# Patient Record
Sex: FEMALE | Race: WHITE | NOT HISPANIC OR LATINO | Employment: OTHER | URBAN - METROPOLITAN AREA
[De-identification: names, ages, dates, MRNs, and addresses within clinical notes are randomized per-mention and may not be internally consistent; named-entity substitution may affect disease eponyms.]

---

## 2017-03-21 ENCOUNTER — ALLSCRIPTS OFFICE VISIT (OUTPATIENT)
Dept: OTHER | Facility: OTHER | Age: 82
End: 2017-03-21

## 2017-03-28 ENCOUNTER — TRANSCRIBE ORDERS (OUTPATIENT)
Dept: ADMINISTRATIVE | Facility: HOSPITAL | Age: 82
End: 2017-03-28

## 2017-03-28 DIAGNOSIS — I35.0 NODULAR CALCIFIC AORTIC VALVE STENOSIS: Primary | ICD-10-CM

## 2017-04-19 ENCOUNTER — GENERIC CONVERSION - ENCOUNTER (OUTPATIENT)
Dept: OTHER | Facility: OTHER | Age: 82
End: 2017-04-19

## 2017-04-19 ENCOUNTER — LAB (OUTPATIENT)
Dept: LAB | Facility: CLINIC | Age: 82
End: 2017-04-19
Payer: MEDICARE

## 2017-04-19 ENCOUNTER — HOSPITAL ENCOUNTER (OUTPATIENT)
Dept: NON INVASIVE DIAGNOSTICS | Facility: CLINIC | Age: 82
Discharge: HOME | End: 2017-04-19
Payer: MEDICARE

## 2017-04-19 DIAGNOSIS — I35.0 NONRHEUMATIC AORTIC VALVE STENOSIS: ICD-10-CM

## 2017-04-19 LAB
ANION GAP SERPL CALCULATED.3IONS-SCNC: 6 MMOL/L (ref 4–13)
ATRIAL RATE: 54 BPM
BUN SERPL-MCNC: 17 MG/DL (ref 5–25)
CALCIUM SERPL-MCNC: 9.7 MG/DL (ref 8.3–10.1)
CHLORIDE SERPL-SCNC: 106 MMOL/L (ref 100–108)
CO2 SERPL-SCNC: 29 MMOL/L (ref 21–32)
CREAT SERPL-MCNC: 0.7 MG/DL (ref 0.6–1.3)
ERYTHROCYTE [DISTWIDTH] IN BLOOD BY AUTOMATED COUNT: 13.9 % (ref 11.6–15.1)
GFR SERPL CREATININE-BSD FRML MDRD: >60 ML/MIN/1.73SQ M
GLUCOSE SERPL-MCNC: 88 MG/DL (ref 65–140)
HCT VFR BLD AUTO: 42.4 % (ref 34.8–46.1)
HGB BLD-MCNC: 14.3 G/DL (ref 11.5–15.4)
MCH RBC QN AUTO: 34.1 PG (ref 26.8–34.3)
MCHC RBC AUTO-ENTMCNC: 33.7 G/DL (ref 31.4–37.4)
MCV RBC AUTO: 101 FL (ref 82–98)
P AXIS: 65 DEGREES
PLATELET # BLD AUTO: 188 THOUSANDS/UL (ref 149–390)
PMV BLD AUTO: 10.8 FL (ref 8.9–12.7)
POTASSIUM SERPL-SCNC: 4.4 MMOL/L (ref 3.5–5.3)
PR INTERVAL: 172 MS
QRS AXIS: -17 DEGREES
QRSD INTERVAL: 130 MS
QT INTERVAL: 466 MS
QTC INTERVAL: 441 MS
RBC # BLD AUTO: 4.19 MILLION/UL (ref 3.81–5.12)
SODIUM SERPL-SCNC: 141 MMOL/L (ref 136–145)
T WAVE AXIS: 82 DEGREES
VENTRICULAR RATE: 54 BPM
WBC # BLD AUTO: 5.58 THOUSAND/UL (ref 4.31–10.16)

## 2017-04-19 PROCEDURE — 85027 COMPLETE CBC AUTOMATED: CPT

## 2017-04-19 PROCEDURE — 36415 COLL VENOUS BLD VENIPUNCTURE: CPT

## 2017-04-19 PROCEDURE — 93005 ELECTROCARDIOGRAM TRACING: CPT

## 2017-04-19 PROCEDURE — 93306 TTE W/DOPPLER COMPLETE: CPT

## 2017-04-19 PROCEDURE — 80048 BASIC METABOLIC PNL TOTAL CA: CPT

## 2017-04-24 ENCOUNTER — ALLSCRIPTS OFFICE VISIT (OUTPATIENT)
Dept: OTHER | Facility: OTHER | Age: 82
End: 2017-04-24

## 2017-05-04 ENCOUNTER — ALLSCRIPTS OFFICE VISIT (OUTPATIENT)
Dept: OTHER | Facility: OTHER | Age: 82
End: 2017-05-04

## 2017-06-21 ENCOUNTER — ALLSCRIPTS OFFICE VISIT (OUTPATIENT)
Dept: OTHER | Facility: OTHER | Age: 82
End: 2017-06-21

## 2017-08-21 ENCOUNTER — ALLSCRIPTS OFFICE VISIT (OUTPATIENT)
Dept: OTHER | Facility: OTHER | Age: 82
End: 2017-08-21

## 2017-08-21 DIAGNOSIS — E03.9 HYPOTHYROIDISM: ICD-10-CM

## 2017-10-17 ENCOUNTER — GENERIC CONVERSION - ENCOUNTER (OUTPATIENT)
Dept: GERIATRICS | Age: 82
End: 2017-10-17

## 2017-10-24 DIAGNOSIS — E03.9 HYPOTHYROIDISM: ICD-10-CM

## 2017-11-09 ENCOUNTER — ALLSCRIPTS OFFICE VISIT (OUTPATIENT)
Dept: OTHER | Facility: OTHER | Age: 82
End: 2017-11-09

## 2017-11-10 NOTE — PROGRESS NOTES
Assessment  Assessed    1  History of Aortic stenosis, severe (424 1) (I35 0)   2  History of Aortic Valve Replacement Transcatheter   3  Left ventricular hypertrophy (429 3) (I51 7)    Plan  Combined disorders of mitral, aortic and tricuspid valves, S/P TAVR (transcatheter aorticvalve replacement)    · Follow-up visit in 6 months Evaluation and Treatment  Follow-up  Status: Hold For -Scheduling  Requested for: 60EVE0511   Ordered;Combined disorders of mitral, aortic and tricuspid valves, S/P TAVR (transcatheter aortic valve replacement); Ordered By: Nicole Martin Performed:  Due: 95QOC2722    Discussion/Summary  Discussion Summary:   Ms Kenia Trammell is a pleasant 49-year-old female who presents to the office today for a routine follow-up  Since her last visit she has been feeling well  She continues to exercise and is asymptomatic   blood pressure is well-controlled on no medication  most recent echo reveals a well-seated aortic valve with mild to moderate regurgitation and mild perivalvular regurgitation  She will undergo a repeat study next year  She was reminded of the need for antibiotic prophylaxis prior to dental procedures  changes were made to her medication regimen  will see her back in the office in six months or sooner if deemed necessary  History of Present Illness  HPI: Ms Kenia Trammell is a pleasant 49-year-old female who presents to the office today for routine followup   her last visit she has been feeling well  She continues swimming a few times a week  She also participates in senior aerobics although she admits to not doing this as frequently as she had been  With those activities she denies any chest pain or shortness of breath  She denies any signs or symptoms of congestive heart failure including increasing lower extremity edema, paroxysmal nocturnal dyspnea, or orthopnea  She denies weight gain or increasing abdominal girth  She denies lightheadedness, dizziness, syncope or presyncope   She denies symptoms of claudication  continues to smoke a few cigarettes per week  Review of Systems  Cardiology Female ROS:    Cardiac: as noted in HPI  Skin: No complaints of nonhealing sores or skin rash  Genitourinary: No complaints of recurrent urinary tract infections, frequent urination at night, difficult urination, blood in urine, kidney stones, loss of bladder control, kidney problems, denies any birth control or hormone replacement, is not post menopausal, not currently pregnant  Psychological: No complaints of feeling depressed, anxiety, panic attacks, or difficulty concentrating  General: no changes in weight  Respiratory: No complaints of shortness of breath, cough with sputum, or wheezing  HEENT: No complaints of serious problems, hearing problems, nose problems, throat problems, or snoring  Gastrointestinal: No complaints of liver problems, nausea, vomiting, heartburn, constipation, bloody stools, diarrhea, problems swallowing, adbominal pain, or rectal bleeding  Hematologic: No complaints of bleeding disorders, anemia, blood clots, or excessive brusing  Neurological: No complaints of numbness, tingling, dizziness, weakness, seizures, headaches, syncope or fainting, AM fatigue, daytime sleepiness, no witnessed apnea episodes  Musculoskeletal: No complaints of arthritis, back pain, or painfull swelling  Active Problems  Problems    1  History of Aortic stenosis, severe (424 1) (I35 0)   2  Cerumen impaction (380 4) (H61 20)   3  Combined disorders of mitral, 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)   4  Diplopia (368 2) (H53 2)   5  DNR (do not resuscitate) (V49 86) (Z66)   6  Essential hypertension (401 9) (I10)   7  Essential tremor (333 1) (G25 0)   8  Fatigue (780 79) (R53 83)   9  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)   10  Hearing loss (389 9) (H91 90)   11  Hypothyroidism (244 9) (E03 9)   12  IBS (irritable bowel syndrome) (564 1) (K58 9)   13   Insomnia (780 52) (G47 00) 14  Left ventricular hypertrophy (429 3) (I51 7)   15  Near syncope (780 2) (R55)   16  Onychomycosis of toenail (110 1) (B35 1)   17  Osteoarthritis (715 90) (M19 90)   18  Osteoporosis (733 00) (M81 0)   19  Postop check (V67 00) (Z09)   20  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)   21  Symptoms involving cardiovascular system (785 9) (R09 89)   22  Urinary incontinence (788 30) (R32)    Past Medical History  Problems    1  History of Aortic stenosis, severe (424 1) (I35 0)   2  History of hypothyroidism (V12 29) (Z86 39)   3  History of upper respiratory infection (V12 09) (Z87 09)   4  History of viral infection (V12 09) (Z86 19)    Surgical History  Problems    1  History of Aortic Valve Replacement Transcatheter   2  History of Cataract Surgery   3  History of Hysterectomy   4  History of Splenectomy  Surgical History Reviewed: The surgical history was reviewed and updated today  Family History  Father    1  Family history of Coronary Artery Disease (V17 49)  Brother    2  Family history of Congestive Heart Failure  Family History Reviewed: The family history was reviewed and updated today  Social History  Problems    · Denied: History of Alcohol Use (History)   · Current some day smoker (305 1) (F17 200)  Social History Reviewed: The social history was reviewed and updated today  Current Meds   1  Acetaminophen 500 MG Oral Tablet; take 2 tablet twice daily; Therapy: 45RWH1709 to Recorded   2  Amoxicillin 500 MG Oral Tablet; TAKE 4 TABLET Other one hour prior to procedure TDD:2000 mg; Therapy: 91AYU7709 to (Evaluate:42Faw2345); Last BI:81REQ2123 Ordered   3  Aspirin 81 MG CAPS; take 1 capsule daily; Therapy: (Doron Bentley) to Recorded   4  Glucosamine Chondroitin Complx Oral Capsule; one tablet twice daisha; Therapy: 80VGJ1696 to Recorded   5  Imodium Advanced 2-125 MG TABS; USE AS DIRECTED; Therapy: 74TPO8947 to Recorded   6   Levothyroxine Sodium 75 MCG Oral Tablet; Take 1 tablet daily  Requested for: 87XET2088; Last Rx:25Oct2017; Status: ACTIVE - Retrospective By Protocol Authorization Ordered   7  Melatonin 5 MG Oral Tablet; TAKE AS DIRECTED; Therapy: 96KLN2829 to (Evaluate:27Nov2014); Last Rx:10Nov2014 Ordered   8  Multivitamins TABS; TAKE 1 TABLET DAILY; Therapy: 67LOG5920 to Recorded   9  Systane Contacts Ophthalmic Solution; one drop in each eye twice daily; Therapy: 28HIN0209 to Recorded  Medication List Reviewed: The medication list was reviewed and updated today  Allergies  Medication    1  No Known Drug Allergies    Vitals  Vital Signs    Recorded: 44WMB6114 10:26AM   Heart Rate 72, L Radial   Pulse Quality Normal, L Radial   Systolic 149, LUE, Sitting   Diastolic 72, LUE, Sitting   Height 5 ft 6 in   Weight 119 lb    BMI Calculated 19 21   BSA Calculated 1 6       Physical Exam   Constitutional  General appearance: No acute distress, well appearing and well nourished  Eyes  Conjunctiva and Sclera examination: Conjunctiva pink, sclera anicteric  Ears, Nose, Mouth, and Throat - Oropharynx: Clear, nares are clear, mucous membranes are moist   Neck  Neck and thyroid: Normal, supple, trachea midline, no thyromegaly  Pulmonary  Respiratory effort: No increased work of breathing or signs of respiratory distress  Cardiovascular  Auscultation of heart: Abnormal   The heart rate was normal  The rhythm was regular  Heart sounds: normal S1,-- normal S2,-- no S3-- and-- no S4  A grade 1 systolic murmur was heard at the RUSB  Early peaking without radiation  Carotid pulses: Normal, 2+ bilaterally  Peripheral vascular exam: Normal pulses throughout, no tenderness, erythema or swelling  Pedal pulses: Normal, 2+ bilaterally  Examination of extremities for edema and/or varicosities: Normal    Abdomen  Abdomen: Non-tender and no distention  Liver and spleen: No hepatomegaly or splenomegaly  Musculoskeletal Gait and station: Normal gait  -- Digits and nails: Normal without clubbing or cyanosis  -- Inspection/palpation of joints, bones, and muscles: Normal, ROM normal    Skin - Skin and subcutaneous tissue: Normal without rashes or lesions  Skin is warm and well perfused, normal turgor  Neurologic - Cranial nerves: II - XII intact  Psychiatric - Orientation to person, place, and time: Normal -- Mood and affect: Normal       Health Management  Health Maintenance   Medicare Annual Wellness Visit; every 1 year; Last 02OBA4490; Next Due: 91QGA8638;  Overdue    Future Appointments    Date/Time Provider Specialty Site   11/21/2017 08:30 AM Stas Allen, 10 [facility] [address] [facility] OFFICE       Signatures   Electronically signed by : Hua Perea DO; Nov 9 2017 10:51AM EST                       (Author)

## 2017-11-21 ENCOUNTER — ALLSCRIPTS OFFICE VISIT (OUTPATIENT)
Dept: OTHER | Facility: OTHER | Age: 82
End: 2017-11-21

## 2017-12-21 DIAGNOSIS — E03.9 HYPOTHYROIDISM: ICD-10-CM

## 2018-01-10 NOTE — PROCEDURES
Chief Complaint  Pt here to have ear wax removed  Patient mentioned that she has some wax problems in the past  She has had this removed previously  There was not a significant amount of decreased hearing currently  She did also ask about her echo, and we talked about DO NOT RESUSCITATE  Current Meds   1  Acetaminophen 500 MG Oral Tablet; PRN; Therapy: 48ZPD7445 to Recorded   2  Calcium 600 MG Oral Tablet; Takw one tablet twice daily; Therapy: (Recorded:94Ign0604) to Recorded   3  Glucosamine Chondroitin Complx Oral Capsule; one tablet twice daisha; Therapy: 93UIJ6669 to Recorded   4  Ibuprofen TABS; PRN; Therapy: (Leanne Mcghee) to Recorded   5  Imodium Advanced 2-125 MG Oral Tablet; USE AS DIRECTED; Therapy: 67KUW4500 to Recorded   6  Levothyroxine Sodium 75 MCG Oral Tablet; Take 1 tablet daily; Therapy: 63WDZ7098 to (Evaluate:31Okh7756)  Requested for: 99DHC8933; Last   Rx:12Jan2016 Ordered   7  Melatonin 5 MG Oral Tablet; TAKE AS DIRECTED; Therapy: 93MOW3853 to (Evaluate:27Nov2014); Last Rx:10Nov2014 Ordered   8  Multivitamins TABS; TAKE 1 TABLET DAILY; Therapy: 90MKO9370 to Recorded   9  Systane Contacts Ophthalmic Solution; one drop in each eye twice daily; Therapy: 01UNH2348 to Recorded    Allergies    1  No Known Drug Allergies    Vitals  Signs [Data Includes: Current Encounter]    Heart Rate: 76, L Radial  Respiration: 16  Systolic: 695, LUE, Sitting  Diastolic: 68, LUE, Sitting  Height: 5 ft 5 in  Weight: 129 lb 0 5 oz  BMI Calculated: 21 47  BSA Calculated: 1 64    Procedure    Procedure: cerumen removal    Indication: cerumen impaction in the left ear  Procedure Note: The procedure was performed by the Provider   The procedure required significant time and effort to remove the cerumen  A otoscope was placed in the ear canal(s) to visualize the ear canal debris  The ear was cleaned by using a curette  The procedure was successful     Post-Procedure:   Patient Status: the patient tolerated the procedure well  Complications: there were no complications  Patient instructions: avoid using q-tips  Follow-up as needed  Assessment    1  Cerumen impaction (380 4) (H61 20)   2  Combined disorders of mitral, 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)   · Moderate aortic stenosis, moderate tricuspid regurgitation, mild mitral regurgitation - 2D      echo, 2014    3  DNR (do not resuscitate) (V49 86) (Z66)   · Has advanced directives  Discussion/Summary    #1: Cerumen impaction: Patient tolerated quite well  Small amount was removed from the left ear  Right ear did not have enough to remove, so this was not performed today  #2: Lower abnormalities: Patient to follow with cardiology in the near future  They will review the aortic valve stenosis, as well as the tricuspid and mitral valves  #3: DO NOT RESUSCITATE: Patient clearly voiced understanding and wished to be DO NOT RESUSCITATE  She states she does have a low showing same  Will enter the diagnosis in the chart  Future Appointments    Date/Time Provider Specialty Site   2016 10:30 AM CHING Abbott  Family Medicine [facility] [place] OFF[facility]   2016 10:40 AM Mode Mnotana DO Cardiology [facility] CARDIOLOG[facility]     Results/Data  Results    ECHO COMPLETE WITH CONTRAST IF INDICATED, TTE / TRANSTHORACIC   ECHO COMPLETE WITH CONTRAST IF INDICATED: [facility]   [address]   [phone]     Transthoracic Echocardiogram   2D, M-mode, Doppler, and Color Doppler     Study date:  2016     Patient: Roslyn Holstein   MR number: [id number]   Account number: [de-identified]   : 1928   Age: 80 years   Gender: Female   Status: Outpatient   Location: [address] [place] [address] [facility]   Height: 65 in   Weight: 128 7 lb   BP: 122/ 78 mmHg     Indications: Aortic valve disorder       Diagnoses: I35 0 - Nonrheumatic aortic (valve) stenosis Sonographer:  Radha Aparicio, [facility], RDCS   Primary Physician:  Carlota Beltran MD   Referring Physician:  Hakan King DO   Group:  [facility]   Interpreting Physician:  Cherylene Cleaver, DO     SUMMARY     LEFT VENTRICLE:   Systolic function was normal by visual assessment  Ejection fraction was   estimated to be 65 %  There were no regional wall motion abnormalities  Doppler parameters were consistent with abnormal left ventricular relaxation   (grade 1 diastolic dysfunction)  MITRAL VALVE:   There was moderate thickening  There was mild regurgitation  AORTIC VALVE:   demonstrated moderately increased thickness, moderate calcification, and   reduced mobility  There was likely severe stenosis with HAYLIE 0 9 cm2,   dimensionless index of 0 2 and mean gradient of 25 mmHg  Lower gradient may be   due to decreased stroke volume from smaller heart size   Valve mean gradient was 27 mmHg  TRICUSPID VALVE:   There was moderate regurgitation  HISTORY: PRIOR HISTORY: LVH, hypothyroidism, smoker, mitral valve disorder,   aortic stenosis  PROCEDURE: The study was performed in the [address] [facility]  This was a routine study  The transthoracic approach was used  The study   included complete 2D imaging, M-mode, complete spectral Doppler, and color   Doppler  The heart rate was 62 bpm, at the start of the study  Images  were   obtained from the parasternal, apical, subcostal, and suprasternal notch   acoustic windows  Image quality was adequate  LEFT VENTRICLE: Size was normal  Systolic function was normal by visual   assessment  Ejection fraction was estimated to be 65 %  There were no regional   wall motion abnormalities  DOPPLER: Doppler parameters were consistent with   abnormal left ventricular relaxation (grade 1 diastolic dysfunction)       RIGHT VENTRICLE: The size was normal  Systolic function was normal      LEFT ATRIUM: Size was normal      RIGHT ATRIUM: Size was normal      MITRAL VALVE: There was moderate thickening  DOPPLER: There was mild   regurgitation  AORTIC VALVE: demonstrated moderately increased thickness, moderate   calcification, and reduced mobility  DOPPLER: There was likely severe stenosis   with HAYLIE 0 9 cm2, dimensionless index of 0 2 and mean gradient of  25 mmHg  Lower gradient may be due to decreased stroke volume from smaller heart size     TRICUSPID VALVE: DOPPLER: There was moderate regurgitation  Estimated peak PA   pressure was 37 mmHg  PULMONIC VALVE: Not well visualized  PERICARDIUM: There was no pericardial effusion  The pericardium was normal in   appearance  AORTA: The root exhibited normal size  MEASUREMENT TABLES     DOPPLER MEASUREMENTS   Aortic valve   (Reference normals)   Peak britany   3 2 cm/s   (--)   VTI   80 cm   (--)   Peak gradient   46 mmHg   (--)   Mean gradient   27 mmHg   (--)   Valve area   0 9 cm squared   (--)   Tricuspid valve   (Reference normals)   RV-RA peak gradient   32 mmHg   (--)     SYSTEM MEASUREMENT TABLES     2D   %FS: 46 98 %   Ao Diam: 3 32 cm   EDV(Teich): 47 21 ml   EF(Cube): 85 09 %   EF(Teich): 79 44 %   ESV(Cube): 5 82 ml   ESV(Teich): 9 71 ml   IVSd: 1 14 cm   LA Area: 14 59 cm2   LA Diam: 3 73 cm   LVEDV MOD A4C: 59 34 ml   LVEF MOD A4C: 63 52 %   LVESV MOD A4C: 21 65 ml   LVIDd: 3 39 cm   LVIDs: 1 8 cm   LVLd A4C: 6 8 cm   LVLs A4C: 5 71 cm   LVOT Diam: 1 89 cm   LVPWd: 1 16 cm   RA Area: 12 69 cm2   RV Diam : 3 27 cm   SI(Cube): 20 27 ml/m2   SI(Teich): 22 87 ml/m2   SV MOD A4C: 37 69 ml   SV(Cube): 33 25 ml   SV(Teich): 37 5 ml     CW   AV Env  Ti: 314 85 ms   AV VTI: 75 83 cm   AV Vmax: 3 3 m/s   AV Vmean: 2 41 m/s   AV maxP 58 mmHg   AV meanP 89 mmHg   TR Vmax: 2 83 m/s   TR maxP 12 mmHg     PW   HAYLIE (VTI): 0 91 cm2   HAYLIE Vmax: 0 81 cm2   E': 0 05 m/s   E/E': 13 61   HR: 62 26 BPM   LVCI Dopp: 2 61 l/minm2   LVCO Dopp: 4 29 L/min   LVOT Env  Ti: 357 36 ms   LVOT VTI: 24 45 cm   LVOT Vmax: 0 95 m/s   LVOT Vmean: 0 69 m/s   LVOT maxPG: 3 59 mmHg   LVOT meanP 09 mmHg   LVSI Dopp: 41 96 ml/m2   LVSV Dopp: 68 82 ml   MV A Edson: 1 61 m/s   MV Dec Payette: 1 3 m/s2   MV DecT: 550 54 ms   MV E Edson: 0 72 m/s   MV E/A Ratio: 0 45     [facility]     Prepared and electronically signed by     Shavonne Khanna DO   Signed 2016 14:05:25     Signatures   Electronically signed by :  CHING Mcnamara ; 2016  9:56AM EST                       (Author)

## 2018-01-10 NOTE — MISCELLANEOUS
History of Present Illness  Cardiac Surgery Phone Follow-up:    This phone call was in regards to post-operative care  Procedure: TF TAVR  4/12/16   Hospital Discharge Date: 4/14/16  Surgeon: Anam Dickinson DO  Office Visit Pending: Yes   5/11/16  The patient has an appointment with their cardiologist on: 4/22/16  Date of Call: 04/18/2016, Follow-up call after discharge  Symptom review with the patient is as follows: no shortness of breath, no chest pain, no leg swelling, pain is controlled, incision stable, no bowel problems, stable appetite, no lightheaded/dizziness, heart rate stable, activity: tolerating daily activity and walks, , patient's reported pre-op weight was 125 lbs, patient's reported weight at discharge was 115 lbs, patient's reported current weight is 122 lbs and medication review  Comments: Spoke to patient  She feels well, offers no complaints  Weight stable, no edema  Difficulty with removing groin and neck dressings (adhered to incisions) but sites scabbed, no bleeding  She is stopping her Colace due to loose stool  Denies lightheadedness or SOB with activity, just tires easily  Plan: 1  Daily weight, call for weight gain 2 lbs or more in 24 hours  2  Continue activity and frequent short walks,increase as tolerated  3  Continue use of IS  4  Call CTS with questions, reviewed scheduled follow-up appts  Active Problems    1  Aortic stenosis (424 1) (I35 0)   2  Cerumen impaction (380 4) (H61 20)   3  Combined disorders of mitral, 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)   4  Diplopia (368 2) (H53 2)   5  DNR (do not resuscitate) (V49 86) (Z66)   6  Essential tremor (333 1) (G25 0)   7  Fatigue (780 79) (R53 83)   8  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)   9  Hearing loss (389 9) (H91 90)   10  Hypothyroidism (244 9) (E03 9)   11  IBS (irritable bowel syndrome) (564 1) (K58 9)   12  Insomnia (780 52) (G47 00)   13  Left ventricular hypertrophy (429 3) (I51 7)   14   Near syncope (780 2) (R55)   15  Osteoarthritis (715 90) (M19 90)   16  Osteoporosis (733 00) (M81 0)   17  Symptoms involving cardiovascular system (785 9) (R09 89)   18  Urinary incontinence (788 30) (R32)    Past Medical History    1  History of hypothyroidism (V12 29) (Z86 39)   2  History of upper respiratory infection (V12 09) (Z87 09)   3  History of viral infection (V12 09) (Z86 19)    Surgical History    1  History of Cataract Surgery   2  History of Hysterectomy   3  History of Splenectomy    Family History  Problems    1  Family history of Congestive Heart Failure : Brother   2  Family history of Coronary Artery Disease (V17 49) : Father    Social History    · Denied: History of Alcohol Use (History)   · Current some day smoker (305 1) (F17 210)    Current Meds    1  Mupirocin 2 % External Ointment; 1 application each nares BID for 5 days before surgery; Therapy: 01Apr2016 to (Last Rx:01Apr2016)  Requested for: 01Apr2016 Ordered    2  Systane Contacts Ophthalmic Solution; one drop in each eye twice daily; Therapy: 49FTY4406 to Recorded    3  Glucosamine Chondroitin Complx Oral Capsule; one tablet twice daisha; Therapy: 33CMC8681 to Recorded   4  Multivitamins TABS; TAKE 1 TABLET DAILY; Therapy: 59YKZ7882 to Recorded    5  Imodium Advanced 2-125 MG Oral Tablet; USE AS DIRECTED; Therapy: 36ZPG3866 to Recorded    6  Melatonin 5 MG Oral Tablet; TAKE AS DIRECTED; Therapy: 31XAB3202 to (Evaluate:27Nov2014); Last Rx:10Nov2014 Ordered    7  Acetaminophen 500 MG Oral Tablet; PRN; Therapy: 24SNA7628 to Recorded   8  Calcium 600 MG Oral Tablet; Takw one tablet twice daily; Therapy: (Recorded:82Ssm8317) to Recorded    9  Ibuprofen TABS; PRN; Therapy: (Sohan Niece) to Recorded   10  Synthroid 75 MCG Oral Tablet (Levothyroxine Sodium); TAKE 1 TABLET EVERY OTHER    DAY Recorded    Signatures   Electronically signed by : JESSICA Kilpatrick;  Apr 18 2016 12:15PM EST                       (Author)

## 2018-01-10 NOTE — PROGRESS NOTES
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)    Plan    · (1) TSH WITH FT4 REFLEX; Status:Active; Requested for:25Fyh4655;    · Follow-up visit in 3 months Evaluation and Treatment  Follow-up  Status: Hold For -  Scheduling  Requested for: 66Gyc0048    Discussion/Summary  Discussion Summary:   Patient is an 70-year-old  female with history of hypothyroidism which be reassessed prior to her next visit  The patient has had aortic valve replacement and appears to be doing extremely well her valve will need to be followed periodically by the cardiologist with echocardiograms  Patient is not interested in getting a DEXA scan or taking treatment for osteoporosis should she have some  Counseling Documentation With Imm: The patient was counseled regarding diagnostic results, instructions for management, patient and family education  total time of encounter was 45 minutes and 20 minutes was spent counseling  [id number]   Goals and Barriers: Patient's goals are to continue being active and exercising regularly  None at present  Patient's Capacity to Self-Care: Patient is able to Self-Care  Patient Education: Educational resources provided: Did note to the patient that her TSH needs to be monitored every 6 months  Medication SE Review and Pt Understands Tx: Possible side effects of new medications were reviewed with the patient/guardian today  Geriatrics ADL [facility]: Toilet  Soiling or wetting while awake more than once a week  Feeding:  Eats without assistance   Dressing: Dresses, undresses, and selects clothes from own wardrobe  Housekeeping: Always neatly dressed and well groomed, without assistance   Physical Ambulation: Linda Garcia Goes about grounds or city  Bathing:  Bathes self ( tub,shower,sponge bath) without help     Geriatrics IADLs [facility]: Ability to use telephone:  Operates telephone on own initiative; looks up and dials numbers, etc    Shopping  Takes care of shopping needs independently  Food Preparation: Plans, prepare, and serves adequate meals independently  Housekeeping: Maintains house alone or with occasional assistance ( i,e, domestic help)  Laundry:  Does personal laundry completely  Transportation Modes:  Travels independently on public transportation or drives own car  Responsibility for Own Medication  Is responsible for taking correct medication in correct dosages at correct time  Ability to Handle Finances: Kathy Restrepo Manages financial matters independently (budgets, writes checks, pays rent and bills, goes to bank, collects and keeps track of income  Driving History No problems while driving  Chief Complaint  Chief Complaint Free Text Note Form: Pt here for 2mth routine- Hypothyroidism, S/P TAVR, HTN       History of Present Illness  HPI: Patient is an 19-year-old  female with a recent history of aortic valve replacement via Tavr  The patient does not wish to have a DEXA scan doesn't really believe in the treatment for osteoporosis  She does have history of hypothyroidism and is currently being treated for that  Her TSHs have been within normal limits but we need to get one this coming month to document efficacy of treatment  The patient does have some hearing loss but she has managed to buy equipment at a cheaper price that helps her with her hearing deficit  She has no history of chest pain denies any shortness of breath  She recently became violently ill sometime last week which appears to be some form of food poisoning  She had vomiting and diarrhea but this has subsided  Review of Systems  Complete-Female:   Constitutional: No fever, no chills, feels well, no tiredness, no recent weight gain or weight loss     Eyes: No complaints of eye pain, no red eyes, no eyesight problems, no discharge, no dry eyes, no itching of eyes  ENT: no complaints of earache, no loss of hearing, no nose bleeds, no nasal discharge, no sore throat, no hoarseness  Cardiovascular: No complaints of slow heart rate, no fast heart rate, no chest pain, no palpitations, no leg claudication, no lower extremity edema  Respiratory: No complaints of shortness of breath, no wheezing, no cough, no SOB on exertion, no orthopnea, no PND  Gastrointestinal: No complaints of abdominal pain, no constipation, no nausea or vomiting, no diarrhea, no bloody stools  Genitourinary: No complaints of dysuria, no incontinence, no pelvic pain, no dysmenorrhea, no vaginal discharge or bleeding  Musculoskeletal: No complaints of arthralgias, no myalgias, no joint swelling or stiffness, no limb pain or swelling  Integumentary: No complaints of skin rash or lesions, no itching, no skin wounds, no breast pain or lump  Neurological: No complaints of headache, no confusion, no convulsions, no numbness, no dizziness or fainting, no tingling, no limb weakness, no difficulty walking  Psychiatric: Not suicidal, no sleep disturbance, no anxiety or depression, no change in personality, no emotional problems  Endocrine: No complaints of proptosis, no hot flashes, no muscle weakness, no deepening of the voice, no feelings of weakness  Hematologic/Lymphatic: No complaints of swollen glands, no swollen glands in the neck, does not bleed easily, does not bruise easily  Geriatric Syndrome [place]:   the memory was unimpaired  has no depression  no falls  no vision impairment  hearing impairment  incontinence  gait is normal     has no osteoporosis  with no delirium  no polypharmacy  Active Problems    1  History of Aortic stenosis, severe (424 1) (I35 0)   2  Cerumen impaction (380 4) (H61 20)   3  Combined disorders of mitral, 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)   4  Diplopia (368 2) (H53 2)   5   DNR (do not resuscitate) (V49 86) (Z66)   6  Essential hypertension (401 9) (I10)   7  Essential tremor (333 1) (G25 0)   8  Fatigue (780 79) (R53 83)   9  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)   10  Hearing loss (389 9) (H91 90)   11  Hypothyroidism (244 9) (E03 9)   12  IBS (irritable bowel syndrome) (564 1) (K58 9)   13  Insomnia (780 52) (G47 00)   14  Left ventricular hypertrophy (429 3) (I51 7)   15  Near syncope (780 2) (R55)   16  Onychomycosis of toenail (110 1) (B35 1)   17  Osteoarthritis (715 90) (M19 90)   18  Osteoporosis (733 00) (M81 0)   19  Postop check (V67 00) (Z09)   20  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)   21  Symptoms involving cardiovascular system (785 9) (R09 89)   22  Urinary incontinence (788 30) (R32)    Past Medical History    1  History of Aortic stenosis, severe (424 1) (I35 0)   2  History of hypothyroidism (V12 29) (Z86 39)   3  History of upper respiratory infection (V12 09) (Z87 09)   4  History of viral infection (V12 09) (Z86 19)    Surgical History    1  History of Aortic Valve Replacement Transcatheter   2  History of Cataract Surgery   3  History of Hysterectomy   4  History of Splenectomy  Surgical History Reviewed: The surgical history was reviewed and updated today  Family History  Father    1  Family history of Coronary Artery Disease (V17 49)  Brother    2  Family history of Congestive Heart Failure  Family History Reviewed: The family history was reviewed and updated today  Social History    · Denied: History of Alcohol Use (History)   · Current some day smoker (305 1) (F17 200)  Social History Reviewed: The social history was reviewed and updated today  Current Meds   1  Acetaminophen 500 MG Oral Tablet; take 2 tablet twice daily; Therapy: 23WDZ2848 to Recorded   2  Amoxicillin 500 MG Oral Tablet; TAKE 4 TABLET Other one hour prior to procedure   TDD:2000 mg; Therapy: 84IHA7654 to (Evaluate:45Vmi2272); Last AR:[id number] Ordered   3  Aspirin 81 MG CAPS; take 1 capsule daily; Therapy: (Rosalie Fine) to Recorded   4  Glucosamine Chondroitin Complx Oral Capsule; one tablet twice daisha; Therapy: 40TMV8716 to Recorded   5  Imodium Advanced 2-125 MG TABS; USE AS DIRECTED; Therapy: 03YIH9890 to Recorded   6  Levothyroxine Sodium 75 MCG Oral Tablet; Take 1 tablet daily  Requested for:   66RTJ6834; Last Rx:37Ngz7911 Ordered   7  Melatonin 5 MG Oral Tablet; TAKE AS DIRECTED; Therapy: 36UQG2361 to (Evaluate:27Nov2014); Last Rx:10Nov2014 Ordered   8  Multivitamins TABS; TAKE 1 TABLET DAILY; Therapy: 96LGS2817 to Recorded   9  Systane Contacts Ophthalmic Solution; one drop in each eye twice daily; Therapy: 50JBU2020 to Recorded  Medication List Reviewed: The medication list was reviewed and updated today  Allergies    1  No Known Drug Allergies    Vitals  Signs   Recorded: 21Aug2017 08:45AM   Heart Rate: 70, R Radial  Respiration: 16  Systolic: 257, RUE, Sitting  Diastolic: 66, RUE, Sitting  Height: 5 ft 5 in  Weight: 118 lb 0 5 oz  BMI Calculated: 19 64  BSA Calculated: 1 58  O2 Saturation: 95    Physical Exam    Constitutional   General appearance: No acute distress, well appearing and well nourished  Eyes   Conjunctiva and lids: No erythema, swelling or discharge  Pupils and irises: Equal, round, reactive to light  Ophthalmoscopic examination: Normal fundi and optic discs  Ears, Nose, Mouth, and Throat   External inspection of ears and nose: Normal     Otoscopic examination: Tympanic membranes translucent with normal light reflex  Canals patent without erythema  Hearing: Normal     Conversational Hearing: Normal     Nasal mucosa, septum, and turbinates: Normal without edema or erythema  Lips, teeth, and gums: Normal, good dentition  Oropharynx: Normal with no erythema, edema, exudate or lesions  Neck   Neck: Supple, symmetric, trachea midline, no masses  Thyroid: Normal, no thyromegaly      Pulmonary Respiratory effort: No increased work of breathing or signs of respiratory distress  Percussion of chest: Normal     Palpation of chest: Normal     Auscultation of lungs: Clear to auscultation  Cardiovascular   Palpation of heart: Normal PMI, no thrills  Auscultation of heart: Abnormal   aortic diastolic murmur  Carotid pulses: 2+ bilaterally  Abdominal aorta: Normal     Femoral pulses: 2+ bilaterally  Pedal pulses: 2+ bilaterally  Examination of extremities for edema and/or varicosities: Normal     Abdomen   Abdomen: Non-tender, no masses  Liver and spleen: No hepatomegaly or splenomegaly  Lymphatic   Palpation of lymph nodes in neck: No lymphadenopathy  Palpation of lymph nodes in axillae: No lymphadenopathy  Palpation of lymph nodes in groin: No lymphadenopathy  Palpation of lymph nodes in other areas: No lymphadenopathy  Musculoskeletal   Gait and station: Normal     Inspection/palpation of digits and nails: Normal without clubbing or cyanosis  Inspection/palpation of joints, bones, and muscles: Normal     Range of motion: Normal     Stability: Normal     Muscle strength/tone: Normal     Skin   Skin and subcutaneous tissue: Normal without rashes or lesions  Palpation of skin and subcutaneous tissue: Normal turgor  Neurologic   Cranial nerves: Cranial nerves 2-12 intact  Reflexes: 2+ and symmetric  Sensation: No sensory loss  Psychiatric   Judgment and insight: Normal     Orientation to person, place and time: Normal     Recent and remote memory: Intact  Mood and affect: Normal        Results/Data  PHQ-2 Adult Depression Screening 74Vwl5532 08:46AM User, s     Test Name Result Flag Reference   PHQ-2 Adult Depression Score 0     Over the last two weeks, how often have you been bothered by any of the following problems?   Little interest or pleasure in doing things: Not at all - 0  Feeling down, depressed, or hopeless: Not at all - 0   PHQ-2 Adult Depression Screening Negative       Falls Risk Assessment (Dx Z13 89 Screen for Neurologic Disorder) 76Adk6606 08:46AM User, Ahs     Test Name Result Flag Reference   Falls Risk      No falls in the past year       Health Management  Health Maintenance   Medicare Annual Wellness Visit; every 1 year; Last 41PSD2145; Next Due: 68WQP5094;  Overdue    Signatures   Electronically signed by : CHING Clayton ; Aug 21 2017  9:18AM EST                       (Author)

## 2018-01-10 NOTE — PROGRESS NOTES
Plan  Health Maintenance    · Medicare Annual Wellness Visit ; every 1 year; Last [id number]; Next [id number];  Status:Active    Discussion/Summary    Pt cont to do well w/out c/o  Reviewed medicare wellness visit  Pt does c/o some fatigue w/cardiac rehab, not sure how long she wants to cont  Discussed poss of decreasing sessions to once or twice a week and cont for duration as able  Pt will discuss w/card rehab coordinator  Has reg scheduled appt w/cards next week, pcp next month  Impression: Subsequent Annual Wellness Visit  Cardiovascular screening and counseling: screening is current, counseling was given on maintaining a healthy diet, counseling was given on ways to improve exercise tolerance and counseling was given on tobacco cessation  Diabetes screening and counseling: screening is current and counseling was given on maintaining a healthy diet  Osteoporosis screening and counseling: the risks and benefits of screening were discussed  Abdominal aortic aneurysm screening and counseling: screening not indicated  Glaucoma screening and counseling: screening is current  HIV screening and counseling: the risks and benefits of screening were discussed  Self Referrals: No      Chief Complaint  Pt here for medicare annual visit      History of Present Illness  Annual visit - no new complaints  Had tvar done in April, is now going to cardiac rehab  Fatigued from cardiac rehab - goes 3x/w, not sure if she can cont that much for 12 wks  Denies cp, palpitation, no sob except 1/2 way up last flight of 3 steps  Occ dry cough  Occ diarrhea (chronic) - r/t foods- uses prn Imodium  Voids 2-3 times/night, some stress incont  No swelling  Enjoys swimming - goal is to return to swimming  The patient is being seen for the subsequent annual wellness visit  Medicare Screening and Risk Factors   Hospitalizations: she has been previously hospitalizied and Aortic Valve Replacement     Once per lifetime medicare screening tests: AAA screening US has not yet been done  Medicare Screening Tests Risk Questions   Abdominal aortic aneurysm risk assessment: none indicated  Osteoporosis risk assessment: female gender, over 48years of age and tobacco use  HIV risk assessment: none indicated  Drug and Alcohol Use: The patient is a current cigarette smoker  She has 12 pack year(s) of cigarette use and had quit for awile then needs to resume  She has declined tobacco cessation intervention  The patient reports occasional alcohol use  Alcohol concern:   The patient has no concerns about alcohol abuse  She has never used illicit drugs  She is not ready to quit using drugs  Diet and Physical Activity: Current diet includes well balanced meals and 2 cups of tea per day  She exercises 3 times per week  Exercise: swimming, strength training 5 hours per week  Mood Disorder and Cognitive Impairment Screening: Geriatric Depression Scale   Yes, the patient is satisfied with Her life  No, the patient has not dropped any activities or interests  No, the patient does not feel that their life is empty  No, the patient does not often get bored  Yes, the patient is hopeful about the future  No, the patient is not bothered by thoughts in their head  Yes, the patient is in good spirits most of the time  No, the patient is not afraid something bad is going to happen to them  Yes, the patient feels happy most of the time  No, the patient does not often feel helpless  Yes, the patient often gets restless and fidgety  Point = 1  Yes, the patient prefers to stay home rather then try new things  Point = 1  No, the patient does not worry about the future  No, the patient does not feel that they have more problems with their memory than most    Yes, the patient thinks its wonderful to be alive now  No, the patient does not feel downhearted or blue     No, the patient does not feel worthless the way they are currently  Yes, the patient finds that life is very exciting  No, the patient does not worry a lot about the past    No, it is not hard for the patient to get started on new projects  Yes, the patient feels full of energy  No, the patient does not feel their situation is hopeless  No, the patient does not feel that most people are better off then they are  No, the patient does not frequently get upset about the little things  No, the patient does not frequently feel like crying  No, the patient does not have any problems concentrating  No, the patient does not enjoy getting up in the morning  Point = 1  No, the patient enjoys social gatherings  Yes, the patient finds it easy to make decisions  Yes, the patient feels their mind is as clear as it used to be  Normal 0 - 9, Mild Depression 10 - 19, Severe Depression 20 - 30   Depression screening  no significant symptoms  She denies feeling down, depressed, or hopeless over the past two weeks  She denies feeling little interest or pleasure in doing things over the past two weeks  Further Evaluation: has decreased some exercise activity r/t recent tvar and cardiac rehab  Cognitive impairment screening: denies difficulty learning/retaining new information, denies difficulty handling complex tasks, denies difficulty with reasoning, denies difficulty with spatial ability and orientation, denies difficulty with language and denies difficulty with behavior  Functional Ability/Level of Safety: Hearing is slightly decreased, slightly decreased in the right ear, slightly decreased in the left ear and a hearing aid is not used  The patient is currently able to do activities of daily living without limitations, able to do instrumental activities of daily living without limitations and able to drive without limitations  Fall risk factors: The patient fell 0 times in the past 12 months  Further Evaluation: no night or long distance driving     Advance Directives: Advance directives: living will, durable power of  for health care directives and advance directives  Co-Managers and Medical Equipment/Suppliers: See Patient Care Team     Last Medicare Wellness Visit Information was reviewed, patient interviewed and updates made to the record today  Falls Risk: The patient fell 0 times in the past 12 months  The patient is currently asymptomatic Symptoms Include:  Urinary Incontinence Symptoms includes: no urinary frequency, but no urinary urgency, no urinary hesitancy and no dysuria    Date of last glaucoma screen was 10/15      Patient Care Team    Care Team Member Role Specialty Office Number   Ludy Fady BEASLEY  Geriatrics [phone]   Tien Jacinto DO  Cardiology [phone] DO  Cardiac Surgery [phone]   [place] [phone]   Pauleen Lesches DO  Cardiology [phone]     Review of Systems    The patient presents with complaints of fatigue (chronic, increased since cardiac rehab started)  Head and Face: negative  Eyes: negative  The patient presents with complaints of hearing loss (sees audiologist, not quite ready for hearing aid  has some buzzing in ears after listening to son's rock and roll band)  The patient presents with complaints of sneezing (occ r/t seasonal allergies)   Cardiovascular: negative  Respiratory: negative  Gastrointestinal: no abdominal pain, no abdominal bloating, no nausea, no vomiting and no constipation    The patient presents with complaints of diarrhea (chronic r/t foods - takes Imodium w/relief - avoids trigger foods when going out)  Genitourinary: stress incont  Musculoskeletal: joint stiffness and right knee  Integumentary and Breasts: negative  Neurological: negative  Psychiatric: insomnia, but no anxiety, no depression and not suicidal    Endocrine: negative     Hematologic and Lymphatic: a tendency for easy bruising, but no swollen glands and no swollen glands in the neck  Over the past 2 weeks, how often have you been bothered by the following problems? 1 ) Little interest or pleasure in doing things? Not at all    2 ) Feeling down, depressed or hopeless? Not at all    3 ) Trouble falling asleep or sleeping too much? Not at all    4 ) Feeling tired or having little energy? Not at all    5 ) Poor appetite or overeating? Not at all    6 ) Feeling bad about yourself, or that you are a failure, or have let yourself or your family down? Not at all    7 ) Trouble concentrating on things, such as reading a newspaper or watching television? Not at all    8 ) Moving or speaking so slowly that other people could have noticed, or the opposite, moving or speaking faster than usual? Not at all    9 ) Thoughts that you would be better off dead or of hurting yourself in some way? 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)   9  Hearing loss (389 9) (H91 90)   10  HTN (hypertension) (401 9) (I10)   11  Hypothyroidism (244 9) (E03 9)   12  IBS (irritable bowel syndrome) (564 1) (K58 9)   13  Insomnia (780 52) (G47 00)   14  Left ventricular hypertrophy (429 3) (I51 7)   15  Near syncope (780 2) (R55)   16  Onychomycosis of toenail (110 1) (B35 1)   17  Osteoarthritis (715 90) (M19 90)   18  Osteoporosis (733 00) (M81 0)   19  Postop check (V67 00) (Z09)   20  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)   21  Symptoms involving cardiovascular system (785 9) (R09 89)   22  Urinary incontinence (788 30) (R32)    Past Medical History    1  History of hypothyroidism (V12 29) (Z86 39)   2   History of upper respiratory infection (V12 09) (Z87 09)   3  History of viral infection (V12 09) (Z86 19)    The active problems and past medical history were reviewed and updated today  Surgical History    1  History of Aortic Valve Replacement Transcatheter   2  History of Cataract Surgery   3  History of Hysterectomy   4  History of Splenectomy    The surgical history was reviewed and updated today  Family History  Father    1  Family history of Coronary Artery Disease (V17 49)  Brother    2  Family history of Congestive Heart Failure    The family history was reviewed and updated today  Social History    · Denied: History of Alcohol Use (History)   · Current some day smoker (305 1) (F17 210)  The social history was reviewed and updated today  The social history was reviewed and is unchanged  Current Meds   1  Acetaminophen 500 MG Oral Tablet; take 2 tablet twice daily; Therapy: 08GJL8567 to Recorded   2  Aspirin 81 MG CAPS; take 1 capsule daily; Therapy: (Usman Regalado) to Recorded   3  Calcium 600 + D TABS; TAKE 1 TABLET DAILY; Therapy: (Recorded:14Jun2016) to Recorded   4  Clopidogrel Bisulfate 75 MG Oral Tablet; TAKE 1 TABLET DAILY; Therapy: (Recorded:15Hoa7755) to Recorded   5  Glucosamine Chondroitin Complx Oral Capsule; one tablet twice daisha; Therapy: 83EWL2319 to Recorded   6  Imodium Advanced 2-125 MG Oral Tablet; USE AS DIRECTED; Therapy: 88QMX8847 to Recorded   7  Levothyroxine Sodium 75 MCG Oral Tablet; Take 1 tablet daily Recorded   8  Melatonin 5 MG Oral Tablet; TAKE AS DIRECTED; Therapy: 38UWM0476 to (Evaluate:27Nov2014); Last Rx:10Nov2014 Ordered   9  Metoprolol Tartrate 25 MG Oral Tablet; TAKE 1/2 TAB IN THE AM AND 1/2 TAB IN THE PM;   Therapy: (Recorded:14Jun2016) to Recorded   10  Multivitamins TABS; TAKE 1 TABLET DAILY; Therapy: 99GCX7025 to Recorded   11  Systane Contacts Ophthalmic Solution; one drop in each eye twice daily;     Therapy: 21TOJ4516 to Recorded    The medication list was reviewed and updated today  Allergies    1  No Known Drug Allergies    Immunizations  Influenza --- Anthony Desouza: [id number]     Vitals  Signs [Data Includes: Current Encounter]   Recorded: 85QDS9417 08:47AM   Heart Rate: 62, L Radial  Respiration: 16  Systolic: 269, LUE, Sitting  Diastolic: 70, LUE, Sitting  Height: 5 ft 5 in  Weight: 129 lb 0 32 oz  BMI Calculated: 21 47  BSA Calculated: 1 64    Procedure    Procedure: Visual Acuity Test    Indication: routine screening  Inforrmation supplied by a Snellen chart  Results: 20/20/40 in both eyes with corrective device, 20/20/40 in the right eye with corrective device, 20/20/50 in the left eye with corrective device      Health Management  Health Maintenance   Medicare Annual Wellness Visit; every 1 year; Last 18YPP9099; Next Due: 99EUW0464; Active    Future Appointments    Date/Time Provider Specialty Site   07/27/2016 10:00 AM CHING Flores  Family Medicine [address] [place] [address] OFFICE   06/24/2016 01:00 PM Aditi Wilson DO Cardiology [facility] CARDIOLOG[facility]     Signatures   Electronically signed by : Vahid Sullivan, ; Jun 14 2016  8:49AM EST                       (Author)    Electronically signed by : Laura Davey, [address] [place] [address]; Jun 14 2016  9:43AM EST                       (Author)    Electronically signed by :  CHING Hardin ; Jun 20 2016  2:21PM EST

## 2018-01-10 NOTE — MISCELLANEOUS
Message  Wellness center requesting refill of pt's levothyroxine  Looking back at notes, last tsh was 1/2016 w/plan recheck in spring  WIll refill x1, then plan to recheck tsh presently  WIll let health center know to make pt aware  Plan  Hypothyroidism    · (1) TSH; Status:Active;  Requested for:62Mwt1343;     Signatures   Electronically signed by : Delfino Mcadams, [facility]; Jul 19 2016 12:53PM EST                       (Author)

## 2018-01-11 DIAGNOSIS — I10 ESSENTIAL (PRIMARY) HYPERTENSION: ICD-10-CM

## 2018-01-11 DIAGNOSIS — M81.0 AGE-RELATED OSTEOPOROSIS WITHOUT CURRENT PATHOLOGICAL FRACTURE: ICD-10-CM

## 2018-01-11 DIAGNOSIS — R29.890 LOSS OF HEIGHT: ICD-10-CM

## 2018-01-11 NOTE — PROGRESS NOTES
Assessment    1  Cerumen impaction (380 4) (T71 20)    Plan    · Removal Impacted Cerumen Using Irrigation / Lavage one or both ears - POC;  Status:Complete;   Done: 60UFC7587   · Follow-up visit in 2 months Evaluation and Treatment  Follow-up  Status: Hold For -  Scheduling  Requested for: 21Jun2017    Discussion/Summary  Discussion Summary:   In summary we have an 70-year-old  female who comes in with cerumen impaction I proceeded to cleanse both ears with a curet successfully  Chief Complaint  Chief Complaint Free Text Note Form: Pt here for routine visit- Hypothyroidism, S/P TAVR, HTN, Osteoporosis      History of Present Illness  HPI: Patient returns to the office she is an 70-year-old  female who had quite interesting past medical history relevant for aortic stenosis had aortic valve replacement back on April 14 of 2016 via TAVR  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 and her clinical picture resolved  She is here today for cerumen impaction  Review of Systems  Complete-Female:   Constitutional: No fever, no chills, feels well, no tiredness, no recent weight gain or weight loss  Eyes: No complaints of eye pain, no red eyes, no eyesight problems, no discharge, no dry eyes, no itching of eyes  ENT: no complaints of earache, no loss of hearing, no nose bleeds, no nasal discharge, no sore throat, no hoarseness  Cardiovascular: No complaints of slow heart rate, no fast heart rate, no chest pain, no palpitations, no leg claudication, no lower extremity edema  Respiratory: No complaints of shortness of breath, no wheezing, no cough, no SOB on exertion, no orthopnea, no PND  Gastrointestinal: No complaints of abdominal pain, no constipation, no nausea or vomiting, no diarrhea, no bloody stools  Genitourinary: No complaints of dysuria, no incontinence, no pelvic pain, no dysmenorrhea, no vaginal discharge or bleeding  Musculoskeletal: No complaints of arthralgias, no myalgias, no joint swelling or stiffness, no limb pain or swelling  Integumentary: No complaints of skin rash or lesions, no itching, no skin wounds, no breast pain or lump  Neurological: No complaints of headache, no confusion, no convulsions, no numbness, no dizziness or fainting, no tingling, no limb weakness, no difficulty walking  Psychiatric: Not suicidal, no sleep disturbance, no anxiety or depression, no change in personality, no emotional problems  Endocrine: No complaints of proptosis, no hot flashes, no muscle weakness, no deepening of the voice, no feelings of weakness  Hematologic/Lymphatic: No complaints of swollen glands, no swollen glands in the neck, does not bleed easily, does not bruise easily  Geriatric Syndrome Kathe Phalen:   the memory was unimpaired  has no depression  no falls  recent weight loss  no vision impairment  no hearing impairment  incontinence  gait is normal     has no osteoporosis  with no delirium  no polypharmacy  Active Problems    1  History of Aortic stenosis, severe (424 1) (I35 0)   2  Cerumen impaction (380 4) (H61 20)   3  Combined disorders of mitral, 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)   4  Diplopia (368 2) (H53 2)   5  DNR (do not resuscitate) (V49 86) (Z66)   6  Essential hypertension (401 9) (I10)   7  Essential tremor (333 1) (G25 0)   8  Fatigue (780 79) (R53 83)   9  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)   10  Hearing loss (389 9) (H91 90)   11  Hypothyroidism (244 9) (E03 9)   12  IBS (irritable bowel syndrome) (564 1) (K58 9)   13  Insomnia (780 52) (G47 00)   14  Left ventricular hypertrophy (429 3) (I51 7)   15  Near syncope (780 2) (R55)   16  Onychomycosis of toenail (110 1) (B35 1)   17  Osteoarthritis (715 90) (M19 90)   18  Osteoporosis (733 00) (M81 0)   19  Postop check (V67 00) (Z09)   20   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)   21  Symptoms involving cardiovascular system (785 9) (R09 89)   22  Urinary incontinence (788 30) (R32)    Past Medical History    1  History of Aortic stenosis, severe (424 1) (I35 0)   2  History of hypothyroidism (V12 29) (Z86 39)   3  History of upper respiratory infection (V12 09) (Z87 09)   4  History of viral infection (V12 09) (Z86 19)    Surgical History    1  History of Aortic Valve Replacement Transcatheter   2  History of Cataract Surgery   3  History of Hysterectomy   4  History of Splenectomy  Surgical History Reviewed: The surgical history was reviewed and updated today  Family History  Father    1  Family history of Coronary Artery Disease (V17 49)  Brother    2  Family history of Congestive Heart Failure  Family History Reviewed: The family history was reviewed and updated today  Social History    · Denied: History of Alcohol Use (History)   · Current some day smoker (305 1) (F17 200)  Social History Reviewed: The social history was reviewed and updated today  Current Meds   1  Acetaminophen 500 MG Oral Tablet; take 2 tablet twice daily; Therapy: 82EOO5644 to Recorded   2  Amoxicillin 500 MG Oral Tablet; TAKE 4 TABLET Other one hour prior to procedure   TDD:2000 mg; Therapy: 30QNE5552 to (Evaluate:07May2017); Last MA:24RWK8964 Ordered   3  Aspirin 81 MG CAPS; take 1 capsule daily; Therapy: (Willie Distance) to Recorded   4  Glucosamine Chondroitin Complx Oral Capsule; one tablet twice daisha; Therapy: 97AUG1809 to Recorded   5  Imodium Advanced 2-125 MG TABS; USE AS DIRECTED; Therapy: 26URB4700 to Recorded   6  Levothyroxine Sodium 75 MCG Oral Tablet; Take 1 tablet daily  Requested for:   02QZA6704; Last Rx:99Pxh8748; Status: ACTIVE - Retrospective By Protocol   Authorization Ordered   7  Melatonin 5 MG Oral Tablet; TAKE AS DIRECTED; Therapy: 62NAV3581 to (Evaluate:27Nov2014); Last Rx:10Nov2014 Ordered   8   Multivitamins TABS; TAKE 1 TABLET DAILY; Therapy: 04MKC2565 to Recorded   9  Systane Contacts Ophthalmic Solution; one drop in each eye twice daily; Therapy: 32OJS7074 to Recorded  Medication List Reviewed: The medication list was reviewed and updated today  Allergies    1  No Known Drug Allergies    Vitals  Signs   Recorded: 21Jun2017 12:20PM   Heart Rate: 66, L Radial  Respiration: 16  Systolic: 813, LUE, Sitting  Diastolic: 76, LUE, Sitting  Weight: 119 lb 3 oz  BMI Calculated: 19 83  BSA Calculated: 1 59    Physical Exam    Constitutional   General appearance: No acute distress, well appearing and well nourished  Eyes   Conjunctiva and lids: No erythema, swelling or discharge  Pupils and irises: Equal, round, reactive to light  Ophthalmoscopic examination: Normal fundi and optic discs  Ears, Nose, Mouth, and Throat   External inspection of ears and nose: Normal     Otoscopic examination: Abnormal   increased wax bilat  Hearing: Normal     Conversational Hearing: Normal     Nasal mucosa, septum, and turbinates: Normal without edema or erythema  Lips, teeth, and gums: Normal, good dentition  Oropharynx: Normal with no erythema, edema, exudate or lesions  Neck   Neck: Supple, symmetric, trachea midline, no masses  Thyroid: Normal, no thyromegaly  Pulmonary   Respiratory effort: No increased work of breathing or signs of respiratory distress  Percussion of chest: Normal     Palpation of chest: Normal     Auscultation of lungs: Clear to auscultation  Cardiovascular   Palpation of heart: Normal PMI, no thrills  Auscultation of heart: Normal rate and rhythm, normal S1 and S2, no murmurs  Carotid pulses: 2+ bilaterally  Abdominal aorta: Normal     Femoral pulses: 2+ bilaterally  Pedal pulses: 2+ bilaterally  Examination of extremities for edema and/or varicosities: Normal     Chest   Breasts: Normal, no dimpling or skin changes appreciated      Palpation of breasts and axillae: Normal, no masses palpated  Chest: Normal     Abdomen   Abdomen: Non-tender, no masses  Liver and spleen: No hepatomegaly or splenomegaly  Examination for hernias: No hernias appreciated  Anus, perineum, and rectum: Normal sphincter tone, no masses, no prolapse  Stool sample for occult blood: Negative  Genitourinary   External genitalia and vagina: Normal, no lesions appreciated  Urethra: Normal, no discharge  Bladder: Not distended, no tenderness  Cervix: Normal, no lesions  Uterus: Normal size, no tenderness, no masses  Adnexa/Parametria: Normal, no masses or tenderness  Lymphatic   Palpation of lymph nodes in neck: No lymphadenopathy  Palpation of lymph nodes in axillae: No lymphadenopathy  Palpation of lymph nodes in groin: No lymphadenopathy  Palpation of lymph nodes in other areas: No lymphadenopathy  Musculoskeletal   Gait and station: Normal     Inspection/palpation of digits and nails: Normal without clubbing or cyanosis  Inspection/palpation of joints, bones, and muscles: Normal     Range of motion: Normal     Stability: Normal     Muscle strength/tone: Normal     Skin   Skin and subcutaneous tissue: Normal without rashes or lesions  Palpation of skin and subcutaneous tissue: Normal turgor  Neurologic   Cranial nerves: Cranial nerves 2-12 intact  Reflexes: 2+ and symmetric  Sensation: No sensory loss  Psychiatric   Judgment and insight: Normal     Orientation to person, place and time: Normal     Recent and remote memory: Intact  Mood and affect: Normal        Results/Data  PHQ-2 Adult Depression Screening 21Jun2017 12:17PM User, s     Test Name Result Flag Reference   PHQ-2 Adult Depression Score 0     Over the last two weeks, how often have you been bothered by any of the following problems?   Little interest or pleasure in doing things: Not at all - 0  Feeling down, depressed, or hopeless: Not at all - 0   PHQ-2 Adult Depression Screening Negative Falls Risk Assessment (Dx Z13 89 Screen for Neurologic Disorder) 21Jun2017 12:17PM User, s     Test Name Result Flag Reference   Falls Risk      No falls in the past year       Health Management  Health Maintenance   Medicare Annual Wellness Visit; every 1 year; Last 48ESF7747; Next Due: 85ZLB4902;  Overdue    Signatures   Electronically signed by : CHING Benitez ; Jun 21 2017 12:46PM EST                       (Author)

## 2018-01-11 NOTE — PROGRESS NOTES
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)    Plan    · Levothyroxine Sodium 75 MCG Oral Tablet; TAKE 1 TABLET DAILY   · (1) TSH; Status:Active; Requested AYF:91DXM7023;    · Follow-up visit in 3 months Evaluation and Treatment  Follow-up  Status: Hold For -  Scheduling  Requested for: 27Oct2016    Discussion/Summary  Discussion Summary:   #1: Hypertension: Blood pressure today is excellent  Based on this, I would recommend that she continue on the metoprolol at her current dose (25 mg one half tablet twice daily)  Other than this, no other changes at the moment  #2: Hypothyroidism: Recent TSH was abnormal, however the repeat TSH was normal  Recommend recheck in 3 months, then adjust as needed  #3: Status post TAVR: Stable  Follows with cardiology  No changes at the moment  Valve appears to be functioning correctly  #4: Abnormal valves: Follow with cardiology as scheduled in the future  Chief Complaint  Chief Complaint Free Text Note Form: Pt here for 3mth routine-S/P TAVR,HTN,Hypothyroidism,insomnia,Osteoporosis  Review bw done 07/26/16  Pt want to discuss D/C Metoprolol 25mg  Pt said Dr Pari Jiang stated that she would be taken off meds but last   renewal she got 11 refills  History of Present Illness  HPI: She is doing well with her blood pressure  She did question whether or not she was supposed to stop the metoprolol  Her valve is doing quite well at this point  Has completed the follow-up with the vascular/valve replacement team, as well as cardiology  With regards to her thyroid, patient is on Synthroid  She did have a TSH done recently Ã2  The first one showed greater than 5, and the second showed 3 5  She is not experiencing any hypothyroid symptoms    She did mention that she was concerned bit about weight loss  She does have a normal range between 128 and 132, and is currently slightly below that at 127, and she did mention that she is eating quite a bit  Of note, she does like chocolate cake  Review of Systems  Complete-Female:   Constitutional: No fever, no chills, feels well, no tiredness, no recent weight gain or weight loss  Eyes: No complaints of eye pain, no red eyes, no eyesight problems, no discharge, no dry eyes, no itching of eyes  ENT: no complaints of earache, no loss of hearing, no nose bleeds, no nasal discharge, no sore throat, no hoarseness  Cardiovascular: No complaints of slow heart rate, no fast heart rate, no chest pain, no palpitations, no leg claudication, no lower extremity edema  Respiratory: No complaints of shortness of breath, no wheezing, no cough, no SOB on exertion, no orthopnea, no PND  Gastrointestinal: No complaints of abdominal pain, no constipation, no nausea or vomiting, no diarrhea, no bloody stools  Genitourinary: No complaints of dysuria, no incontinence, no pelvic pain, no dysmenorrhea, no vaginal discharge or bleeding  Musculoskeletal: No complaints of arthralgias, no myalgias, no joint swelling or stiffness, no limb pain or swelling  Integumentary: No complaints of skin rash or lesions, no itching, no skin wounds, no breast pain or lump  Neurological: No complaints of headache, no confusion, no convulsions, no numbness, no dizziness or fainting, no tingling, no limb weakness, no difficulty walking  Psychiatric: Not suicidal, no sleep disturbance, no anxiety or depression, no change in personality, no emotional problems  Endocrine: No complaints of proptosis, no hot flashes, no muscle weakness, no deepening of the voice, no feelings of weakness  Hematologic/Lymphatic: No complaints of swollen glands, no swollen glands in the neck, does not bleed easily, does not bruise easily  Active Problems    1   History of Aortic stenosis, severe (424  1) (I35 0)   2  Cerumen impaction (380 4) (H61 20)   3  Combined disorders of mitral, 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)   4  Diplopia (368 2) (H53 2)   5  DNR (do not resuscitate) (V49 86) (Z66)   6  Essential tremor (333 1) (G25 0)   7  Fatigue (780 79) (R53 83)   8  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)   9  Hearing loss (389 9) (H91 90)   10  HTN (hypertension) (401 9) (I10)   11  Hypothyroidism (244 9) (E03 9)   12  IBS (irritable bowel syndrome) (564 1) (K58 9)   13  Insomnia (780 52) (G47 00)   14  Left ventricular hypertrophy (429 3) (I51 7)   15  Near syncope (780 2) (R55)   16  Onychomycosis of toenail (110 1) (B35 1)   17  Osteoarthritis (715 90) (M19 90)   18  Osteoporosis (733 00) (M81 0)   19  Postop check (V67 00) (Z09)   20  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)   21  Symptoms involving cardiovascular system (785 9) (R09 89)   22  Urinary incontinence (788 30) (R32)    Past Medical History    1  History of Aortic stenosis, severe (424 1) (I35 0)   2  History of hypothyroidism (V12 29) (Z86 39)   3  History of upper respiratory infection (V12 09) (Z87 09)   4  History of viral infection (V12 09) (Z86 19)    Surgical History    1  History of Aortic Valve Replacement Transcatheter   2  History of Cataract Surgery   3  History of Hysterectomy   4  History of Splenectomy  Surgical History Reviewed: The surgical history was reviewed and updated today  Family History  Father    1  Family history of Coronary Artery Disease (V17 49)  Brother    2  Family history of Congestive Heart Failure  Family History Reviewed: The family history was reviewed and updated today  Social History    · Denied: History of Alcohol Use (History)   · Current some day smoker (305 1) (F17 210)  Social History Reviewed: The social history was reviewed and updated today  The social history was reviewed and is unchanged  Current Meds   1   Acetaminophen 500 MG Oral Tablet; take 2 tablet twice daily; Therapy: 55DCG6604 to Recorded   2  Aspirin 81 MG CAPS; take 1 capsule daily; Therapy: (Benjamen Roots) to Recorded   3  Calcium 600 + D TABS; TAKE 1 TABLET DAILY; Therapy: (Recorded:73Sge6125) to Recorded   4  Clopidogrel Bisulfate 75 MG Oral Tablet; TAKE 1 TABLET DAILY  Requested for:   50QEM8411; Last ND:01CHT1436 Ordered   5  Glucosamine Chondroitin Complx Oral Capsule; one tablet twice daisha; Therapy: 13LMP6381 to Recorded   6  Imodium Advanced 2-125 MG Oral Tablet; USE AS DIRECTED; Therapy: 11UXP0487 to Recorded   7  Levothyroxine Sodium 75 MCG Oral Tablet; TAKE 1 TABLET DAILY  Requested for:   98Tmx1777; Last Rx:25Onw7807; Status: ACTIVE - Retrospective By Protocol Authorization   Ordered   8  Melatonin 5 MG Oral Tablet; TAKE AS DIRECTED; Therapy: 98PQQ8499 to (Evaluate:27Nov2014); Last Rx:10Nov2014 Ordered   9  Metoprolol Tartrate 25 MG Oral Tablet; TAKE 1/2 TAB IN THE AM AND 1/2 TAB IN THE PM    Requested for: 64SFS4378; Last SA:00EGP5129 Ordered   10  Multivitamins TABS; TAKE 1 TABLET DAILY; Therapy: 89EHM9960 to Recorded   11  Systane Contacts Ophthalmic Solution; one drop in each eye twice daily; Therapy: 97WEZ6974 to Recorded  Medication List Reviewed: The medication list was reviewed and updated today  Allergies    1  No Known Drug Allergies    Vitals  Signs   Recorded: 07ADI1238 15:54ZV   Systolic: 315, RUE, Sitting  Diastolic: 68, RUE, Sitting  Heart Rate: 66, R Radial  Respiration: 16  Height: 5 ft 5 in  Weight: 127 lb 0 64 oz  BMI Calculated: 21 14  BSA Calculated: 1 63    Physical Exam    Constitutional   General appearance: No acute distress, well appearing and well nourished  Head and Face   Head and face: Normal     Palpation of the face and sinuses: No sinus tenderness  Pulmonary   Respiratory effort: No increased work of breathing or signs of respiratory distress  Auscultation of lungs: Clear to auscultation      Cardiovascular Auscultation of heart: Normal rate and rhythm, normal S1 and S2, no murmurs  Health Management  Health Maintenance   Medicare Annual Wellness Visit; every 1 year; Last 13IDV3473; Next Due: 63MFS5450; Active    Signatures   Electronically signed by :  CHING Bloom ; Jul 27 2016 10:37AM EST                       (Author)

## 2018-01-12 VITALS
WEIGHT: 119.19 LBS | RESPIRATION RATE: 16 BRPM | DIASTOLIC BLOOD PRESSURE: 76 MMHG | SYSTOLIC BLOOD PRESSURE: 120 MMHG | HEART RATE: 66 BPM | BODY MASS INDEX: 19.83 KG/M2

## 2018-01-12 NOTE — PROGRESS NOTES
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)    Plan    · Follow-up visit in 3 months Evaluation and Treatment  Follow-up  Status: Hold For -  Scheduling  Requested for: 31Oct2016   · A diet low in sodium and high in potassium, magnesium, and calcium can help your  blood pressure ; Status:Complete;   Done: 40KIV4562 10:29AM   · Begin a limited exercise program ; Status:Complete;   Done: 70ZHE6682 10:29AM   · Continue with our present treatment plan ; Status:Complete;   Done: 77PRB1848 10:29AM   · Eat a low fat and low cholesterol diet ; Status:Complete;   Done: 42NHE1975 10:29AM   · Call [phone] if: You develop double vision (see two of everything) ;  Status:Complete;   Done: 31Oct2016 10:29AM   · Call [phone] if: Your blood pressure is frequently higher than 140/90 ;  Status:Complete;   Done: 48ZRU0839 10:29AM   · Call 911 if: You experience a new kind of chest pain (angina) or pressure ;  Status:Complete;   Done: 31Oct2016 10:29AM   · Call 911 if: You have any symptoms of a stroke ; Status:Complete;   Done: 31Oct2016  10:29AM   · Seek Immediate Medical Attention if: You have a severe headache that will not go away ;  Status:Complete;   Done: 31Oct2016 10:29AM   · Seek Immediate Medical Attention if: Your blood pressure is greater than 250/120 for 2  consecutive readings ; Status:Complete;   Done: 12AME2841 10:29AM    · From  Levothyroxine Sodium 75 MCG Oral Tablet TAKE 1 TABLET DAILY To  Synthroid 75 MCG Oral Tablet (Levothyroxine Sodium) Take 1 tablet daily   · (1) TSH; Status:Active;  Requested for:14Kyk6964;    · Follow-up visit in 6 weeks Evaluation and Treatment  Follow-up  Status: Hold For -  Scheduling  Requested for: 23Fmq4318   · Begin a limited exercise program ; Status:Complete;   Done: 82LSZ9063 10:30AM   · You may slowly resume your normal level of activity once you feel better ;  Status:Complete;   Done: 31Oct2016 10:30AM    · Follow-up visit in 1 month Evaluation and Treatment  Follow-up  Status: Hold For -  Scheduling  Requested for: 31Oct2016   · Eat a low fat and low cholesterol diet ; Status:Complete;   Done: 31Oct2016 10:28AM   · Use your oxygen at 2 liters per minute 12 hours a day ; Status:Complete;   Done:  31Oct2016 10:28AM   · Oxygen Supplies; Status:Complete;   Done: 22URR3623 10:28AM    Discussion/Summary  Discussion Summary:   #1: Aortic stenosis: Status post TAVR  Patient doing relatively well from this  In fact, she doesn't seem to have any problems at all  Given that she will be in [place] and the surrounding areas at high elevation in December, would be okay with portable oxygen for her when she is there for a when necessary basis  It is more likely on the basis of deconditioning than anything else, however I will put down that this was secondary to her valvular abnormalities  #2: Hypertension: Stable  A pressure today is excellent  #3: Hypothyroidism: TSH is high again  She did mention that her pills may have been different colors recently  Based on this, recommend switch to brand name Synthroid at 75 Âµg, check labs in 6 weeks, and follow-up after that  #4: Status post TAVR: Patient doing extremely well  No changes  Medication SE Review and Pt Understands Tx: Possible side effects of new medications were reviewed with the patient/guardian today  The treatment plan was reviewed with the patient/guardian  The patient/guardian understands and agrees with the treatment plan      Chief Complaint  Chief Complaint Free Text Note Form: Pt here for 3mth routine- HTN,Hypothyroidism,S/P TAVR  c/o fatigue - review recent TSH done 10/27  Want to discuss flying to [place] to see family considering recent TAVR surgery not sure if she should  History of Present Illness  HPI: Reviewed TAVR with patient   She is concerned about flight and time in [place]  She did note that the altitude did bother her is [place] about 8 years ago  TSH is high today  I did review with her that she is not forgetting to take her medications  Previously her TSH was variable as well  She has noted a change in the color of the synthroid  Has only been since [employer]  Weight loss: SHe has had some weight loss recently  She states she is eating well, but cant seem to gain  Review of Systems  Complete-Female:   Constitutional: feeling tired, but as noted in HPI  Eyes: No complaints of eye pain, no red eyes, no eyesight problems, no discharge, no dry eyes, no itching of eyes  ENT: no complaints of earache, no loss of hearing, no nose bleeds, no nasal discharge, no sore throat, no hoarseness  Cardiovascular: No complaints of slow heart rate, no fast heart rate, no chest pain, no palpitations, no leg claudication, no lower extremity edema  Respiratory: No complaints of shortness of breath, no wheezing, no cough, no SOB on exertion, no orthopnea, no PND  Gastrointestinal: No complaints of abdominal pain, no constipation, no nausea or vomiting, no diarrhea, no bloody stools  Genitourinary: No complaints of dysuria, no incontinence, no pelvic pain, no dysmenorrhea, no vaginal discharge or bleeding  Musculoskeletal: No complaints of arthralgias, no myalgias, no joint swelling or stiffness, no limb pain or swelling  Integumentary: No complaints of skin rash or lesions, no itching, no skin wounds, no breast pain or lump  Neurological: No complaints of headache, no confusion, no convulsions, no numbness, no dizziness or fainting, no tingling, no limb weakness, no difficulty walking  Psychiatric: Not suicidal, no sleep disturbance, no anxiety or depression, no change in personality, no emotional problems  Endocrine: No complaints of proptosis, no hot flashes, no muscle weakness, no deepening of the voice, no feelings of weakness  Hematologic/Lymphatic: No complaints of swollen glands, no swollen glands in the neck, does not bleed easily, does not bruise easily  Active Problems    1  History of Aortic stenosis, severe (424 1) (I35 0)   2  Cerumen impaction (380 4) (H61 20)   3  Combined disorders of mitral, 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)   4  Diplopia (368 2) (H53 2)   5  DNR (do not resuscitate) (V49 86) (Z66)   6  Essential tremor (333 1) (G25 0)   7  Fatigue (780 79) (R53 83)   8  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)   9  Hearing loss (389 9) (H91 90)   10  HTN (hypertension) (401 9) (I10)   11  Hypothyroidism (244 9) (E03 9)   12  IBS (irritable bowel syndrome) (564 1) (K58 9)   13  Insomnia (780 52) (G47 00)   14  Left ventricular hypertrophy (429 3) (I51 7)   15  Near syncope (780 2) (R55)   16  Onychomycosis of toenail (110 1) (B35 1)   17  Osteoarthritis (715 90) (M19 90)   18  Osteoporosis (733 00) (M81 0)   19  Postop check (V67 00) (Z09)   20  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)   21  Symptoms involving cardiovascular system (785 9) (R09 89)   22  Urinary incontinence (788 30) (R32)    Past Medical History    1  History of Aortic stenosis, severe (424 1) (I35 0)   2  History of hypothyroidism (V12 29) (Z86 39)   3  History of upper respiratory infection (V12 09) (Z87 09)   4  History of viral infection (V12 09) (Z86 19)    Surgical History    1  History of Aortic Valve Replacement Transcatheter   2  History of Cataract Surgery   3  History of Hysterectomy   4  History of Splenectomy  Surgical History Reviewed: The surgical history was reviewed and updated today  Family History  Father    1  Family history of Coronary Artery Disease (V17 49)  Brother    2  Family history of Congestive Heart Failure  Family History Reviewed: The family history was reviewed and updated today         Social History    · Denied: History of Alcohol Use (History)   · Current some day smoker (305  1) (F17 210)  Social History Reviewed: The social history was reviewed and updated today  The social history was reviewed and is unchanged  Current Meds   1  Acetaminophen 500 MG Oral Tablet; take 2 tablet twice daily; Therapy: 55LVT3958 to Recorded   2  Aspirin 81 MG CAPS; take 1 capsule daily; Therapy: (Loreta Wilson) to Recorded   3  Calcium 600 + D TABS; TAKE 1 TABLET DAILY; Therapy: (Recorded:07Qzx2030) to Recorded   4  Glucosamine Chondroitin Complx Oral Capsule; one tablet twice daisha; Therapy: 07LXO0983 to Recorded   5  Imodium Advanced 2-125 MG Oral Tablet; USE AS DIRECTED; Therapy: 33AED5839 to Recorded   6  Levothyroxine Sodium 75 MCG Oral Tablet; TAKE 1 TABLET DAILY  Requested for:   48Kej7846; Last Rx:24End9514 Ordered   7  Melatonin 5 MG Oral Tablet; TAKE AS DIRECTED; Therapy: 74GKI2253 to (Evaluate:27Nov2014); Last Rx:97Lik1219 Ordered   8  Metoprolol Tartrate 25 MG Oral Tablet; TAKE 1/2 TAB IN THE AM AND 1/2 TAB IN THE PM    Requested for: 90FKS3768; Last VH:03WGF6361 Ordered   9  Multivitamins TABS; TAKE 1 TABLET DAILY; Therapy: 56VPK4689 to Recorded   10  Systane Contacts Ophthalmic Solution; one drop in each eye twice daily; Therapy: 72AOV7838 to Recorded  Medication List Reviewed: The medication list was reviewed and updated today  Allergies    1  No Known Drug Allergies    Vitals  Signs   Recorded: 48JFA3149 94:85IY   Systolic: 841, RUE, Sitting  Diastolic: 60, RUE, Sitting  Heart Rate: 62, R Radial  Respiration: 16  Height: 5 ft 5 in  Weight: 125 lb 0 16 oz  BMI Calculated: 20 8  BSA Calculated: 1 62    Physical Exam    Constitutional   General appearance: No acute distress, well appearing and well nourished  Pulmonary   Respiratory effort: No increased work of breathing or signs of respiratory distress  Auscultation of lungs: Clear to auscultation  Cardiovascular   Auscultation of heart: Normal rate and rhythm, normal S1 and S2, no murmurs  Carotid pulses: 2+ bilaterally  Examination of extremities for edema and/or varicosities: Normal          Health Management  Health Maintenance   Medicare Annual Wellness Visit; every 1 year; Last 62LRS4134; Next Due: 89XSQ2243; Active    Future Appointments    Date/Time Provider Specialty Site   11/30/2016 01:40 PM Mode [place] DO Cardiology [facility]     Signatures   Electronically signed by :  CHING Hanna ; Oct 31 2016 10:33AM EST                       (Author)

## 2018-01-13 VITALS
SYSTOLIC BLOOD PRESSURE: 138 MMHG | BODY MASS INDEX: 20.33 KG/M2 | WEIGHT: 122 LBS | HEART RATE: 62 BPM | HEIGHT: 65 IN | RESPIRATION RATE: 14 BRPM | DIASTOLIC BLOOD PRESSURE: 66 MMHG | OXYGEN SATURATION: 95 %

## 2018-01-13 VITALS
HEART RATE: 76 BPM | WEIGHT: 123 LBS | HEIGHT: 65 IN | BODY MASS INDEX: 20.49 KG/M2 | SYSTOLIC BLOOD PRESSURE: 128 MMHG | DIASTOLIC BLOOD PRESSURE: 76 MMHG

## 2018-01-13 NOTE — MISCELLANEOUS
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)   5  Essential tremor (333 1) (G25 0)   6  Hypothyroidism (244 9) (E03 9)   7  Onychomycosis of toenail (110 1) (B35 1)   8  DNR (do not resuscitate) (V49 86) (Y57)    Discussion/Summary  Discussion Summary:   #1: Combined valvular disorders of aortic mitral and tricuspid: Patient went and had valve replacement  She is doing extremely well at this point  Cardiac exam today was normal  She will follow-up with cardiology and CT surgery as scheduled  #2: Aortic stenosis: As per #1  #3: Hypertension: Blood pressure doing well  No changes  Follow-up as scheduled  #4: Status post TAVR: Follow-up with cardiology as scheduled  This will also be with CT surgery  Patient had a long discussion with me today about cardiac rehabilitation  Given the fact that she is DO NOT RESUSCITATE, and did not wish to have resuscitation or shocking, it would make sense that she did not necessarily need to participate in cardiac rehabilitation  She does have a fairly rigorous exercise program at [facility]  Recommend that she discuss this with Dr Don High at her visit for CT surgery  #5: Essential tremor: Stable  No changes at the moment  #7: Hypothyroidism: Stable  Check in the future  #8: Onychomycosis: Patient's nails are quite thickened and irritated and painful and brittle  Recommend follow-up with podiatry  This is especially true given the bunion deformity of her first toes bilaterally  #9: DO NOT RESUSCITATE: Patient continues to request DO NOT RESUSCITATE  She does understand this, and we will continue done at that request    Medication SE Review and Pt Understands Tx: Possible side effects of new medications were reviewed with the patient/guardian today   The treatment plan was reviewed with the patient/guardian  The patient/guardian understands and agrees with the treatment plan      Chief Complaint  Chief Complaint Free Text Note Form: Pt here for [facility] visit- Aortic Stenosis  [facility] and discharge 04/14/16  Only concern today is fatigue      History of Present Illness  TCM Communication [facility]: The patient is being contacted for follow-up after hospitalization  She was hospitalized at [facility]  The date of admission: 04/12/2016, date of discharge: 04/14/2016  Diagnosis: Aortic Stenosis  She was discharged to home  Medications reviewed and updated today  Follow-up appointments with other specialists: Cardiology appt 04/22/16 @2:40pm    Symptoms: fatigue, shortness of breath and wound drainage, but no fever, no weakness, no dizziness, no headache, no cough, no chest pain, no back pain on left side, no back pain on right side, no arm pain left side, no arm pain on right side, no leg pain on left side, no leg pain on right side, no upper abdominal pain, no middle abdominal pain, no lower abdominal pain, no rash:, no anorexia, no nausea, no vomiting, no loose stools, no constipation, no pain with urinating, no incisional pain and no swelling  Slight bleeding from neck wound  Will come to [facility] to change dressing  Communication performed and completed by    HPI: Patient was recently in the hospital  She had an aortic valve replacement  She did have a TAVR, with some postop delirium  She is better at this point  She is looking to return to swimming, aerobics, and fitness classes at [facility]  She was not told by cardio when she could return to those activities  Review of Systems  Complete-Female:   Constitutional: negative  Head and Face: negative  Eyes: negative  ENT: negative  Cardiovascular: negative  Respiratory: negative  Gastrointestinal: negative  Genitourinary: negative  Musculoskeletal: negative     Integumentary and Breasts: negative  Neurological: negative  Psychiatric: negative  Endocrine: negative  Hematologic and Lymphatic: negative  Active Problems     1  Aortic stenosis (424 1) (I35 0)   2  Cerumen impaction (380 4) (H61 20)   3  Diplopia (368 2) (H53 2)   4  DNR (do not resuscitate) (V49 86) (Z66)   5  Essential tremor (333 1) (G25 0)   6  Fatigue (780 79) (R53 83)   7  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)   8  Hearing loss (389 9) (H91 90)   9  Hypothyroidism (244 9) (E03 9)   10  IBS (irritable bowel syndrome) (564 1) (K58 9)   11  Insomnia (780 52) (G47 00)   12  Left ventricular hypertrophy (429 3) (I51 7)   13  Near syncope (780 2) (R55)   14  Osteoarthritis (715 90) (M19 90)   15  Osteoporosis (733 00) (M81 0)   16  Symptoms involving cardiovascular system (785 9) (R09 89)   17  Urinary incontinence (788 30) (R32)    Combined disorders of mitral, 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)          Past Medical History    1  History of hypothyroidism (V12 29) (Z86 39)   2  History of upper respiratory infection (V12 09) (Z87 09)   3  History of viral infection (V12 09) (Z86 19)    Surgical History    1  History of Aortic Valve Replacement Transcatheter   2  History of Cataract Surgery   3  History of Hysterectomy   4  History of Splenectomy  Surgical History Reviewed: The surgical history was reviewed and updated today  Family History    1  Family history of Coronary Artery Disease (V17 49)    2  Family history of Congestive Heart Failure  Family History Reviewed: The family history was reviewed and updated today  Social History    · Denied: History of Alcohol Use (History)   · Current some day smoker (305 1) (F17 210)  Social History Reviewed: The social history was reviewed and updated today  The social history was reviewed and is unchanged  Current Meds   1  Acetaminophen 500 MG Oral Tablet; PRN; Therapy: 46FYC5750 to Recorded   2  Calcium 600 MG Oral Tablet;  Takw one tablet twice daily; Therapy: (Recorded:37Cuq4582) to Recorded   3  Glucosamine Chondroitin Complx Oral Capsule; one tablet twice daisha; Therapy: 84RWX3172 to Recorded   4  Ibuprofen TABS; PRN; Therapy: (Nicolasa Peralta) to Recorded   5  Imodium Advanced 2-125 MG Oral Tablet; USE AS DIRECTED; Therapy: 57UXW3723 to Recorded   6  Melatonin 5 MG Oral Tablet; TAKE AS DIRECTED; Therapy: 32AYA7724 to (Evaluate:27Nov2014); Last Rx:10Nov2014 Ordered   7  Metoprolol Tartrate 25 MG Oral Tablet; take 1/2 tablet twice daily; Therapy: (Patti Riding) to Recorded   8  Multivitamins TABS; TAKE 1 TABLET DAILY; Therapy: 84JGS4923 to Recorded   9  Mupirocin 2 % External Ointment; 1 application each nares BID for 5 days before surgery; Therapy: 01Apr2016 to (Last Rx:01Apr2016)  Requested for: 01Apr2016 Ordered   10  Synthroid 75 MCG Oral Tablet; TAKE 1 TABLET EVERY OTHER DAY Recorded   11  Systane Contacts Ophthalmic Solution; one drop in each eye twice daily; Therapy: 03NKT2992 to Recorded  Medication List Reviewed: The medication list was reviewed and updated today  Allergies    1  No Known Drug Allergies    Vitals  Signs [Data Includes: Current Encounter]   Recorded: 27Apr2016 10:32AM   Heart Rate: 60, L Radial  Respiration: 16  Systolic: 238, LUE, Sitting  Diastolic: 70, LUE, Sitting  Height: 5 ft 5 in  Weight: 127 lb 0 96 oz  BMI Calculated: 21 14  BSA Calculated: 1 63    Physical Exam    Constitutional   General appearance: No acute distress, well appearing and well nourished  Health Management  Health Maintenance   Medicare Annual Wellness Visit; every 1 year; Next Due: [id number]; Overdue    Future Appointments    Date/Time Provider Specialty Site   06/24/2016 01:00 PM [facility]  Cardiology [facility] CARDIOLOGY [facility]   05/11/2016 01:00 PM Bertrand Peralta DO Cardiac Surgery CT SURGERY [facility]     Signatures   Electronically signed by :  Tabitha Liao, ; Apr 15 2016 10:33AM EST (Author)    Electronically signed by :  CHING Hsu ; Apr 27 2016 11:03AM EST                       (Author)

## 2018-01-14 VITALS
SYSTOLIC BLOOD PRESSURE: 130 MMHG | WEIGHT: 118.03 LBS | HEIGHT: 65 IN | RESPIRATION RATE: 16 BRPM | DIASTOLIC BLOOD PRESSURE: 66 MMHG | OXYGEN SATURATION: 95 % | HEART RATE: 70 BPM | BODY MASS INDEX: 19.67 KG/M2

## 2018-01-14 VITALS
WEIGHT: 119 LBS | HEIGHT: 66 IN | BODY MASS INDEX: 19.13 KG/M2 | DIASTOLIC BLOOD PRESSURE: 72 MMHG | HEART RATE: 72 BPM | SYSTOLIC BLOOD PRESSURE: 110 MMHG

## 2018-01-15 ENCOUNTER — ALLSCRIPTS OFFICE VISIT (OUTPATIENT)
Dept: OTHER | Facility: OTHER | Age: 83
End: 2018-01-15

## 2018-01-16 NOTE — MISCELLANEOUS
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)   4  Diplopia (368 2) (H53 2)   5  DNR (do not resuscitate) (V49 86) (Z66)   6  Essential tremor (333 1) (G25 0)   7  Fatigue (780 79) (R53 83)   8  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)   9  Hearing loss (389 9) (H91 90)   10  Hypothyroidism (244 9) (E03 9)   11  IBS (irritable bowel syndrome) (564 1) (K58 9)   12  Insomnia (780 52) (G47 00)   13  Left ventricular hypertrophy (429 3) (I51 7)   14  Near syncope (780 2) (R55)   15  Osteoarthritis (715 90) (M19 90)   16  Osteoporosis (733 00) (M81 0)   17  Symptoms involving cardiovascular system (785 9) (R09 89)   18  Urinary incontinence (788 30) (R32)    Past Medical History    1  History of hypothyroidism (V12 29) (Z86 39)   2  History of upper respiratory infection (V12 09) (Z87 09)   3  History of viral infection (V12 09) (Z86 19)    Surgical History    1  History of Cataract Surgery   2  History of Hysterectomy   3  History of Splenectomy    Family History    1  Family history of Congestive Heart Failure : Brother   2  Family history of Coronary Artery Disease (V17 49) : Father    Social History    · Denied: History of Alcohol Use (History)   · Current some day smoker (305 1) (F17 210)    Current Meds    1  Mupirocin 2 % External Ointment; 1 application each nares BID for 5 days before surgery; Therapy: 01Apr2016 to (Last Rx:01Apr2016)  Requested for: 01Apr2016 Ordered    2  Systane Contacts Ophthalmic Solution; one drop in each eye twice daily;    Therapy: 33SOP5501 to Recorded    3  Glucosamine Chondroitin Complx Oral Capsule; one tablet twice daisha; Therapy: 35ULM2274 to Recorded   4  Multivitamins TABS; TAKE 1 TABLET DAILY; Therapy: 11QVE2050 to Recorded    5  Imodium Advanced 2-125 MG Oral Tablet; USE AS DIRECTED; Therapy: 66RRO9221 to Recorded    6  Melatonin 5 MG Oral Tablet; TAKE AS DIRECTED; Therapy: 50VEL0782 to (Evaluate:27Nov2014); Last Rx:10Nov2014 Ordered    7  Acetaminophen 500 MG Oral Tablet; PRN; Therapy: 14ZEM5335 to Recorded   8  Calcium 600 MG Oral Tablet; Takw one tablet twice daily; Therapy: (Recorded:00Qhj7137) to Recorded    9  Ibuprofen TABS; PRN; Therapy: (Littlejohn Alosa) to Recorded   10   Synthroid 75 MCG Oral Tablet (Levothyroxine Sodium); TAKE 1 TABLET EVERY OTHER    DAY Recorded    Signatures   Electronically signed by : Lm Tran, ; Apr 6 2016 10:15AM EST                       (Author)

## 2018-01-16 NOTE — CONSULTS
I had the pleasure of evaluating your patient, Omar Krystina  My full evaluation follows:      History of Present Illness  Ms Ayaan Torres is a pleasant 80-year-old female who presents to the office today for routine followup  Since her last visit she has been feeling well  She continues swimming a few times a week  She also participates in senior aerobics although she admits to not doing this as frequently as she had been  With those activities she denies any chest pain or shortness of breath  She denies any signs or symptoms of congestive heart failure including increasing lower extremity edema, paroxysmal nocturnal dyspnea, or orthopnea  She denies weight gain or increasing abdominal girth  She denies lightheadedness, dizziness, syncope or presyncope  She denies symptoms of claudication    She continues to smoke a few cigarettes per week  Review of Systems      Cardiac: as noted in HPI  Skin: No complaints of nonhealing sores or skin rash  Genitourinary: No complaints of recurrent urinary tract infections, frequent urination at night, difficult urination, blood in urine, kidney stones, loss of bladder control, kidney problems, denies any birth control or hormone replacement, is not post menopausal, not currently pregnant  Psychological: No complaints of feeling depressed, anxiety, panic attacks, or difficulty concentrating  General: no changes in weight  Respiratory: No complaints of shortness of breath, cough with sputum, or wheezing  HEENT: No complaints of serious problems, hearing problems, nose problems, throat problems, or snoring  Gastrointestinal: No complaints of liver problems, nausea, vomiting, heartburn, constipation, bloody stools, diarrhea, problems swallowing, adbominal pain, or rectal bleeding  Hematologic: No complaints of bleeding disorders, anemia, blood clots, or excessive brusing     Neurological: No complaints of numbness, tingling, dizziness, weakness, seizures, headaches, syncope or fainting, AM fatigue, daytime sleepiness, no witnessed apnea episodes  Musculoskeletal: No complaints of arthritis, back pain, or painfull swelling  Active Problems    1  History of Aortic stenosis, severe (424 1) (I35 0)   2  Cerumen impaction (380 4) (H61 20)   3  Combined disorders of mitral, 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)   4  Diplopia (368 2) (H53 2)   5  DNR (do not resuscitate) (V49 86) (Z66)   6  Essential hypertension (401 9) (I10)   7  Essential tremor (333 1) (G25 0)   8  Fatigue (780 79) (R53 83)   9  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)   10  Hearing loss (389 9) (H91 90)   11  Hypothyroidism (244 9) (E03 9)   12  IBS (irritable bowel syndrome) (564 1) (K58 9)   13  Insomnia (780 52) (G47 00)   14  Left ventricular hypertrophy (429 3) (I51 7)   15  Near syncope (780 2) (R55)   16  Onychomycosis of toenail (110 1) (B35 1)   17  Osteoarthritis (715 90) (M19 90)   18  Osteoporosis (733 00) (M81 0)   19  Postop check (V67 00) (Z09)   20  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)   21  Symptoms involving cardiovascular system (785 9) (R09 89)   22  Urinary incontinence (788 30) (R32)    Past Medical History    · History of Aortic stenosis, severe (424 1) (I35 0)   · History of hypothyroidism (V12 29) (Z86 39)   · History of upper respiratory infection (V12 09) (Z87 09)   · History of viral infection (V12 09) (Z86 19)    Surgical History    · History of Aortic Valve Replacement Transcatheter   · History of Cataract Surgery   · History of Hysterectomy   · History of Splenectomy    The surgical history was reviewed and updated today  Family History    · Family history of Coronary Artery Disease (V17 49)    · Family history of Congestive Heart Failure    The family history was reviewed and updated today         Social History    · Denied: History of Alcohol Use (History)   · Current some day smoker (305 1) (F17 200)  The social history was reviewed and updated today  Current Meds   1  Acetaminophen 500 MG Oral Tablet; take 2 tablet twice daily; Therapy: 42ESX6718 to Recorded   2  Amoxicillin 500 MG Oral Tablet; TAKE 4 TABLET Other one hour prior to procedure   TDD:2000 mg; Therapy: 39LHR4499 to (Evaluate:03Kan4450); Last HI:48EIY0965 Ordered   3  Aspirin 81 MG CAPS; take 1 capsule daily; Therapy: (Woodrow Jack) to Recorded   4  Glucosamine Chondroitin Complx Oral Capsule; one tablet twice daisha; Therapy: 78GKD9681 to Recorded   5  Imodium Advanced 2-125 MG TABS; USE AS DIRECTED; Therapy: 74FXY6856 to Recorded   6  Levothyroxine Sodium 75 MCG Oral Tablet; Take 1 tablet daily  Requested for:   77VUT1351; Last Rx:25Oct2017; Status: ACTIVE - Retrospective By Protocol Authorization   Ordered   7  Melatonin 5 MG Oral Tablet; TAKE AS DIRECTED; Therapy: 14KDB9630 to (Evaluate:27Nov2014); Last Rx:10Nov2014 Ordered   8  Multivitamins TABS; TAKE 1 TABLET DAILY; Therapy: 46JNH9118 to Recorded   9  Systane Contacts Ophthalmic Solution; one drop in each eye twice daily; Therapy: 22VJY6916 to Recorded    The medication list was reviewed and updated today  Allergies    1  No Known Drug Allergies    Vitals   Recorded: 73WKJ7062 10:26AM   Heart Rate 72, L Radial   Pulse Quality Normal, L Radial   Systolic 580, LUE, Sitting   Diastolic 72, LUE, Sitting   Height 5 ft 6 in   Weight 119 lb    BMI Calculated 19 21   BSA Calculated 1 6     Physical Exam    Constitutional   General appearance: No acute distress, well appearing and well nourished  Eyes   Conjunctiva and Sclera examination: Conjunctiva pink, sclera anicteric  Ears, Nose, Mouth, and Throat - Oropharynx: Clear, nares are clear, mucous membranes are moist    Neck   Neck and thyroid: Normal, supple, trachea midline, no thyromegaly  Pulmonary   Respiratory effort: No increased work of breathing or signs of respiratory distress      Cardiovascular   Auscultation of heart: Abnormal   The heart rate was normal  The rhythm was regular  Heart sounds: normal S1, normal S2, no S3 and no S4  A grade 1 systolic murmur was heard at the RUSB  Early peaking without radiation  Carotid pulses: Normal, 2+ bilaterally  Peripheral vascular exam: Normal pulses throughout, no tenderness, erythema or swelling  Pedal pulses: Normal, 2+ bilaterally  Examination of extremities for edema and/or varicosities: Normal     Abdomen   Abdomen: Non-tender and no distention  Liver and spleen: No hepatomegaly or splenomegaly  Musculoskeletal Gait and station: Normal gait  Digits and nails: Normal without clubbing or cyanosis  Inspection/palpation of joints, bones, and muscles: Normal, ROM normal     Skin - Skin and subcutaneous tissue: Normal without rashes or lesions  Skin is warm and well perfused, normal turgor  Neurologic - Cranial nerves: II - XII intact  Psychiatric - Orientation to person, place, and time: Normal  Mood and affect: Normal       Assessment    1  History of Aortic stenosis, severe (424 1) (I35 0)   2  History of Aortic Valve Replacement Transcatheter   · Transfemoral TAVR with 26 mm Bar FRANTZ 3 valve   3  Left ventricular hypertrophy (429 3) (I51 7)    Plan  Combined disorders of mitral, aortic and tricuspid valves, S/P TAVR (transcatheter aortic  valve replacement)    · Follow-up visit in 6 months Evaluation and Treatment  Follow-up  Status: Hold For -  Scheduling  Requested for: 51EJZ3920   Ordered; For: Combined disorders of mitral, aortic and tricuspid valves, S/P TAVR (transcatheter aortic valve replacement); Ordered By: Navjot Salazar Performed:  Due: 34HWK9158    Discussion/Summary    Ms Gaona is a pleasant 59-year-old female who presents to the office today for a routine follow-up  Since her last visit she has been feeling well  She continues to exercise and is asymptomatic  Her blood pressure is well-controlled on no medication       Her most recent echo reveals a well-seated aortic valve with mild to moderate regurgitation and mild perivalvular regurgitation  She will undergo a repeat study next year  She was reminded of the need for antibiotic prophylaxis prior to dental procedures  No changes were made to her medication regimen  I will see her back in the office in six months or sooner if deemed necessary  Thank you very much for allowing me to participate in the care of this patient  If you have any questions, please do not hesitate to contact me        Future Appointments    Signatures   Electronically signed by : Christi Dixon DO; Nov 9 2017 10:51AM EST                       (Author)

## 2018-01-18 NOTE — PROGRESS NOTES
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)   4  Essential hypertension (401 9) (I10)    Discussion/Summary  Discussion Summary:   1 ) hypothyroid - no clinical s/s, tsh check wnl  cont current meds, f/u in 3 months  2 ) severe AS, s/p TAVR - doing well with no co  Has f/u with cards in May   3 ) Htn - stable, cont current rx  Monitor salt intake, fluid intake  Notify office if headaches/dizziness/fatigue  4 ) left ear cerumen impaction - debrox drops - 5 drops to left ear bid x4 days, then will reassess next week  Counseling Documentation With Imm: The patient was counseled regarding instructions for management, importance of compliance with treatment  Medication SE Review and Pt Understands Tx: The treatment plan was reviewed with the patient/guardian  The patient/guardian understands and agrees with the treatment plan      Chief Complaint  Chief Complaint Free Text Note Form: Pt here for 3mth routine- Hypothyroidism, HTN, S/P TAVR      History of Present Illness  HPI: Mrs Justice Reece is here for follow up appt of chronic conditions including hypothyroid (denies temp intol, constipation, anxiety, palpitation), htn (denies ha/dizziness, is taking meds as directed), AS (is s/p tavr - no cp, sob cough)  Pt reports she is doing well without complaints  She reports occ insomnia which is chronic and worsens when she is concerned about things  She reports good activity level, good appetite  She states she has an appt w/cards in May  She denies trouble ambulating, denies falls  She reports occ st memory loss, but no interrupting function  Pt does ask to have ears inspected for wax  Review of Systems  Complete-Female:   Constitutional: No fever, no chills, feels well, no tiredness, no recent weight gain or weight loss     Cardiovascular: No complaints of slow heart rate, no fast heart rate, no chest pain, no palpitations, no leg claudication, no lower extremity edema  Respiratory: No complaints of shortness of breath, no wheezing, no cough, no SOB on exertion, no orthopnea, no PND  Gastrointestinal: No complaints of abdominal pain, no constipation, no nausea or vomiting, no diarrhea, no bloody stools  Musculoskeletal: No complaints of arthralgias, no myalgias, no joint swelling or stiffness, no limb pain or swelling  Neurological: No complaints of headache, no confusion, no convulsions, no numbness, no dizziness or fainting, no tingling, no limb weakness, no difficulty walking  Psychiatric: sleep disturbances  Active Problems    1  History of Aortic stenosis, severe (424 1) (I35 0)   2  Cerumen impaction (380 4) (H61 20)   3  Combined disorders of mitral, 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)   4  Diplopia (368 2) (H53 2)   5  DNR (do not resuscitate) (V49 86) (Z66)   6  Essential tremor (333 1) (G25 0)   7  Fatigue (780 79) (R53 83)   8  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)   9  Hearing loss (389 9) (H91 90)   10  Hypothyroidism (244 9) (E03 9)   11  IBS (irritable bowel syndrome) (564 1) (K58 9)   12  Insomnia (780 52) (G47 00)   13  Left ventricular hypertrophy (429 3) (I51 7)   14  Near syncope (780 2) (R55)   15  Onychomycosis of toenail (110 1) (B35 1)   16  Osteoarthritis (715 90) (M19 90)   17  Osteoporosis (733 00) (M81 0)   18  Postop check (V67 00) (Z09)   19  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)   20  Symptoms involving cardiovascular system (785 9) (R09 89)   21  Urinary incontinence (788 30) (R32)    Past Medical History    1  History of Aortic stenosis, severe (424 1) (I35 0)   2  History of hypothyroidism (V12 29) (Z86 39)   3  History of upper respiratory infection (V12 09) (Z87 09)   4  History of viral infection (V12 09) (Z86 19)    Surgical History    1  History of Aortic Valve Replacement Transcatheter   2  History of Cataract Surgery   3  History of Hysterectomy   4  History of Splenectomy    Family History  Father    1  Family history of Coronary Artery Disease (V17 49)  Brother    2  Family history of Congestive Heart Failure    Social History    · Denied: History of Alcohol Use (History)   · Current some day smoker (305 1) (F17 200)    Current Meds   1  Acetaminophen 500 MG Oral Tablet; take 2 tablet twice daily; Therapy: 28TPA1938 to Recorded   2  Aspirin 81 MG CAPS; take 1 capsule daily; Therapy: (Remigio De La Cruz) to Recorded   3  Calcium 600 + D TABS; TAKE 1 TABLET DAILY; Therapy: (Recorded:14Jun2016) to Recorded   4  Glucosamine Chondroitin Complx Oral Capsule; one tablet twice daisha; Therapy: 17BMO4902 to Recorded   5  Imodium Advanced 2-125 MG TABS; USE AS DIRECTED; Therapy: 13FYK7231 to Recorded   6  Levothyroxine Sodium 75 MCG Oral Tablet; Take 1 tablet daily  Requested for:   50DMC1535; Last Rx:24Jan2017 Ordered   7  Melatonin 5 MG Oral Tablet; TAKE AS DIRECTED; Therapy: 49QVC5203 to (Evaluate:27Nov2014); Last Rx:10Nov2014 Ordered   8  Multivitamins TABS; TAKE 1 TABLET DAILY; Therapy: 15UFC8697 to Recorded   9  Systane Contacts Ophthalmic Solution; one drop in each eye twice daily; Therapy: 19EGD2101 to Recorded    Allergies    1  No Known Drug Allergies    Vitals  Signs   Recorded: 21Mar2017 10:37AM   Heart Rate: 70, L Radial  Respiration: 16  Systolic: 358, LUE, Sitting  Diastolic: 72, LUE, Sitting  Height: 5 ft 5 in  Weight: 124 lb 0 4 oz  BMI Calculated: 20 64  BSA Calculated: 1 61    Physical Exam    Constitutional   General appearance: No acute distress, well appearing and well nourished  Eyes   EOM: normal    Ears, Nose, Mouth, and Throat   Otoscopic examination: Abnormal   (right ear w/sm cerumen, can see tm intact, pearly grey  left ear w/impacted yellow-brown wax occluding tm)   Conversational Hearing: Normal     Pulmonary   Respiratory effort: No increased work of breathing or signs of respiratory distress  Auscultation of lungs: Clear to auscultation  Cardiovascular   Auscultation of heart: Normal rate and rhythm, normal S1 and S2, no murmurs  Examination of extremities for edema and/or varicosities: Normal     Abdomen   Abdomen: Non-tender, no masses  Musculoskeletal   Gait and station: Normal       Psychiatric   Judgment and insight: Normal     Recent and remote memory: Abnormal   (occ st memory forgetfulness, but then could recall after few minutes)      Results/Data  PHQ-2 Adult Depression Screening 21Mar2017 10:42AM User, Ahs     Test Name Result Flag Reference   PHQ-2 Adult Depression Score 0     Over the last two weeks, how often have you been bothered by any of the following problems? Little interest or pleasure in doing things: Not at all - 0  Feeling down, depressed, or hopeless: Not at all - 0   PHQ-2 Adult Depression Screening Negative       Falls Risk Assessment (Dx Z13 89 Screen for Neurologic Disorder) 71QJN7228 10:42AM User, Ahs     Test Name Result Flag Reference   Falls Risk      No falls in the past year       Health Management  Health Maintenance   Medicare Annual Wellness Visit; every 1 year; Last 91UYM4910; Next Due: 40IQW1209; Active    Future Appointments    Date/Time Provider Specialty Site   05/04/2017 01:00 PM Dena Ho DO Cardiology [facility]     Signatures   Electronically signed by : Ariela Maldonado; Mar 21 2017  2:48PM EST                       (Author)    Electronically signed by :  CHING Fairbanks ; Mar 22 2017 10:20AM EST

## 2018-01-18 NOTE — PROGRESS NOTES
Assessment  Assessed    1  Hypothyroidism (244 9) (E03 9)    Plan  Hypothyroidism    · (1) TSH; Status:Active; Requested for:14Mar2017;     Discussion/Summary  Discussion Summary:   #1: Hypothyroidism: Patient's TSH is improved  She is feeling better  Based on that, no adjustment is made today  Would recommend that she follow-up in approximately 3 months with repeat TSH  We'll reevaluate based on that number  Chief Complaint  Chief Complaint Free Text Note Form: Pt here for 1mth f/u- Hypothyroidism  Review BW done 12/13/16      History of Present Illness  HPI: Patient here to follow-up on hypothyroidism  Her TSH is 4 59  Her last TSH was 7  16  With her last office visit, she was started on branded Synthroid 75 Âµg  She is noticing that she is less tired, however there were no other significant symptoms  Review of Systems  Complete-Female:   Constitutional: No fever, no chills, feels well, no tiredness, no recent weight gain or weight loss  Endocrine: as noted in HPI  Active Problems  Problems    1  History of Aortic stenosis, severe (424 1) (I35 0)   2  Cerumen impaction (380 4) (H61 20)   3  Combined disorders of mitral, 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)   4  Diplopia (368 2) (H53 2)   5  DNR (do not resuscitate) (V49 86) (Z66)   6  Essential tremor (333 1) (G25 0)   7  Fatigue (780 79) (R53 83)   8  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)   9  Hearing loss (389 9) (H91 90)   10  Hypothyroidism (244 9) (E03 9)   11  IBS (irritable bowel syndrome) (564 1) (K58 9)   12  Insomnia (780 52) (G47 00)   13  Left ventricular hypertrophy (429 3) (I51 7)   14  Near syncope (780 2) (R55)   15  Onychomycosis of toenail (110 1) (B35 1)   16  Osteoarthritis (715 90) (M19 90)   17  Osteoporosis (733 00) (M81 0)   18  Postop check (V67 00) (Z09)   19  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)   20  Symptoms involving cardiovascular system (785 9) (R09 89)   21   Urinary incontinence (788 30) (R32)    Past Medical History  Problems    1  History of Aortic stenosis, severe (424 1) (I35 0)   2  History of hypothyroidism (V12 29) (Z86 39)   3  History of upper respiratory infection (V12 09) (Z87 09)   4  History of viral infection (V12 09) (Z86 19)    Surgical History  Problems    1  History of Aortic Valve Replacement Transcatheter   2  History of Cataract Surgery   3  History of Hysterectomy   4  History of Splenectomy    Family History  Father    1  Family history of Coronary Artery Disease (V17 49)  Brother    2  Family history of Congestive Heart Failure    Social History  Problems    · Denied: History of Alcohol Use (History)   · Current some day smoker (305 1) (F17 200)  Social History Reviewed: The social history was reviewed and updated today  The social history was reviewed and is unchanged  Current Meds   1  Acetaminophen 500 MG Oral Tablet; take 2 tablet twice daily; Therapy: 75CCD3556 to Recorded   2  Aspirin 81 MG CAPS; take 1 capsule daily; Therapy: (Kasia Sánchez) to Recorded   3  Calcium 600 + D TABS; TAKE 1 TABLET DAILY; Therapy: (Recorded:14Jun2016) to Recorded   4  Glucosamine Chondroitin Complx Oral Capsule; one tablet twice daisha; Therapy: 19MRZ7638 to Recorded   5  Imodium Advanced 2-125 MG TABS; USE AS DIRECTED; Therapy: 20IOY1881 to Recorded   6  Melatonin 5 MG Oral Tablet; TAKE AS DIRECTED; Therapy: 70BGR2238 to (Evaluate:27Nov2014); Last Rx:10Nov2014 Ordered   7  Multivitamins TABS; TAKE 1 TABLET DAILY; Therapy: 16YBK5569 to Recorded   8  Synthroid 75 MCG Oral Tablet; Take 1 tablet daily  Requested for: 57VXA8448; Last   Rx:31Oct2016 Ordered   9  Systane Contacts Ophthalmic Solution; one drop in each eye twice daily; Therapy: 72UXC9046 to Recorded    Allergies  Medication    1   No Known Drug Allergies    Vitals  Signs   Recorded: 97Zrs2580 09:11AM   Heart Rate: 60, L Radial  Respiration: 16  Systolic: 476, LUE, Sitting  Diastolic: 66, LUE, Sitting  Height: 5 ft 5 in  Weight: 125 lb 0 32 oz  BMI Calculated: 20 8  BSA Calculated: 1 62  O2 Saturation: 95    Physical Exam  General Multi-System Exam [facility]:   Constitutional   General appearance: No acute distress, well appearing and well nourished  Results/Data  PHQ-2 Adult Depression Screening 16Oao1448 09:13AM User, States     Test Name Result Flag Reference   PHQ-2 Adult Depression Score 0     Over the last two weeks, how often have you been bothered by any of the following problems? Little interest or pleasure in doing things: Not at all - 0  Feeling down, depressed, or hopeless: Not at all - 0   PHQ-2 Adult Depression Screening Negative       Falls Risk Assessment (Dx Z13 89 Screen for Neurologic Disorder) 41BDO1620 09:13AM User, States     Test Name Result Flag Reference   Falls Risk      No falls in the past year       Health Management  Health Maintenance   Medicare Annual Wellness Visit; every 1 year; Last 06YVB1637; Next Due: 59LUB7437; Active    Signatures   Electronically signed by :  CHING Wylie ; Dec 14 2016  9:24AM EST                       (Author)

## 2018-01-22 VITALS
OXYGEN SATURATION: 95 % | HEART RATE: 76 BPM | SYSTOLIC BLOOD PRESSURE: 134 MMHG | DIASTOLIC BLOOD PRESSURE: 72 MMHG | HEIGHT: 65 IN | WEIGHT: 120.03 LBS | BODY MASS INDEX: 20 KG/M2 | RESPIRATION RATE: 16 BRPM

## 2018-01-22 VITALS
SYSTOLIC BLOOD PRESSURE: 124 MMHG | WEIGHT: 124.03 LBS | HEART RATE: 70 BPM | HEIGHT: 65 IN | BODY MASS INDEX: 20.66 KG/M2 | DIASTOLIC BLOOD PRESSURE: 72 MMHG | RESPIRATION RATE: 16 BRPM

## 2018-01-22 VITALS
OXYGEN SATURATION: 93 % | RESPIRATION RATE: 16 BRPM | BODY MASS INDEX: 19.5 KG/M2 | WEIGHT: 117.02 LBS | HEIGHT: 65 IN | SYSTOLIC BLOOD PRESSURE: 120 MMHG | HEART RATE: 63 BPM | DIASTOLIC BLOOD PRESSURE: 76 MMHG

## 2018-01-23 NOTE — PROGRESS NOTES
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)   4  Hearing loss (389 9) (H91 90)   5  Decreased body height (781 91) (R29 890)    Plan    · * DXA BONE DENSITY SPINE HIP AND PELVIS; Status:Hold For - Scheduling; Requested  for:82Ehd1488;     · Follow-up visit in 3 months Evaluation and Treatment  Follow-up  Status: Hold For -  Scheduling  Requested for: 39QKS9906    Discussion/Summary  Discussion Summary:   1 -HEIGHT LOSS  -PATIENT WILL BE GETTING A DEXA SCAN TO FURTHER EVALUATE  2 -AORTIC VALVE REPLACEMENT  -HAS A DIASTOLIC MURMUR GETS FOLLOW WITH CARDIOLOGY ON A YEARLY BASIS WITH AN ECHOCARDIOGRAM     3 -HYPOTHYROIDISM  -UNDER EXCELLENT CONTROL AT PRESENT    4 -HISTORY OF HAIRY CELL LEUKEMIA  -IN REMISSION AT PRESENT  Counseling Documentation With Imm: The patient was counseled regarding diagnostic results, instructions for management  total time of encounter was 35 minutes and 10 minutes was spent counseling  [id number]      Chief Complaint  Chief Complaint Free Text Note Form: Pt here for routine visit- Review chronic conditions and labs done 01/11, 12/21       History of Present Illness  HPI: PATIENT IS AN 80YEAR-OLD  FEMALE WHO HAD DECLINED GETTING A DEXA SCAN IN THE PAST BUT NOW SEEMS AMENABLE TO GETTING THE TEST DONE  SHE HAD A VITAMIN-D LEVEL DONE BACK IN 2016 THAT WAS 40 8 WELL WITHIN NORMAL RANGE    SHE ALSO HAS A GFR OF 69 WITH NORMAL ELECTROLYTES AND A TSH OF 4 99 WITH A NORMAL FREE T4  SHE CURRENTLY IS ON THYROID REPLACEMENT AND DOING WELL ON THAT  SHE DENIES ANY OTHER ACTIVE COMPLAINTS SHE HAD A PREVIOUS AORTIC VALVE REPLACEMENT VIA TAVR WITH A RESIDUAL DIASTOLIC MURMUR  SHE DENIES ANY CHEST PAIN SHE DENIES ANY SHORTNESS OF BREATH SHE DOES EXERCISE ON A REGULAR BASIS  SHE DOES HAVE URINARY INCONTINENCE WEARS A PAD        Review of Systems  Complete-Female: Constitutional: No fever, no chills, feels well, no tiredness, no recent weight gain or weight loss  Eyes: No complaints of eye pain, no red eyes, no eyesight problems, no discharge, no dry eyes, no itching of eyes  ENT: no complaints of earache, no loss of hearing, no nose bleeds, no nasal discharge, no sore throat, no hoarseness  Cardiovascular: No complaints of slow heart rate, no fast heart rate, no chest pain, no palpitations, no leg claudication, no lower extremity edema  Respiratory: No complaints of shortness of breath, no wheezing, no cough, no SOB on exertion, no orthopnea, no PND  Gastrointestinal: No complaints of abdominal pain, no constipation, no nausea or vomiting, no diarrhea, no bloody stools  Genitourinary: No complaints of dysuria, no incontinence, no pelvic pain, no dysmenorrhea, no vaginal discharge or bleeding  Musculoskeletal: No complaints of arthralgias, no myalgias, no joint swelling or stiffness, no limb pain or swelling  Integumentary: No complaints of skin rash or lesions, no itching, no skin wounds, no breast pain or lump  Neurological: No complaints of headache, no confusion, no convulsions, no numbness, no dizziness or fainting, no tingling, no limb weakness, no difficulty walking  Psychiatric: Not suicidal, no sleep disturbance, no anxiety or depression, no change in personality, no emotional problems  Endocrine: No complaints of proptosis, no hot flashes, no muscle weakness, no deepening of the voice, no feelings of weakness  Hematologic/Lymphatic: No complaints of swollen glands, no swollen glands in the neck, does not bleed easily, does not bruise easily  Geriatric Syndrome Brandon Loaiza:   the memory was unimpaired  has no depression  no falls  recent weight loss  no vision impairment  hearing impairment  incontinence  gait is normal     has no osteoporosis  with no delirium  no polypharmacy  Active Problems    1   History of Aortic stenosis, severe (424 1) (I35 0)   2  Cerumen impaction (380 4) (H61 20)   3  Combined disorders of mitral, 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)   4  Diplopia (368 2) (H53 2)   5  DNR (do not resuscitate) (V49 86) (Z66)   6  Essential hypertension (401 9) (I10)   7  Essential tremor (333 1) (G25 0)   8  Fatigue (780 79) (R53 83)   9  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)   10  Hearing loss (389 9) (H91 90)   11  Hypothyroidism (244 9) (E03 9)   12  IBS (irritable bowel syndrome) (564 1) (K58 9)   13  Insomnia (780 52) (G47 00)   14  Left ventricular hypertrophy (429 3) (I51 7)   15  Near syncope (780 2) (R55)   16  Onychomycosis of toenail (110 1) (B35 1)   17  Osteoarthritis (715 90) (M19 90)   18  Osteoporosis (733 00) (M81 0)   19  Postop check (V67 00) (Z09)   20  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)   21  Symptoms involving cardiovascular system (785 9) (R09 89)   22  Urinary incontinence (788 30) (R32)    Past Medical History    1  History of Aortic stenosis, severe (424 1) (I35 0)   2  History of hypothyroidism (V12 29) (Z86 39)   3  History of upper respiratory infection (V12 09) (Z87 09)   4  History of viral infection (V12 09) (Z86 19)    Surgical History    1  History of Aortic Valve Replacement Transcatheter   2  History of Cataract Surgery   3  History of Hysterectomy   4  History of Splenectomy  Surgical History Reviewed: The surgical history was reviewed and updated today  Family History  Mother    1  Family history of malignant neoplasm of uterus (V16 49) (Z80 49)  Father    2  Family history of Coronary Artery Disease (V17 49)  Brother    3  Family history of Congestive Heart Failure  Family History Reviewed: The family history was reviewed and updated today  Social History    · Alcohol Use (History)   · Current some day smoker (305 1) (F17 200)  Social History Reviewed: The social history was reviewed and updated today        Current Meds   1  Acetaminophen 500 MG Oral Tablet; take 2 tablet twice daily; Therapy: 41UGE7959 to Recorded   2  Amoxicillin 500 MG Oral Tablet; TAKE 4 TABLET Other one hour prior to procedure   TDD:2000 mg; Therapy: 58AAV4964 to (Evaluate:32Sep6147); Last YV:90BHZ6880 Ordered   3  Aspirin 81 MG CAPS; take 1 capsule daily; Therapy: (Wilhelmena Litten) to Recorded   4  Calcium 600-200 MG-UNIT Oral Tablet; TAKE 1 TABLET Bedtime; Therapy: 81TPX3949 to Recorded   5  Glucosamine Chondroitin Complx Oral Capsule; one tablet twice daisha; Therapy: 21UZW7681 to Recorded   6  Imodium Advanced 2-125 MG TABS; USE AS DIRECTED; Therapy: 77LAF2860 to Recorded   7  Levothyroxine Sodium 75 MCG Oral Tablet; TAKE 1 TABLET DAILY IN THE MORNING ON   EMPTY STOMACH; Therapy: 78HWQ2785 to (Evaluate:18Jan2018)  Requested for: 66FZB8000; Last   Rx:75Emy0050; Status: ACTIVE - Retrospective By Protocol Authorization Ordered   8  Melatonin 5 MG Oral Tablet; TAKE AS DIRECTED; Therapy: 46UPK9193 to (Evaluate:27Nov2014); Last Rx:10Nov2014 Ordered   9  Multivitamins TABS; TAKE 1 TABLET DAILY; Therapy: 22QAY5907 to Recorded   10  Systane Contacts Ophthalmic Solution; one drop in each eye twice daily; Therapy: 93XYI9202 to Recorded  Medication List Reviewed: The medication list was reviewed and updated today  Allergies    1  No Known Drug Allergies    Vitals  Signs   Recorded: 32LCE0228 11:37AM   Heart Rate: 63, L Radial  Respiration: 16  Systolic: 889, LUE, Sitting  Diastolic: 76, LUE, Sitting  Height: 5 ft 5 in  Weight: 117 lb 0 3 oz  BMI Calculated: 19 47  BSA Calculated: 1 58  O2 Saturation: 93    Physical Exam    Constitutional   General appearance: No acute distress, well appearing and well nourished  Eyes   Conjunctiva and lids: No swelling, erythema, or discharge  Pupils and irises: Equal, round and reactive to light      Ears, Nose, Mouth, and Throat   External inspection of ears and nose: Normal     Otoscopic examination: Tympanic membrance translucent with normal light reflex  Canals patent without erythema  Oropharynx: Normal with no erythema, edema, exudate or lesions  Pulmonary   Respiratory effort: No increased work of breathing or signs of respiratory distress  Auscultation of lungs: Clear to auscultation  Cardiovascular   Palpation of heart: Normal PMI, no thrills  Auscultation of heart: Abnormal   DIASTOLIC MURMUR  Examination of extremities for edema and/or varicosities: Normal     Abdomen   Abdomen: Non-tender, no masses  Liver and spleen: No hepatomegaly or splenomegaly  Lymphatic   Palpation of lymph nodes in neck: No lymphadenopathy  Musculoskeletal   Gait and station: Normal     Digits and nails: Normal without clubbing or cyanosis  Inspection/palpation of joints, bones, and muscles: Normal     Skin   Skin and subcutaneous tissue: Normal without rashes or lesions  Neurologic   Cranial nerves: Cranial nerves 2-12 intact  Reflexes: 2+ and symmetric  Sensation: No sensory loss  Psychiatric   Orientation to person, place and time: Normal     Mood and affect: Normal        Health Management  Health Maintenance   Medicare Annual Wellness Visit; every 1 year; Last 45RPG6180; Next Due: 63Cxs1443;  Active    Signatures   Electronically signed by : CHING Viera ; Darren 15 2018 12:04PM EST                       (Author)

## 2018-03-22 PROBLEM — Z95.2 S/P TAVR (TRANSCATHETER AORTIC VALVE REPLACEMENT): Status: ACTIVE | Noted: 2018-03-22

## 2018-03-22 NOTE — PROGRESS NOTES
Cardiology Follow Up    [address]  4/7/1928  [id number]  [address] [facility]  [address] [place] [address] [address]    1  Aortic stenosis, severe  Echo complete with contrast if indicated   2  S/P TAVR (transcatheter aortic valve replacement)  Echo complete with contrast if indicated   3  Left ventricular hypertrophy         Discussion/Summary:  Ms Regina Almaguer is a pleasant 63-year-old female who presents to the office today for a routine follow-up  Since her last visit she has been feeling well  She continues to exercise and is asymptomatic  Her blood pressure is well-controlled on no medication  Her most recent echo reveals a well-seated aortic valve with mild to moderate regurgitation and mild perivalvular regurgitation  She will undergo a repeat study for re-evaluation  She was reminded of the need for antibiotic prophylaxis prior to dental procedures  No changes were made to her medication regimen  I will see her back in the office in six months or sooner if deemed necessary  Interval History:  Ms Regina Almaguer is a pleasant 63-year-old female who presents to the office today for routine followup  Since her last visit she has been feeling well  She continues swimming a few times a week  With those activities she denies any chest pain or shortness of breath  She denies any signs or symptoms of congestive heart failure including increasing lower extremity edema, paroxysmal nocturnal dyspnea, or orthopnea  She denies weight gain or increasing abdominal girth  She denies lightheadedness, dizziness, syncope or presyncope  She denies symptoms of claudication  She has not smoked since December       Problem List     Aortic stenosis, severe    Urinary incontinence    Osteoporosis    Osteoarthritis    Leukemia, 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[facility] [address] )    Overview Signed 4/12/2016 12:57 PM by Sasha Sparks PA-C     s/p splenectomy Hypothyroidism    Hyperlipidemia    HTN (hypertension)    Essential tremor    CAD (coronary artery disease)    Insomnia    S/P TAVR (transcatheter aortic valve replacement)        Past Medical History:   Diagnosis Date    Aortic stenosis     severe    CAD (coronary artery disease)     Essential tremor     HTN (hypertension)     HTN (hypertension)     Hyperlipidemia     Hypothyroidism     Leukemia, 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HCC)     s/p splenectomy    Osteoarthritis     Osteoporosis     Urinary incontinence      Social History     Social History    Marital status:      Spouse name: N/A    Number of children: N/A    Years of education: N/A     Occupational History    Not on file  Social History Main Topics    Smoking status: Current Some Day Smoker     Types: Cigarettes    Smokeless tobacco: Former User    Alcohol use Yes      Comment: SOCIAL    Drug use: No    Sexual activity: Not on file     Other Topics Concern    Not on file     Social History Narrative    No narrative on file      No family history on file  Past Surgical History:   Procedure Laterality Date    COLONOSCOPY      HYSTERECTOMY      PA REPLACE AORTIC VALVE OPENFEMORAL ARTERY APPROACH N/A 4/12/2016    Procedure: REPLACEMENT AORTIC VALVE TRANSCATHETER (TAVR) TRANSFEMORAL  26mm Lin 3 valve;  Surgeon: Zohreh Bui DO;  [facility]: [facility];  Service: Cardiac Surgery    SPLENECTOMY      for hx hairy cell leukemia       Current Outpatient Prescriptions:     acetaminophen (TYLENOL) 500 mg tablet, Take 1,000 mg by mouth every 6 (six) hours as needed for mild pain   , Disp: , Rfl:     Calcium Carbonate-Vitamin D (CALCIUM + D PO), Take 1 tablet by mouth daily  500MG-400IU , Disp: , Rfl:     Glucosamine-Chondroitin (GLUCOSAMINE CHONDR COMPLEX PO), Take by mouth 2 (two) times a day  1500MG-1250MG, Disp: , Rfl:     levothyroxine 75 mcg tablet, Take 75 mcg by mouth daily  , Disp: , Rfl:     Loperamide HCl (IMODIUM PO), Take 2 mg by mouth as needed  , Disp: , Rfl:     MELATONIN PO, Take 5 mg by mouth daily at bedtime  , Disp: , Rfl:     Multiple Vitamin (MULTIVITAMIN) tablet, Take 1 tablet by mouth daily  , Disp: , Rfl:     Polyethyl Glycol-Propyl Glycol (SYSTANE OP), Apply 1 drop to eye 2 (two) times a day  EACH EYE TWICE DAILY , Disp: , Rfl:     ascorbic acid (VITAMIN C) 500 mg tablet, Take 500 mg by mouth 2 (two) times a day   , Disp: , Rfl:     IBUPROFEN PO, Take 400 mg by mouth every 4 (four) hours as needed  , Disp: , Rfl:   No Known Allergies    Labs:     Chemistry        Component Value Date/Time     04/19/2017 1216     06/02/2015 1237    K 4 4 04/19/2017 1216    K 4 3 06/02/2015 1237     04/19/2017 1216     06/02/2015 1237    CO2 29 04/19/2017 1216    CO2 31 06/02/2015 1237    BUN 17 04/19/2017 1216    BUN 20 06/02/2015 1237    CREATININE 0 70 04/19/2017 1216    CREATININE 0 68 06/02/2015 1237        Component Value Date/Time    CALCIUM 9 7 04/19/2017 1216    CALCIUM 9 0 06/02/2015 1237    ALKPHOS 65 06/02/2015 1237    AST 22 06/02/2015 1237    ALT 22 06/02/2015 1237    BILITOT 0 81 06/02/2015 1237            Lab Results   Component Value Date    CHOL 202 11/25/2014    CHOL 192 03/13/2014     Lab Results   Component Value Date    HDL 84 11/25/2014    HDL 69 03/13/2014     Lab Results   Component Value Date    LDLCALC 102 (H) 11/25/2014    LDLCALC 111 (H) 03/13/2014     Lab Results   Component Value Date    TRIG 80 11/25/2014    TRIG 62 03/13/2014     No components found for: CHOLHDL    Imaging: No results found  Review of Systems   Cardiovascular: Negative for chest pain, claudication, cyanosis, dyspnea on exertion, irregular heartbeat, leg swelling, near-syncope, orthopnea, palpitations, paroxysmal nocturnal dyspnea and syncope         Vitals:    03/23/18 1143   BP: 130/70   Pulse: 60     Vitals:    03/23/18 1143   Weight: 53 5 kg (118 lb)     Height: 5' 6" (167 6 cm)   Body mass index is 19 05 kg/m²      Physical Exam:   General appearance:  Appears stated age, alert, well appearing and in no distress  HEENT:  PERRLA, EOMI, no scleral icterus, no conjunctival pallor  NECK:  Supple, No elevated JVP, no thyromegaly, no carotid bruits  HEART:  Regular rate and rhythm, normal S1/S2, soft early peaking systolic ejection murmur noted at the right upper sternal border with a early diastolic murmur  LUNGS:  Clear to auscultation bilaterally  ABDOMEN:  Soft, non-tender, positive bowel sounds, no rebound or guarding, no organomegaly   EXTREMITIES:  No edema  VASCULAR:  Normal pedal pulses   SKIN: No lesions or rashes on exposed skin  NEURO:  CN II-XII intact, no focal deficits

## 2018-03-23 ENCOUNTER — OFFICE VISIT (OUTPATIENT)
Dept: CARDIOLOGY CLINIC | Facility: CLINIC | Age: 83
End: 2018-03-23
Payer: MEDICARE

## 2018-03-23 VITALS
BODY MASS INDEX: 18.96 KG/M2 | HEART RATE: 60 BPM | WEIGHT: 118 LBS | DIASTOLIC BLOOD PRESSURE: 70 MMHG | SYSTOLIC BLOOD PRESSURE: 130 MMHG | HEIGHT: 66 IN

## 2018-03-23 DIAGNOSIS — Z95.2 S/P TAVR (TRANSCATHETER AORTIC VALVE REPLACEMENT): ICD-10-CM

## 2018-03-23 DIAGNOSIS — I51.7 LEFT VENTRICULAR HYPERTROPHY: ICD-10-CM

## 2018-03-23 DIAGNOSIS — I35.0 AORTIC STENOSIS, SEVERE: Primary | ICD-10-CM

## 2018-03-23 PROCEDURE — 99214 OFFICE O/P EST MOD 30 MIN: CPT | Performed by: INTERNAL MEDICINE

## 2018-03-30 ENCOUNTER — HOSPITAL ENCOUNTER (OUTPATIENT)
Dept: NON INVASIVE DIAGNOSTICS | Facility: CLINIC | Age: 83
Discharge: HOME | End: 2018-03-30
Payer: MEDICARE

## 2018-03-30 DIAGNOSIS — Z95.2 S/P TAVR (TRANSCATHETER AORTIC VALVE REPLACEMENT): ICD-10-CM

## 2018-03-30 DIAGNOSIS — I35.0 AORTIC STENOSIS, SEVERE: ICD-10-CM

## 2018-03-30 PROCEDURE — 93306 TTE W/DOPPLER COMPLETE: CPT

## 2018-03-30 PROCEDURE — 93306 TTE W/DOPPLER COMPLETE: CPT | Performed by: INTERNAL MEDICINE

## 2018-04-06 ENCOUNTER — TRANSCRIBE ORDERS (OUTPATIENT)
Dept: LAB | Facility: CLINIC | Age: 83
End: 2018-04-06

## 2018-04-10 ENCOUNTER — HOSPITAL ENCOUNTER (OUTPATIENT)
Dept: RADIOLOGY | Age: 83
Discharge: HOME | End: 2018-04-10
Payer: MEDICARE

## 2018-04-10 DIAGNOSIS — M81.0 AGE-RELATED OSTEOPOROSIS WITHOUT CURRENT PATHOLOGICAL FRACTURE: ICD-10-CM

## 2018-04-10 DIAGNOSIS — R29.890 LOSS OF HEIGHT: ICD-10-CM

## 2018-04-10 PROCEDURE — 77080 DXA BONE DENSITY AXIAL: CPT

## 2018-04-19 ENCOUNTER — TELEPHONE (OUTPATIENT)
Dept: CARDIOLOGY CLINIC | Facility: CLINIC | Age: 83
End: 2018-04-19

## 2018-04-24 DIAGNOSIS — Z95.2 H/O AORTIC VALVE REPLACEMENT: Primary | ICD-10-CM

## 2018-04-24 DIAGNOSIS — Z95.2 S/P TAVR (TRANSCATHETER AORTIC VALVE REPLACEMENT): Primary | ICD-10-CM

## 2018-04-24 RX ORDER — AMOXICILLIN 500 MG/1
CAPSULE ORAL
Qty: 4 CAPSULE | Refills: 0 | Status: SHIPPED | OUTPATIENT
Start: 2018-04-24 | End: 2019-04-24

## 2018-04-24 RX ORDER — AMOXICILLIN 500 MG/1
2000 CAPSULE ORAL
Qty: 4 CAPSULE | Refills: 3 | Status: SHIPPED | OUTPATIENT
Start: 2018-04-24 | End: 2018-04-25

## 2018-04-24 NOTE — TELEPHONE ENCOUNTER
Patient is requesting Amoxicillin 500mg for dental   work up please specify sig: and if any refills  Thanks

## 2018-05-04 ENCOUNTER — OFFICE VISIT (OUTPATIENT)
Dept: GERIATRICS | Facility: CLINIC | Age: 83
End: 2018-05-04
Payer: MEDICARE

## 2018-05-04 VITALS
BODY MASS INDEX: 19.41 KG/M2 | RESPIRATION RATE: 18 BRPM | DIASTOLIC BLOOD PRESSURE: 64 MMHG | HEART RATE: 76 BPM | SYSTOLIC BLOOD PRESSURE: 118 MMHG | HEIGHT: 65 IN | WEIGHT: 116.5 LBS | OXYGEN SATURATION: 94 %

## 2018-05-04 DIAGNOSIS — E03.9 ACQUIRED HYPOTHYROIDISM: Primary | ICD-10-CM

## 2018-05-04 DIAGNOSIS — M81.0 AGE-RELATED OSTEOPOROSIS WITHOUT CURRENT PATHOLOGICAL FRACTURE: ICD-10-CM

## 2018-05-04 DIAGNOSIS — Z95.2 S/P TAVR (TRANSCATHETER AORTIC VALVE REPLACEMENT): ICD-10-CM

## 2018-05-04 PROCEDURE — 99214 OFFICE O/P EST MOD 30 MIN: CPT | Performed by: INTERNAL MEDICINE

## 2018-05-04 RX ORDER — ALENDRONATE SODIUM 70 MG/1
70 TABLET ORAL
Qty: 4 TABLET | Refills: 3 | Status: SHIPPED | OUTPATIENT
Start: 2018-05-04 | End: 2018-08-21 | Stop reason: SDUPTHER

## 2018-05-04 NOTE — PATIENT INSTRUCTIONS
1 -will order a TSH and a vitamin-D level  She will obtain her blood work this week  2 -patient will start Fosamax 70 mg once a week I have dispense 4 with 3 refills    3 -patient will return in 3 months for a physical examination

## 2018-05-04 NOTE — PROGRESS NOTES
Assessment/Plan:  1 -acquired hypothyroidism -I will recheck a TSH and a free T4 prior to her next visit in 3 months at which time we will do a full physical exam     2 -age-related osteoporosis without current pathological fracture -the patient was advised to start Fosamax 70 mg once a week  She is to take this medication 1st thing in the morning without 8 oz glass of water, remain upright, do not eat breakfast or take other medications for half an hour  After the half an hour has elapsed she can have her breakfast take her other medications  I will also obtain a vitamin-D level  3 -status post transcatheter aortic valve replacement -she is doing well at present and has active follow-up from Cardiology  No problem-specific Assessment & Plan notes found for this encounter  Diagnoses and all orders for this visit:    Acquired hypothyroidism    Age-related osteoporosis without current pathological fracture    S/P TAVR (transcatheter aortic valve replacement)          Subjective:   CC Pt here for 3mth routine- S/P TAVR,Hypothyroidism, HTN  Review DEXA done 04/10/18 and ECHO 03/30/18   Patient ID: Milagros Erickson is a 80 y o  female  Patient is a 80-year-old  female who is followed in the practice for hypothyroidism patient's last TSH performed in December was 4 99 with a free T4 of 1 12  No adjustments were made on her medication and we will need to obtain another TSH in approximately 3 months  The patient also had aortic valve replacement via TAVR 2 years ago she had an echocardiogram recently revealing normal systolic function with an ejection fraction of 60% and no regional wall motion abnormalities  Wall thickness was mildly to moderately increased in her left ventricle her aortic valve show a bioprosthesis it exhibited normal systolic function with mild regurgitation    Her mitral valve showed moderate annular calcification with moderate thickening moderate calcification in the posterior leaflet mild stenosis and moderate to severe regurgitation  This will need to be followed by Cardiology on a regular basis to make sure that the patient does not develop severe MR  We also had a bone density done on the patient and that was discussed in full with her her bone density revealed evidence of osteoporosis at the level of the left femoral neck with a -2 9  She had a -1 8 at the level of the lumbar spine and a -2 2 at the left total hip  The following portions of the patient's history were reviewed and updated as appropriate: allergies, current medications, past family history, past medical history, past social history, past surgical history and problem list     Review of Systems   Constitutional: Positive for fatigue  HENT: Negative  Eyes: Negative  Respiratory: Negative  Cardiovascular: Negative  Gastrointestinal: Negative  Endocrine: Negative  Genitourinary: Negative  Musculoskeletal: Positive for arthralgias and back pain  Skin: Negative  Allergic/Immunologic: Negative  Neurological: Negative  Hematological: Bruises/bleeds easily  Psychiatric/Behavioral: Negative  Objective:      /64 (BP Location: Right arm, Patient Position: Sitting, Cuff Size: Standard)   Pulse 76   Resp 18   Ht 5' 5" (1 651 m)   Wt 52 8 kg (116 lb 8 oz)   BMI 19 39 kg/m²          Physical Exam   Constitutional: She is oriented to person, place, and time  She appears well-developed and well-nourished  HENT:   Head: Normocephalic and atraumatic  Right Ear: External ear normal    Left Ear: External ear normal    Mouth/Throat: Oropharynx is clear and moist    Eyes: Conjunctivae and EOM are normal  Pupils are equal, round, and reactive to light  Neck: Normal range of motion  Neck supple  Cardiovascular: Normal rate, regular rhythm, normal heart sounds and intact distal pulses  Pulmonary/Chest: Effort normal and breath sounds normal    Abdominal: Soft  Bowel sounds are normal    Musculoskeletal: Normal range of motion  Neurological: She is alert and oriented to person, place, and time  She has normal reflexes  Skin: Skin is warm and dry  Psychiatric: She has a normal mood and affect  Her behavior is normal  Judgment and thought content normal    Nursing note and vitals reviewed

## 2018-05-25 DIAGNOSIS — E03.9 HYPOTHYROIDISM, UNSPECIFIED TYPE: Primary | ICD-10-CM

## 2018-05-25 RX ORDER — LEVOTHYROXINE SODIUM 0.07 MG/1
75 TABLET ORAL DAILY
Qty: 30 TABLET | Refills: 3 | Status: SHIPPED | OUTPATIENT
Start: 2018-05-25 | End: 2018-09-19 | Stop reason: SDUPTHER

## 2018-08-06 ENCOUNTER — OFFICE VISIT (OUTPATIENT)
Dept: GERIATRICS | Facility: CLINIC | Age: 83
End: 2018-08-06
Payer: MEDICARE

## 2018-08-06 VITALS
DIASTOLIC BLOOD PRESSURE: 70 MMHG | WEIGHT: 114.8 LBS | HEIGHT: 65 IN | BODY MASS INDEX: 19.13 KG/M2 | HEART RATE: 76 BPM | SYSTOLIC BLOOD PRESSURE: 132 MMHG | RESPIRATION RATE: 18 BRPM | OXYGEN SATURATION: 92 %

## 2018-08-06 DIAGNOSIS — I35.0 AORTIC STENOSIS, SEVERE: ICD-10-CM

## 2018-08-06 DIAGNOSIS — E03.9 ACQUIRED HYPOTHYROIDISM: Primary | ICD-10-CM

## 2018-08-06 DIAGNOSIS — Z95.2 S/P TAVR (TRANSCATHETER AORTIC VALVE REPLACEMENT): ICD-10-CM

## 2018-08-06 PROCEDURE — 99214 OFFICE O/P EST MOD 30 MIN: CPT | Performed by: INTERNAL MEDICINE

## 2018-08-06 NOTE — PROGRESS NOTES
Assessment/Plan:    Hypothyroidism  I will repeat a TSH and a free T4 in 6 months time she is doing very well at present  S/P TAVR (transcatheter aortic valve replacement)  She follows with Cardiology on a regular basis  Her cardiologist is Dr Nabeel Flowers over at [facility]   She should continue exercising regularly as she does will see her in 6 months for yearly evaluation  Diagnoses and all orders for this visit:    Acquired hypothyroidism  -     TSH, 3rd generation with T4 reflex; Future    Aortic stenosis, severe  -     Comprehensive metabolic panel; Future  -     Lipid panel; Future    S/P TAVR (transcatheter aortic valve replacement)    Other orders  -     APPLE CIDER VINEGAR PO; Take by mouth daily          Subjective:   CC  Pt here for 3mth routine- Review chronic conditions  Review BW done 05/08/18  Started Fosamax 70mg weekly at last OV     Patient ID: Olaf Garibay is a 80 y o  female  Patient is a 80-year-old  female who returns to the office today for follow-up she had an aortic valve replacement approximately 2 years ago via TAVR  The patient has responded well her most recent echocardiogram reveals an ejection fraction of 60% with very mild aortic regurgitation  She is also followed for hypo be time in [place] D her vitamin-D level at present is 37 which is within normal range  Her hypothyroidism is well controlled with a free T4 of 1 12 TSH slightly elevated 5 59 I will need to repeat the studies in another 6 months to make sure that there is no further rise in her TSH  The patient is feeling well denies any active complaints she wears glasses she exercises on a regular basis has no issues with chest discomfort or palpitations, no GI problems no evidence of constipation or diarrhea  She enjoys all of her basic and instrumental activities of daily living          The following portions of the patient's history were reviewed and updated as appropriate: allergies, current medications, past family history, past medical history, past social history, past surgical history and problem list     Review of Systems   Constitutional: Negative  HENT: Negative  Eyes: Negative  Respiratory: Negative  Cardiovascular: Negative  Gastrointestinal: Negative  Endocrine: Negative  Genitourinary: Negative  Musculoskeletal: Negative  Skin: Negative  Allergic/Immunologic: Negative  Neurological: Negative  Hematological: Negative  Psychiatric/Behavioral: Negative  Objective:      /70 (BP Location: Right arm, Patient Position: Sitting, Cuff Size: Standard)   Pulse 76   Resp 18   Ht 5' 5" (1 651 m)   Wt 52 1 kg (114 lb 12 8 oz)   SpO2 92%   BMI 19 10 kg/m²          Physical Exam   Constitutional: She appears well-developed and well-nourished  HENT:   Head: Normocephalic and atraumatic  Left Ear: External ear normal    Nose: Nose normal    Mouth/Throat: Oropharynx is clear and moist    Neck: Normal range of motion  Neck supple  Cardiovascular: Normal rate, regular rhythm and intact distal pulses  Murmur heard  Patient has a diastolic murmur in the aortic focus  Pulmonary/Chest: Effort normal and breath sounds normal    Abdominal: Soft  Bowel sounds are normal    Musculoskeletal: Normal range of motion  Skin: Skin is warm  Psychiatric: She has a normal mood and affect  Her behavior is normal  Judgment and thought content normal    Nursing note and vitals reviewed

## 2018-08-06 NOTE — PATIENT INSTRUCTIONS
1 -will obtain routine blood work prior to her general evaluation in 6 months time      2 -continue active exercise program

## 2018-08-06 NOTE — ASSESSMENT & PLAN NOTE
She follows with Cardiology on a regular basis  Her cardiologist is Dr Casie riojas at [facility]   She should continue exercising regularly as she does will see her in 6 months for yearly evaluation

## 2018-08-21 DIAGNOSIS — M81.0 AGE-RELATED OSTEOPOROSIS WITHOUT CURRENT PATHOLOGICAL FRACTURE: ICD-10-CM

## 2018-08-21 RX ORDER — ALENDRONATE SODIUM 70 MG/1
70 TABLET ORAL
Qty: 5 TABLET | Refills: 3 | Status: SHIPPED | OUTPATIENT
Start: 2018-08-21 | End: 2019-02-06 | Stop reason: SINTOL

## 2018-09-17 ENCOUNTER — OFFICE VISIT (OUTPATIENT)
Dept: CARDIOLOGY CLINIC | Facility: CLINIC | Age: 83
End: 2018-09-17
Payer: MEDICARE

## 2018-09-17 VITALS
HEART RATE: 70 BPM | SYSTOLIC BLOOD PRESSURE: 110 MMHG | HEIGHT: 65 IN | WEIGHT: 116.5 LBS | DIASTOLIC BLOOD PRESSURE: 60 MMHG | BODY MASS INDEX: 19.41 KG/M2

## 2018-09-17 DIAGNOSIS — I44.7 LEFT BUNDLE BRANCH BLOCK (LBBB): ICD-10-CM

## 2018-09-17 DIAGNOSIS — Z95.2 S/P TAVR (TRANSCATHETER AORTIC VALVE REPLACEMENT): ICD-10-CM

## 2018-09-17 DIAGNOSIS — I35.0 AORTIC STENOSIS, SEVERE: Primary | ICD-10-CM

## 2018-09-17 DIAGNOSIS — I51.7 LEFT VENTRICULAR HYPERTROPHY: ICD-10-CM

## 2018-09-17 PROCEDURE — 99214 OFFICE O/P EST MOD 30 MIN: CPT | Performed by: INTERNAL MEDICINE

## 2018-09-17 PROCEDURE — 93000 ELECTROCARDIOGRAM COMPLETE: CPT | Performed by: INTERNAL MEDICINE

## 2018-09-17 NOTE — PROGRESS NOTES
Cardiology Follow Up    [address]  4/7/1928  [id number]  [address] [facility]  [address] [place] [address] [address]    1  Aortic stenosis, severe  POCT ECG   2  S/P TAVR (transcatheter aortic valve replacement)  POCT ECG   3  Left ventricular hypertrophy     4  Left bundle branch block (LBBB)         Discussion/Summary:  Ms Christie Quinones is a pleasant 66-year-old female who presents to the office today for a routine follow-up  Since her last visit she has been feeling well  She continues to exercise and is asymptomatic  Her blood pressure is well-controlled on no medication  Her most recent echo reveals a well-seated aortic valve with mild regurgitation  She was also noted to have moderate to severe mitral regurgitation  I personally reviewed the study  Her mitral regurgitation appears at most moderate  She also does have mild-to-moderate perivalvular aortic regurgitation  She will undergo repeat study in the spring  No changes were made to her medication regimen  I will see her back in the office in six months or sooner if deemed necessary  Interval History:  Ms Christie Quinones is a pleasant 66-year-old female who presents to the office today for routine followup  Since her last visit she has been feeling well  She continues swimming a few times a week  With those activities she denies any chest pain or shortness of breath  She denies any signs or symptoms of congestive heart failure including increasing lower extremity edema, paroxysmal nocturnal dyspnea, or orthopnea  She denies weight gain or increasing abdominal girth  She denies lightheadedness, dizziness, syncope or presyncope  She denies symptoms of claudication  She smokes not even one cigarette per day           Problem List     Aortic stenosis, severe    Urinary incontinence    Osteoporosis    Osteoarthritis    Leukemia, 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HCC)    Overview Signed 4/12/2016 12:57 PM by Coco Morfin PA-C     s/p splenectomy         Hypothyroidism    Hyperlipidemia    HTN (hypertension)    Essential tremor    CAD (coronary artery disease)    Insomnia    S/P TAVR (transcatheter aortic valve replacement)        Past Medical History:   Diagnosis Date    Aortic stenosis     severe    CAD (coronary artery disease)     Essential tremor     HTN (hypertension)     HTN (hypertension)     Hyperlipidemia     Hypothyroidism     Leukemia, 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HCC)     s/p splenectomy    Osteoarthritis     Osteoporosis     Urinary incontinence      Social History     Social History    Marital status:      Spouse name: N/A    Number of children: N/A    Years of education: N/A     Occupational History    Not on file       Social History Main Topics    Smoking status: Current Some Day Smoker     Types: Cigarettes    Smokeless tobacco: Former User    Alcohol use Yes      Comment: SOCIAL    Drug use: No    Sexual activity: Not on file     Other Topics Concern    Not on file     Social History Narrative    No narrative on file      Family History   Problem Relation Age of Onset    Other Mother         malignant neoplasm of uterus    Coronary artery disease Father     Heart failure Brother      Past Surgical History:   Procedure Laterality Date    COLONOSCOPY      HYSTERECTOMY      IN REPLACE AORTIC VALVE OPENFEMORAL ARTERY APPROACH N/A 4/12/2016    Procedure: REPLACEMENT AORTIC VALVE TRANSCATHETER (TAVR) TRANSFEMORAL  26mm Lin 3 valve;  Surgeon: Heather Guy DO;  [facility];  Service: Cardiac Surgery    SPLENECTOMY      for hx hairy cell leukemia       Current Outpatient Prescriptions:     acetaminophen (TYLENOL) 500 mg tablet, Take 1,000 mg by mouth every 6 (six) hours as needed for mild pain   , Disp: , Rfl:     alendronate (FOSAMAX) 70 mg tablet, Take 1 tablet (70 mg total) by mouth every 7 days, Disp: 5 tablet, Rfl: 3    amoxicillin (AMOXIL) 500 mg capsule, TAKE 4 CAPSULES 1 HOUR PRIOR TO PROCEDURE , Disp: 4 capsule, Rfl: 0    APPLE CIDER VINEGAR PO, Take by mouth daily, Disp: , Rfl:     levothyroxine 75 mcg tablet, Take 1 tablet (75 mcg total) by mouth daily, Disp: 30 tablet, Rfl: 3    Loperamide HCl (IMODIUM PO), Take 2 mg by mouth as needed  , Disp: , Rfl:     MELATONIN PO, Take 5 mg by mouth daily at bedtime  , Disp: , Rfl:     Multiple Vitamin (MULTIVITAMIN) tablet, Take 1 tablet by mouth daily  , Disp: , Rfl:     Polyethyl Glycol-Propyl Glycol (SYSTANE OP), Apply 1 drop to eye 2 (two) times a day  EACH EYE TWICE DAILY , Disp: , Rfl:   No Known Allergies    Labs:     Chemistry        Component Value Date/Time     04/19/2017 1216     06/02/2015 1237    K 4 4 04/19/2017 1216    K 4 3 06/02/2015 1237     04/19/2017 1216     06/02/2015 1237    CO2 29 04/19/2017 1216    CO2 31 06/02/2015 1237    BUN 17 04/19/2017 1216    BUN 20 06/02/2015 1237    CREATININE 0 70 04/19/2017 1216    CREATININE 0 68 06/02/2015 1237        Component Value Date/Time    CALCIUM 9 7 04/19/2017 1216    CALCIUM 9 0 06/02/2015 1237    ALKPHOS 65 06/02/2015 1237    AST 22 06/02/2015 1237    ALT 22 06/02/2015 1237    BILITOT 0 81 06/02/2015 1237            Lab Results   Component Value Date    CHOL 202 11/25/2014    CHOL 192 03/13/2014     Lab Results   Component Value Date    HDL 84 11/25/2014    HDL 69 03/13/2014     Lab Results   Component Value Date    LDLCALC 102 (H) 11/25/2014    LDLCALC 111 (H) 03/13/2014     Lab Results   Component Value Date    TRIG 80 11/25/2014    TRIG 62 03/13/2014     No components found for: CHOLHDL    Imaging: No results found  Review of Systems   Cardiovascular: Negative for chest pain, claudication, cyanosis, dyspnea on exertion, irregular heartbeat, leg swelling, near-syncope, orthopnea, palpitations, paroxysmal nocturnal dyspnea and syncope         Vitals:    09/17/18 1316   BP: 110/60   Pulse: Vitals:    09/17/18 1303   Weight: 52 8 kg (116 lb 8 oz)     Height: 5' 5" (165 1 cm)   Body mass index is 19 39 kg/m²      Physical Exam:   General appearance:  Appears stated age, alert, well appearing and in no distress  HEENT:  PERRLA, EOMI, no scleral icterus, no conjunctival pallor  NECK:  Supple, No elevated JVP, no thyromegaly, no carotid bruits  HEART:  Regular rate and rhythm, normal S1/S2, soft early peaking systolic ejection murmur noted at the right upper sternal border with a early diastolic murmur  LUNGS:  Clear to auscultation bilaterally  ABDOMEN:  Soft, non-tender, positive bowel sounds, no rebound or guarding, no organomegaly   EXTREMITIES:  No edema  VASCULAR:  Normal pedal pulses   SKIN: No lesions or rashes on exposed skin  NEURO:  CN II-XII intact, no focal deficits

## 2018-09-19 DIAGNOSIS — E03.9 HYPOTHYROIDISM, UNSPECIFIED TYPE: ICD-10-CM

## 2018-09-19 RX ORDER — LEVOTHYROXINE SODIUM 0.07 MG/1
75 TABLET ORAL DAILY
Qty: 30 TABLET | Refills: 3 | Status: SHIPPED | OUTPATIENT
Start: 2018-09-19 | End: 2019-01-14 | Stop reason: SDUPTHER

## 2019-01-14 DIAGNOSIS — E03.9 HYPOTHYROIDISM, UNSPECIFIED TYPE: ICD-10-CM

## 2019-01-14 RX ORDER — LEVOTHYROXINE SODIUM 0.07 MG/1
TABLET ORAL
Qty: 30 TABLET | Refills: 3 | Status: SHIPPED | OUTPATIENT
Start: 2019-01-14 | End: 2019-05-17 | Stop reason: SDUPTHER

## 2019-02-06 ENCOUNTER — OFFICE VISIT (OUTPATIENT)
Dept: GERIATRICS | Facility: CLINIC | Age: 84
End: 2019-02-06
Payer: MEDICARE

## 2019-02-06 ENCOUNTER — DOCUMENTATION (OUTPATIENT)
Dept: GERIATRICS | Facility: CLINIC | Age: 84
End: 2019-02-06

## 2019-02-06 VITALS
WEIGHT: 116.1 LBS | OXYGEN SATURATION: 94 % | HEART RATE: 65 BPM | SYSTOLIC BLOOD PRESSURE: 124 MMHG | RESPIRATION RATE: 18 BRPM | HEIGHT: 65 IN | DIASTOLIC BLOOD PRESSURE: 70 MMHG | BODY MASS INDEX: 19.34 KG/M2

## 2019-02-06 DIAGNOSIS — H81.393 PERIPHERAL VERTIGO OF BOTH EARS: ICD-10-CM

## 2019-02-06 DIAGNOSIS — N39.3 STRESS INCONTINENCE OF URINE: ICD-10-CM

## 2019-02-06 DIAGNOSIS — I44.7 LEFT BUNDLE BRANCH BLOCK (LBBB): ICD-10-CM

## 2019-02-06 DIAGNOSIS — I51.7 LEFT VENTRICULAR HYPERTROPHY: Primary | ICD-10-CM

## 2019-02-06 DIAGNOSIS — E02 SUBCLINICAL IODINE-DEFICIENCY HYPOTHYROIDISM: ICD-10-CM

## 2019-02-06 DIAGNOSIS — Z95.2 S/P TAVR (TRANSCATHETER AORTIC VALVE REPLACEMENT): ICD-10-CM

## 2019-02-06 PROBLEM — H81.399 PERIPHERAL VERTIGO: Status: ACTIVE | Noted: 2019-02-06

## 2019-02-06 PROBLEM — H61.23 BILATERAL IMPACTED CERUMEN: Status: ACTIVE | Noted: 2019-02-06

## 2019-02-06 PROCEDURE — 99215 OFFICE O/P EST HI 40 MIN: CPT | Performed by: INTERNAL MEDICINE

## 2019-02-06 NOTE — PROGRESS NOTES
Assessment/Plan:    Urinary incontinence  Patient has evidence of stress incontinence she only wears 1 pad a day  S/P TAVR (transcatheter aortic valve replacement)  Patient is currently being followed actively by Cardiology she will have a heart echocardiogram on February 25th and will have a follow-up appointment with Dr Faustino Shah on March 28th of this year  Osteoporosis  Patient's stop the Fosamax on her own because of constipation does not wish to go on Prolia at present she was encouraged to actively walk  Peripheral vertigo  Patient has evidence of peripheral vertigo I will refer her to physical therapy and she will be giving a routine of exercises to do to help ameliorate this problem  Bilateral impacted cerumen  Will bring patient in next week for cerumen removal        Diagnoses and all orders for this visit:    Left ventricular hypertrophy    Left bundle branch block (LBBB)    S/P TAVR (transcatheter aortic valve replacement)    Stress incontinence of urine    Peripheral vertigo of both ears  -     Ambulatory referral to Physical Therapy; Future    Other orders  -     Glucosamine-Chondroitin (GLUCOSAMINE CHONDR COMPLEX PO); Take by mouth daily          Subjective:      Patient ID: Juan Salinas is a 80 y o  female  Patient is a 63-year-old  female who comes to the office today for a yearly evaluation patient had a previous history of TAVR her most recent echocardiogram revealed a well-seated aortic valve with mild regurgitation  She does have evidence of moderate to severe mitral regurg her mitral regurgitation appears to be at most moderate  She also has mild to moderate perivalvular aortic regurgitation and will be undergoing a repeat study apparently in the spring  Patient apparently stopped taking the Fosamax because it was causing constipation I will need to discuss with her whether she would be willing to take Prolia for her osteoporosis    She has a -2 9 on her DEXA scan performed in April of 2018 the osteoporosis is noted in her left femoral neck  She does not wish at this time to take the Prolia injections  She is aware that she can suffer a fracture in her left hip if not treated  The patient's blood work reveals a normal blood sugar of 82 her GFR is 66  Her cholesterol slightly elevated at 224  Her good cholesterol is superb at 90  The patient does complain that she did have episode of peripheral vertigo which made her uneasy on her gait  She has never been evaluated by Physical therapy for this and has not been given the exercises to perform to help palliate this problem  The following portions of the patient's history were reviewed and updated as appropriate: allergies, current medications, past family history, past medical history, past social history, past surgical history and problem list     Review of Systems   Constitutional: Negative  HENT: Negative  Eyes: At time she states that she might have double vision  She will be seeing Ophthalmology in a couple months  Respiratory: Negative  Cardiovascular: Negative  Gastrointestinal: Negative  Endocrine: Negative  Genitourinary:        Patient can at times have incontinence of urine she does wear a pad  It appears to be related more to stress incontinence because it is more noticeable when she coughs  Musculoskeletal: Positive for arthralgias  Skin: Negative  Allergic/Immunologic: Negative  Neurological: Negative  Hematological: Negative  Psychiatric/Behavioral: Negative  Objective:      /70 (BP Location: Right arm, Patient Position: Sitting, Cuff Size: Standard)   Pulse 65   Resp 18   Ht 5' 5" (1 651 m)   Wt 52 7 kg (116 lb 1 6 oz)   SpO2 94%   BMI 19 32 kg/m²          Physical Exam   Constitutional: She is oriented to person, place, and time  She appears well-developed and well-nourished  HENT:   Head: Normocephalic and atraumatic  Mouth/Throat: Oropharynx is clear and moist    Patient has evidence of increased wax bilaterally  Eyes: Pupils are equal, round, and reactive to light  Conjunctivae and EOM are normal    Neck: Normal range of motion  Neck supple  Cardiovascular: Normal rate, normal heart sounds and intact distal pulses  Pulmonary/Chest: Effort normal and breath sounds normal    Abdominal: Soft  Bowel sounds are normal    Musculoskeletal: Normal range of motion  Neurological: She is alert and oriented to person, place, and time  She has normal reflexes  Skin: Skin is warm and dry  Nursing note and vitals reviewed

## 2019-02-06 NOTE — ASSESSMENT & PLAN NOTE
Patient has evidence of peripheral vertigo I will refer her to physical therapy and she will be giving a routine of exercises to do to help ameliorate this problem

## 2019-02-06 NOTE — ASSESSMENT & PLAN NOTE
Patient's stop the Fosamax on her own because of constipation does not wish to go on Prolia at present she was encouraged to actively walk

## 2019-02-06 NOTE — ASSESSMENT & PLAN NOTE
Patient is currently being followed actively by Cardiology she will have a heart echocardiogram on February 25th and will have a follow-up appointment with Dr Anthony Arrieta on March 28th of this year

## 2019-02-06 NOTE — PATIENT INSTRUCTIONS
1 -patient will be referred to physical therapy for her peripheral vertigo to do the Epley maneuver  2 -patient will be following with Ophthalmology will discuss with them the double vision episodes      3 -patient will follow with Cardiology after her echocardiogram

## 2019-02-12 ENCOUNTER — OFFICE VISIT (OUTPATIENT)
Dept: GERIATRICS | Facility: CLINIC | Age: 84
End: 2019-02-12
Payer: MEDICARE

## 2019-02-12 VITALS
TEMPERATURE: 97.4 F | BODY MASS INDEX: 19 KG/M2 | DIASTOLIC BLOOD PRESSURE: 68 MMHG | WEIGHT: 114.2 LBS | HEART RATE: 68 BPM | SYSTOLIC BLOOD PRESSURE: 112 MMHG

## 2019-02-12 DIAGNOSIS — H61.23 BILATERAL IMPACTED CERUMEN: Primary | ICD-10-CM

## 2019-02-12 PROCEDURE — 99213 OFFICE O/P EST LOW 20 MIN: CPT | Performed by: NURSE PRACTITIONER

## 2019-02-12 PROCEDURE — 69209 REMOVE IMPACTED EAR WAX UNI: CPT | Performed by: NURSE PRACTITIONER

## 2019-02-12 NOTE — PATIENT INSTRUCTIONS
· Debrox to left ear - 5 drops twice a day  x4 days then will recheck ear next week  · Notify office if worsening of hearing, drainage, pain, dizziness

## 2019-02-12 NOTE — ASSESSMENT & PLAN NOTE
· Left ear clear of cerumen with irrigation, right ear partially cleared  · Debrox to right ear - 5 drops bid x4 days then will recheck ear next week  · Notify office if worsening of hearing, drainage, pain, dizziness

## 2019-02-12 NOTE — PROGRESS NOTES
Assessment/Plan:      Diagnoses and all orders for this visit:    Bilateral impacted cerumen    Other orders  -     Ear cerumen removal          Subjective:     Patient ID: Rowdy Yee is a 80 y o  female  Patient here for ear cleaning secondary to wax buildup, decreased hearing  Has had cerumen removal in past and tolerated well  Patient did not use debrox, does not clean own ears  Review of Systems   Constitutional: Negative  HENT: Positive for hearing loss  Negative for ear discharge and ear pain  Objective:     Physical Exam   Constitutional: She is oriented to person, place, and time  She appears well-developed and well-nourished  No distress  HENT:   Head:       Neurological: She is alert and oriented to person, place, and time  Skin: She is not diaphoretic  Nursing note and vitals reviewed  Ear cerumen removal  Date/Time: 2/12/2019 11:51 AM  Performed by: JESSICA Anderson  Authorized by: JESSICA Anderson     Patient location:  Clinic  Consent:     Consent obtained:  Verbal    Consent given by:  Patient    Risks discussed:  Bleeding, dizziness, infection, incomplete removal, pain and TM perforation    Alternatives discussed:  No treatment  Universal protocol:     Procedure explained and questions answered to patient or proxy's satisfaction: yes      Patient identity confirmed:  Verbally with patient  Procedure details:     Local anesthetic:  None    Location:  L ear and R ear    Procedure type: irrigation      Visualization (free text):  Right tm visible after flush- sm yellow wax removed- intact, pearly greay  Left ear tm minimally visible after flush- unable to remove wax safely  Post-procedure details:     Complication:  None    Hearing quality:  Improved    Patient tolerance of procedure:   Tolerated well, no immediate complications

## 2019-02-13 ENCOUNTER — EVALUATION (OUTPATIENT)
Dept: PHYSICAL THERAPY | Age: 84
End: 2019-02-13
Payer: MEDICARE

## 2019-02-13 VITALS — SYSTOLIC BLOOD PRESSURE: 121 MMHG | DIASTOLIC BLOOD PRESSURE: 65 MMHG | HEART RATE: 64 BPM

## 2019-02-13 DIAGNOSIS — R26.9 ABNORMALITY OF GAIT: Primary | ICD-10-CM

## 2019-02-13 DIAGNOSIS — R42 VERTIGO: ICD-10-CM

## 2019-02-13 DIAGNOSIS — H81.393 PERIPHERAL VERTIGO OF BOTH EARS: ICD-10-CM

## 2019-02-13 PROCEDURE — 97162 PT EVAL MOD COMPLEX 30 MIN: CPT

## 2019-02-13 PROCEDURE — G8979 MOBILITY GOAL STATUS: HCPCS

## 2019-02-13 PROCEDURE — G8978 MOBILITY CURRENT STATUS: HCPCS

## 2019-02-13 PROCEDURE — 97530 THERAPEUTIC ACTIVITIES: CPT

## 2019-02-14 NOTE — PROGRESS NOTES
PT Evaluation     Today's date: 2019  Patient name: Vivek Garland  : 1928  MRN: [id number]  Referring provider: Francella Lesch, MD  Dx:   Encounter Diagnosis     ICD-10-CM    1  Abnormality of gait R26 9    2  Peripheral vertigo of both ears H81 393 Ambulatory referral to Physical Therapy   3  Vertigo R42                   Assessment  Assessment details: The pt is a 80year old female presenting with decreased balance, unstable gait and c/o vertigo, with exacerbation of vertigo noted on 2019 while in bed with the pt then staying in bed for approx 3-4 days due to constant dizziness  She presents with right sided vestibular dysfunction, 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, and report of 1 year of double vision  PT is warranted to address these deficits in efforts to maximize function and return to all prior activity without onset of symptoms  Currently the pt does report dizziness with right jorge hallpike testing with no nystagmus noted, slight left dizziness noted upon testing  She reports having had wax removed from her ears and a decline in symptoms to date  The pt also presents with visual disturbance ( central processing ) which shall also be addressed with eye head exer  With possible referral to neuoropthalmalogist Dr Ana M Blair should additional intervention be required  Impairments: abnormal gait, abnormal or restricted ROM, activity intolerance, impaired balance, impaired physical strength, lacks appropriate home exercise program and pain with function  Functional limitations: decreased tolerance to bed mobility and supine lying, decreased tolerance to quick turns with gait, decreased balance and unstable gait, hx of double visionBarriers to therapy: Double vision hx not checked by ophthalmologist ( pt goes in Oct for check up )  Understanding of Dx/Px/POC: good   Prognosis: good    Goals  Short Term  1  The pt shall achieve vertigo reduction by 50 percent in two weeks    2  The pt shall achieve independent bed mobility in two weeks  3  The pt shall prove independent in a home exer program in two weeks  Long Term Goals Vestibular  1  The pt shall achieve 80 percent reduction in vertigo in 4 weeks   2  The pt shall achieve quick turns with gait with not loss of balance in 4 weeks    Plan  Patient would benefit from: PT eval and skilled physical therapy  Referral necessary: Yes  Planned therapy interventions: balance, manual therapy, joint mobilization, neuromuscular re-education, strengthening, stretching, sensory integrative techniques, therapeutic activities, therapeutic exercise and home exercise program  Other planned therapy interventions: vestibular rehabilitation, habituation exer, eye head exer,   Frequency: 2x week  Duration in weeks: 8  Treatment plan discussed with: patient        Subjective Evaluation    History of Present Illness  Date of onset: 2019  Mechanism of injury: The pt is a 80year old female referred to outpt PT with onset of vertigo while lying in bed on 2019 per her report  She reports intermittent vertigo for many years (+ 15-20) with the pt reporting apple cider vinegar has helped eliminate vertigo in the past  The pt is an avid swimmer 3-4 times a week swimming a 1/4 mile at a time and exercising in her gym at [facility] at least 2 x a  Week in addition to swimming             Recurrent probem    Quality of life: good    Pain  Current pain ratin  At best pain ratin  At worst pain ratin  Location: right sided neck pain  at 1-2   low back pain with making bed at 5-6  Quality: tight and dull ache  Relieving factors: change in position and heat  Aggravating factors: overhead activity  Progression: no change    Social Support  Steps to enter house: yes (also elevator present   15 step 2 railings)  Stairs in house: no   Lives in: apartment  Lives with: alone    Exercise history: avid exerciser, swimming , weights aerobic exer    Treatments  Current treatment: physical therapy  Patient Goals  Patient goals for therapy: increased motion, improved balance, decreased pain, independence with ADLs/IADLs and return to sport/leisure activities  Patient goal: reduction of vertigo and improved balance to reduce risk of falling         Objective     Active Range of Motion   Cervical/Thoracic Spine       Cervical  Subcranial protraction:  WFL   Subcranial retraction: Active cervical subcranial retraction: 1/2 range    Flexion:  WFL  Extension: Neck active extension: 1/2 range       Left lateral flexion: Neck active lateral bend left: 1/2 range       Right lateral flexion: Neck active lateral bend right: 1/2 range       Left rotation: Neck active rotation left: 3/4 range  Right rotation: Neck active rotation right: 3/4 range            Additional Active Range of Motion Details  Slight forward head, +suboccipital restriction noted  Ambulation     Ambulation: Level Surfaces   Ambulation without assistive device: independent    Additional Level Surfaces Ambulation Details  200 ft slow ketty wide base of support,  [facility] step testing eyes open 2inches forward, eyes closed 1 foot forward with 25 degree turn to the right indicating vestibular dysfunction, left jorge hallpike c/o double vision  Slight vertigo, right jorge hallpike  _+ vertigo no nystagmus 5 seconds, right sidelying to sit c/o vertigo , left sidelying to sit slight vertigo, TUG 10 seconds, VOR and vor cancellation - , c/o buzzing in both ears, HTT- , tandem gait cg of 1 , tandem stance falls in 10 sec, sharpened romberg falls in 5 seconds, flat surface eyes open wfl's , eyes closed increased forward and backward sway , foam surface eyes open wfl's, eyes closed pt with excessive forward and backward sway noted, foam standing with head turns pt falls with eyes closed       Ambulation: Stairs   Ascend stairs: independent  Pattern: non-reciprocal  Railings: one rail  Descend stairs: independent  Pattern: non-reciprocal  Railings: one rail      Flowsheet Rows      Most Recent Value   PT/OT G-Codes   Current Score  82   Projected Score  86   Assessment Type  Evaluation   G code set  Mobility: Walking & Moving Around   Mobility: Walking and Moving Around Current Status ()  CI   Mobility: Walking and Moving Around Goal Status ()  CI          Precautions: no med allergies known   PMH: s/p TVAR4/12/2016 (valve replacement), stress incontinence, hypothyroidism, osteoporosis, severe aortic stenosis, left ventricular hypertrophye,, s/p hysterectomy, insomnia, CAD, HTN essential tremor, left bundle branch block,  DDD, chronic low back pain, arthritis, hairy cell leukemia s/p splenectomy, report of double vision for +1 year, pt with recent bilateral ear cleaning due to excess wax 2/11/19 per pt report,     Daily Treatment Diary     Manual  2/13/19             I nelly            biodex balance testing and training                                                        Exercise Diary  2/13            Eye head exer issue             Tandem gait 1 in              Tandem stance             Ball pass activities              squats             Hand to opposite knee gait             braiding             Backwards walking              theraband sidestepping                                                                                                                                                                 Modalities

## 2019-02-19 ENCOUNTER — OFFICE VISIT (OUTPATIENT)
Dept: GERIATRICS | Facility: CLINIC | Age: 84
End: 2019-02-19
Payer: MEDICARE

## 2019-02-19 ENCOUNTER — OFFICE VISIT (OUTPATIENT)
Dept: PHYSICAL THERAPY | Age: 84
End: 2019-02-19
Payer: MEDICARE

## 2019-02-19 VITALS
RESPIRATION RATE: 18 BRPM | HEIGHT: 65 IN | HEART RATE: 64 BPM | SYSTOLIC BLOOD PRESSURE: 118 MMHG | OXYGEN SATURATION: 95 % | DIASTOLIC BLOOD PRESSURE: 70 MMHG | BODY MASS INDEX: 19.26 KG/M2 | WEIGHT: 115.6 LBS

## 2019-02-19 DIAGNOSIS — H61.23 BILATERAL IMPACTED CERUMEN: Primary | ICD-10-CM

## 2019-02-19 DIAGNOSIS — R26.9 ABNORMALITY OF GAIT: Primary | ICD-10-CM

## 2019-02-19 DIAGNOSIS — R42 VERTIGO: ICD-10-CM

## 2019-02-19 PROCEDURE — 97530 THERAPEUTIC ACTIVITIES: CPT

## 2019-02-19 PROCEDURE — 97110 THERAPEUTIC EXERCISES: CPT

## 2019-02-19 PROCEDURE — 99212 OFFICE O/P EST SF 10 MIN: CPT | Performed by: NURSE PRACTITIONER

## 2019-02-19 PROCEDURE — 97750 PHYSICAL PERFORMANCE TEST: CPT

## 2019-02-19 NOTE — ASSESSMENT & PLAN NOTE
· Right ear clear - intact grey tm - no further cleaning needed  · Left ear occluded - after flush mild occluded with thin wax, unable to remove with flush and not safe to remove with currette  Can partially see intact grey tm, but upper portion still occluded    · Follow up with audiology

## 2019-02-19 NOTE — PROGRESS NOTES
Assessment/Plan:      Diagnoses and all orders for this visit:    Bilateral impacted cerumen    Other orders  -     Ear cerumen removal    ·   Right ear clear - intact grey tm - no further cleaning needed  · Left ear occluded - after flush mild occluded with thin wax, unable to remove with flush and not safe to remove with currette  Can partially see intact grey tm, but upper portion still occluded  Follow up with audiology    Subjective:     Patient ID: Meghan Chawla is a 80 y o  female  Patient here for follow up of cerumen impaction left ear  Has used debrox as directed        Review of Systems   Constitutional: Negative  HENT: Negative  Objective:     Physical Exam   Constitutional: She is oriented to person, place, and time  She appears well-developed and well-nourished  No distress  HENT:   Head:       Neurological: She is alert and oriented to person, place, and time  Skin: She is not diaphoretic  Ear cerumen removal  Date/Time: 2/19/2019 10:14 AM  Performed by: JESSICA Burk  Authorized by: JESSICA Burk     Patient location:  Clinic  Consent:     Consent obtained:  Verbal    Consent given by:  Patient    Risks discussed:  Bleeding, dizziness, incomplete removal, infection, pain and TM perforation    Alternatives discussed:  No treatment  Universal protocol:     Procedure explained and questions answered to patient or proxy's satisfaction: yes    Post-procedure details:     Complication:  None    Hearing quality:  Normal    Patient tolerance of procedure:  Procedure terminated at patient's request  Comments: At end of procedure still thin veil of wax left, unable to remove by irrigation, too far back for curette  Able to partially visualize intact tm (pearly grey)  Discussed with patient - will stop irrigating at this time

## 2019-02-19 NOTE — PROGRESS NOTES
Daily Note     Today's date: 2019  Patient name: Juan Salinas  : 1928  MRN: [id number]  Referring provider: Andrea Pepe MD  Dx:   Encounter Diagnosis     ICD-10-CM    1  Abnormality of gait R26 9    2  Vertigo R42                   Subjective:  Andrew supervising PT  200-230,  Dinah supervising 230-30pm      Objective: See treatment diary below       Manual  19                     I eval                     [employer] balance testing and training    perf today                                                                                                 Exercise Diary                     Eye head exer issue    15 min there exer                   Tandem gait 1 in      10 ft x 4                   Tandem stance    30 sec x 3                   Ball pass activities                        squats    3 x 10                   Hand to opposite knee gait                       braiding                       Backwards walking                        theraband sidestepping                         ankle sway referencing flat surface and foam eo, ec    10 rep cg of /close s                                                                                                                                                                                                                                                                         Modalities                                                                                                                        Assessment: Tolerated treatment well  Patient would benefit from continued PT  Pt issued written home exer program today  Plan: Continue per plan of care

## 2019-02-21 ENCOUNTER — OFFICE VISIT (OUTPATIENT)
Dept: PHYSICAL THERAPY | Age: 84
End: 2019-02-21
Payer: MEDICARE

## 2019-02-21 DIAGNOSIS — R26.9 ABNORMALITY OF GAIT: Primary | ICD-10-CM

## 2019-02-21 PROCEDURE — 97110 THERAPEUTIC EXERCISES: CPT

## 2019-02-21 PROCEDURE — 97530 THERAPEUTIC ACTIVITIES: CPT

## 2019-02-21 NOTE — PROGRESS NOTES
Daily Note     Today's date: 2019  Patient name: Gissell Durant  : 1928  MRN: [id number]  Referring provider: Miller Gamez MD  Dx:   Encounter Diagnosis     ICD-10-CM    1  Abnormality of gait R26 9                   Subjective: "I have difficulty with the heel to toe walking "      Objective: See treatment diary below     Manual  19                   I eval                     biodex balance testing and training    perf today  training x 5 maze easy and los easy level                                                                                                Exercise Diary                   Eye head exer issue    15 min there exer  15 min                 Tandem gait 1 in      10 ft x 4  10 f t x 4                 Tandem stance    30 sec x 3  30 se c x 2                 Ball pass activities                        squats    3 x 10  3 x 10                 Hand to opposite knee gait      40 ft x 2                 braiding      cg 20 ft x 4                 Backwards walking       10 ft x 4                 theraband sidestepping       red 4 x 10 ft                  ankle sway referencing flat surface and foam eo, ec    10 rep cg of 1/close s  10 reps                                                                                                                                                                                                                                                                        Modalities                                                                                                                 Assessment: Tolerated treatment well  Patient would benefit from continued PT, good technique with eye head exer  Today upon review of exer      Plan: Continue per plan of care

## 2019-02-25 ENCOUNTER — HOSPITAL ENCOUNTER (OUTPATIENT)
Dept: NON INVASIVE DIAGNOSTICS | Facility: CLINIC | Age: 84
Discharge: HOME | End: 2019-02-25
Payer: MEDICARE

## 2019-02-25 DIAGNOSIS — Z95.2 S/P TAVR (TRANSCATHETER AORTIC VALVE REPLACEMENT): ICD-10-CM

## 2019-02-25 DIAGNOSIS — I35.0 AORTIC STENOSIS, SEVERE: ICD-10-CM

## 2019-02-25 PROCEDURE — 93306 TTE W/DOPPLER COMPLETE: CPT

## 2019-02-25 PROCEDURE — 93306 TTE W/DOPPLER COMPLETE: CPT | Performed by: INTERNAL MEDICINE

## 2019-02-26 ENCOUNTER — OFFICE VISIT (OUTPATIENT)
Dept: PHYSICAL THERAPY | Age: 84
End: 2019-02-26
Payer: MEDICARE

## 2019-02-26 DIAGNOSIS — R26.9 ABNORMALITY OF GAIT: Primary | ICD-10-CM

## 2019-02-26 PROCEDURE — 97530 THERAPEUTIC ACTIVITIES: CPT

## 2019-02-26 NOTE — PROGRESS NOTES
Daily Note     Today's date: 2019  Patient name: Meghan Chawla  : 1928  MRN: [id number]  Referring provider: Severiano Haskins MD  Dx:   Encounter Diagnosis     ICD-10-CM    1  Abnormality of gait R26 9                   Subjective: "I do believe my legs are getting stronger "      Objective: See treatment diary below       Manual  19                 I eval                     biodex balance testing and training    perf today  training x 5 maze easy and los easy level   training x 5                                                                                             Exercise Diary                 Eye head exer issue    15 min there exer  15 min   at home               Tandem gait 1 in  Off set     10 ft x 4  10 f t x 4  10 f t x 4               Tandem stance off set    30 sec x 3  30 se c x 2  30 se c x 2                Ball pass activities         10               squats    3 x 10  3 x 10   3 x 10               Hand to opposite knee gait      40 ft x 2  40 f t x 2               braiding      cg 20 ft x 4                 Backwards walking       10 ft x 4                 theraband sidestepping       red 4 x 10 ft                  ankle sway referencing flat surface and foam eo, ec    10 rep cg of 1/close s  10 reps   10 reps                 walk and toss ball to self 40 ft x 2 close superv       40 ft x 2                side to side ball toss        40 ft x 2                                                                                                                                                                                                                     Modalities                                                                                                         Assessment: Tolerated treatment well  Patient would benefit from continued PT      Plan: Continue per plan of care

## 2019-02-28 ENCOUNTER — OFFICE VISIT (OUTPATIENT)
Dept: PHYSICAL THERAPY | Age: 84
End: 2019-02-28
Payer: MEDICARE

## 2019-02-28 DIAGNOSIS — R26.9 ABNORMALITY OF GAIT: Primary | ICD-10-CM

## 2019-02-28 PROCEDURE — 97112 NEUROMUSCULAR REEDUCATION: CPT

## 2019-02-28 NOTE — PROGRESS NOTES
Daily Note     Today's date: 2019  Patient name: Ludmila Moore  : 1928  MRN: [id number]  Referring provider: Darlene Beasley MD  Dx:   Encounter Diagnosis     ICD-10-CM    1  Abnormality of gait R26 9                   Subjective: Pt  With some LOB with eyes closed  Objective: See treatment diary below      Assessment: Tolerated treatment well  Patient would benefit from continued PT      Plan: Continue per plan of care  Manual  19               I eval                     biodex balance testing and training    perf today  training x 5 maze easy and los easy level   training x 5  stabiltiy/maze                                                                                           Exercise Diary               Eye head exer issue    15 min there exer  15 min   at home  HEP             Tandem gait 1 in  Off set     10 ft x 4  10 f t x 4  10 f t x 4  40ft             Tandem stance off set    30 sec x 3  30 se c x 2  30 se c x 2   20sec x 5             Ball pass activities         10               squats    3 x 10  3 x 10   3 x 10  30             Hand to opposite knee gait      40 ft x 2  40 f t x 2  yes             braiding      cg 20 ft x 4                 Backwards walking       10 ft x 4    yes             theraband sidestepping       red 4 x 10 ft                  ankle sway referencing flat surface and foam eo, ec    10 rep cg of 1/close s  10 reps   10 reps   10 reps              walk and toss ball to self 40 ft x 2 close superv       40 ft x 2  40ft   X 2              side to side ball toss        40 ft x 2                                                                                                                                                                                                                     Modalities

## 2019-03-05 ENCOUNTER — OFFICE VISIT (OUTPATIENT)
Dept: PHYSICAL THERAPY | Age: 84
End: 2019-03-05
Payer: MEDICARE

## 2019-03-05 DIAGNOSIS — R26.9 ABNORMALITY OF GAIT: Primary | ICD-10-CM

## 2019-03-05 PROCEDURE — 97530 THERAPEUTIC ACTIVITIES: CPT

## 2019-03-05 NOTE — PROGRESS NOTES
Daily Note     Today's date: 3/5/2019  Patient name: Megan Trinh  : 1928  MRN: [id number]  Referring provider: Jennifer Engle MD  Dx:   Encounter Diagnosis     ICD-10-CM    1  Abnormality of gait R26 9                   Subjective: "I have joined [facility] Exer class and have done 2 session "  "My balance has improved very much "      Objective: See treatment diary below      [facility] 19               I eval                     biodex balance testing and training    perf today  training x 5 maze easy and los easy level   training x 5  stabiltiy/maze                                                                                           Exercise Diary    3           Eye head exer issue    15 min there exer  15 min   at home  HEP             Tandem gait 1 in  Off set     10 ft x 4  10 f t x 4  10 f t x 4  40ft  40 f t x 4           Tandem stance off set    30 sec x 3  30 se c x 2  30 se c x 2   20sec x 5  20 se c x 5           Ball pass activities         10    10           squats    3 x 10  3 x 10   3 x 10  30  3 x 10           Hand to opposite knee gait      40 ft x 2  40 f t x 2  yes  40 ft x 2           braiding      cg 20 ft x 4      20 ft x 4 close s           Backwards walking       10 ft x 4    yes  10 ft x 4           theraband sidestepping       red 4 x 10 ft                  ankle sway referencing flat surface and foam eo, ec    10 rep cg of 1/close s  10 reps   10 reps   10 reps  10            walk and toss ball to self 40 ft x 2 close superv       40 ft x 2  40ft   X 2              side to side ball toss        40 ft x 2                4 square step             5 reps x 2 and diagonals                                                                                                                                                                                         Modalities                                                                                                      Assessment: Tolerated treatment well  Patient would benefit from continued PT      Plan: Continue per plan of care

## 2019-03-07 ENCOUNTER — OFFICE VISIT (OUTPATIENT)
Dept: PHYSICAL THERAPY | Age: 84
End: 2019-03-07
Payer: MEDICARE

## 2019-03-07 DIAGNOSIS — R26.9 ABNORMALITY OF GAIT: Primary | ICD-10-CM

## 2019-03-07 PROCEDURE — 97110 THERAPEUTIC EXERCISES: CPT

## 2019-03-07 PROCEDURE — 97112 NEUROMUSCULAR REEDUCATION: CPT

## 2019-03-07 NOTE — PROGRESS NOTES
Daily Note     Today's date: 3/7/2019  Patient name: Romana Pride  : 1928  MRN: [id number]  Referring provider: Maryann Galindo MD  Dx:   Encounter Diagnosis     ICD-10-CM    1  Abnormality of gait R26 9                   Subjective: pt  Reports she is 100% improved and is ready to discharge  Objective: See treatment diary below      Assessment: Tolerated treatment well  Patient would benefit from continued PT      Plan: Continue per plan of care  Manual  2/13/19  2/19  2/21  2/26  2/28  3/7             I eval                     biodex balance testing and training    perf today  training x 5 maze easy and los easy level   training x 5  stabiltiy/maze                                                                                           Exercise Diary  2/13  2/19  2/21  2/26  2/28  3/5  3/7         Eye head exer issue    15 min there exer  15 min   at home  HEP             Tandem gait 1 in  Off set     10 ft x 4  10 f t x 4  10 f t x 4  40ft  40 f t x 4           Tandem stance off set    30 sec x 3  30 se c x 2  30 se c x 2   20sec x 5  20 se c x 5           Ball pass activities         10    10           squats    3 x 10  3 x 10   3 x 10  30  3 x 10           Hand to opposite knee gait      40 ft x 2  40 f t x 2  yes  40 ft x 2           braiding      cg 20 ft x 4      20 ft x 4 close s           Backwards walking       10 ft x 4    yes  10 ft x 4           theraband sidestepping       red 4 x 10 ft                  ankle sway referencing flat surface and foam eo, ec    10 rep cg of 1/close s  10 reps   10 reps   10 reps  10            walk and toss ball to self 40 ft x 2 close superv       40 ft x 2  40ft   X 2              side to side ball toss        40 ft x 2                4 square step             5 reps x 2 and diagonals                                                                                                                                                                                         Modalities

## 2019-03-12 ENCOUNTER — APPOINTMENT (OUTPATIENT)
Dept: PHYSICAL THERAPY | Age: 84
End: 2019-03-12
Payer: MEDICARE

## 2019-03-14 ENCOUNTER — APPOINTMENT (OUTPATIENT)
Dept: PHYSICAL THERAPY | Age: 84
End: 2019-03-14
Payer: MEDICARE

## 2019-03-28 ENCOUNTER — OFFICE VISIT (OUTPATIENT)
Dept: CARDIOLOGY CLINIC | Facility: CLINIC | Age: 84
End: 2019-03-28
Payer: MEDICARE

## 2019-03-28 VITALS
DIASTOLIC BLOOD PRESSURE: 56 MMHG | HEART RATE: 54 BPM | SYSTOLIC BLOOD PRESSURE: 110 MMHG | HEIGHT: 65 IN | BODY MASS INDEX: 18.99 KG/M2 | WEIGHT: 114 LBS

## 2019-03-28 DIAGNOSIS — Z95.2 S/P TAVR (TRANSCATHETER AORTIC VALVE REPLACEMENT): ICD-10-CM

## 2019-03-28 DIAGNOSIS — I35.0 AORTIC STENOSIS, SEVERE: Primary | ICD-10-CM

## 2019-03-28 DIAGNOSIS — I51.7 LEFT VENTRICULAR HYPERTROPHY: ICD-10-CM

## 2019-03-28 DIAGNOSIS — I44.7 LEFT BUNDLE BRANCH BLOCK (LBBB): ICD-10-CM

## 2019-03-28 PROCEDURE — 93000 ELECTROCARDIOGRAM COMPLETE: CPT | Performed by: INTERNAL MEDICINE

## 2019-03-28 PROCEDURE — 99214 OFFICE O/P EST MOD 30 MIN: CPT | Performed by: INTERNAL MEDICINE

## 2019-04-22 ENCOUNTER — OFFICE VISIT (OUTPATIENT)
Dept: GERIATRICS | Facility: CLINIC | Age: 84
End: 2019-04-22
Payer: MEDICARE

## 2019-04-22 VITALS
HEIGHT: 65 IN | SYSTOLIC BLOOD PRESSURE: 122 MMHG | OXYGEN SATURATION: 93 % | HEART RATE: 67 BPM | WEIGHT: 114 LBS | DIASTOLIC BLOOD PRESSURE: 64 MMHG | BODY MASS INDEX: 18.99 KG/M2

## 2019-04-22 DIAGNOSIS — I77.9 AORTA DISORDER (HCC): ICD-10-CM

## 2019-04-22 DIAGNOSIS — H81.399 PERIPHERAL VERTIGO, UNSPECIFIED LATERALITY: ICD-10-CM

## 2019-04-22 DIAGNOSIS — R09.82 PND (POST-NASAL DRIP): ICD-10-CM

## 2019-04-22 DIAGNOSIS — Z95.2 S/P TAVR (TRANSCATHETER AORTIC VALVE REPLACEMENT): Primary | ICD-10-CM

## 2019-04-22 PROCEDURE — 99213 OFFICE O/P EST LOW 20 MIN: CPT | Performed by: INTERNAL MEDICINE

## 2019-04-22 RX ORDER — FLUTICASONE PROPIONATE 50 MCG
1 SPRAY, SUSPENSION (ML) NASAL DAILY
Qty: 1 BOTTLE | Refills: 0 | Status: SHIPPED | OUTPATIENT
Start: 2019-04-22 | End: 2019-11-18 | Stop reason: ALTCHOICE

## 2019-05-08 ENCOUNTER — OFFICE VISIT (OUTPATIENT)
Dept: GERIATRICS | Facility: CLINIC | Age: 84
End: 2019-05-08
Payer: MEDICARE

## 2019-05-08 VITALS
WEIGHT: 113.9 LBS | SYSTOLIC BLOOD PRESSURE: 140 MMHG | HEIGHT: 65 IN | HEART RATE: 63 BPM | OXYGEN SATURATION: 95 % | BODY MASS INDEX: 18.98 KG/M2 | DIASTOLIC BLOOD PRESSURE: 68 MMHG

## 2019-05-08 DIAGNOSIS — C91.40 HAIRY CELL LEUKEMIA NOT HAVING ACHIEVED REMISSION (HCC): ICD-10-CM

## 2019-05-08 DIAGNOSIS — E03.8 OTHER SPECIFIED HYPOTHYROIDISM: ICD-10-CM

## 2019-05-08 DIAGNOSIS — H81.399 PERIPHERAL VERTIGO, UNSPECIFIED LATERALITY: Primary | ICD-10-CM

## 2019-05-08 DIAGNOSIS — R09.82 PND (POST-NASAL DRIP): ICD-10-CM

## 2019-05-08 PROCEDURE — 99214 OFFICE O/P EST MOD 30 MIN: CPT | Performed by: INTERNAL MEDICINE

## 2019-05-17 DIAGNOSIS — E03.9 HYPOTHYROIDISM, UNSPECIFIED TYPE: ICD-10-CM

## 2019-05-17 RX ORDER — LEVOTHYROXINE SODIUM 0.07 MG/1
75 TABLET ORAL
Qty: 30 TABLET | Refills: 3 | Status: SHIPPED | OUTPATIENT
Start: 2019-05-17 | End: 2019-08-15 | Stop reason: SDUPTHER

## 2019-08-09 ENCOUNTER — OFFICE VISIT (OUTPATIENT)
Dept: GERIATRICS | Facility: CLINIC | Age: 84
End: 2019-08-09
Payer: MEDICARE

## 2019-08-09 VITALS
SYSTOLIC BLOOD PRESSURE: 120 MMHG | HEIGHT: 65 IN | BODY MASS INDEX: 18.64 KG/M2 | OXYGEN SATURATION: 94 % | HEART RATE: 65 BPM | DIASTOLIC BLOOD PRESSURE: 68 MMHG | WEIGHT: 111.9 LBS

## 2019-08-09 DIAGNOSIS — G47.00 INSOMNIA, UNSPECIFIED TYPE: ICD-10-CM

## 2019-08-09 DIAGNOSIS — C91.41 HAIRY CELL LEUKEMIA, IN REMISSION (HCC): ICD-10-CM

## 2019-08-09 DIAGNOSIS — Z95.2 S/P TAVR (TRANSCATHETER AORTIC VALVE REPLACEMENT): ICD-10-CM

## 2019-08-09 DIAGNOSIS — E03.8 OTHER SPECIFIED HYPOTHYROIDISM: Primary | ICD-10-CM

## 2019-08-09 DIAGNOSIS — G25.0 ESSENTIAL TREMOR: ICD-10-CM

## 2019-08-09 PROCEDURE — 99215 OFFICE O/P EST HI 40 MIN: CPT | Performed by: INTERNAL MEDICINE

## 2019-08-09 NOTE — ASSESSMENT & PLAN NOTE
08/01 TSH- 5 83   Patient feels more tired and fatigue than before  Levothyroxine 150 mcg on Sunday and continue 75 mcg on other days    Repeat TSH in 3 months and follow up

## 2019-08-09 NOTE — PATIENT INSTRUCTIONS
1  TSH 08/01/2019- 5 83       Levothyroxin 75mcg 1 PO  M,T,W,Th,F,S      Levothyroxin 75mcg 2 PO Sunday    2  Repeat TSH I 3 months     3   Follow up visit after blood test

## 2019-08-09 NOTE — PROGRESS NOTES
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[address] )  CBC 08/01/2019 - within normal limits      Insomnia  On melatonin 5mg stable       S/P TAVR (transcatheter aortic valve replacement)         Will visit her cardiologist every 6 months        No palpitation, no chest pain or syncope        No orders of the defined types were placed in this encounter       - Medication Side Effects: Adverse side effects of medications were reviewed with the patient/guardian today  Chief Complaint     Chief Complaint   Patient presents with    Follow-up       History of Present Illness     HPI    80 y o female patient is here for her follow up visit with blood work  Patient has known hx of hypothyroidism and on Levothyroxine 75 mcg PO daily and takes in empty stomach  She had her TSH and CBC done on 08/01/19 and her level is 5 83 and her previous test on 5/2019 was 3 88  Patient also stated that she feels more tired than before and feels no energy to function  She was recommended to have 150 mcg on Sunday and rest of the day 75 mcg of Levothyroxine  Rpt TSH in 3 months  Patient sates she does not have any other symptoms or complaints at this time     The following portions of the patient's history were reviewed and updated as appropriate: allergies, current medications, past family history, past medical history, past social history, past surgical history and problem list     Review of Systems     Review of Systems   Constitutional: Positive for fatigue   Negative for appetite change, fever and unexpected weight change  HENT: Negative for hearing loss, postnasal drip and trouble swallowing  Eyes: Negative for visual disturbance  Has new eye glasses and her vision is better   Respiratory: Negative for cough, shortness of breath and wheezing  Cardiovascular: Negative for chest pain, palpitations and leg swelling  Gastrointestinal: Negative for constipation and diarrhea  Endocrine: Positive for cold intolerance  Genitourinary: Negative  Musculoskeletal: Negative  Neurological: Negative  Psychiatric/Behavioral: Negative  All other systems reviewed and are negative  Active Problem List     Patient Active Problem List   Diagnosis    Aortic stenosis, severe    Urinary incontinence    Osteoporosis    Osteoarthritis    Leukemia, 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[address] )    Hypothyroidism    Essential tremor    Insomnia    S/P TAVR (transcatheter aortic valve replacement)    Left ventricular hypertrophy    Left bundle branch block (LBBB)    Peripheral vertigo    Bilateral impacted cerumen    PND (post-nasal drip)    Aorta disorder (HCC)       Objective     /68   Pulse 65   Ht 5' 5" (1 651 m)   Wt 50 8 kg (111 lb 14 4 oz)   SpO2 94%   BMI 18 62 kg/m²     Physical Exam   Constitutional: She is oriented to person, place, and time  She appears well-developed and well-nourished  No distress  HENT:   Head: Normocephalic and atraumatic  Mouth/Throat: Oropharynx is clear and moist    Eyes: Pupils are equal, round, and reactive to light  Conjunctivae and EOM are normal  Right eye exhibits no discharge  Left eye exhibits no discharge  Neck: Normal range of motion  Neck supple  No JVD present  Cardiovascular: Normal rate and regular rhythm  No murmur heard  Pulmonary/Chest: Effort normal and breath sounds normal    Abdominal: Soft  Bowel sounds are normal  She exhibits no distension  There is no tenderness  Musculoskeletal: Normal range of motion   She exhibits no edema, tenderness or deformity  Lymphadenopathy:     She has no cervical adenopathy  Neurological: She is alert and oriented to person, place, and time  No cranial nerve deficit  Skin: No rash noted  No erythema  Psychiatric: She has a normal mood and affect  Her behavior is normal  Judgment and thought content normal    Nursing note and vitals reviewed  Pertinent Laboratory/Diagnostic Studies:    TSH -5 3 08/01/2019  CBC with in normal limits    Current Medications     Current Outpatient Medications:     levothyroxine 75 mcg tablet, Take 1 tablet (75 mcg total) by mouth daily in the early morning (Patient taking differently: Take 75 mcg by mouth daily in the early morning Take 1 tablet Mon- Sat and Take 2 tablets on Sundays), Disp: 30 tablet, Rfl: 3    acetaminophen (TYLENOL) 500 mg tablet, Take 1,000 mg by mouth every 6 (six) hours as needed for mild pain   , Disp: , Rfl:     APPLE CIDER VINEGAR PO, Take by mouth daily, Disp: , Rfl:     fluticasone (FLONASE) 50 mcg/act nasal spray, 1 spray into each nostril daily for 10 doses, Disp: 1 Bottle, Rfl: 0    Glucosamine-Chondroitin (GLUCOSAMINE CHONDR COMPLEX PO), Take by mouth daily, Disp: , Rfl:     Loperamide HCl (IMODIUM PO), Take 2 mg by mouth as needed  , Disp: , Rfl:     MELATONIN PO, Take 5 mg by mouth daily at bedtime  , Disp: , Rfl:     Multiple Vitamin (MULTIVITAMIN) tablet, Take 1 tablet by mouth daily  , Disp: , Rfl:     Polyethyl Glycol-Propyl Glycol (SYSTANE OP), Apply 1 drop to eye 2 (two) times a day   EACH EYE TWICE DAILY , Disp: , Rfl:     Health Maintenance     Health Maintenance   Topic Date Due    Medicare Annual Wellness Visit (AWV)  04/07/1928    SLP PLAN OF CARE  04/07/[birth year]28    DTaP,Tdap,and Td Vaccines (1 - Tdap) 04/07/1949    Urinary Incontinence Screening  04/07/1993    Pneumococcal Vaccine: 65+ Years (2 of 2 - PCV13) 12/22/2007    PT PLAN OF CARE  03/20/2019    INFLUENZA VACCINE  07/01/2019    Fall Risk  02/13/2020    Depression Screening PHQ  02/13/2020    BMI: Adult  05/08/2020    Pneumococcal Vaccine: Pediatrics (0 to 5 Years) and At-Risk Patients (6 to 59 Years)  Aged Out    HEPATITIS B VACCINES  Aged [employer] History   Administered Date(s) Administered    Influenza Split High Dose Preservative Free IM 11/19/2001, 11/12/2004, 10/19/2005, 10/01/2006, 10/25/2007, 10/31/2008, 11/02/2009, 10/20/2010, 11/01/2011, 10/23/2012, 10/30/2013, 10/27/2014, 10/19/2015, 10/27/2017    Pneumococcal Polysaccharide PPV23 10/01/1998, 12/22/2006    Zoster 05/14/2008       Bri BEASLEY  Geriatric Medicine  8/9/2019 9:59 AM

## 2019-08-15 DIAGNOSIS — E03.9 HYPOTHYROIDISM, UNSPECIFIED TYPE: ICD-10-CM

## 2019-09-11 RX ORDER — LEVOTHYROXINE SODIUM 0.07 MG/1
TABLET ORAL
Qty: 30 TABLET | Refills: 6 | Status: SHIPPED | OUTPATIENT
Start: 2019-09-11 | End: 2019-11-18 | Stop reason: SDUPTHER

## 2019-09-17 DIAGNOSIS — I35.0 AORTIC STENOSIS, SEVERE: Primary | ICD-10-CM

## 2019-09-17 DIAGNOSIS — Z95.2 S/P TAVR (TRANSCATHETER AORTIC VALVE REPLACEMENT): ICD-10-CM

## 2019-09-17 RX ORDER — AMOXICILLIN 500 MG/1
CAPSULE ORAL
Qty: 4 CAPSULE | Refills: 1 | Status: SHIPPED | OUTPATIENT
Start: 2019-09-17 | End: 2019-09-18

## 2019-11-18 ENCOUNTER — OFFICE VISIT (OUTPATIENT)
Dept: GERIATRICS | Facility: CLINIC | Age: 84
End: 2019-11-18
Payer: MEDICARE

## 2019-11-18 VITALS
HEART RATE: 75 BPM | WEIGHT: 113.1 LBS | BODY MASS INDEX: 18.84 KG/M2 | OXYGEN SATURATION: 95 % | DIASTOLIC BLOOD PRESSURE: 84 MMHG | HEIGHT: 65 IN | SYSTOLIC BLOOD PRESSURE: 120 MMHG

## 2019-11-18 DIAGNOSIS — E03.9 HYPOTHYROIDISM, UNSPECIFIED TYPE: Primary | ICD-10-CM

## 2019-11-18 DIAGNOSIS — G89.29 CHRONIC LOW BACK PAIN, UNSPECIFIED BACK PAIN LATERALITY, UNSPECIFIED WHETHER SCIATICA PRESENT: ICD-10-CM

## 2019-11-18 DIAGNOSIS — M54.50 CHRONIC LOW BACK PAIN, UNSPECIFIED BACK PAIN LATERALITY, UNSPECIFIED WHETHER SCIATICA PRESENT: ICD-10-CM

## 2019-11-18 PROCEDURE — 99214 OFFICE O/P EST MOD 30 MIN: CPT | Performed by: INTERNAL MEDICINE

## 2019-11-18 RX ORDER — LEVOTHYROXINE SODIUM 0.07 MG/1
TABLET ORAL
Qty: 102 TABLET | Refills: 3 | Status: SHIPPED | OUTPATIENT
Start: 2019-11-18 | End: 2020-06-04

## 2019-11-18 NOTE — PATIENT INSTRUCTIONS
1 - TSH normal  Patient has been doing remarkably well  Will follow up with patient in 6 months for repeat TSH

## 2019-11-18 NOTE — ASSESSMENT & PLAN NOTE
Last TSH on 8/1/19 was 5 83  She was taking Levothyroxin 75mcg QD, we increased Sunday dose only to BID  TSH on 11/12 was 3 69, which is considered to be WNL  Will recheck TSH in 6 months

## 2019-11-18 NOTE — PROGRESS NOTES
[facility] Progress Note    NAME: Treva Luz  AGE: 80 y o  SEX: female [id number]    DATE OF ENCOUNTER: 11/18/2019    Assessment and Plan     Problem List Items Addressed This Visit        Endocrine    Hypothyroidism - Primary     On 75mcg of levothyroxine  TSH on 11/12 was 3 69 (WNL)  Will recheck TSH in 6 months  Other    Lumbago     Patient notes ongoing back pain after swimming too much or too often  Has tried to cut back  Has been taking Tylenol 1,000mg BID  Patient encouraged to take TID, never to exceed 3g/day  All medications and routine orders were reviewed and updated as needed  Plan discussed with: Patient    Chief Complaint     Chief Complaint   Patient presents with    Follow-up    Hypothyroidism        History of Present Illness     80year old  female with a history of hypothyroidism presents for follow up visit of TSH levels  Her last TSH on 8/1/19 was 5 83  She was taking Levothyroxin 75mcg QD, we increased Sunday dose only to BID  TSH on 11/12 was 3 69, which is considered to be WNL  Overall patient has been doing well  However she does note intermittent back discomfort  She notices this comes on if she swims too long or too frequent, so she has tried to cut back  She is on Tylenol 1000mg q8hr PRN, but only takes 1,000mg BID  Encouraged to take no more than 3g Tylenol per day for her pain  She also noticed intermittent abdominal discomfort that is relieved with bowel movements  No other complaints  Patient has received her flu shot  She has 4 children and enjoys seeing her grandchildren           HISTORY:  Past Surgical History:   Procedure Laterality Date    COLONOSCOPY      HYSTERECTOMY      [place] REPLACE AORTIC VALVE OPENFEMORAL ARTERY APPROACH N/A 4/12/2016    Procedure: REPLACEMENT AORTIC VALVE TRANSCATHETER (TAVR) TRANSFEMORAL  26mm Lin 3 valve;  Surgeon: Henrik Vee DO;  Location: [facility]; Service: Cardiac Surgery    SPLENECTOMY      for hx hairy cell leukemia      Past Medical History:   Diagnosis Date    Aortic stenosis     severe    CAD (coronary artery disease)     Essential tremor     HTN (hypertension)     HTN (hypertension)     Hyperlipidemia     Hypothyroidism     Leukemia, 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HCC)     s/p splenectomy    Osteoarthritis     Osteoporosis     Urinary incontinence      Family History   Problem Relation Age of Onset    Other Mother         malignant neoplasm of uterus    Coronary artery disease Father     Heart failure Brother      Social History     Socioeconomic History    Marital status:      Spouse name: None    Number of children: None    Years of education: None    Highest education level: None   Occupational History    None   Social Needs    Financial resource strain: None    Food insecurity:     Worry: None     Inability: None    Transportation needs:     Medical: None     Non-medical: None   Tobacco Use    Smoking status: Current Some Day Smoker     Types: Cigarettes    Smokeless tobacco: Former User   Substance and Sexual Activity    Alcohol use: Yes     Comment: SOCIAL    Drug use: No    Sexual activity: None   Lifestyle    Physical activity:     Days per week: None     Minutes per session: None    Stress: None   Relationships    Social connections:     Talks on phone: None     Gets together: None     Attends Baptism service: None     Active member of club or organization: None     Attends meetings of clubs or organizations: None     Relationship status: None    Intimate partner violence:     Fear of current or ex partner: None     Emotionally abused: None     Physically abused: None     Forced sexual activity: None   Other Topics Concern    None   Social History Narrative    None       Allergies:  No Known Allergies    Review of Systems     Review of Systems   Constitutional: Negative      HENT: Positive for hearing loss (wears hearing aids)     Eyes: Positive for visual disturbance (wears glasses)  Respiratory: Negative  Negative for shortness of breath  Cardiovascular: Negative  Negative for chest pain  Gastrointestinal: Positive for abdominal pain (bowel movement relieves pain)  Endocrine: Negative  Genitourinary: Negative  Musculoskeletal: Positive for back pain  Skin: Negative  Allergic/Immunologic: Negative  Neurological: Negative  Hematological: Negative  Psychiatric/Behavioral: Negative  PHQ-9 Depression Screening    PHQ-9:    Frequency of the following problems over the past two weeks:              Medications and orders       Current Outpatient Medications:     APPLE CIDER VINEGAR PO, Take by mouth daily, Disp: , Rfl:     Glucosamine-Chondroitin (GLUCOSAMINE CHONDR COMPLEX PO), Take by mouth 2 (two) times a day, Disp: , Rfl:     levothyroxine 75 mcg tablet, Take 1 tablet (75 mcg) by mouth mon- sat and take 2 tablets on sundays, Disp: 30 tablet, Rfl: 6    MELATONIN PO, Take 5 mg by mouth daily at bedtime  , Disp: , Rfl:     Polyethyl Glycol-Propyl Glycol (SYSTANE OP), Apply 1 drop to eye 2 (two) times a day  EACH EYE TWICE DAILY , Disp: , Rfl:     acetaminophen (TYLENOL) 500 mg tablet, Take 1,000 mg by mouth every 8 (eight) hours No more than 3g a day, Disp: , Rfl:     Loperamide HCl (IMODIUM PO), Take 2 mg by mouth as needed  , Disp: , Rfl:     Multiple Vitamin (MULTIVITAMIN) tablet, Take 1 tablet by mouth daily  , Disp: , Rfl:        Objective     Vitals:   Vitals:    11/18/19 0918   BP: 120/84   Pulse: 75   SpO2: 95%   Weight: 51 3 kg (113 lb 1 6 oz)   Height: 5' 5" (1 651 m)       Physical Exam   Constitutional: She is oriented to person, place, and time  She appears well-developed and well-nourished  HENT:   Head: Normocephalic and atraumatic     Nose: Nose normal    Some cerumen impaction to bilateral ears, but TM visualized and unremarkable   Eyes: Pupils are equal, round, and reactive to light  Conjunctivae and EOM are normal    Neck: Normal range of motion  Neck supple  Cardiovascular: Normal rate, regular rhythm and intact distal pulses  Pulmonary/Chest: Effort normal and breath sounds normal    Abdominal: Soft  Bowel sounds are normal  There is no tenderness  Musculoskeletal: Normal range of motion  Neurological: She is alert and oriented to person, place, and time  Skin: Skin is warm and dry  Psychiatric: She has a normal mood and affect  Her behavior is normal    Nursing note and vitals reviewed  Pertinent Laboratory/Diagnostic Studies: The following labs/studies were reviewed please see facility chart for details

## 2019-11-18 NOTE — ASSESSMENT & PLAN NOTE
Patient notes ongoing back pain after swimming too much or too often  Has tried to cut back  Has been taking Tylenol 1,000mg BID  Patient encouraged to take TID, never to exceed 3g/day

## 2019-12-12 ENCOUNTER — OFFICE VISIT (OUTPATIENT)
Dept: CARDIOLOGY CLINIC | Facility: CLINIC | Age: 84
End: 2019-12-12
Payer: MEDICARE

## 2019-12-12 VITALS
HEIGHT: 65 IN | HEART RATE: 70 BPM | SYSTOLIC BLOOD PRESSURE: 114 MMHG | DIASTOLIC BLOOD PRESSURE: 70 MMHG | WEIGHT: 113 LBS | BODY MASS INDEX: 18.83 KG/M2

## 2019-12-12 DIAGNOSIS — I51.7 LEFT VENTRICULAR HYPERTROPHY: ICD-10-CM

## 2019-12-12 DIAGNOSIS — Z95.2 S/P TAVR (TRANSCATHETER AORTIC VALVE REPLACEMENT): ICD-10-CM

## 2019-12-12 DIAGNOSIS — I44.7 LEFT BUNDLE BRANCH BLOCK (LBBB): ICD-10-CM

## 2019-12-12 DIAGNOSIS — I35.0 AORTIC STENOSIS, SEVERE: Primary | ICD-10-CM

## 2019-12-12 PROCEDURE — 99214 OFFICE O/P EST MOD 30 MIN: CPT | Performed by: INTERNAL MEDICINE

## 2019-12-12 NOTE — PROGRESS NOTES
Cardiology Follow Up    [address]  4/7/1928  [id number]  [address] [facility]  6 [address] [place] [address] [address]    1  Aortic stenosis, severe  Echo complete with contrast if indicated   2  S/P TAVR (transcatheter aortic valve replacement)  Echo complete with contrast if indicated   3  Left bundle branch block (LBBB)     4  Left ventricular hypertrophy         Discussion/Summary:  Ms Gaby Uribe is a pleasant 66-year-old female who presents to the office today for a routine follow-up  Since her last visit she has been feeling well  She continues to exercise and is asymptomatic  Her blood pressure is well-controlled on no medication  Her most recent echocardiogram reveals her ejection fraction had declined to 50%  She has moderate perivalvular regurgitation around her bioprosthetic aortic valve  Her mitral regurgitation has improved from moderate to severe to mild-to-moderate  She was noted to have mild mitral stenosis  I will reassess her valvular heart disease with an echocardiogram next year  I will see her back in the office in six months or sooner if deemed necessary  Interval History:  Ms Gaby Uribe is a pleasant 66-year-old female who presents to the office today for routine followup  Since her last visit she has been feeling well  She continues swimming a few times a week  She does about 30 lengths of the pool  With those activities she denies any chest pain or shortness of breath  She denies any signs or symptoms of congestive heart failure including increasing lower extremity edema, paroxysmal nocturnal dyspnea, or orthopnea  She denies weight gain or increasing abdominal girth    She denies symptoms of claudication  She continues to smoke about 4 to 5 cigarettes per week      Problem List     Aortic stenosis, severe    Urinary incontinence    Osteoporosis    Osteoarthritis    Leukemia, 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[address] )    Overview Signed 4/12/2016 12:57 PM by Kenia Clements PA-C     s/p splenectomy         Hypothyroidism    Hyperlipidemia    HTN (hypertension)    Essential tremor    CAD (coronary artery disease)    Insomnia    S/P TAVR (transcatheter aortic valve replacement)        Past Medical History:   Diagnosis Date    Aortic stenosis     severe    CAD (coronary artery disease)     Essential tremor     HTN (hypertension)     HTN (hypertension)     Hyperlipidemia     Hypothyroidism     Leukemia, 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HCC)     s/p splenectomy    Osteoarthritis     Osteoporosis     Urinary incontinence      Social History     Socioeconomic History    Marital status:       Spouse name: Not on file    Number of children: Not on file    Years of education: Not on file    Highest education level: Not on file   Occupational History    Not on file   Social Needs    Financial resource strain: Not on file    Food insecurity:     Worry: Not on file     Inability: Not on file    Transportation needs:     Medical: Not on file     Non-medical: Not on file   Tobacco Use    Smoking status: Current Some Day Smoker     Types: Cigarettes    Smokeless tobacco: Former User   Substance and Sexual Activity    Alcohol use: Yes     Comment: SOCIAL    Drug use: No    Sexual activity: Not on file   Lifestyle    Physical activity:     Days per week: Not on file     Minutes per session: Not on file    Stress: Not on file   Relationships    Social connections:     Talks on phone: Not on file     Gets together: Not on file     Attends Gnosticism service: Not on file     Active member of club or organization: Not on file     Attends meetings of clubs or organizations: Not on file     Relationship status: Not on file    Intimate partner violence:     Fear of current or ex partner: Not on file     Emotionally abused: Not on file     Physically abused: Not on file     Forced sexual activity: Not on file   Other Topics Concern  Not on file   Social History Narrative    Not on file      Family History   Problem Relation Age of Onset    Other Mother         malignant neoplasm of uterus    Coronary artery disease Father     Heart failure Brother      Past Surgical History:   Procedure Laterality Date    COLONOSCOPY      HYSTERECTOMY      FL REPLACE AORTIC VALVE OPENFEMORAL ARTERY APPROACH N/A 4/12/2016    Procedure: REPLACEMENT AORTIC VALVE TRANSCATHETER (TAVR) TRANSFEMORAL  26mm Lin 3 valve;  Surgeon: Eddie Damian DO;  [facility];  Service: Cardiac Surgery    SPLENECTOMY      for hx hairy cell leukemia       Current Outpatient Medications:     acetaminophen (TYLENOL) 500 mg tablet, Take 1,000 mg by mouth every 8 (eight) hours No more than 3g a day, Disp: , Rfl:     APPLE CIDER VINEGAR PO, Take by mouth daily, Disp: , Rfl:     Glucosamine-Chondroitin (GLUCOSAMINE CHONDR COMPLEX PO), Take by mouth 2 (two) times a day, Disp: , Rfl:     levothyroxine 75 mcg tablet, Take 1 tablet (75 mcg) by mouth mon- sat and take 2 tablets on sundays, Disp: 102 tablet, Rfl: 3    Loperamide HCl (IMODIUM PO), Take 2 mg by mouth as needed  , Disp: , Rfl:     MELATONIN PO, Take 5 mg by mouth daily at bedtime  , Disp: , Rfl:     Multiple Vitamin (MULTIVITAMIN) tablet, Take 1 tablet by mouth daily  , Disp: , Rfl:     Polyethyl Glycol-Propyl Glycol (SYSTANE OP), Apply 1 drop to eye 2 (two) times a day   EACH EYE TWICE DAILY , Disp: , Rfl:   No Known Allergies    Labs:     Chemistry        Component Value Date/Time     06/02/2015 1237    K 4 4 04/19/2017 1216    K 4 3 06/02/2015 1237     04/19/2017 1216     06/02/2015 1237    CO2 29 04/19/2017 1216    CO2 26 04/12/2016 1502    BUN 17 04/19/2017 1216    BUN 20 06/02/2015 1237    CREATININE 0 70 04/19/2017 1216    CREATININE 0 68 06/02/2015 1237        Component Value Date/Time    CALCIUM 9 7 04/19/2017 1216    CALCIUM 9 0 06/02/2015 1237    ALKPHOS 65 06/02/2015 1237    AST 22 06/02/2015 1237    ALT 22 06/02/2015 1237    BILITOT 0 81 06/02/2015 1237            Lab Results   Component Value Date    CHOL 202 11/25/2014    CHOL 192 03/13/2014     Lab Results   Component Value Date    HDL 84 11/25/2014    HDL 69 03/13/2014     Lab Results   Component Value Date    LDLCALC 102 (H) 11/25/2014    LDLCALC 111 (H) 03/13/2014     Lab Results   Component Value Date    TRIG 80 11/25/2014    TRIG 62 03/13/2014     No results found for: CHOLHDL    Imaging: No results found  Review of Systems   Constitution: Positive for malaise/fatigue  Musculoskeletal: Positive for back pain  All other systems reviewed and are negative  Vitals:    12/12/19 0951   BP: 114/70   Pulse: 70     Vitals:    12/12/19 0951   Weight: 51 3 kg (113 lb)     Height: 5' 5" (165 1 cm)   Body mass index is 18 8 kg/m²      Physical Exam:  General:  Alert and cooperative, appears stated age  [de-identified]:  PERRLA, EOMI, no scleral icterus, no conjunctival pallor  Neck:  No lymphadenopathy, no thyromegaly, no carotid bruits, no elevated JVP  Heart:  Regular rate and rhythm, normal S1/S2, 2/6 early peaking ALDA RUSB without radiation  Lungs:  Clear to auscultation bilaterally   Abdomen:  Soft, non-tender, positive bowel sounds, no rebound or guarding,   no organomegaly   Extremities:  No clubbing, cyanosis or edema   Vascular:  2+ pedal pulses  Skin:  No rashes or lesions on exposed skin  Neurologic:  Cranial nerves II-XII grossly intact without focal deficits

## 2019-12-20 DIAGNOSIS — M54.9 ACUTE BACK PAIN, UNSPECIFIED BACK LOCATION, UNSPECIFIED BACK PAIN LATERALITY: Primary | ICD-10-CM

## 2019-12-20 NOTE — PROGRESS NOTES
Pt called complaining of lower back pain that has been on and off for a few weeks  The patient states it has been "debilitating" and is coming to borrow a walker from the wellness center until Monday  She was recommended to use tylenol and heat on her lower back  She states she will try to go down to the hot tub in the fitness center

## 2019-12-23 ENCOUNTER — OFFICE VISIT (OUTPATIENT)
Dept: GERIATRICS | Facility: CLINIC | Age: 84
End: 2019-12-23
Payer: MEDICARE

## 2019-12-23 VITALS
OXYGEN SATURATION: 94 % | HEART RATE: 67 BPM | HEIGHT: 65 IN | WEIGHT: 111.1 LBS | SYSTOLIC BLOOD PRESSURE: 108 MMHG | DIASTOLIC BLOOD PRESSURE: 82 MMHG | BODY MASS INDEX: 18.51 KG/M2

## 2019-12-23 DIAGNOSIS — M47.816 SPONDYLOSIS OF LUMBAR REGION WITHOUT MYELOPATHY OR RADICULOPATHY: Primary | ICD-10-CM

## 2019-12-23 DIAGNOSIS — M54.50 CHRONIC RIGHT-SIDED LOW BACK PAIN WITHOUT SCIATICA: ICD-10-CM

## 2019-12-23 DIAGNOSIS — M54.50 CHRONIC LOW BACK PAIN, UNSPECIFIED BACK PAIN LATERALITY, UNSPECIFIED WHETHER SCIATICA PRESENT: ICD-10-CM

## 2019-12-23 DIAGNOSIS — G89.29 CHRONIC RIGHT-SIDED LOW BACK PAIN WITHOUT SCIATICA: ICD-10-CM

## 2019-12-23 DIAGNOSIS — G89.29 CHRONIC LOW BACK PAIN, UNSPECIFIED BACK PAIN LATERALITY, UNSPECIFIED WHETHER SCIATICA PRESENT: ICD-10-CM

## 2019-12-23 PROBLEM — H61.23 BILATERAL IMPACTED CERUMEN: Status: RESOLVED | Noted: 2019-02-06 | Resolved: 2019-12-23

## 2019-12-23 PROBLEM — R09.82 PND (POST-NASAL DRIP): Status: RESOLVED | Noted: 2019-04-22 | Resolved: 2019-12-23

## 2019-12-23 PROCEDURE — 99213 OFFICE O/P EST LOW 20 MIN: CPT | Performed by: INTERNAL MEDICINE

## 2019-12-23 RX ORDER — AMOXICILLIN 500 MG/1
2000 TABLET, FILM COATED ORAL
COMMUNITY
End: 2021-08-25 | Stop reason: SDUPTHER

## 2019-12-23 NOTE — PROGRESS NOTES
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  I recommend for her to see the physical therapy department in consultation for an evaluation and management of her chronic lower back pain  I recommend for her to see the occupational therapy department for an evaluation for a walker  She will continue with her Tylenol, as needed  I advised her to follow up, if her symptoms do not get better, change, or worsen  PUI  Subjective:      Patient ID: Melisa Pool is a 80 y o  female  She presents to the office for an acute visit to be evaluated for lower back pain  She reports lower back pain is a chronic problem for her  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  She notes those children are now 61years old  She reports that she was told by an orthopedist at the time that she should never have surgery  She has done various stretching exercises over the years  She was advised to perform regular exercise  She enjoys swimming and does so on a regular basis  She notes a flare-up of her lower back pain 2 weeks ago after a routine swimming session  She had difficulty bending to dress and walking back to her home  As her symptoms were persisting, she had an x-ray of her lumbar spine performed on December 20, 2019  She has been using a walker since December 20, 2019  She reports that her back is doing much better with the use of a walker  She describes the pain as being center to right in her lower back  She denies radicular symptoms into her legs  She denies bowel and bladder incontinence, as long as she listens to her body  She has been using Tylenol 1000 mg twice daily with success        The following portions of the patient's history were reviewed and updated as appropriate: allergies, current medications, past family history, past medical history, past social history, past surgical history and problem list     Review of Systems   Gastrointestinal:        She denies incontinence  Genitourinary:        She denies incontinence  Musculoskeletal: Positive for back pain (See HPI )  Objective:    /82   Pulse 67   Ht 5' 5" (1 651 m)   Wt 50 4 kg (111 lb 1 6 oz)   SpO2 94%   BMI 18 49 kg/m²      Physical Exam   Constitutional: Vital signs are normal  She appears well-developed and well-nourished  She is cooperative  Non-toxic appearance  She does not have a sickly appearance  She does not appear ill  No distress  She appears comfortable sitting and ambulating in the exam room  She appears younger than her stated age and healthy  Eyes: No scleral icterus  Musculoskeletal:   There is no lumbar spinal tenderness  There is no paraspinal muscle tenderness  There is a negative straight leg raise test, bilaterally  She has 2+ and equal patellar reflexes, bilaterally  Neurological: She is alert  Portable lumbar spine x-ray, two view:  Moderate degenerative changes

## 2020-02-12 ENCOUNTER — HOSPITAL ENCOUNTER (OUTPATIENT)
Dept: NON INVASIVE DIAGNOSTICS | Facility: CLINIC | Age: 85
Discharge: HOME | End: 2020-02-12
Payer: MEDICARE

## 2020-02-12 DIAGNOSIS — I35.0 AORTIC STENOSIS, SEVERE: ICD-10-CM

## 2020-02-12 DIAGNOSIS — Z95.2 S/P TAVR (TRANSCATHETER AORTIC VALVE REPLACEMENT): ICD-10-CM

## 2020-02-12 PROCEDURE — 93306 TTE W/DOPPLER COMPLETE: CPT | Performed by: INTERNAL MEDICINE

## 2020-02-12 PROCEDURE — 93306 TTE W/DOPPLER COMPLETE: CPT

## 2020-05-19 ENCOUNTER — TELEMEDICINE (OUTPATIENT)
Dept: GERIATRICS | Facility: CLINIC | Age: 85
End: 2020-05-19
Payer: MEDICARE

## 2020-05-19 DIAGNOSIS — N39.45 CONTINUOUS LEAKAGE OF URINE: ICD-10-CM

## 2020-05-19 DIAGNOSIS — E03.8 OTHER SPECIFIED HYPOTHYROIDISM: Primary | ICD-10-CM

## 2020-05-19 DIAGNOSIS — Z95.2 S/P TAVR (TRANSCATHETER AORTIC VALVE REPLACEMENT): ICD-10-CM

## 2020-05-19 PROCEDURE — 99213 OFFICE O/P EST LOW 20 MIN: CPT | Performed by: INTERNAL MEDICINE

## 2020-05-28 ENCOUNTER — OFFICE VISIT (OUTPATIENT)
Dept: GERIATRICS | Facility: CLINIC | Age: 85
End: 2020-05-28
Payer: MEDICARE

## 2020-05-28 VITALS
SYSTOLIC BLOOD PRESSURE: 150 MMHG | DIASTOLIC BLOOD PRESSURE: 78 MMHG | OXYGEN SATURATION: 97 % | TEMPERATURE: 98.6 F | HEART RATE: 63 BPM

## 2020-05-28 DIAGNOSIS — H53.9 DIFFICULTY READING DUE TO VISUAL PROBLEM: Primary | ICD-10-CM

## 2020-05-28 PROCEDURE — 99214 OFFICE O/P EST MOD 30 MIN: CPT | Performed by: INTERNAL MEDICINE

## 2020-06-04 ENCOUNTER — OFFICE VISIT (OUTPATIENT)
Dept: GERIATRICS | Facility: CLINIC | Age: 85
End: 2020-06-04
Payer: MEDICARE

## 2020-06-04 VITALS
OXYGEN SATURATION: 95 % | TEMPERATURE: 98.7 F | SYSTOLIC BLOOD PRESSURE: 120 MMHG | WEIGHT: 109.4 LBS | DIASTOLIC BLOOD PRESSURE: 70 MMHG | HEIGHT: 65 IN | BODY MASS INDEX: 18.23 KG/M2 | HEART RATE: 62 BPM | RESPIRATION RATE: 16 BRPM

## 2020-06-04 DIAGNOSIS — H53.9 DIFFICULTY READING DUE TO VISUAL PROBLEM: Primary | ICD-10-CM

## 2020-06-04 DIAGNOSIS — E03.8 OTHER SPECIFIED HYPOTHYROIDISM: ICD-10-CM

## 2020-06-04 DIAGNOSIS — E03.9 HYPOTHYROIDISM, UNSPECIFIED TYPE: ICD-10-CM

## 2020-06-04 PROCEDURE — 99214 OFFICE O/P EST MOD 30 MIN: CPT | Performed by: INTERNAL MEDICINE

## 2020-06-04 RX ORDER — LEVOTHYROXINE SODIUM 0.07 MG/1
TABLET ORAL
Qty: 112 TABLET | Refills: 3 | Status: SHIPPED | OUTPATIENT
Start: 2020-06-04 | End: 2020-10-26 | Stop reason: SDUPTHER

## 2020-07-14 NOTE — PROGRESS NOTES
d                                           Cardiology Follow Up    [address]  4/7/1928  [id number]  [address] [facility]  [address] [place] [address] [address]    1  Aortic stenosis, severe  POCT ECG   2  S/P TAVR (transcatheter aortic valve replacement)  POCT ECG   3  Left bundle branch block (LBBB)     4  Left ventricular hypertrophy         Discussion/Summary:  Ms Anu Jennings is a pleasant 80-year-old female who presents to the office today for a routine follow-up  Since her last visit she has been feeling well  She continues to exercise and is asymptomatic  Her most recent echocardiogram reveals her ejection fraction remains stable at  50%  She has moderate perivalvular regurgitation of her bioprosthetic aortic valve  Her mitral regurgitation remains moderate  She was noted to have mild mitral stenosis  This is relatively unchanged since 2019  She was reminded of the need for antibiotics prior to dental procedures  Her blood pressure is elevated in the office today  I have asked that the facility monitor it for the next week and report the readings to the office for my review  I will see her back in the office in six months or sooner if deemed necessary  Interval History:  Ms Anu Jennings is a pleasant 80-year-old female who presents to the office today for routine followup  Since her last visit she has been feeling well  She had not been swimming due to the coronavirus outbreak  However she recently resumed this earlier this week  She does exercises in the pool and also swims a few laps  With those activities she denies any chest pain or shortness of breath  She denies any signs or symptoms of congestive heart failure including increasing lower extremity edema, paroxysmal nocturnal dyspnea, or orthopnea  She denies weight gain or increasing abdominal girth  She denies symptoms of claudication      She continues to smoke "when I can" cannot quantify how often she does so  Problem List     Aortic stenosis, severe    Urinary incontinence    Osteoporosis    Osteoarthritis    Leukemia, 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HCC)    Overview Signed 4/12/2016 12:57 PM by Miguel Ramires PA-C     s/p splenectomy         Hypothyroidism    Hyperlipidemia    HTN (hypertension)    Essential tremor    CAD (coronary artery disease)    Insomnia    S/P TAVR (transcatheter aortic valve replacement)        Past Medical History:   Diagnosis Date    Aortic stenosis     severe    CAD (coronary artery disease)     Essential tremor     HTN (hypertension)     HTN (hypertension)     Hyperlipidemia     Hypothyroidism     Leukemia, 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HCC)     s/p splenectomy    Osteoarthritis     Osteoporosis     Urinary incontinence      Social History     Socioeconomic History    Marital status:       Spouse name: Not on file    Number of children: Not on file    Years of education: Not on file    Highest education level: Not on file   Occupational History    Not on file   Social Needs    Financial resource strain: Not on file    Food insecurity:     Worry: Not on file     Inability: Not on file    Transportation needs:     Medical: Not on file     Non-medical: Not on file   Tobacco Use    Smoking status: Current Some Day Smoker     Types: Cigarettes    Smokeless tobacco: Former User   Substance and Sexual Activity    Alcohol use: Yes     Comment: SOCIAL    Drug use: No    Sexual activity: Not on file   Lifestyle    Physical activity:     Days per week: Not on file     Minutes per session: Not on file    Stress: Not on file   Relationships    Social connections:     Talks on phone: Not on file     Gets together: Not on file     Attends Cheondoism service: Not on file     Active member of club or organization: Not on file     Attends meetings of clubs or organizations: Not on file     Relationship status: Not on file    Intimate partner violence:     Fear of current or ex partner: Not on file     Emotionally abused: Not on file     Physically abused: Not on file     Forced sexual activity: Not on file   Other Topics Concern    Not on file   Social History Narrative    Not on file      Family History   Problem Relation Age of Onset    Other Mother         malignant neoplasm of uterus    Coronary artery disease Father     Heart failure Brother      Past Surgical History:   Procedure Laterality Date    COLONOSCOPY      HYSTERECTOMY      NE REPLACE AORTIC VALVE OPENFEMORAL ARTERY APPROACH N/A 4/12/2016    Procedure: REPLACEMENT AORTIC VALVE TRANSCATHETER (TAVR) TRANSFEMORAL  26mm Lin 3 valve;  Surgeon: Ronaldo Murray DO;  [facility];  Service: Cardiac Surgery    SPLENECTOMY      for hx hairy cell leukemia       Current Outpatient Medications:     acetaminophen (TYLENOL) 500 mg tablet, Take 1,000 mg by mouth every 8 (eight) hours No more than 3g a day, Disp: , Rfl:     amoxicillin (AMOXIL) 500 MG tablet, Take 2,000 mg by mouth 60 minutes pre-procedure, Disp: , Rfl:     APPLE CIDER VINEGAR PO, Take by mouth daily, Disp: , Rfl:     aspirin 81 MG tablet, Take 81 mg by mouth daily, Disp: , Rfl:     Calcium Carbonate-Vitamin D (CALCIUM PLUS VITAMIN D PO), Take by mouth, Disp: , Rfl:     Glucosamine-Chondroitin (GLUCOSAMINE CHONDR COMPLEX PO), Take by mouth 2 (two) times a day, Disp: , Rfl:     levothyroxine 75 mcg tablet, Take 1 tablet (75 mcg) by mouth mon- sat and take 2 tablets on Saturday and Sunday  (Patient taking differently: Take 1 tablet (75 mcg) by mouth mon- fri and take 2 tablets on Saturday and Sunday ), Disp: 112 tablet, Rfl: 3    Loperamide HCl (IMODIUM PO), Take 2 mg by mouth as needed  , Disp: , Rfl:     MELATONIN PO, Take 5 mg by mouth daily at bedtime  , Disp: , Rfl:     Multiple Vitamin (MULTIVITAMIN) tablet, Take 1 tablet by mouth daily  , Disp: , Rfl:     Polyethyl Glycol-Propyl Glycol (SYSTANE OP), Apply 1 drop to eye 2 (two) times a day  EACH EYE TWICE DAILY , Disp: , Rfl:   No Known Allergies    Labs:     Chemistry        Component Value Date/Time     06/02/2015 1237    K 4 4 04/19/2017 1216    K 4 3 06/02/2015 1237     04/19/2017 1216     06/02/2015 1237    CO2 29 04/19/2017 1216    CO2 26 04/12/2016 1502    BUN 17 04/19/2017 1216    BUN 20 06/02/2015 1237    CREATININE 0 70 04/19/2017 1216    CREATININE 0 68 06/02/2015 1237        Component Value Date/Time    CALCIUM 9 7 04/19/2017 1216    CALCIUM 9 0 06/02/2015 1237    ALKPHOS 65 06/02/2015 1237    AST 22 06/02/2015 1237    ALT 22 06/02/2015 1237    BILITOT 0 81 06/02/2015 1237            Lab Results   Component Value Date    CHOL 202 11/25/2014    CHOL 192 03/13/2014     Lab Results   Component Value Date    HDL 84 11/25/2014    HDL 69 03/13/2014     Lab Results   Component Value Date    LDLCALC 102 (H) 11/25/2014    LDLCALC 111 (H) 03/13/2014     Lab Results   Component Value Date    TRIG 80 11/25/2014    TRIG 62 03/13/2014     No results found for: CHOLHDL    Imaging: No results found  Review of Systems   Constitution: Positive for malaise/fatigue  Musculoskeletal: Positive for back pain  Neurological: Positive for vertigo  All other systems reviewed and are negative  Vitals:    07/15/20 0833   BP: 144/60   Pulse: 66   Temp: 98 3 °F (36 8 °C)     Vitals:    07/15/20 0833   Weight: 49 5 kg (109 lb 1 6 oz)     Height: 5' 5" (165 1 cm)   Body mass index is 18 16 kg/m²      Physical Exam:  General:  Alert and cooperative, appears stated age  HEENT:  PERRLA, EOMI, no scleral icterus, no conjunctival pallor  Neck:  No lymphadenopathy, no thyromegaly, no carotid bruits, no elevated JVP  Heart:  Regular rate and rhythm, normal S1/S2, 2/6 early peaking ALDA RUSB   Lungs:  Clear to auscultation bilaterally   Abdomen:  Soft, non-tender, positive bowel sounds, no rebound or guarding,   no organomegaly   Extremities:  No clubbing, cyanosis or edema   Vascular:  2+ pedal pulses  Skin:  No rashes or lesions on exposed skin  Neurologic:  Cranial nerves II-XII grossly intact without focal deficits

## 2020-07-15 ENCOUNTER — OFFICE VISIT (OUTPATIENT)
Dept: CARDIOLOGY CLINIC | Facility: CLINIC | Age: 85
End: 2020-07-15
Payer: MEDICARE

## 2020-07-15 VITALS
WEIGHT: 109.1 LBS | DIASTOLIC BLOOD PRESSURE: 60 MMHG | HEART RATE: 66 BPM | SYSTOLIC BLOOD PRESSURE: 144 MMHG | BODY MASS INDEX: 18.18 KG/M2 | HEIGHT: 65 IN | TEMPERATURE: 98.3 F

## 2020-07-15 DIAGNOSIS — Z95.2 S/P TAVR (TRANSCATHETER AORTIC VALVE REPLACEMENT): ICD-10-CM

## 2020-07-15 DIAGNOSIS — I35.0 AORTIC STENOSIS, SEVERE: Primary | ICD-10-CM

## 2020-07-15 DIAGNOSIS — I44.7 LEFT BUNDLE BRANCH BLOCK (LBBB): ICD-10-CM

## 2020-07-15 DIAGNOSIS — I51.7 LEFT VENTRICULAR HYPERTROPHY: ICD-10-CM

## 2020-07-15 PROCEDURE — 93000 ELECTROCARDIOGRAM COMPLETE: CPT | Performed by: INTERNAL MEDICINE

## 2020-07-15 PROCEDURE — 99214 OFFICE O/P EST MOD 30 MIN: CPT | Performed by: INTERNAL MEDICINE

## 2020-07-21 ENCOUNTER — OFFICE VISIT (OUTPATIENT)
Dept: GERIATRICS | Facility: CLINIC | Age: 85
End: 2020-07-21
Payer: MEDICARE

## 2020-07-21 VITALS
HEART RATE: 64 BPM | WEIGHT: 108 LBS | BODY MASS INDEX: 17.97 KG/M2 | TEMPERATURE: 97.4 F | SYSTOLIC BLOOD PRESSURE: 130 MMHG | OXYGEN SATURATION: 98 % | DIASTOLIC BLOOD PRESSURE: 80 MMHG | RESPIRATION RATE: 16 BRPM

## 2020-07-21 DIAGNOSIS — E03.9 HYPOTHYROIDISM, UNSPECIFIED TYPE: Primary | ICD-10-CM

## 2020-07-21 DIAGNOSIS — Z95.2 S/P TAVR (TRANSCATHETER AORTIC VALVE REPLACEMENT): ICD-10-CM

## 2020-07-21 DIAGNOSIS — F51.01 PRIMARY INSOMNIA: ICD-10-CM

## 2020-07-21 PROCEDURE — 99214 OFFICE O/P EST MOD 30 MIN: CPT | Performed by: INTERNAL MEDICINE

## 2020-07-21 NOTE — ASSESSMENT & PLAN NOTE
Patient's TSH currently is 3 27 that is she is within normal range that also means that her medication is being adequately replaced

## 2020-07-21 NOTE — ASSESSMENT & PLAN NOTE
Her insomnia sporadic she does notice that when she takes with supplements in the late evening she has a hard time maintaining sleep

## 2020-07-21 NOTE — ASSESSMENT & PLAN NOTE
She gets regular follow ups by Cardiology they are happy with her results  They also told her that she does have a leaky mitral valve but does not require intervention at present

## 2020-07-21 NOTE — PROGRESS NOTES
[facility] Progress Note    NAME: Gissell Durant  AGE: 80 y o  SEX: female [id number]    DATE OF ENCOUNTER: 7/21/2020    Assessment and Plan     Problem List Items Addressed This Visit        Endocrine    Hypothyroidism - Primary     Patient's TSH currently is 3 27 that is she is within normal range that also means that her medication is being adequately replaced  Other    Insomnia     Her insomnia sporadic she does notice that when she takes with supplements in the late evening she has a hard time maintaining sleep  S/P TAVR (transcatheter aortic valve replacement)     She gets regular follow ups by Cardiology they are happy with her results  They also told her that she does have a leaky mitral valve but does not require intervention at present  All medications and routine orders were reviewed and updated as needed  Plan discussed with: Patient    Chief Complaint     Chief Complaint   Patient presents with    Follow-up     Weight loss and lab results  History of Present Illness     Patient is a 70-year-old  female who comes to the office for follow-up to review her thyroid studies done  We had increased her thyroid medication to 75 mcg Monday through Friday and 150 mcg on Saturday and Sunday  Her TSH is now is within normal range at 3 2 7  The patient will remain on current dosage  The patient appears to be doing well she follows with cardiology on a regular basis she had history of aortic stenosis that got surgically corrected via TAVR  On auscultation she does have a diastolic murmur consistent with aortic insufficiency  Her most recent echocardiogram revealed evidence of an ejection fraction of 50% with evidence of concentric hypertrophy, some mitral stenosis and mitral regurg  Patient denies any chest pain denies any palpitations denies any shortness of breath          HISTORY:  Past Surgical History: Procedure Laterality Date    COLONOSCOPY      HYSTERECTOMY      [place] REPLACE AORTIC VALVE OPENFEMORAL ARTERY APPROACH N/A 4/12/2016    Procedure: REPLACEMENT AORTIC VALVE TRANSCATHETER (TAVR) TRANSFEMORAL  26mm Lin 3 valve;  Surgeon: Emerson Bautista DO;  [facility]: [facility];  Service: Cardiac Surgery    SPLENECTOMY      for hx hairy cell leukemia      Past Medical History:   Diagnosis Date    Aortic stenosis     severe    CAD (coronary artery disease)     Essential tremor     HTN (hypertension)     HTN (hypertension)     Hyperlipidemia     Hypothyroidism     Leukemia, 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HCC)     s/p splenectomy    Osteoarthritis     Osteoporosis     Urinary incontinence      Family History   Problem Relation Age of Onset    Other Mother         malignant neoplasm of uterus    Coronary artery disease Father     Heart failure Brother      Social History     Socioeconomic History    Marital status:       Spouse name: Not on file    Number of children: Not on file    Years of education: Not on file    Highest education level: Not on file   Occupational History    Not on file   Social Needs    Financial resource strain: Not on file    Food insecurity:     Worry: Not on file     Inability: Not on file    Transportation needs:     Medical: Not on file     Non-medical: Not on file   Tobacco Use    Smoking status: Current Some Day Smoker     Types: Cigarettes    Smokeless tobacco: Former User   Substance and Sexual Activity    Alcohol use: Yes     Comment: SOCIAL    Drug use: No    Sexual activity: Not on file   Lifestyle    Physical activity:     Days per week: Not on file     Minutes per session: Not on file    Stress: Not on file   Relationships    Social connections:     Talks on phone: Not on file     Gets together: Not on file     Attends [employer] service: Not on file     Active member of club or organization: Not on file     Attends meetings of clubs or organizations: Not on file     Relationship status: Not on file    Intimate partner violence:     Fear of current or ex partner: Not on file     Emotionally abused: Not on file     Physically abused: Not on file     Forced sexual activity: Not on file   Other Topics Concern    Not on file   Social History Narrative    Not on file       Allergies:  No Known Allergies    Review of Systems     Review of Systems   Constitutional: Negative  HENT: Negative  Eyes: Negative  Respiratory: Negative  Cardiovascular: Negative  Gastrointestinal: Negative  Endocrine: Negative  Genitourinary: Negative  Musculoskeletal: Positive for arthralgias and back pain  Skin: Negative  Allergic/Immunologic: Negative  Neurological: Negative  Hematological: Negative  Psychiatric/Behavioral: Negative  PHQ-9 Depression Screening    PHQ-9:    Frequency of the following problems over the past two weeks:              Medications and orders       Current Outpatient Medications:     acetaminophen (TYLENOL) 500 mg tablet, Take 1,000 mg by mouth every 8 (eight) hours No more than 3g a day, Disp: , Rfl:     amoxicillin (AMOXIL) 500 MG tablet, Take 2,000 mg by mouth 60 minutes pre-procedure, Disp: , Rfl:     APPLE CIDER VINEGAR PO, Take by mouth daily, Disp: , Rfl:     aspirin 81 MG tablet, Take 81 mg by mouth daily, Disp: , Rfl:     Calcium Carbonate-Vitamin D (CALCIUM PLUS VITAMIN D PO), Take by mouth, Disp: , Rfl:     Glucosamine-Chondroitin (GLUCOSAMINE CHONDR COMPLEX PO), Take by mouth 2 (two) times a day, Disp: , Rfl:     levothyroxine 75 mcg tablet, Take 1 tablet (75 mcg) by mouth mon- sat and take 2 tablets on Saturday and Sunday  (Patient taking differently: Take 1 tablet (75 mcg) by mouth mon- fri and take 2 tablets on Saturday and Sunday ), Disp: 112 tablet, Rfl: 3    Loperamide HCl (IMODIUM PO), Take 2 mg by mouth as needed  , Disp: , Rfl:     MELATONIN PO, Take 5 mg by mouth daily at bedtime    , Disp: , Rfl:     Multiple Vitamin (MULTIVITAMIN) tablet, Take 1 tablet by mouth daily  , Disp: , Rfl:     Polyethyl Glycol-Propyl Glycol (SYSTANE OP), Apply 1 drop to eye 2 (two) times a day  EACH EYE TWICE DAILY , Disp: , Rfl:        Objective     Vitals:   Vitals:    07/21/20 1139   BP: 130/80   BP Location: Left arm   Patient Position: Sitting   Cuff Size: Standard   Pulse: 64   Resp: 16   Temp: (!) 97 4 °F (36 3 °C)   TempSrc: Temporal   SpO2: 98%   Weight: 49 kg (108 lb)       Physical Exam   Constitutional: She appears well-developed and well-nourished  HENT:   Head: Normocephalic and atraumatic  Right Ear: External ear normal    Left Ear: External ear normal    Nose: Nose normal    Mouth/Throat: Oropharynx is clear and moist    Patient has very narrow ear canals bilaterally  Eyes: Pupils are equal, round, and reactive to light  Conjunctivae and EOM are normal    Neck: Normal range of motion  Neck supple  Cardiovascular: Normal rate, regular rhythm and intact distal pulses  No murmur heard  Patient has evidence of a diastolic murmur heard best over the aortic area consistent with aortic insufficiency  This will need to be followed on a regular basis with echocardiograms  Pulmonary/Chest: Effort normal and breath sounds normal  No respiratory distress  She has no wheezes  She has no rales  Abdominal: Soft  Bowel sounds are normal  There is no tenderness  Musculoskeletal: She exhibits no edema or tenderness  Neurological: She is alert  Skin: Skin is warm and dry  She is not diaphoretic  Psychiatric: She has a normal mood and affect  Her behavior is normal  Thought content normal        Pertinent Laboratory/Diagnostic Studies: The following labs/studies were reviewed please see facility chart for details

## 2020-07-21 NOTE — PATIENT INSTRUCTIONS
1 -will continue to monitor her TSH every 6 months  2  -will reassess patient in 3 months sooner if she has any underlying problems

## 2020-07-31 ENCOUNTER — TELEPHONE (OUTPATIENT)
Dept: CARDIOLOGY CLINIC | Facility: CLINIC | Age: 85
End: 2020-07-31

## 2020-07-31 NOTE — TELEPHONE ENCOUNTER
Pt was seen on 7/15  BP was 144/60  You asked [facility] to record BP for a week and send results to you  The nurse there called to see if you received the results  I got a verbal report on the BPs    7/16  117/63  HR 67  7/17  156/ 73  HR 73  7/18  144/78  HR 71  7/19  137/74  HR 65  7/20  139/78  HR 60    Not done on the 21st or 22nd as nurse states she may have been out of the facility, as she is in independent living  Please advise

## 2020-07-31 NOTE — TELEPHONE ENCOUNTER
Spoke with Thierno Morrell and advised of Dr Alisah Williamson to continue to record BP for another week    Faxed this msg to Thierno Morrell as she requested to: [phone]

## 2020-08-04 ENCOUNTER — OFFICE VISIT (OUTPATIENT)
Dept: GERIATRICS | Facility: CLINIC | Age: 85
End: 2020-08-04
Payer: MEDICARE

## 2020-08-04 VITALS
TEMPERATURE: 97.5 F | OXYGEN SATURATION: 96 % | SYSTOLIC BLOOD PRESSURE: 120 MMHG | DIASTOLIC BLOOD PRESSURE: 64 MMHG | BODY MASS INDEX: 17.83 KG/M2 | HEART RATE: 74 BPM | HEIGHT: 65 IN | WEIGHT: 107 LBS

## 2020-08-04 DIAGNOSIS — Z95.2 S/P TAVR (TRANSCATHETER AORTIC VALVE REPLACEMENT): ICD-10-CM

## 2020-08-04 DIAGNOSIS — F51.01 PRIMARY INSOMNIA: ICD-10-CM

## 2020-08-04 DIAGNOSIS — G89.29 CHRONIC RIGHT-SIDED LOW BACK PAIN WITHOUT SCIATICA: ICD-10-CM

## 2020-08-04 DIAGNOSIS — C91.41 HAIRY CELL LEUKEMIA, IN REMISSION (HCC): ICD-10-CM

## 2020-08-04 DIAGNOSIS — E03.3 POSTINFECTIOUS HYPOTHYROIDISM: Primary | ICD-10-CM

## 2020-08-04 DIAGNOSIS — M54.50 CHRONIC RIGHT-SIDED LOW BACK PAIN WITHOUT SCIATICA: ICD-10-CM

## 2020-08-04 PROCEDURE — 99214 OFFICE O/P EST MOD 30 MIN: CPT | Performed by: INTERNAL MEDICINE

## 2020-08-04 RX ORDER — MIRTAZAPINE 7.5 MG/1
7.5 TABLET, FILM COATED ORAL
Qty: 30 TABLET | Refills: 3 | Status: SHIPPED | OUTPATIENT
Start: 2020-08-04 | End: 2020-12-04

## 2020-08-04 NOTE — ASSESSMENT & PLAN NOTE
Patient with chronic low back pain, will try on lidocaine patch and see that will help alleviate the problem  She is to wear the patch for 12 hours on 12 hours off

## 2020-08-04 NOTE — ASSESSMENT & PLAN NOTE
Patient is having some issues such as pain also her son apparently is going through a divorce  This has weighed heavily on her  I will try given her Remeron 15 mg at bedtime and see that helps and reassess her in about 4 weeks

## 2020-08-04 NOTE — PROGRESS NOTES
[address] [place] [address] Progress Note    NAME: Vanda Barreto  AGE: 80 y o  SEX: female [id number]    DATE OF ENCOUNTER: 8/4/2020    Assessment and Plan     Problem List Items Addressed This Visit        Endocrine    Hypothyroidism - Primary     Patient is taking currently 75 mcg of levothyroxine patient's TSH is within normal range  Other    Insomnia     Patient is having some issues such as pain also her son apparently is going through a divorce  This has weighed heavily on her  I will try given her Remeron 15 mg at bedtime and see that helps and reassess her in about 4 weeks  S/P TAVR (transcatheter aortic valve replacement)    Chronic right-sided low back pain without sciatica     Patient with chronic low back pain, will try on lidocaine patch and see that will help alleviate the problem  She is to wear the patch for 12 hours on 12 hours off  All medications and routine orders were reviewed and updated as needed  Plan discussed with: Patient    Chief Complaint     Chief Complaint   Patient presents with    Leg Pain     and hip pain        History of Present Illness     Patient is a 72-year-old  female who enjoys all of her basic and instrumental activities of daily living  The patient made a visit today to follow-up on her low back pain that radiates to her right buttock  She states that when she is in the pool it is alleviated by when she starts walking it begins to bother her with different degrees of intensity  The patient takes an NSAID at times for her discomfort  The patient is also followed by Cardiology she is status post TAVR recently had an echocardiogram that revealed evidence of some moderate mitral regurgitation, aortic regurgitation, ejection fraction of 50% and evidence of concentric hypertrophy    The patient also has history of hypothyroidism and is currently taking levothyroxine 75 mcg daily she recently had a TSH performed which revealed a level of 3 27 which is within normal range  HISTORY:  Past Surgical History:   Procedure Laterality Date    COLONOSCOPY      HYSTERECTOMY      [place] REPLACE AORTIC VALVE OPENFEMORAL ARTERY APPROACH N/A 4/12/2016    Procedure: REPLACEMENT AORTIC VALVE TRANSCATHETER (TAVR) TRANSFEMORAL  26mm Lin 3 valve;  Surgeon: Eric Levin DO;  [facility];  Service: Cardiac Surgery    SPLENECTOMY      for hx hairy cell leukemia      Past Medical History:   Diagnosis Date    Aortic stenosis     severe    CAD (coronary artery disease)     Essential tremor     HTN (hypertension)     HTN (hypertension)     Hyperlipidemia     Hypothyroidism     Leukemia, 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HCC)     s/p splenectomy    Osteoarthritis     Osteoporosis     Urinary incontinence      Family History   Problem Relation Age of Onset    Other Mother         malignant neoplasm of uterus    Coronary artery disease Father     Heart failure Brother      Social History     Socioeconomic History    Marital status:       Spouse name: Not on file    Number of children: Not on file    Years of education: Not on file    Highest education level: Not on file   Occupational History    Not on file   Social Needs    Financial resource strain: Not on file    Food insecurity     Worry: Not on file     Inability: Not on file    Transportation needs     Medical: Not on file     Non-medical: Not on file   Tobacco Use    Smoking status: Current Some Day Smoker     Types: Cigarettes    Smokeless tobacco: Former User   Substance and Sexual Activity    Alcohol use: Yes     Comment: SOCIAL    Drug use: No    Sexual activity: Not on file   Lifestyle    Physical activity     Days per week: Not on file     Minutes per session: Not on file    Stress: Not on file   Relationships    Social connections     Talks on phone: Not on file     Gets together: Not on file     Attends [employer] service: Not on file Active member of club or organization: Not on file     Attends meetings of clubs or organizations: Not on file     Relationship status: Not on file    Intimate partner violence     Fear of current or ex partner: Not on file     Emotionally abused: Not on file     Physically abused: Not on file     Forced sexual activity: Not on file   Other Topics Concern    Not on file   Social History Narrative    Not on file       Allergies:  No Known Allergies    Review of Systems     Review of Systems   Constitutional: Negative  HENT: Negative  Eyes: Negative  Dry eyes   Respiratory: Negative  Cardiovascular: Negative  Gastrointestinal: Negative  Endocrine: Negative  Genitourinary: Negative  Musculoskeletal: Negative  Skin: Negative  Allergic/Immunologic: Negative  Neurological: Negative  Hematological: Negative  Psychiatric/Behavioral: Negative  PHQ-9 Depression Screening    PHQ-9:    Frequency of the following problems over the past two weeks:              Medications and orders       Current Outpatient Medications:     acetaminophen (TYLENOL) 500 mg tablet, Take 1,000 mg by mouth every 8 (eight) hours No more than 3g a day, Disp: , Rfl:     amoxicillin (AMOXIL) 500 MG tablet, Take 2,000 mg by mouth 60 minutes pre-procedure, Disp: , Rfl:     APPLE CIDER VINEGAR PO, Take by mouth daily, Disp: , Rfl:     aspirin 81 MG tablet, Take 81 mg by mouth daily, Disp: , Rfl:     Calcium Carbonate-Vitamin D (CALCIUM PLUS VITAMIN D PO), Take by mouth, Disp: , Rfl:     Glucosamine-Chondroitin (GLUCOSAMINE CHONDR COMPLEX PO), Take by mouth 2 (two) times a day, Disp: , Rfl:     levothyroxine 75 mcg tablet, Take 1 tablet (75 mcg) by mouth mon- sat and take 2 tablets on Saturday and Sunday   (Patient taking differently: Take 1 tablet (75 mcg) by mouth mon- fri and take 2 tablets on Saturday and Sunday ), Disp: 112 tablet, Rfl: 3    Loperamide HCl (IMODIUM PO), Take 2 mg by mouth as needed  , Disp: , Rfl:     MELATONIN PO, Take 5 mg by mouth daily at bedtime  , Disp: , Rfl:     Multiple Vitamin (MULTIVITAMIN) tablet, Take 1 tablet by mouth daily  , Disp: , Rfl:     Polyethyl Glycol-Propyl Glycol (SYSTANE OP), Apply 1 drop to eye 2 (two) times a day  EACH EYE TWICE DAILY , Disp: , Rfl:        Objective     Vitals:   Vitals:    08/04/20 0957   BP: 120/64   Pulse: 74   Temp: 97 5 °F (36 4 °C)   TempSrc: Temporal   SpO2: 96%   Weight: 48 5 kg (107 lb)   Height: 5' 5"       Physical Exam   Constitutional: She appears well-developed  HENT:   Head: Normocephalic and atraumatic  Nose: Nose normal    Mouth/Throat: Mucous membranes are moist  Oropharynx is clear  Eyes: Pupils are equal, round, and reactive to light  Conjunctivae are normal    Neck: Normal range of motion  Neck supple  Cardiovascular: Normal rate and regular rhythm  Murmur heard  Patient has a regular rate and rhythm with a marked diastolic murmur consistent with aortic regurgitation heard best over the aortic focus  Pulmonary/Chest: Breath sounds normal  No respiratory distress  She has no wheezes  She has no rales  Abdominal: Soft  Normal appearance and bowel sounds are normal  There is no abdominal tenderness  Musculoskeletal:         General: No tenderness  Neurological: She is alert  She is disoriented  Skin: Skin is warm and dry  She is not diaphoretic  Psychiatric: Her behavior is normal  Thought content normal        Pertinent Laboratory/Diagnostic Studies: The following labs/studies were reviewed please see facility chart for details

## 2020-08-04 NOTE — PATIENT INSTRUCTIONS
1 -will give Remeron 7 5 mg to be used at bedtime and reassess her in 4 weeks and see that insomnia has improved  2 -low back pain will be treated with Aspercreme or as per Lidoderm patch to be applied to her low back on 12 hours of 12 hours  3 -will continue to monitor blood pressure in office I will see her back in about 4 weeks to make sure things are going well for her  4  -she may use Debrox drops to both ears 4 drops at bedtime for 4 days

## 2020-09-04 ENCOUNTER — OFFICE VISIT (OUTPATIENT)
Dept: GERIATRICS | Facility: CLINIC | Age: 85
End: 2020-09-04
Payer: MEDICARE

## 2020-09-04 VITALS
HEIGHT: 65 IN | TEMPERATURE: 97.7 F | SYSTOLIC BLOOD PRESSURE: 146 MMHG | OXYGEN SATURATION: 96 % | HEART RATE: 72 BPM | WEIGHT: 109.6 LBS | BODY MASS INDEX: 18.26 KG/M2 | DIASTOLIC BLOOD PRESSURE: 70 MMHG

## 2020-09-04 DIAGNOSIS — G89.29 CHRONIC RIGHT-SIDED LOW BACK PAIN WITHOUT SCIATICA: ICD-10-CM

## 2020-09-04 DIAGNOSIS — M54.50 CHRONIC RIGHT-SIDED LOW BACK PAIN WITHOUT SCIATICA: ICD-10-CM

## 2020-09-04 DIAGNOSIS — H61.23 BILATERAL IMPACTED CERUMEN: ICD-10-CM

## 2020-09-04 DIAGNOSIS — E03.8 OTHER SPECIFIED HYPOTHYROIDISM: ICD-10-CM

## 2020-09-04 DIAGNOSIS — I34.0 NONRHEUMATIC MITRAL VALVE REGURGITATION: Primary | ICD-10-CM

## 2020-09-04 DIAGNOSIS — H53.9 DIFFICULTY READING DUE TO VISUAL PROBLEM: ICD-10-CM

## 2020-09-04 PROCEDURE — 99214 OFFICE O/P EST MOD 30 MIN: CPT | Performed by: INTERNAL MEDICINE

## 2020-09-04 PROCEDURE — 69210 REMOVE IMPACTED EAR WAX UNI: CPT | Performed by: INTERNAL MEDICINE

## 2020-09-04 NOTE — ASSESSMENT & PLAN NOTE
Patient with history of hypothyroidism patient received levothyroxine 75 mcg orally by mouth Monday through Saturday and she takes 150 mcg on Saturday and Sunday her TSH on July 16, 2020 was 3 27 which is within normal range

## 2020-09-04 NOTE — PROGRESS NOTES
Ear cerumen removal    Date/Time: 9/4/2020 11:57 AM  Performed by: Julita Perales MD  Authorized by: Julita Perales MD     Patient location:  Clinic  Consent:     Consent obtained:  Verbal    Consent given by:  Patient    Risks discussed:  Bleeding, infection, pain, dizziness, incomplete removal and TM perforation    Alternatives discussed:  No treatment and delayed treatment  Universal protocol:     Procedure explained and questions answered to patient or proxy's satisfaction: yes      Site/side marked: yes      Immediately prior to procedure a time out was called: yes      Patient identity confirmed:  Provided demographic data  Procedure details:     Location:  L ear and R ear    Procedure type: irrigation with instrumentation      Instrumentation: curette      Approach:  External  Post-procedure details:     Complication:  None    Hearing quality:  Improved    Patient tolerance of procedure:   Tolerated well, no immediate complications

## 2020-09-04 NOTE — PROGRESS NOTES
[facility] Progress Note    NAME: Marizol Ashford  AGE: 80 y o  SEX: female [id number]    DATE OF ENCOUNTER: 9/4/2020    Assessment and Plan     Problem List Items Addressed This Visit        Endocrine    Hypothyroidism     Patient with history of hypothyroidism patient received levothyroxine 75 mcg orally by mouth Monday through Saturday and she takes 150 mcg on Saturday and Sunday her TSH on July 16, 2020 was 3 27 which is within normal range  Cardiovascular and Mediastinum    Nonrheumatic mitral valve regurgitation - Primary       Nervous and Auditory    Bilateral impacted cerumen     Patient had bilateral cerumen impaction I flushed and this impacted both ears  Other    Chronic right-sided low back pain without sciatica     Patient's pain has resolved she no longer has the discomfort that she had previously  Difficulty reading due to visual problem     Patient will be getting new glasses in the near future  All medications and routine orders were reviewed and updated as needed  Plan discussed with: Patient    Chief Complaint     Chief Complaint   Patient presents with    Follow-up    Back Pain        History of Present Illness     Patient is a 71-year-old  female with history of hypothyroidism that is well controlled at present  She also has had history of TAVR  Evidence of mitral regurgitation by recent echo done in February patient has an EF of about 50%  The patient comes in for a regular visit today complaining of decreased hearing acuity  She was found to have marked cerumen impaction          HISTORY:  Past Surgical History:   Procedure Laterality Date    COLONOSCOPY      HYSTERECTOMY      [place] REPLACE AORTIC VALVE OPENFEMORAL ARTERY APPROACH N/A 4/12/2016    Procedure: REPLACEMENT AORTIC VALVE TRANSCATHETER (TAVR) TRANSFEMORAL  26mm Lin 3 valve;  Surgeon: Saqib Machado DO;  [facility]; Service: Cardiac Surgery    SPLENECTOMY      for hx hairy cell leukemia      Past Medical History:   Diagnosis Date    Aortic stenosis     severe    CAD (coronary artery disease)     Essential tremor     HTN (hypertension)     HTN (hypertension)     Hyperlipidemia     Hypothyroidism     Leukemia, 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HCC)     s/p splenectomy    Osteoarthritis     Osteoporosis     Urinary incontinence      Family History   Problem Relation Age of Onset    Other Mother         malignant neoplasm of uterus    Coronary artery disease Father     Heart failure Brother      Social History     Socioeconomic History    Marital status:       Spouse name: Not on file    Number of children: Not on file    Years of education: Not on file    Highest education level: Not on file   Occupational History    Not on file   Social Needs    Financial resource strain: Not on file    Food insecurity     Worry: Not on file     Inability: Not on file    Transportation needs     Medical: Not on file     Non-medical: Not on file   Tobacco Use    Smoking status: Current Some Day Smoker     Types: Cigarettes    Smokeless tobacco: Former User   Substance and Sexual Activity    Alcohol use: Yes     Comment: SOCIAL    Drug use: No    Sexual activity: Not on file   Lifestyle    Physical activity     Days per week: Not on file     Minutes per session: Not on file    Stress: Not on file   Relationships    Social connections     Talks on phone: Not on file     Gets together: Not on file     Attends [employer] service: Not on file     Active member of club or organization: Not on file     Attends meetings of clubs or organizations: Not on file     Relationship status: Not on file    Intimate partner violence     Fear of current or ex partner: Not on file     Emotionally abused: Not on file     Physically abused: Not on file     Forced sexual activity: Not on file   Other Topics Concern    Not on file   Social History Narrative    Not on file       Allergies:  No Known Allergies    Review of Systems     Review of Systems   Constitutional: Negative  HENT: Positive for hearing loss  Eyes: Negative  Respiratory: Negative  Cardiovascular: Negative  Gastrointestinal: Negative  Endocrine: Negative  Genitourinary: Negative  Musculoskeletal: Negative  Skin: Negative  Allergic/Immunologic: Negative  Neurological: Negative  Hematological: Negative  Psychiatric/Behavioral: Negative  PHQ-9 Depression Screening    PHQ-9:    Frequency of the following problems over the past two weeks:              Medications and orders       Current Outpatient Medications:     acetaminophen (TYLENOL) 500 mg tablet, Take 1,000 mg by mouth every 8 (eight) hours No more than 3g a day, Disp: , Rfl:     amoxicillin (AMOXIL) 500 MG tablet, Take 2,000 mg by mouth 60 minutes pre-procedure, Disp: , Rfl:     APPLE CIDER VINEGAR PO, Take by mouth daily, Disp: , Rfl:     aspirin 81 MG tablet, Take 81 mg by mouth daily, Disp: , Rfl:     Calcium Carbonate-Vitamin D (CALCIUM PLUS VITAMIN D PO), Take by mouth, Disp: , Rfl:     Glucosamine-Chondroitin (GLUCOSAMINE CHONDR COMPLEX PO), Take by mouth 2 (two) times a day, Disp: , Rfl:     levothyroxine 75 mcg tablet, Take 1 tablet (75 mcg) by mouth mon- sat and take 2 tablets on Saturday and Sunday  (Patient taking differently: Take 1 tablet (75 mcg) by mouth mon- fri and take 2 tablets on Saturday and Sunday ), Disp: 112 tablet, Rfl: 3    Loperamide HCl (IMODIUM PO), Take 2 mg by mouth as needed  , Disp: , Rfl:     MELATONIN PO, Take 5 mg by mouth daily at bedtime  , Disp: , Rfl:     mirtazapine (REMERON) 7 5 MG tablet, Take 1 tablet (7 5 mg total) by mouth daily at bedtime, Disp: 30 tablet, Rfl: 3    Multiple Vitamin (MULTIVITAMIN) tablet, Take 1 tablet by mouth daily  , Disp: , Rfl:     Polyethyl Glycol-Propyl Glycol (SYSTANE OP), Apply 1 drop to eye 2 (two) times a day  EACH EYE TWICE DAILY , Disp: , Rfl:        Objective     Vitals:   Vitals:    09/04/20 1117   BP: 146/70   Pulse: 72   Temp: 97 7 °F (36 5 °C)   TempSrc: Temporal   SpO2: 96%   Weight: 49 7 kg (109 lb 9 6 oz)   Height: 5' 5" (1 651 m)       Physical Exam  Constitutional:       Appearance: She is well-developed and normal weight  She is not diaphoretic  HENT:      Head: Normocephalic and atraumatic  Ears:      Comments: Impacted cerumen bilaterally     Nose: Nose normal       Mouth/Throat:      Mouth: Mucous membranes are dry  Pharynx: Oropharynx is clear  Eyes:      Extraocular Movements: Extraocular movements intact  Conjunctiva/sclera: Conjunctivae normal       Pupils: Pupils are equal, round, and reactive to light  Neck:      Musculoskeletal: Normal range of motion and neck supple  Cardiovascular:      Rate and Rhythm: Normal rate and regular rhythm  Pulses: Normal pulses  Heart sounds: Murmur present  Comments: Patient has a grade 3/6 systolic ejection murmur holosystolic in nature  Pulmonary:      Effort: No respiratory distress  Breath sounds: Normal breath sounds  No wheezing or rales  Abdominal:      General: Abdomen is flat  Bowel sounds are normal       Palpations: Abdomen is soft  Tenderness: There is no abdominal tenderness  Musculoskeletal: Normal range of motion  General: No tenderness  Skin:     General: Skin is warm and dry  Neurological:      General: No focal deficit present  Mental Status: She is alert and oriented to person, place, and time  Mental status is at baseline  Psychiatric:         Mood and Affect: Mood normal          Behavior: Behavior normal          Thought Content: Thought content normal          Judgment: Judgment normal          Pertinent Laboratory/Diagnostic Studies: The following labs/studies were reviewed please see facility chart for details

## 2020-09-04 NOTE — PATIENT INSTRUCTIONS
1 -patient tolerated procedure well I removed a large amount of wax bilaterally      2 -patient will return to the office in 3 months

## 2020-10-26 DIAGNOSIS — E03.9 HYPOTHYROIDISM, UNSPECIFIED TYPE: ICD-10-CM

## 2020-10-28 RX ORDER — LEVOTHYROXINE SODIUM 0.07 MG/1
TABLET ORAL
Qty: 112 TABLET | Refills: 3 | Status: SHIPPED | OUTPATIENT
Start: 2020-10-28 | End: 2021-10-18 | Stop reason: SDUPTHER

## 2020-12-04 ENCOUNTER — OFFICE VISIT (OUTPATIENT)
Dept: GERIATRICS | Facility: CLINIC | Age: 85
End: 2020-12-04
Payer: MEDICARE

## 2020-12-04 VITALS
OXYGEN SATURATION: 94 % | RESPIRATION RATE: 16 BRPM | SYSTOLIC BLOOD PRESSURE: 130 MMHG | HEART RATE: 70 BPM | WEIGHT: 112.38 LBS | TEMPERATURE: 98.3 F | DIASTOLIC BLOOD PRESSURE: 52 MMHG | BODY MASS INDEX: 18.72 KG/M2 | HEIGHT: 65 IN

## 2020-12-04 DIAGNOSIS — Z95.2 S/P TAVR (TRANSCATHETER AORTIC VALVE REPLACEMENT): ICD-10-CM

## 2020-12-04 DIAGNOSIS — I10 ESSENTIAL HYPERTENSION: ICD-10-CM

## 2020-12-04 DIAGNOSIS — E03.8 OTHER SPECIFIED HYPOTHYROIDISM: ICD-10-CM

## 2020-12-04 DIAGNOSIS — F51.01 PRIMARY INSOMNIA: ICD-10-CM

## 2020-12-04 DIAGNOSIS — N39.45 CONTINUOUS LEAKAGE OF URINE: ICD-10-CM

## 2020-12-04 DIAGNOSIS — R42 LIGHTHEADEDNESS: ICD-10-CM

## 2020-12-04 DIAGNOSIS — I34.0 NONRHEUMATIC MITRAL VALVE REGURGITATION: Primary | ICD-10-CM

## 2020-12-04 DIAGNOSIS — I35.1 NONRHEUMATIC AORTIC VALVE INSUFFICIENCY: ICD-10-CM

## 2020-12-04 PROCEDURE — 99214 OFFICE O/P EST MOD 30 MIN: CPT | Performed by: INTERNAL MEDICINE

## 2020-12-08 ENCOUNTER — APPOINTMENT (EMERGENCY)
Dept: RADIOLOGY | Facility: HOSPITAL | Age: 85
End: 2020-12-08
Payer: MEDICARE

## 2020-12-08 ENCOUNTER — HOSPITAL ENCOUNTER (OUTPATIENT)
Facility: HOSPITAL | Age: 85
Setting detail: OBSERVATION
Discharge: HOME | End: 2020-12-09
Attending: EMERGENCY MEDICINE | Admitting: INTERNAL MEDICINE
Payer: MEDICARE

## 2020-12-08 DIAGNOSIS — R42 LIGHTHEADEDNESS: ICD-10-CM

## 2020-12-08 DIAGNOSIS — R07.9 CHEST PAIN: Primary | ICD-10-CM

## 2020-12-08 DIAGNOSIS — R94.31 ABNORMAL EKG: ICD-10-CM

## 2020-12-08 PROBLEM — D75.89 MACROCYTOSIS: Status: ACTIVE | Noted: 2020-12-08

## 2020-12-08 PROBLEM — I10 HYPERTENSION: Status: ACTIVE | Noted: 2020-12-08

## 2020-12-08 LAB
ALBUMIN SERPL BCP-MCNC: 3.7 G/DL (ref 3.5–5)
ALP SERPL-CCNC: 63 U/L (ref 46–116)
ALT SERPL W P-5'-P-CCNC: 21 U/L (ref 12–78)
ANION GAP SERPL CALCULATED.3IONS-SCNC: 4 MMOL/L (ref 4–13)
AST SERPL W P-5'-P-CCNC: 25 U/L (ref 5–45)
ATRIAL RATE: 65 BPM
BASOPHILS # BLD AUTO: 0.02 THOUSANDS/ΜL (ref 0–0.1)
BASOPHILS NFR BLD AUTO: 1 % (ref 0–1)
BILIRUB SERPL-MCNC: 0.84 MG/DL (ref 0.2–1)
BUN SERPL-MCNC: 23 MG/DL (ref 5–25)
CALCIUM SERPL-MCNC: 9.3 MG/DL (ref 8.3–10.1)
CHLORIDE SERPL-SCNC: 109 MMOL/L (ref 100–108)
CO2 SERPL-SCNC: 28 MMOL/L (ref 21–32)
CREAT SERPL-MCNC: 0.73 MG/DL (ref 0.6–1.3)
EOSINOPHIL # BLD AUTO: 0.08 THOUSAND/ΜL (ref 0–0.61)
EOSINOPHIL NFR BLD AUTO: 2 % (ref 0–6)
ERYTHROCYTE [DISTWIDTH] IN BLOOD BY AUTOMATED COUNT: 14.1 % (ref 11.6–15.1)
FLUAV RNA RESP QL NAA+PROBE: NEGATIVE
FLUBV RNA RESP QL NAA+PROBE: NEGATIVE
GFR SERPL CREATININE-BSD FRML MDRD: 72 ML/MIN/1.73SQ M
GLUCOSE SERPL-MCNC: 100 MG/DL (ref 65–140)
HCT VFR BLD AUTO: 42.1 % (ref 34.8–46.1)
HGB BLD-MCNC: 13.7 G/DL (ref 11.5–15.4)
IMM GRANULOCYTES # BLD AUTO: 0.06 THOUSAND/UL (ref 0–0.2)
IMM GRANULOCYTES NFR BLD AUTO: 1 % (ref 0–2)
LIPASE SERPL-CCNC: 177 U/L (ref 73–393)
LYMPHOCYTES # BLD AUTO: 1.47 THOUSANDS/ΜL (ref 0.6–4.47)
LYMPHOCYTES NFR BLD AUTO: 33 % (ref 14–44)
MCH RBC QN AUTO: 33.5 PG (ref 26.8–34.3)
MCHC RBC AUTO-ENTMCNC: 32.5 G/DL (ref 31.4–37.4)
MCV RBC AUTO: 103 FL (ref 82–98)
MONOCYTES # BLD AUTO: 0.54 THOUSAND/ΜL (ref 0.17–1.22)
MONOCYTES NFR BLD AUTO: 12 % (ref 4–12)
NEUTROPHILS # BLD AUTO: 2.24 THOUSANDS/ΜL (ref 1.85–7.62)
NEUTS SEG NFR BLD AUTO: 51 % (ref 43–75)
NRBC BLD AUTO-RTO: 0 /100 WBCS
P AXIS: 60 DEGREES
PLATELET # BLD AUTO: 184 THOUSANDS/UL (ref 149–390)
PLATELET # BLD AUTO: 185 THOUSANDS/UL (ref 149–390)
PMV BLD AUTO: 10.2 FL (ref 8.9–12.7)
PMV BLD AUTO: 10.5 FL (ref 8.9–12.7)
POTASSIUM SERPL-SCNC: 4 MMOL/L (ref 3.5–5.3)
PR INTERVAL: 164 MS
PROT SERPL-MCNC: 7.4 G/DL (ref 6.4–8.2)
QRS AXIS: -15 DEGREES
QRSD INTERVAL: 136 MS
QT INTERVAL: 420 MS
QTC INTERVAL: 436 MS
RBC # BLD AUTO: 4.09 MILLION/UL (ref 3.81–5.12)
RSV RNA RESP QL NAA+PROBE: NEGATIVE
SARS-COV-2 RNA RESP QL NAA+PROBE: NEGATIVE
SODIUM SERPL-SCNC: 141 MMOL/L (ref 136–145)
T WAVE AXIS: 88 DEGREES
TROPONIN I SERPL-MCNC: <0.02 NG/ML
TSH SERPL DL<=0.05 MIU/L-ACNC: 3.06 UIU/ML (ref 0.36–3.74)
VENTRICULAR RATE: 65 BPM
WBC # BLD AUTO: 4.41 THOUSAND/UL (ref 4.31–10.16)

## 2020-12-08 PROCEDURE — 99220 PR INITIAL OBSERVATION CARE/DAY 70 MINUTES: CPT | Performed by: INTERNAL MEDICINE

## 2020-12-08 PROCEDURE — 93005 ELECTROCARDIOGRAM TRACING: CPT

## 2020-12-08 PROCEDURE — 93010 ELECTROCARDIOGRAM REPORT: CPT | Performed by: INTERNAL MEDICINE

## 2020-12-08 PROCEDURE — 36415 COLL VENOUS BLD VENIPUNCTURE: CPT | Performed by: EMERGENCY MEDICINE

## 2020-12-08 PROCEDURE — 99285 EMERGENCY DEPT VISIT HI MDM: CPT | Performed by: EMERGENCY MEDICINE

## 2020-12-08 PROCEDURE — 85049 AUTOMATED PLATELET COUNT: CPT | Performed by: INTERNAL MEDICINE

## 2020-12-08 PROCEDURE — 84484 ASSAY OF TROPONIN QUANT: CPT | Performed by: INTERNAL MEDICINE

## 2020-12-08 PROCEDURE — 93308 TTE F-UP OR LMTD: CPT | Performed by: EMERGENCY MEDICINE

## 2020-12-08 PROCEDURE — 83690 ASSAY OF LIPASE: CPT | Performed by: EMERGENCY MEDICINE

## 2020-12-08 PROCEDURE — 99285 EMERGENCY DEPT VISIT HI MDM: CPT

## 2020-12-08 PROCEDURE — 84443 ASSAY THYROID STIM HORMONE: CPT | Performed by: INTERNAL MEDICINE

## 2020-12-08 PROCEDURE — 80053 COMPREHEN METABOLIC PANEL: CPT | Performed by: EMERGENCY MEDICINE

## 2020-12-08 PROCEDURE — 84484 ASSAY OF TROPONIN QUANT: CPT | Performed by: EMERGENCY MEDICINE

## 2020-12-08 PROCEDURE — 85025 COMPLETE CBC W/AUTO DIFF WBC: CPT | Performed by: EMERGENCY MEDICINE

## 2020-12-08 PROCEDURE — 1123F ACP DISCUSS/DSCN MKR DOCD: CPT | Performed by: EMERGENCY MEDICINE

## 2020-12-08 PROCEDURE — 0241U HB NFCT DS VIR RESP RNA 4 TRGT: CPT | Performed by: EMERGENCY MEDICINE

## 2020-12-08 PROCEDURE — 71045 X-RAY EXAM CHEST 1 VIEW: CPT

## 2020-12-08 RX ORDER — POLYVINYL ALCOHOL 14 MG/ML
1 SOLUTION/ DROPS OPHTHALMIC
Status: DISCONTINUED | OUTPATIENT
Start: 2020-12-08 | End: 2020-12-09 | Stop reason: HOSPADM

## 2020-12-08 RX ORDER — HEPARIN SODIUM 5000 [USP'U]/ML
5000 INJECTION, SOLUTION INTRAVENOUS; SUBCUTANEOUS EVERY 8 HOURS SCHEDULED
Status: DISCONTINUED | OUTPATIENT
Start: 2020-12-08 | End: 2020-12-09 | Stop reason: HOSPADM

## 2020-12-08 RX ORDER — ASPIRIN 81 MG/1
81 TABLET, CHEWABLE ORAL DAILY
Status: DISCONTINUED | OUTPATIENT
Start: 2020-12-08 | End: 2020-12-09 | Stop reason: HOSPADM

## 2020-12-08 RX ORDER — LANOLIN ALCOHOL/MO/W.PET/CERES
3 CREAM (GRAM) TOPICAL
Status: DISCONTINUED | OUTPATIENT
Start: 2020-12-08 | End: 2020-12-09 | Stop reason: HOSPADM

## 2020-12-08 RX ORDER — ACETAMINOPHEN 325 MG/1
1000 TABLET ORAL EVERY 8 HOURS
Status: DISCONTINUED | OUTPATIENT
Start: 2020-12-08 | End: 2020-12-09 | Stop reason: HOSPADM

## 2020-12-08 RX ORDER — LEVOTHYROXINE SODIUM 0.07 MG/1
75 TABLET ORAL
Status: DISCONTINUED | OUTPATIENT
Start: 2020-12-08 | End: 2020-12-09 | Stop reason: HOSPADM

## 2020-12-08 RX ADMIN — ACETAMINOPHEN 975 MG: 325 TABLET, FILM COATED ORAL at 20:24

## 2020-12-08 RX ADMIN — ASPIRIN 81 MG CHEWABLE TABLET 81 MG: 81 TABLET CHEWABLE at 14:05

## 2020-12-08 RX ADMIN — MELATONIN 3 MG: at 23:17

## 2020-12-08 RX ADMIN — HEPARIN SODIUM 5000 UNITS: 5000 INJECTION INTRAVENOUS; SUBCUTANEOUS at 14:06

## 2020-12-08 RX ADMIN — HEPARIN SODIUM 5000 UNITS: 5000 INJECTION INTRAVENOUS; SUBCUTANEOUS at 22:09

## 2020-12-09 VITALS
DIASTOLIC BLOOD PRESSURE: 78 MMHG | WEIGHT: 106.7 LBS | BODY MASS INDEX: 17.78 KG/M2 | SYSTOLIC BLOOD PRESSURE: 154 MMHG | TEMPERATURE: 97.6 F | OXYGEN SATURATION: 92 % | HEART RATE: 72 BPM | HEIGHT: 65 IN | RESPIRATION RATE: 18 BRPM

## 2020-12-09 DIAGNOSIS — R42 LIGHTHEADEDNESS: Primary | ICD-10-CM

## 2020-12-09 PROBLEM — R07.9 CHEST PAIN: Status: RESOLVED | Noted: 2020-12-08 | Resolved: 2020-12-09

## 2020-12-09 LAB
ATRIAL RATE: 0 BPM
ATRIAL RATE: 66 BPM
MAGNESIUM SERPL-MCNC: 2.4 MG/DL (ref 1.6–2.6)
P AXIS: 68 DEGREES
PHOSPHATE SERPL-MCNC: 2.7 MG/DL (ref 2.3–4.1)
PR INTERVAL: 180 MS
QRS AXIS: 0 DEGREES
QRS AXIS: 30 DEGREES
QRSD INTERVAL: 0 MS
QRSD INTERVAL: 134 MS
QT INTERVAL: 0 MS
QT INTERVAL: 458 MS
QTC INTERVAL: 0 MS
QTC INTERVAL: 480 MS
T WAVE AXIS: 0 DEGREES
T WAVE AXIS: 97 DEGREES
VENTRICULAR RATE: 0 BPM
VENTRICULAR RATE: 66 BPM

## 2020-12-09 PROCEDURE — 99215 OFFICE O/P EST HI 40 MIN: CPT | Performed by: INTERNAL MEDICINE

## 2020-12-09 PROCEDURE — RECHECK: Performed by: PHYSICIAN ASSISTANT

## 2020-12-09 PROCEDURE — 99217 PR OBSERVATION CARE DISCHARGE MANAGEMENT: CPT | Performed by: PHYSICIAN ASSISTANT

## 2020-12-09 PROCEDURE — 84100 ASSAY OF PHOSPHORUS: CPT | Performed by: STUDENT IN AN ORGANIZED HEALTH CARE EDUCATION/TRAINING PROGRAM

## 2020-12-09 PROCEDURE — 93005 ELECTROCARDIOGRAM TRACING: CPT

## 2020-12-09 PROCEDURE — 83735 ASSAY OF MAGNESIUM: CPT | Performed by: STUDENT IN AN ORGANIZED HEALTH CARE EDUCATION/TRAINING PROGRAM

## 2020-12-09 PROCEDURE — 93010 ELECTROCARDIOGRAM REPORT: CPT | Performed by: INTERNAL MEDICINE

## 2020-12-09 PROCEDURE — 87081 CULTURE SCREEN ONLY: CPT | Performed by: INTERNAL MEDICINE

## 2020-12-09 RX ADMIN — ACETAMINOPHEN 325 MG: 325 TABLET, FILM COATED ORAL at 06:09

## 2020-12-09 RX ADMIN — HEPARIN SODIUM 5000 UNITS: 5000 INJECTION INTRAVENOUS; SUBCUTANEOUS at 14:19

## 2020-12-09 RX ADMIN — LEVOTHYROXINE SODIUM 75 MCG: 75 TABLET ORAL at 06:09

## 2020-12-09 RX ADMIN — ASPIRIN 81 MG CHEWABLE TABLET 81 MG: 81 TABLET CHEWABLE at 10:51

## 2020-12-09 RX ADMIN — HEPARIN SODIUM 5000 UNITS: 5000 INJECTION INTRAVENOUS; SUBCUTANEOUS at 06:10

## 2020-12-10 LAB — MRSA NOSE QL CULT: NORMAL

## 2020-12-11 ENCOUNTER — OFFICE VISIT (OUTPATIENT)
Dept: GERIATRICS | Facility: CLINIC | Age: 85
End: 2020-12-11
Payer: MEDICARE

## 2020-12-11 VITALS
BODY MASS INDEX: 18.93 KG/M2 | OXYGEN SATURATION: 96 % | WEIGHT: 113.6 LBS | TEMPERATURE: 98.6 F | HEART RATE: 71 BPM | HEIGHT: 65 IN | DIASTOLIC BLOOD PRESSURE: 74 MMHG | SYSTOLIC BLOOD PRESSURE: 132 MMHG

## 2020-12-11 DIAGNOSIS — M81.0 AGE-RELATED OSTEOPOROSIS WITHOUT CURRENT PATHOLOGICAL FRACTURE: ICD-10-CM

## 2020-12-11 DIAGNOSIS — E03.8 OTHER SPECIFIED HYPOTHYROIDISM: ICD-10-CM

## 2020-12-11 DIAGNOSIS — I44.7 LEFT BUNDLE BRANCH BLOCK (LBBB): Primary | ICD-10-CM
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
## 2020-12-11 DIAGNOSIS — F51.01 PRIMARY INSOMNIA: ICD-10-CM

## 2020-12-11 PROCEDURE — 99214 OFFICE O/P EST MOD 30 MIN: CPT | Performed by: INTERNAL MEDICINE

## 2021-01-06 ENCOUNTER — OFFICE VISIT (OUTPATIENT)
Dept: CARDIOLOGY CLINIC | Facility: CLINIC | Age: 86
End: 2021-01-06
Payer: MEDICARE

## 2021-01-06 VITALS
BODY MASS INDEX: 19.03 KG/M2 | HEART RATE: 67 BPM | DIASTOLIC BLOOD PRESSURE: 80 MMHG | WEIGHT: 114.2 LBS | OXYGEN SATURATION: 97 % | HEIGHT: 65 IN | SYSTOLIC BLOOD PRESSURE: 162 MMHG

## 2021-01-06 DIAGNOSIS — I10 HYPERTENSION, UNSPECIFIED TYPE: ICD-10-CM

## 2021-01-06 DIAGNOSIS — Z95.2 S/P TAVR (TRANSCATHETER AORTIC VALVE REPLACEMENT): ICD-10-CM

## 2021-01-06 DIAGNOSIS — R42 LIGHTHEADEDNESS: Primary | ICD-10-CM

## 2021-01-06 DIAGNOSIS — R07.89 CHEST PAIN, ATYPICAL: ICD-10-CM

## 2021-01-06 PROCEDURE — 99215 OFFICE O/P EST HI 40 MIN: CPT | Performed by: NURSE PRACTITIONER

## 2021-01-06 NOTE — PROGRESS NOTES
Cardiology Follow Up    Rosaline Sharma  4/7/1928  [id number]  [facility] [place] [id number] [phone]      Interval History:   Ms Marium Alvarez was admitted to [facility] on 12/08 - 12/09/20 with chest pain  She presented to the ED with  chest pain and lightheadedness  Troponin negative x4  Orthostatic blood pressures negative  Cardiology consulted  She presetned with dizziness and epigastric discomfort accompanied with nausea  Cardiology felt dizziness due to vertigo  She carries a history intermittent lightheadedness particularly occurring with standing up too quickly  She has a history of vertigo  CP was felt to be due to epigastric pain  Cardiology ordered a 14 day Zio patch du to lightheadedness  She was instructed to follow up with Cardiology  Ms Candida Stephens presents to our office for a recent hospitalization follow up visit  Scooter Arrieta resides at [facility] in independent living  She add a lot of salt to her food for flavor  Scooter Arrieta denies CP, Palpitations, lightheadedness, dizziness, dyspnea with minima or moderate exertion since discharge from the hospital   She does not monitor her BP at home  HPI:  Severe AS, 4/2016 sp TAVR  LBBB  LVH  Hypothyroidism   1/12/67 TTE; LV systolic  Function lower limits normal, LVEF 85%, grade 1 diastolic dysfunction, LA mod regurgitation, mod TR, PAP 37mmHg, #26 Bar Lin 3 TAVR  Bioprosthetic present well-seated and exhibited normal function moderate perivalvular regurgitation primarily at 9 o clock position, mild PHTN, aortic root with mild dilatation 33mm      Patient Active Problem List   Diagnosis    Urinary incontinence    Osteoporosis    Osteoarthritis    Leukemia, 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[address] )    Hypothyroidism    Essential tremor    Insomnia    S/P TAVR (transcatheter aortic valve replacement)    Left ventricular hypertrophy    Left bundle branch block (LBBB)    Peripheral vertigo    Bilateral impacted cerumen    Chronic right-sided low back pain without sciatica    Difficulty reading due to visual problem    Nonrheumatic mitral valve regurgitation    Aortic insufficiency    Hypertension    Macrocytosis    Abnormal EKG    Fall     Past Medical History:   Diagnosis Date    Aortic stenosis     severe    CAD (coronary artery disease)     Essential tremor     HTN (hypertension)     HTN (hypertension)     Hyperlipidemia     Hypothyroidism     Leukemia, 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HCC)     s/p splenectomy    Osteoarthritis     Osteoporosis     Urinary incontinence      Social History     Socioeconomic History    Marital status:       Spouse name: Not on file    Number of children: Not on file    Years of education: Not on file    Highest education level: Not on file   Occupational History    Not on file   Social Needs    Financial resource strain: Not on file    Food insecurity     Worry: Not on file     Inability: Not on file    Transportation needs     Medical: Not on file     Non-medical: Not on file   Tobacco Use    Smoking status: Current Some Day Smoker     Types: Cigarettes    Smokeless tobacco: Former User   Substance and Sexual Activity    Alcohol use: Yes     Comment: SOCIAL    Drug use: No    Sexual activity: Not on file   Lifestyle    Physical activity     Days per week: Not on file     Minutes per session: Not on file    Stress: Not on file   Relationships    Social connections     Talks on phone: Not on file     Gets together: Not on file     Attends Worship service: Not on file     Active member of club or organization: Not on file     Attends meetings of clubs or organizations: Not on file     Relationship status: Not on file    Intimate partner violence     Fear of current or ex partner: Not on file     Emotionally abused: Not on file     Physically abused: Not on file Forced sexual activity: Not on file   Other Topics Concern    Not on file   Social History Narrative    Not on file      Family History   Problem Relation Age of Onset    Other Mother         malignant neoplasm of uterus    Coronary artery disease Father     Heart failure Brother      Past Surgical History:   Procedure Laterality Date    COLONOSCOPY      HYSTERECTOMY      [place] REPLACE AORTIC VALVE OPENFEMORAL ARTERY APPROACH N/A 4/12/2016    Procedure: REPLACEMENT AORTIC VALVE TRANSCATHETER (TAVR) TRANSFEMORAL  26mm Lin 3 valve;  Surgeon: Hilary Blanc DO;  [facility];  Service: Cardiac Surgery    SPLENECTOMY      for hx hairy cell leukemia       Current Outpatient Medications:     acetaminophen (TYLENOL) 500 mg tablet, Take 1,000 mg by mouth every 8 (eight) hours No more than 3g a day, Disp: , Rfl:     amoxicillin (AMOXIL) 500 MG tablet, Take 2,000 mg by mouth 60 minutes pre-procedure, Disp: , Rfl:     APPLE CIDER VINEGAR PO, Take by mouth daily, Disp: , Rfl:     aspirin 81 MG tablet, Take 81 mg by mouth daily, Disp: , Rfl:     Calcium Carbonate-Vitamin D (CALCIUM PLUS VITAMIN D PO), Take by mouth, Disp: , Rfl:     Glucosamine-Chondroitin (GLUCOSAMINE CHONDR COMPLEX PO), Take by mouth 2 (two) times a day, Disp: , Rfl:     levothyroxine 75 mcg tablet, Take 1 tablet (75 mcg) by mouth mon- fri and take 2 tablets on Saturday and Sunday  , Disp: 112 tablet, Rfl: 3    Loperamide HCl (IMODIUM PO), Take 2 mg by mouth as needed  , Disp: , Rfl:     MELATONIN PO, Take 5 mg by mouth daily at bedtime  , Disp: , Rfl:     Multiple Vitamin (MULTIVITAMIN) tablet, Take 1 tablet by mouth daily  , Disp: , Rfl:     Polyethyl Glycol-Propyl Glycol (SYSTANE OP), Apply 1 drop to eye 2 (two) times a day   EACH EYE TWICE DAILY , Disp: , Rfl:   No Known Allergies    Labs:  Admission on 12/08/2020, Discharged on 12/09/2020   Component Date Value    WBC 12/08/2020 4 41     RBC 12/08/2020 4 09     Hemoglobin 12/08/2020 13 7     Hematocrit 12/08/2020 42 1     MCV 12/08/2020 103*    MCH 12/08/2020 33 5     MCHC 12/08/2020 32 5     RDW 12/08/2020 14 1     MPV 12/08/2020 10 2     Platelets 09/03/1364 185     nRBC 12/08/2020 0     Neutrophils Relative 12/08/2020 51     Immat GRANS % 12/08/2020 1     Lymphocytes Relative 12/08/2020 33     Monocytes Relative 12/08/2020 12     Eosinophils Relative 12/08/2020 2     Basophils Relative 12/08/2020 1     Neutrophils Absolute 12/08/2020 2 24     Immature Grans Absolute 12/08/2020 0 06     Lymphocytes Absolute 12/08/2020 1 47     Monocytes Absolute 12/08/2020 0 54     Eosinophils Absolute 12/08/2020 0 08     Basophils Absolute 12/08/2020 0 02     Sodium 12/08/2020 141     Potassium 12/08/2020 4 0     Chloride 12/08/2020 109*    CO2 12/08/2020 28     ANION GAP 12/08/2020 4     BUN 12/08/2020 23     Creatinine 12/08/2020 0 73     Glucose 12/08/2020 100     Calcium 12/08/2020 9 3     AST 12/08/2020 25     ALT 12/08/2020 21     Alkaline Phosphatase 12/08/2020 63     Total Protein 12/08/2020 7 4     Albumin 12/08/2020 3 7     Total Bilirubin 12/08/2020 0 84     eGFR 12/08/2020 72     Troponin I 12/08/2020 <0 02     Lipase 12/08/2020 177     SARS-CoV-2 12/08/2020 Negative     INFLUENZA A PCR 12/08/2020 Negative     INFLUENZA B PCR 12/08/2020 Negative     RSV PCR 12/08/2020 Negative     TSH 3RD GENERATON 12/08/2020 3 060     Troponin I 12/08/2020 <0 02     Troponin I 12/08/2020 <0 02     Platelets 35/82/1505 184     MPV 12/08/2020 10 5     Ventricular Rate 12/08/2020 65     Atrial Rate 12/08/2020 65     RI Interval 12/08/2020 164     QRSD Interval 12/08/2020 136     QT Interval 12/08/2020 420     QTC Interval 12/08/2020 436     P Axis 12/08/2020 60     QRS [place] 12/08/2020 -15     T Wave Axis 12/08/2020 88     Troponin I 12/08/2020 <0 02     MRSA Culture Only 12/09/2020 No Methicillin Resistant Staphlyococcus aureus (MRSA) isolated     Ventricular Rate 12/09/2020 66     Atrial Rate 12/09/2020 66     WI Interval 12/09/2020 180     QRSD Interval 12/09/2020 134     QT Interval 12/09/2020 458     QTC Interval 12/09/2020 480     P Axis 12/09/2020 68     QRS Axis 12/09/2020 30     T Wave Axis 12/09/2020 97     Ventricular Rate 12/09/2020 0     Atrial Rate 12/09/2020 0     QRSD Interval 12/09/2020 0     QT Interval 12/09/2020 0     QTC Interval 12/09/2020 0     QRS Axis 12/09/2020 0     T Wave Palmer 12/09/2020 0     Magnesium 12/09/2020 2 4     Phosphorus 12/09/2020 2 7      Imaging: Xr Chest 1 View Portable    Result Date: 12/8/2020  Narrative: CHEST INDICATION:   Chest pain  COMPARISON:  4/13/2016 EXAM PERFORMED/VIEWS:  XR CHEST PORTABLE FINDINGS:  Prosthetic aortic valve  Cardiomediastinal silhouette appears unremarkable  The lungs are clear  Unchanged emphysema  No pneumothorax or pleural effusion  Osseous structures appear within normal limits for patient age  Impression: No acute cardiopulmonary disease  Workstation performed: [id number]     Us Bedside Procedure    Result Date: 12/8/2020  Narrative: 1 2 [id number] 2 224 [id number]      Review of Systems:  Review of Systems   HENT: Positive for hearing loss  Musculoskeletal: Positive for arthralgias, gait problem and myalgias  Using a walker to assist with ambulation    All other systems reviewed and are negative  Physical Exam:  Physical Exam  Vitals signs reviewed  Eyes:      Pupils: Pupils are equal, round, and reactive to light  Neck:      Musculoskeletal: Normal range of motion  Cardiovascular:      Rate and Rhythm: Normal rate and regular rhythm  Pulses: Normal pulses  Heart sounds: Murmur present  Comments: Right mid sternal flow murmur   Pulmonary:      Effort: Pulmonary effort is normal       Breath sounds: Normal breath sounds     Abdominal:      General: Bowel sounds are normal       Palpations: Abdomen is soft  Musculoskeletal: Normal range of motion  Right lower leg: No edema  Left lower leg: No edema  Skin:     General: Skin is warm and dry  Capillary Refill: Capillary refill takes less than 2 seconds  Neurological:      General: No focal deficit present  Mental Status: She is alert and oriented to person, place, and time  Psychiatric:         Mood and Affect: Mood normal          Behavior: Behavior normal          Discussion/Summary:  1  Lightheadedness- denies lightheadedness since discharge  14 day ZIO patch evaluate rate and rhythm possibly contributing to symptoms  We have assisted Helen with the placement of ZIO patch in the office today  I have re enforced instructions on use  2  Chest pain felt to be epigastric in etiology, no further CP since discharge  3  Hypertnesion - RUE sitting 150/86, adding a lot of salt to food, no longer swimming due to COVID-19  She is not on antihypertensive medication  I provided hand out information on low sodium diet  I have [address] on a 2gm sodium diet and using alternative salt supplements  She was instructed on Home BP monitoring every other day and to keep a log  She will contact the nurses at [facility] to assist her with BP monitoring      4  Severe AS, 4/2016 abhishek HAMPTON

## 2021-01-06 NOTE — PATIENT INSTRUCTIONS
2gm sodium, low fat, low cholesterol, eating fresh is best     Avoid ham, brandt, sausage and soup    Home Blood pressure monitoring every other day at home  Keep a log and bring it with you to the follow up visit      Follow instructions for Zio patch

## 2021-01-25 DIAGNOSIS — E03.9 HYPOTHYROIDISM, UNSPECIFIED TYPE: Primary | ICD-10-CM

## 2021-01-27 ENCOUNTER — OFFICE VISIT (OUTPATIENT)
Dept: GERIATRICS | Facility: CLINIC | Age: 86
End: 2021-01-27
Payer: MEDICARE

## 2021-01-27 VITALS
HEIGHT: 65 IN | HEART RATE: 75 BPM | TEMPERATURE: 98 F | DIASTOLIC BLOOD PRESSURE: 86 MMHG | WEIGHT: 113.6 LBS | OXYGEN SATURATION: 96 % | SYSTOLIC BLOOD PRESSURE: 140 MMHG | RESPIRATION RATE: 16 BRPM | BODY MASS INDEX: 18.93 KG/M2

## 2021-01-27 DIAGNOSIS — Z79.899 POLYPHARMACY: ICD-10-CM

## 2021-01-27 DIAGNOSIS — C91.41 HAIRY CELL LEUKEMIA, IN REMISSION (HCC): ICD-10-CM

## 2021-01-27 DIAGNOSIS — Z95.2 S/P TAVR (TRANSCATHETER AORTIC VALVE REPLACEMENT): ICD-10-CM

## 2021-01-27 DIAGNOSIS — E03.8 OTHER SPECIFIED HYPOTHYROIDISM: ICD-10-CM

## 2021-01-27 DIAGNOSIS — M81.0 AGE-RELATED OSTEOPOROSIS WITHOUT CURRENT PATHOLOGICAL FRACTURE: ICD-10-CM

## 2021-01-27 DIAGNOSIS — R42 LIGHTHEADEDNESS: Primary | ICD-10-CM

## 2021-01-27 DIAGNOSIS — F51.01 PRIMARY INSOMNIA: ICD-10-CM

## 2021-01-27 DIAGNOSIS — I10 HYPERTENSION, UNSPECIFIED TYPE: ICD-10-CM

## 2021-01-27 PROCEDURE — 99215 OFFICE O/P EST HI 40 MIN: CPT | Performed by: STUDENT IN AN ORGANIZED HEALTH CARE EDUCATION/TRAINING PROGRAM

## 2021-01-27 NOTE — PROGRESS NOTES
[facility]  [address] [facility], [address] [place] [address], [address] [place] [address]    PRIMARY CARE PRACTICE FOR OLDER ADULTS  [facility]    NAME: Claudette Goodman  AGE: 80 y o  SEX: female    DATE OF ENCOUNTER:  01/27/2021    Assessment and Plan     Problem List Items Addressed This Visit        Endocrine    Hypothyroidism      Will repeat TSH with reflex T4   Continue levothyroxine 75 mcg daily   Will continue to monitor periodically         Relevant Orders    TSH, 3rd generation with T4 reflex       Cardiovascular and Mediastinum    Hypertension      /86   Typically controlled off medications  Will continue to monitor   Recommend adherence to a low-sodium, heart healthy diet   Continue aspirin 81 mg daily            Musculoskeletal and Integument    Osteoporosis      Prior history of osteoporosis   Will plan to repeat DEXA scan and if positive will consider  starting Prolia  Continue weight-bearing exercises   Maintain Falls precautions  Continue calcium and vitamin-D supplementations for now            Other    Leukemia, 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[facility] [address] )      Patient previously followed with Hematology/ Oncology while still living in [place]   CBCs have remained stable this far         Insomnia      Improved   Continue melatonin 5 mg p o   HS   Avoid daytime napping  Avoid caffeinated beverages after 14:00  Patient did admit she has not been able to sleep for the past 2 days as her son was recently hospitalized in the ICU   Reassured patient         S/P TAVR (transcatheter aortic valve replacement)     Stable   Patient denies any cardiorespiratory distress   Patient to follow-up with Cardiology as scheduled  Recommend adherence to a heart healthy diet         Lightheadedness - Primary     Symptoms improved per patient   Recently had a Zio patch monitor, however awaiting results   Will schedule an appointment for patient to follow up with Cardiology  Patient denies any acute cardiorespiratory distress   Recommend adherence to a heart healthy diet         Polypharmacy      Patient has been using ibuprofen for chronic pain  Discussed side effects of NSAID therapy in elderly patients given the increased risk for GI bleed, renal impairment, hypertension and CHF  Advised discontinuation of ibuprofen and starting Tylenol as needed for chronic pain                 - Counseling Documentation: patient was counseled regarding: diagnostic results, instructions for management, risk factor reductions, prognosis, patient and family education, impressions, risks and benefits of treatment options and importance of compliance with treatment    Chief Complaint     Patient presents for routine follow-up of acute and chronic conditions    History of Present Illness     HPI    This is a 80-year-old female with vision impairment, HTN, essential tremor, hypothyroidism, insomnia, leukemia, s/p TAVR, osteoporosis and urinary incontinence amongst other medical conditions who presents for routine follow-up of acute and chronic conditions  The patient was previously being followed by Dr Shavon Chino  Today the patient  Appeared somewhat sad as she related that her son was recently admitted to hospital and transferred to the ICU for respiratory symptoms  The patient was tearful and stated that since his hospitalization, she has had difficulty sleeping for the past 2 nights  Typically however for her chronic history of insomnia, melatonin has been effective  The patient previously had episodes of lightheadedness which she states has improved significantly  She was admitted to hospital in December of 2020  for chest pain and lightheadedness  Today she denies any vertigo and states she recently completed wearing a zio patch  She does not have an appointment scheduled with Cardiology for follow-up  No cardiorespiratory distress, chest pain, syncope, palpitations, nausea or vomiting    The patient states she continues to tolerate small amounts orally and has been having regular bowel movements  No falls in the past 6 months  She continues on levothyroxine 75 mcg daily for a history of hypothyroidism  She has been compliant with melatonin for ahistory of insomnia which was controlled until her son's recent hospital admission  The patient continues on Systane eye drops for a history of dry eyes which remains stable  The following portions of the patient's history were reviewed and updated as appropriate: allergies, current medications, past family history, past medical history, past social history, past surgical history and problem list     Review of Systems     Review of Systems   Constitutional: Negative for activity change, chills and fever  HENT: Negative for congestion, ear pain, rhinorrhea, sore throat and trouble swallowing  Eyes: Negative for discharge  Respiratory: Negative for cough, chest tightness, shortness of breath, wheezing and stridor  Cardiovascular: Negative for chest pain, palpitations and leg swelling  Gastrointestinal: Negative for abdominal pain, constipation, diarrhea, nausea and vomiting  Genitourinary: Negative for decreased urine volume, dysuria and hematuria  Musculoskeletal: Positive for arthralgias (Chronic) and gait problem  Skin: Negative for color change, pallor, rash and wound  Neurological: Positive for tremors and light-headedness (Infrequent)  Negative for dizziness and weakness  Psychiatric/Behavioral: Negative for confusion, decreased concentration and sleep disturbance (Insomnia improved)          Patient was somewhat sad as she related her  Son was admitted to the ICU       Active Problem List     Patient Active Problem List   Diagnosis    Urinary incontinence    Osteoporosis    Osteoarthritis    Leukemia, 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[address] )    Hypothyroidism    Essential tremor    Insomnia    S/P TAVR (transcatheter aortic valve replacement)    Left ventricular hypertrophy    Left bundle branch block (LBBB)    Peripheral vertigo    Bilateral impacted cerumen    Chronic right-sided low back pain without sciatica    Difficulty reading due to visual problem    Nonrheumatic mitral valve regurgitation    Aortic insufficiency    Lightheadedness    Hypertension    Macrocytosis    Abnormal EKG    Fall    Polypharmacy       Objective     /86 (BP Location: Left arm, Patient Position: Sitting, Cuff Size: Adult)   Pulse 75   Temp 98 °F (36 7 °C) (Temporal)   Resp 16   Ht 5' 5 43" (1 662 m) Comment: with shoes  Wt 51 5 kg (113 lb 9 6 oz) Comment: with shoes  SpO2 96%   BMI 18 65 kg/m²     Physical Exam  Constitutional:       General: She is not in acute distress  Appearance: Normal appearance  She is well-developed  She is not ill-appearing or diaphoretic  HENT:      Head: Normocephalic and atraumatic  Right Ear: Tympanic membrane, ear canal and external ear normal       Left Ear: Tympanic membrane, ear canal and external ear normal       Nose: Nose normal       Mouth/Throat:      Mouth: Mucous membranes are moist       Pharynx: Oropharynx is clear  No oropharyngeal exudate  Eyes:      General: No scleral icterus  Right eye: No discharge  Left eye: No discharge  Conjunctiva/sclera: Conjunctivae normal    Neck:      Musculoskeletal: Normal range of motion and neck supple  Vascular: No JVD  Cardiovascular:      Rate and Rhythm: Normal rate and regular rhythm  Heart sounds: Murmur (ESM 3/6) present  No friction rub  No gallop  Pulmonary:      Effort: Pulmonary effort is normal  No respiratory distress  Breath sounds: Normal breath sounds  No wheezing or rales  Abdominal:      General: Bowel sounds are normal  There is no distension  Palpations: Abdomen is soft  Tenderness: There is no abdominal tenderness  Musculoskeletal: Normal range of motion  General: No tenderness or deformity  Skin:     General: Skin is warm  Coloration: Skin is not pale  Findings: No erythema or rash  Neurological:      General: No focal deficit present  Mental Status: She is alert and oriented to person, place, and time  Mental status is at baseline  Cranial Nerves: No cranial nerve deficit  Gait: Gait abnormal (uses walker)  Deep Tendon Reflexes: Reflexes are normal and symmetric  Psychiatric:         Mood and Affect: Mood normal          Pertinent Laboratory/Diagnostic Studies:   Reviewed prior blood work including CBC with  Increased MCV persisting    TSH elevated, will plan to repeat in 6 weeks   Also reviewed prior echocardiogram showing EF of 50%, moderate MR, perivalvular regurgitation of prosthetic aortic valve    Current Medications     Current Outpatient Medications:     acetaminophen (TYLENOL) 500 mg tablet, Take 1,000 mg by mouth every 8 (eight) hours No more than 3g a day, Disp: , Rfl:     amoxicillin (AMOXIL) 500 MG tablet, Take 2,000 mg by mouth 60 minutes pre-procedure, Disp: , Rfl:     APPLE CIDER VINEGAR PO, Take by mouth daily, Disp: , Rfl:     aspirin 81 MG tablet, Take 81 mg by mouth daily, Disp: , Rfl:     Calcium Carbonate-Vitamin D (CALCIUM PLUS VITAMIN D PO), Take by mouth, Disp: , Rfl:     Glucosamine-Chondroitin (GLUCOSAMINE CHONDR COMPLEX PO), Take by mouth 2 (two) times a day, Disp: , Rfl:     levothyroxine 75 mcg tablet, Take 1 tablet (75 mcg) by mouth mon- fri and take 2 tablets on Saturday and Sunday  , Disp: 112 tablet, Rfl: 3    MELATONIN PO, Take 5 mg by mouth daily at bedtime  , Disp: , Rfl:     Multiple Vitamin (MULTIVITAMIN) tablet, Take 1 tablet by mouth daily  , Disp: , Rfl:     Polyethyl Glycol-Propyl Glycol (SYSTANE OP), Apply 1 drop to eye 2 (two) times a day  EACH EYE TWICE DAILY , Disp: , Rfl:     Loperamide HCl (IMODIUM PO), Take 2 mg by mouth as needed    , Disp: , Rfl:     Health Maintenance     Health Maintenance   Topic Date Due    Medicare Annual Wellness Visit (AWV)  04/07/[birth year]28    SLP PLAN OF CARE  04/07/1928    Meningococcal ACWY Vaccine (1 - Risk start before 7 months 4-dose series) 06/07/1928    HIB Vaccine (1 of 1 - Risk 1-dose series) 07/07/1929    COVID-19 Vaccine (1 of 2) 04/07/1944    DTaP,Tdap,and Td Vaccines (1 - Tdap) 04/07/1949    PT PLAN OF CARE  03/20/2019    Fall Risk  02/13/2020    Depression Screening PHQ  02/13/2020    Influenza Vaccine (1) 09/01/2020    BMI: Adult  01/27/2022    Pneumococcal Vaccine: 65+ Years  Completed    Hepatitis B Vaccine  Aged Out    IPV Vaccine  Aged Out    Hepatitis A Vaccine  Aged Out    HPV Vaccine  Aged [employer] History   Administered Date(s) Administered    Influenza Split High Dose Preservative Free IM 11/19/2001, 11/12/2004, 10/19/2005, 10/01/2006, 10/25/2007, 10/31/2008, 11/02/2009, 10/20/2010, 11/01/2011, 10/23/2012, 10/30/2013, 10/27/2014, 10/19/2015, 10/27/2017    Pneumococcal Polysaccharide PPV23 10/01/1998, 12/22/2006    Zoster 05/14/2008       Jensen Ruiz MD  Geriatrics   1/28/2021 6:36 AM

## 2021-01-27 NOTE — PATIENT INSTRUCTIONS
1)Recommend weaning off ibuprofen and using Tylenol Extra strength 975,g every 8hours as needed  2)Repeat TSH with reflex T4 in  6 weeks  3)Follow up with Cardiology - my office to call and schedule an brandon't  To discuss Zio patch  4)Follow up in 2 months

## 2021-01-28 PROBLEM — Z79.899 POLYPHARMACY: Status: ACTIVE | Noted: 2021-01-28

## 2021-01-28 NOTE — ASSESSMENT & PLAN NOTE
Stable   Patient denies any cardiorespiratory distress   Patient to follow-up with Cardiology as scheduled  Recommend adherence to a heart healthy diet

## 2021-01-28 NOTE — ASSESSMENT & PLAN NOTE
Patient has been using ibuprofen for chronic pain  Discussed side effects of NSAID therapy in elderly patients given the increased risk for GI bleed, renal impairment, hypertension and CHF  Advised discontinuation of ibuprofen and starting Tylenol as needed for chronic pain

## 2021-01-28 NOTE — ASSESSMENT & PLAN NOTE
Patient previously followed with Hematology/ Oncology while still living in [place]   CBCs have remained stable this far

## 2021-01-28 NOTE — ASSESSMENT & PLAN NOTE
Will repeat TSH with reflex T4   Continue levothyroxine 75 mcg daily   Will continue to monitor periodically

## 2021-01-28 NOTE — ASSESSMENT & PLAN NOTE
Improved   Continue melatonin 5 mg p o   HS   Avoid daytime napping  Avoid caffeinated beverages after 14:00  Patient did admit she has not been able to sleep for the past 2 days as her son was recently hospitalized in the ICU   Reassured patient

## 2021-01-28 NOTE — ASSESSMENT & PLAN NOTE
Prior history of osteoporosis   Will plan to repeat DEXA scan and if positive will consider  starting Prolia  Continue weight-bearing exercises   Maintain [place] precautions  Continue calcium and vitamin-D supplementations for now

## 2021-01-28 NOTE — ASSESSMENT & PLAN NOTE
/86   Typically controlled off medications  Will continue to monitor   Recommend adherence to a low-sodium, heart healthy diet   Continue aspirin 81 mg daily

## 2021-01-28 NOTE — ASSESSMENT & PLAN NOTE
Symptoms improved per patient   Recently had a Zio patch monitor, however awaiting results   Will schedule an appointment for patient to follow up with Cardiology  Patient denies any acute cardiorespiratory distress   Recommend adherence to a heart healthy diet

## 2021-02-03 ENCOUNTER — TELEPHONE (OUTPATIENT)
Dept: GERIATRICS | Facility: CLINIC | Age: 86
End: 2021-02-03

## 2021-02-03 ENCOUNTER — IMMUNIZATIONS (OUTPATIENT)
Dept: FAMILY MEDICINE CLINIC | Facility: HOSPITAL | Age: 86
End: 2021-02-03

## 2021-02-03 DIAGNOSIS — Z23 ENCOUNTER FOR IMMUNIZATION: Primary | ICD-10-CM

## 2021-02-03 PROCEDURE — 0001A SARS-COV-2 / COVID-19 MRNA VACCINE (PFIZER-BIONTECH) 30 MCG: CPT

## 2021-02-03 PROCEDURE — 91300 SARS-COV-2 / COVID-19 MRNA VACCINE (PFIZER-BIONTECH) 30 MCG: CPT

## 2021-02-03 NOTE — TELEPHONE ENCOUNTER
I'm sorry this was a mix up and [facility] actually ordered this for the patient  I will direct [facility] wellness staff to follow up with them

## 2021-02-03 NOTE — TELEPHONE ENCOUNTER
[facility] center has been getting patients vitals since 1/6/21  When are they able to discontinue this?

## 2021-02-04 ENCOUNTER — CLINICAL SUPPORT (OUTPATIENT)
Dept: CARDIOLOGY CLINIC | Facility: CLINIC | Age: 86
End: 2021-02-04
Payer: MEDICARE

## 2021-02-04 DIAGNOSIS — R42 LIGHTHEADEDNESS: ICD-10-CM

## 2021-02-04 PROCEDURE — 93248 EXT ECG>7D<15D REV&INTERPJ: CPT | Performed by: INTERNAL MEDICINE

## 2021-02-15 ENCOUNTER — TELEPHONE (OUTPATIENT)
Dept: CARDIOLOGY CLINIC | Facility: CLINIC | Age: 86
End: 2021-02-15

## 2021-02-15 NOTE — TELEPHONE ENCOUNTER
Pt is ready to proceed with a PM   I advised Dr Myrtle Jimenes is on vacation until Wednesday  Pt stated she feels there is no hurry for placement and stated she is scheduled for 2nd booster vaccine on 2/22

## 2021-02-17 DIAGNOSIS — I44.7 LEFT BUNDLE BRANCH BLOCK (LBBB): Primary | ICD-10-CM

## 2021-02-17 NOTE — TELEPHONE ENCOUNTER
Patient scheduled for Dual Chamber PPM on 2/26/21 in [facility] with Dr Micah Yung  Patient aware of all general instructions  Patient has Medicare as primary insurance

## 2021-02-22 ENCOUNTER — IMMUNIZATIONS (OUTPATIENT)
Dept: FAMILY MEDICINE CLINIC | Facility: HOSPITAL | Age: 86
End: 2021-02-22

## 2021-02-22 DIAGNOSIS — Z23 ENCOUNTER FOR IMMUNIZATION: Primary | ICD-10-CM

## 2021-02-22 PROCEDURE — 0002A SARS-COV-2 / COVID-19 MRNA VACCINE (PFIZER-BIONTECH) 30 MCG: CPT

## 2021-02-22 PROCEDURE — 91300 SARS-COV-2 / COVID-19 MRNA VACCINE (PFIZER-BIONTECH) 30 MCG: CPT

## 2021-02-25 ENCOUNTER — TELEPHONE (OUTPATIENT)
Dept: SURGERY | Facility: HOSPITAL | Age: 86
End: 2021-02-25

## 2021-02-25 RX ORDER — CEFAZOLIN SODIUM 2 G/50ML
2000 SOLUTION INTRAVENOUS ONCE
Status: CANCELLED | OUTPATIENT
Start: 2021-02-25 | End: 2021-02-25

## 2021-02-25 RX ORDER — CHLORHEXIDINE GLUCONATE 0.12 MG/ML
15 RINSE ORAL ONCE
Status: CANCELLED | OUTPATIENT
Start: 2021-02-25 | End: 2021-02-25

## 2021-02-26 ENCOUNTER — APPOINTMENT (OUTPATIENT)
Dept: RADIOLOGY | Facility: HOSPITAL | Age: 86
End: 2021-02-26
Payer: MEDICARE

## 2021-02-26 ENCOUNTER — HOSPITAL ENCOUNTER (OUTPATIENT)
Dept: NON INVASIVE DIAGNOSTICS | Facility: HOSPITAL | Age: 86
Discharge: HOME | End: 2021-02-26
Attending: INTERNAL MEDICINE | Admitting: INTERNAL MEDICINE
Payer: MEDICARE

## 2021-02-26 ENCOUNTER — ANESTHESIA (OUTPATIENT)
Dept: NON INVASIVE DIAGNOSTICS | Facility: HOSPITAL | Age: 86
End: 2021-02-26
Payer: MEDICARE

## 2021-02-26 ENCOUNTER — ANESTHESIA EVENT (OUTPATIENT)
Dept: NON INVASIVE DIAGNOSTICS | Facility: HOSPITAL | Age: 86
End: 2021-02-26
Payer: MEDICARE

## 2021-02-26 VITALS
SYSTOLIC BLOOD PRESSURE: 168 MMHG | BODY MASS INDEX: 18.16 KG/M2 | TEMPERATURE: 98.4 F | DIASTOLIC BLOOD PRESSURE: 78 MMHG | OXYGEN SATURATION: 94 % | HEIGHT: 65 IN | RESPIRATION RATE: 22 BRPM | WEIGHT: 109 LBS | HEART RATE: 67 BPM

## 2021-02-26 DIAGNOSIS — I44.7 LEFT BUNDLE BRANCH BLOCK (LBBB): ICD-10-CM

## 2021-02-26 PROBLEM — Z87.898 HISTORY OF ANESTHESIA COMPLICATIONS: Status: ACTIVE | Noted: 2021-02-26

## 2021-02-26 LAB
ANION GAP SERPL CALCULATED.3IONS-SCNC: 3 MMOL/L (ref 4–13)
ATRIAL RATE: 72 BPM
BUN SERPL-MCNC: 25 MG/DL (ref 5–25)
CALCIUM SERPL-MCNC: 9.1 MG/DL (ref 8.3–10.1)
CHLORIDE SERPL-SCNC: 113 MMOL/L (ref 100–108)
CO2 SERPL-SCNC: 27 MMOL/L (ref 21–32)
CREAT SERPL-MCNC: 0.75 MG/DL (ref 0.6–1.3)
ERYTHROCYTE [DISTWIDTH] IN BLOOD BY AUTOMATED COUNT: 14.2 % (ref 11.6–15.1)
GFR SERPL CREATININE-BSD FRML MDRD: 69 ML/MIN/1.73SQ M
GLUCOSE P FAST SERPL-MCNC: 88 MG/DL (ref 65–99)
GLUCOSE SERPL-MCNC: 88 MG/DL (ref 65–140)
HCT VFR BLD AUTO: 41.5 % (ref 34.8–46.1)
HGB BLD-MCNC: 13.6 G/DL (ref 11.5–15.4)
INR PPP: 1.07 (ref 0.84–1.19)
MCH RBC QN AUTO: 34 PG (ref 26.8–34.3)
MCHC RBC AUTO-ENTMCNC: 32.8 G/DL (ref 31.4–37.4)
MCV RBC AUTO: 104 FL (ref 82–98)
P AXIS: 63 DEGREES
PLATELET # BLD AUTO: 166 THOUSANDS/UL (ref 149–390)
PMV BLD AUTO: 10.3 FL (ref 8.9–12.7)
POTASSIUM SERPL-SCNC: 4.2 MMOL/L (ref 3.5–5.3)
PR INTERVAL: 168 MS
PROTHROMBIN TIME: 13.9 SECONDS (ref 11.6–14.5)
QRS AXIS: 1 DEGREES
QRSD INTERVAL: 138 MS
QT INTERVAL: 432 MS
QTC INTERVAL: 473 MS
RBC # BLD AUTO: 4 MILLION/UL (ref 3.81–5.12)
SODIUM SERPL-SCNC: 143 MMOL/L (ref 136–145)
T WAVE AXIS: 103 DEGREES
VENTRICULAR RATE: 72 BPM
WBC # BLD AUTO: 4.04 THOUSAND/UL (ref 4.31–10.16)

## 2021-02-26 PROCEDURE — 93010 ELECTROCARDIOGRAM REPORT: CPT | Performed by: INTERNAL MEDICINE

## 2021-02-26 PROCEDURE — 93005 ELECTROCARDIOGRAM TRACING: CPT

## 2021-02-26 PROCEDURE — NC001 PR NO CHARGE: Performed by: INTERNAL MEDICINE

## 2021-02-26 PROCEDURE — C1769 GUIDE WIRE: HCPCS | Performed by: INTERNAL MEDICINE

## 2021-02-26 PROCEDURE — 85027 COMPLETE CBC AUTOMATED: CPT | Performed by: PHYSICIAN ASSISTANT

## 2021-02-26 PROCEDURE — 33208 INSRT HEART PM ATRIAL & VENT: CPT | Performed by: INTERNAL MEDICINE

## 2021-02-26 PROCEDURE — 80048 BASIC METABOLIC PNL TOTAL CA: CPT | Performed by: PHYSICIAN ASSISTANT

## 2021-02-26 PROCEDURE — C1898 LEAD, PMKR, OTHER THAN TRANS: HCPCS

## 2021-02-26 PROCEDURE — 71045 X-RAY EXAM CHEST 1 VIEW: CPT

## 2021-02-26 PROCEDURE — C1892 INTRO/SHEATH,FIXED,PEEL-AWAY: HCPCS | Performed by: INTERNAL MEDICINE

## 2021-02-26 PROCEDURE — 85610 PROTHROMBIN TIME: CPT | Performed by: PHYSICIAN ASSISTANT

## 2021-02-26 PROCEDURE — C1785 PMKR, DUAL, RATE-RESP: HCPCS

## 2021-02-26 RX ORDER — SODIUM CHLORIDE 9 MG/ML
INJECTION, SOLUTION INTRAVENOUS CONTINUOUS PRN
Status: DISCONTINUED | OUTPATIENT
Start: 2021-02-26 | End: 2021-02-26

## 2021-02-26 RX ORDER — CEFAZOLIN SODIUM 2 G/50ML
SOLUTION INTRAVENOUS AS NEEDED
Status: DISCONTINUED | OUTPATIENT
Start: 2021-02-26 | End: 2021-02-26

## 2021-02-26 RX ORDER — PROPOFOL 10 MG/ML
INJECTION, EMULSION INTRAVENOUS CONTINUOUS PRN
Status: DISCONTINUED | OUTPATIENT
Start: 2021-02-26 | End: 2021-02-26

## 2021-02-26 RX ORDER — CEFAZOLIN SODIUM 2 G/50ML
2000 SOLUTION INTRAVENOUS ONCE
Status: DISCONTINUED | OUTPATIENT
Start: 2021-02-26 | End: 2021-02-26 | Stop reason: HOSPADM

## 2021-02-26 RX ORDER — LIDOCAINE HYDROCHLORIDE 10 MG/ML
INJECTION, SOLUTION EPIDURAL; INFILTRATION; INTRACAUDAL; PERINEURAL CODE/TRAUMA/SEDATION MEDICATION
Status: COMPLETED | OUTPATIENT
Start: 2021-02-26 | End: 2021-02-26

## 2021-02-26 RX ORDER — GENTAMICIN SULFATE 40 MG/ML
INJECTION, SOLUTION INTRAMUSCULAR; INTRAVENOUS CODE/TRAUMA/SEDATION MEDICATION
Status: COMPLETED | OUTPATIENT
Start: 2021-02-26 | End: 2021-02-26

## 2021-02-26 RX ORDER — ACETAMINOPHEN 325 MG/1
650 TABLET ORAL EVERY 4 HOURS PRN
Status: DISCONTINUED | OUTPATIENT
Start: 2021-02-26 | End: 2021-02-26 | Stop reason: HOSPADM

## 2021-02-26 RX ORDER — CHLORHEXIDINE GLUCONATE 0.12 MG/ML
15 RINSE ORAL ONCE
Status: DISCONTINUED | OUTPATIENT
Start: 2021-02-26 | End: 2021-02-26 | Stop reason: HOSPADM

## 2021-02-26 RX ADMIN — GENTAMICIN SULFATE 80 MG: 40 INJECTION, SOLUTION INTRAMUSCULAR; INTRAVENOUS at 15:44

## 2021-02-26 RX ADMIN — PHENYLEPHRINE HYDROCHLORIDE 50 MCG/MIN: 10 INJECTION INTRAVENOUS at 15:19

## 2021-02-26 RX ADMIN — LIDOCAINE HYDROCHLORIDE 30 ML: 10 INJECTION, SOLUTION EPIDURAL; INFILTRATION; INTRACAUDAL; PERINEURAL at 15:14

## 2021-02-26 RX ADMIN — SODIUM CHLORIDE: 0.9 INJECTION, SOLUTION INTRAVENOUS at 14:45

## 2021-02-26 RX ADMIN — CEFAZOLIN SODIUM 2000 MG: 2 SOLUTION INTRAVENOUS at 14:52

## 2021-02-26 RX ADMIN — PROPOFOL 100 MCG/KG/MIN: 10 INJECTION, EMULSION INTRAVENOUS at 14:57

## 2021-02-26 NOTE — ANESTHESIA POSTPROCEDURE EVALUATION
Post-Op Assessment Note    CV Status:  Stable  Pain Score: 0    Pain management: adequate     Mental Status:  Arousable   Hydration Status:  Stable   PONV Controlled:  None   Airway Patency:  Patent   Two or more mitigation strategies used for obstructive sleep apnea   Post Op Vitals Reviewed: Yes      Staff: Anesthesiologist, with CRNAs         No complications documented      BP   141/66   Temp      Pulse  62   Resp   18   SpO2   96

## 2021-02-26 NOTE — ANESTHESIA PREPROCEDURE EVALUATION
Procedure:  CARDIAC EPS/PACER IMPLANT    Relevant Problems   ANESTHESIA (within normal limits)   (-) History of anesthesia complications      CARDIO   (+) Aortic insufficiency   (+) Hypertension   (+) Left bundle branch block (LBBB)   (+) Nonrheumatic mitral valve regurgitation   (+) S/P TAVR (transcatheter aortic valve replacement)      ENDO   (+) Hypothyroidism      GI/HEPATIC  Confirmed NPO appropriate   (-) Gastroesophageal reflux disease      /RENAL (within normal limits)      HEMATOLOGY (within normal limits)      MUSCULOSKELETAL   (+) Chronic right-sided low back pain without sciatica   (+) Osteoarthritis      NEURO/PSYCH (within normal limits)      PULMONARY (within normal limits)   (-) Smoking   (-) URI (upper respiratory infection)      Other   (+) Leukemia, 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HCC)        Physical Exam    Airway    Mallampati score: I  TM Distance: >3 FB  Neck ROM: full     Dental   No notable dental hx     Cardiovascular  Rate: abnormal,     Pulmonary  Pulmonary exam normal Breath sounds clear to auscultation,     Other Findings        Anesthesia Plan  ASA Score- 3     Anesthesia Type- IV sedation with anesthesia with ASA Monitors  Additional Monitors:   Airway Plan:     Comment: I discussed the risks and benefits of IV sedation anesthesia including the possibility of the need to convert to general anesthesia and the potential risk of awareness  The patient was given the opportunity to ask questions, which were answered          Plan Factors-    Chart reviewed  EKG reviewed  Existing labs reviewed  Patient is not a current smoker  Induction- intravenous  Postoperative Plan-     Informed Consent- Anesthetic plan and risks discussed with patient and son  I personally reviewed this patient with the CRNA  Discussed and agreed on the Anesthesia Plan with the CRNA             Lab Results   Component Value Date    WBC 4 04 (L) 02/26/2021    HGB 13 6 02/26/2021    HCT 41 5 02/26/2021     (H) 02/26/2021     02/26/2021     Lab Results   Component Value Date    SODIUM 143 02/26/2021    K 4 2 02/26/2021     (H) 02/26/2021    CO2 27 02/26/2021    BUN 25 02/26/2021    CREATININE 0 75 02/26/2021    GLUC 88 02/26/2021    CALCIUM 9 1 02/26/2021     Lab Results   Component Value Date    ALT 21 12/08/2020    AST 25 12/08/2020    ALKPHOS 63 12/08/2020    BILITOT 0 81 06/02/2015     Lab Results   Component Value Date    INR 1 07 02/26/2021    INR 1 06 03/31/2016    PROTIME 13 9 02/26/2021    PROTIME 13 6 03/31/2016     Lab Results   Component Value Date    HGBA1C 5 5 04/01/2016

## 2021-02-26 NOTE — H&P
H&P Exam - Cardiology   Luan Combs 80 y o  female MRN: [id number]  Unit/Bed#: CW2 217-05 Encounter: [id number]    Assessment/Plan   1  TBS   * patient presents to [facility] under direction of dr Neha York for DC PPM due to documented TBS on recent zio patch    * patient with LBBB and 2:1 AVB on zio as well as periods of SVT (report in epic)    * EF in  was 50%    * plan for PPM implant with admission vs discharge TBD by attending     2  Severe AS s/p TAVR (2016)  3  LBBB   4  Hypothyroidism         Imaging: I have personally reviewed pertinent reports  Results for orders placed during the hospital encounter of 20   Echo complete with contrast if indicated    [facility]ative [facility]  [facility] [place], [address] [facility]  [phone]    Transthoracic Echocardiogram  2D, M-mode, Doppler, and Color Doppler    Study date:  2020    Patient: Jese Hillman  MR number: [id number]  Account number: [de-identified]  : 1928  Age: 80 years  Gender: Female  Status: Outpatient  Location: [address] [place] [address] [facility]  Height: 65 in  Weight: 110 7 lb  BP: 108/ 82 mmHg    Indications: Aortic valve disease  Diagnoses: I35 9 - Nonrheumatic aortic valve disorder, unspecified    Sonographer:  Meg Servin RDCS  Primary Physician:  Nola Meneses MD  Referring Physician:  Lisset Langley DO  Group:  [facility]  Cardiology Fellow: Neema Mitchell MD  Interpreting Physician:  Thania Cárdenas MD    SUMMARY    LEFT VENTRICLE:  Systolic function was at the lower limits of normal  Ejection fraction was estimated to be 50 %  Although no diagnostic regional wall motion abnormality was identified, this possibility cannot be completely excluded on the basis of this study  Wall thickness was moderately to markedly increased  There was severe concentric hypertrophy    Doppler parameters were consistent with abnormal left ventricular relaxation (grade 1 diastolic dysfunction)  RIGHT VENTRICLE:  The size was normal   Systolic function was normal     LEFT ATRIUM:  The atrium was mildly dilated  MITRAL VALVE:  There was moderate annular calcification  Transmitral velocity was increased due to valvular stenosis  There was mild stenosis  The mean gradient was 3 mm Hg at a heart rate of 65 BPM   There was moderate regurgitation  AORTIC VALVE:  A #26 Edward Lin 3 TAVR bioprosthesis was present  It was well-seated and exhibited normal function  There was no stenosis  There was moderate perivalvular regurgitation primarily in the 9 o'clock position  TRICUSPID VALVE:  There was moderate regurgitation  Estimated peak PA pressure was 37 mmHg  The findings suggest mild pulmonary hypertension  AORTA:  The root exhibited mild dilatation at 33 mm (21 4 mm/m2)  There was mild dilatation of the ascending aorta at 35 mm (22 7 mm/m2)  COMPARISONS:  There has been no significant interval change  Comparison was made with the previous study of 25-Feb-2019  HISTORY: PRIOR HISTORY: Hypothyroidism  Aortic stenosis  S/P TAVR; 2016  LBBB  CAD  Hyperlipidemia  Hypertension  Smoker  PROCEDURE: The study was performed in the [address] [facility]  This was a routine study  The transthoracic approach was used  The study included complete 2D imaging, M-mode, complete spectral Doppler, and color Doppler  The  heart rate was 56 bpm, at the start of the study  Echocardiographic views were limited due to poor acoustic window availability and lung interference  Image quality was adequate  LEFT VENTRICLE: Size was normal  Systolic function was at the lower limits of normal  Ejection fraction was estimated to be 50 %  Although no diagnostic regional wall motion abnormality was identified, this possibility cannot be completely  excluded on the basis of this study  Wall thickness was moderately to markedly increased  There was severe concentric hypertrophy   DOPPLER: Doppler parameters were consistent with abnormal left ventricular relaxation (grade 1 diastolic  dysfunction)  RIGHT VENTRICLE: The size was normal  Systolic function was normal     LEFT ATRIUM: The atrium was mildly dilated  RIGHT ATRIUM: Size was normal     MITRAL VALVE: There was moderate annular calcification  There was mildly reduced leaflet separation  DOPPLER: Transmitral velocity was increased due to valvular stenosis  There was mild stenosis  The mean gradient was 3 mm Hg at a heart  rate of 65 BPM  There was moderate regurgitation  AORTIC VALVE: A #26 Edward Lin 3 TAVR bioprosthesis was present  It was well-seated and exhibited normal function  There was no stenosis  There was moderate perivalvular regurgitation primarily in the 9 o'clock position  TRICUSPID VALVE: The valve structure was normal  There was normal leaflet separation  DOPPLER: The transtricuspid velocity was within the normal range  There was no evidence for stenosis  There was moderate regurgitation  Estimated peak PA  pressure was 37 mmHg  The findings suggest mild pulmonary hypertension  PULMONIC VALVE: DOPPLER: The transpulmonic velocity was within the normal range  There was trace regurgitation  PERICARDIUM: There was no pericardial effusion  AORTA: The root exhibited mild dilatation at 33 mm (21 4 mm/m2)  There was mild dilatation of the ascending aorta at 35 mm (22 7 mm/m2)  SYSTEMIC VEINS: IVC: The inferior vena cava was normal in size and course   Respirophasic changes were normal     MEASUREMENT TABLES    2D MEASUREMENTS  LVOT   (Reference normals)  Diam   19 mm   (--)    SYSTEM MEASUREMENT TABLES    2D  %FS: 25 11 %  Ao Diam: 3 46 cm  EDV(Teich): 144 84 ml  EF(Cube): 57 99 %  EF(Teich): 49 12 %  ESV(Cube): 68 3 ml  ESV(Teich): 73 7 ml  IVSd: 1 22 cm  LA Area: 23 58 cm2  LA Diam: 3 54 cm  LVEDV MOD A4C: 83 34 ml  LVEF MOD A4C: 45 88 %  LVESV MOD A4C: 45 1 ml  LVIDd: 5 46 cm  LVIDs: 4 09 cm  LVLd A4C: 7 13 cm  LVLs A4C: 6 23 cm  LVOT Diam: 1 93 cm  LVPWd: 1 2 cm  RA Area: 17 01 cm2  RV Diam : 3 59 cm  SI(Cube): 61 23 ml/m2  SI(Teich): 46 2 ml/m2  SV MOD A4C: 38 24 ml  SV(Cube): 94 29 ml  SV(Teich): 71 15 ml    CW  AV Env  Ti: 295 75 ms  AV VTI: 41 08 cm  AV Vmax: 2 03 m/s  AV Vmean: 1 39 m/s  AV maxP 44 mmHg  AV meanP 55 mmHg  HR: 54 1 BPM  MV VTI: 69 14 cm  MV Vmax: 2 15 m/s  MV Vmean: 1 04 m/s  MV maxP 57 mmHg  MV meanP 02 mmHg  TR Vmax: 2 83 m/s  TR maxP mmHg    MM  TAPSE: 2 23 cm    PW  HAYLIE (VTI): 1 44 cm2  HAYLIE Vmax: 1 34 cm2  E': 0 02 m/s  E/E': 43 84  HR: 50 4 BPM  LVCI Dopp: 1 94 l/minm2  LVCO Dopp: 2 98 L/min  LVOT Env  Ti: 310 54 ms  LVOT VTI: 20 19 cm  LVOT Vmax: 0 92 m/s  LVOT Vmean: 0 65 m/s  LVOT maxPG: 3 41 mmHg  LVOT meanP 9 mmHg  LVSI Dopp: 38 44 ml/m2  LVSV Dopp: 59 2 ml  MV A Edson: 1 74 m/s  MV Dec Glynn: 1 68 m/s2  MV DecT: 525 98 ms  MV E Edson: 0 88 m/s  MV E/A Ratio: 0 51  MVA (VTI): 0 86 cm2    [facility] Accredited Echocardiography Laboratory    Prepared and electronically signed by    Jeff Abarca MD  Signed 2020 12:08:42         EKG:       History of Present Illness   HPI:  Anh Price is a 80y o  year old female with a history as above who presents to [facility] under direction of dr Bozena Alvarez for DC PPM implant  Briefly this is a patient followed by Dr Bozena Alvarez as an outpatient who underwent 2 week zio patch due to symptoms of LH/dizziness  During her monitoring period she was found to have multiple episodes of bradycardia and 2:1 AVB and SVT due to this PPM was recommended  Today she is accompanied by her son  Patient has no concerns or complaints  Review of Systems  ROS as noted above, otherwise 12 point review of systems was performed and is negative         Historical Information   Past Medical History:   Diagnosis Date    Aortic stenosis     severe    CAD (coronary artery disease)     Essential tremor     HTN (hypertension)     HTN (hypertension)     Hyperlipidemia     Hypothyroidism     Leukemia, 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HCC)     s/p splenectomy    Osteoarthritis     Osteoporosis     Urinary incontinence      Past Surgical History:   Procedure Laterality Date    COLONOSCOPY      HYSTERECTOMY      MT REPLACE AORTIC VALVE OPENFEMORAL ARTERY APPROACH N/A 4/12/2016    Procedure: REPLACEMENT AORTIC VALVE TRANSCATHETER (TAVR) TRANSFEMORAL  26mm Lin 3 valve;  Surgeon: Dallas Brand DO;  [facility]: [facility];  Service: Cardiac Surgery    SPLENECTOMY      for hx hairy cell leukemia     Family History:   Family History   Problem Relation Age of Onset    Other Mother         malignant neoplasm of uterus    Coronary artery disease Father     Heart failure Brother        Social History   Social History     Substance and Sexual Activity   Alcohol Use Yes    Comment: SOCIAL     Social History     Substance and Sexual Activity   Drug Use No     Social History     Tobacco Use   Smoking Status Current Some Day Smoker    Types: Cigarettes   Smokeless Tobacco Former User         Meds/Allergies   all medications and allergies reviewed  Home Medications:   Medications Prior to Admission   Medication    acetaminophen (TYLENOL) 500 mg tablet    amoxicillin (AMOXIL) 500 MG tablet    APPLE CIDER VINEGAR PO    aspirin 81 MG tablet    Calcium Carbonate-Vitamin D (CALCIUM PLUS VITAMIN D PO)    Glucosamine-Chondroitin (GLUCOSAMINE CHONDR COMPLEX PO)    levothyroxine 75 mcg tablet    Loperamide HCl (IMODIUM PO)    MELATONIN PO    Multiple Vitamin (MULTIVITAMIN) tablet    Polyethyl Glycol-Propyl Glycol (SYSTANE OP)       No Known Allergies    Objective   Vitals: Blood pressure 168/78, pulse 67, temperature 98 4 °F (36 9 °C), temperature source Oral, resp  rate 22, height 5' 5" (1 651 m), weight 49 4 kg (109 lb), SpO2 94 %    Orthostatic Blood Pressures      Most Recent Value   Blood Pressure  168/78 filed at 02/26/2021 1856   Patient Position - Orthostatic VS  Lying filed at 02/26/2021 0856          No intake or output data in the 24 hours ending 02/26/21 1139    Invasive Devices     None                 Physical Exam  Constitutional:       Appearance: She is well-developed  HENT:      Head: Normocephalic and atraumatic  Eyes:      Pupils: Pupils are equal, round, and reactive to light  Neck:      Musculoskeletal: Normal range of motion and neck supple  Cardiovascular:      Rate and Rhythm: Normal rate and regular rhythm  Pulmonary:      Effort: Pulmonary effort is normal       Breath sounds: Normal breath sounds  Abdominal:      General: Bowel sounds are normal       Palpations: Abdomen is soft  Musculoskeletal: Normal range of motion  Skin:     General: Skin is warm and dry  Neurological:      Mental Status: She is alert and oriented to person, place, and time  Lab Results: I have personally reviewed pertinent lab results      Results from last 7 days   Lab Units 02/26/21  0941   WBC Thousand/uL 4 04*   HEMOGLOBIN g/dL 13 6   HEMATOCRIT % 41 5   PLATELETS Thousands/uL 166     Results from last 7 days   Lab Units 02/26/21  0941   POTASSIUM mmol/L 4 2   CHLORIDE mmol/L 113*   CO2 mmol/L 27   BUN mg/dL 25   CREATININE mg/dL 0 75   CALCIUM mg/dL 9 1     Results from last 7 days   Lab Units 02/26/21  0941   INR  1 07               Code Status: Prior

## 2021-02-26 NOTE — DISCHARGE INSTRUCTIONS
Please refer to post pacemaker implantation discharge instructions and restrictions and your pacemaker booklet/temporary card  Keep incision dry for one week  Do not use lotions/powders/creams on incision  Leave outer bandage in place for 1 week - it is water proof, and as long as it is fully adhered to your skin you may shower with it  If it appears as though the bandage is coming off and/or there is any communication to the area of device incision, please then keep the whole area dry for the remaining week  After 1 week, please remove by pulling all edges away from the center of the bandage  No overhead reaching/pushing/pulling/lifting greater than 5-10lbs with left arm for six weeks  Please call the office if you notice redness, swelling, bleeding, or drainage from incision or if you develop fevers  AFTER PACEMAKER CARE:    If you have any questions, please call [phone] to speak with a nurse (8:30am-4pm, or [phone] after hours)  For appointments, please call [phone]  WHAT YOU SHOULD KNOW:   A pacemaker is a small, battery-powered device that is placed under your skin in your upper chest area with wires placed through a vein that lead directly into the heart  It helps regulate your heart rate and prevent your heart from beating too slowly  AFTER YOU LEAVE:     Medicines:     · Pain medicine: You may need medicine to take away or decrease pain  ¨ Learn how to take your medicine  Ask what medicine and how much you should take  Be sure you know how, when, and how often to take it  Usually Over the counter pain medicine is sufficient to control pain (Acetominophen or Ibuprofen) Ask your doctor if you may take these  If this does not control your pain, narcotic pain killers may be prescribed, please call if you need prescription  ¨ Do not wait until the pain is severe before you take your medicine  Tell caregivers if your pain does not decrease      ¨ Pain medicine can make you dizzy or sleepy  Prevent falls by calling someone when you get out of bed or if you need help  Take your medicine as directed  Call your healthcare provider if you think your medicine is not helping or if you have side effects  Tell him if you are allergic to any medicine  Follow up with your cardiologist after your procedure: You will need a follow-up visit approximately 2 weeks after you leave the hospital  Your cardiologist will check your wound and make sure that your pacemaker is working correctly  Follow the instructions to check your pacemaker: Your cardiologist or primary healthcare provider will check your pacemaker and the battery regularly  He will use a computer to check your pacemaker over the telephone or wireless device which will be given to you  Pacemaker batteries usually last 8 to 10 years  The pacemaker unit will be replaced when the battery gets low  This is a simpler procedure than the original one to implant your pacemaker  Wound care:  Keep your incision dry for one week  Do not use lotions/powders/creams on incision  Leave outer bandage in place for 1 week - it is water proof, and as long as it is fully adhered to your skin you may shower with it  If it appears as though the bandage is coming off and/or there is any communication to the area of device incision, please then keep the whole area dry for the remaining week  After 1 week, please remove by pulling all edges away from the center of the bandage  Please call the office if you notice redness, swelling, bleeding, or drainage from incision or if you develop fevers  Activity:   · Arm movement and lifting:  Be careful using the arm on the side of your pacemaker  Do not move your arm for the first 24 hours after your procedure  Do not  lift your arm above your shoulder or lift more than 10 pounds for one month after your procedure   Avoid pushing, pulling, or repetitive arm movements for one month  This helps the leads stay in place and helps your wound heal  Ask your caregiver when you can drive after your procedure  You may move your arm side to side without lifting above your shoulder, and do not need to wear a sling at home  · Driving: you are ok to drive 48 hours after pacemaker is implanted   · Sports:  Ask your caregiver when it is okay to play tennis, golf, basketball, or any sport that requires you to lift your arms  Do not play full contact sports, such as football, that could damage your pacemaker  Ask your cardiologist or primary healthcare provider how much and what kinds of physical activity are safe for you  Living with a pacemaker:   · Tell all caregivers you have a pacemaker: This includes surgeons, radiologists, and medical technicians  You may want to wear a medical alert ID bracelet or necklace that states that you have a pacemaker  · Carry your pacemaker ID card: Make sure you receive a pacemaker ID card  Carry it with you at all times  It lists important information about your pacemaker  Show it to airport security if you travel  · Avoid electrical interference:  Avoid welding equipment and other equipment with large magnets or electric fields  These things could interfere with how your pacemaker works  Use your cell phone on the ear opposite from your pacemaker  Do not carry your cell phone in your shirt pocket over your chest      · Some Pacemakers are MRI safe  Ask you doctor if it is safe to proceed with MRI and let the radiologist and staff know you have a pacemaker  · Do not touch the skin around your pacemaker: This can cause damage to the lead wires or move the pacemaker unit from where it should be  Contact your cardiologist or primary healthcare provider if:   · The area around your pacemaker has increasing amount of pain after surgery  The pain should improve over first few days after implantation       · The skin around your stitches has increasing redness, swelling, or has drainage  This may mean that you have an infection  · You have a fever  · You have chills, a cough, and feel weak or achy  These are also signs of infection  · Your feet or ankles are more swollen than your baseline  · Your Heart rate is less than 50 beats per minute     Seek care immediately if:   · Your bandage becomes soaked with blood  · Your pacemaker is swelling rapidly    · Your stitches open up  · You feel your heart suddenly beating very slowly or quickly  · You become too weak or dizzy to stand, or you pass out  · Your arm or leg feels warm, tender, and painful  It may look swollen and red  · You have chest pain that does not go away with rest or medicine  · You feel lightheaded, short of breath, and have chest pain  · You cough up blood  © 2014 [address] is for End User's use only and may not be sold, redistributed or otherwise used for commercial purposes  All illustrations and images included in CareNotes® are the copyrighted property of [employer]  or Russ Monroy  The above information is an  only  It is not intended as medical advice for individual conditions or treatments  Talk to your doctor, nurse or pharmacist before following any medical regimen to see if it is safe and effective for you

## 2021-03-01 LAB
ATRIAL RATE: 62 BPM
P AXIS: 82 DEGREES
PR INTERVAL: 178 MS
QRS AXIS: 136 DEGREES
QRSD INTERVAL: 106 MS
QT INTERVAL: 454 MS
QTC INTERVAL: 460 MS
T WAVE AXIS: 109 DEGREES
VENTRICULAR RATE: 62 BPM

## 2021-03-01 PROCEDURE — 93010 ELECTROCARDIOGRAM REPORT: CPT | Performed by: INTERNAL MEDICINE

## 2021-03-12 ENCOUNTER — IN-CLINIC DEVICE VISIT (OUTPATIENT)
Dept: CARDIOLOGY CLINIC | Facility: CLINIC | Age: 86
End: 2021-03-12

## 2021-03-12 DIAGNOSIS — Z95.0 CARDIAC PACEMAKER IN SITU: Primary | ICD-10-CM

## 2021-03-12 PROCEDURE — 99024 POSTOP FOLLOW-UP VISIT: CPT | Performed by: INTERNAL MEDICINE

## 2021-04-06 ENCOUNTER — OFFICE VISIT (OUTPATIENT)
Dept: GERIATRICS | Facility: CLINIC | Age: 86
End: 2021-04-06
Payer: MEDICARE

## 2021-04-06 VITALS
SYSTOLIC BLOOD PRESSURE: 136 MMHG | OXYGEN SATURATION: 97 % | RESPIRATION RATE: 16 BRPM | BODY MASS INDEX: 18.85 KG/M2 | DIASTOLIC BLOOD PRESSURE: 70 MMHG | HEART RATE: 88 BPM | WEIGHT: 113.3 LBS

## 2021-04-06 DIAGNOSIS — Z00.00 MEDICARE ANNUAL WELLNESS VISIT, SUBSEQUENT: Primary | ICD-10-CM

## 2021-04-06 DIAGNOSIS — M81.0 AGE-RELATED OSTEOPOROSIS WITHOUT CURRENT PATHOLOGICAL FRACTURE: ICD-10-CM

## 2021-04-06 PROCEDURE — G0439 PPPS, SUBSEQ VISIT: HCPCS | Performed by: NURSE PRACTITIONER

## 2021-04-06 PROCEDURE — 1123F ACP DISCUSS/DSCN MKR DOCD: CPT | Performed by: NURSE PRACTITIONER

## 2021-04-06 NOTE — PATIENT INSTRUCTIONS
Medicare Preventive Visit Patient Instructions  Thank you for completing your Welcome to Medicare Visit or Medicare Annual Wellness Visit today  Your next wellness visit will be due in one year (4/7/2022)  The screening/preventive services that you may require over the next 5-10 years are detailed below  Some tests may not apply to you based off risk factors and/or age  Screening tests ordered at today's visit but not completed yet may show as past due  Also, please note that scanned in results may not display below  Preventive Screenings:  Service Recommendations Previous Testing/Comments   Colorectal Cancer Screening  * Colonoscopy    * Fecal Occult Blood Test (FOBT)/Fecal Immunochemical Test (FIT)  * Fecal DNA/Cologuard Test  * Flexible Sigmoidoscopy Age: 54-65 years old   Colonoscopy: every 10 years (may be performed more frequently if at higher risk)  OR  FOBT/FIT: every 1 year  OR  Cologuard: every 3 years  OR  Sigmoidoscopy: every 5 years  Screening may be recommended earlier than age 48 if at higher risk for colorectal cancer  Also, an individualized decision between you and your healthcare provider will decide whether screening between the ages of 74-80 would be appropriate  Colonoscopy: Not on file  FOBT/FIT: Not on file  Cologuard: Not on file  Sigmoidoscopy: Not on file          Breast Cancer Screening Age: 36 years old  Frequency: every 1-2 years  Not required if history of left and right mastectomy Mammogram: Not on file        Cervical Cancer Screening Between the ages of 21-29, pap smear recommended once every 3 years  Between the ages of 33-67, can perform pap smear with HPV co-testing every 5 years     Recommendations may differ for women with a history of total hysterectomy, cervical cancer, or abnormal pap smears in past  Pap Smear: Not on file        Hepatitis C Screening Once for adults born between [facility]  More frequently in patients at high risk for Hepatitis C Hep C Antibody: Not on file        Diabetes Screening 1-2 times per year if you're at risk for diabetes or have pre-diabetes Fasting glucose: 88 mg/dL   A1C: No results in last 5 years        Cholesterol Screening Once every 5 years if you don't have a lipid disorder  May order more often based on risk factors  Lipid panel: 01/31/2019          Other Preventive Screenings Covered by Medicare:  1  Abdominal Aortic Aneurysm (AAA) Screening: covered once if your at risk  You're considered to be at risk if you have a family history of AAA  2  Lung Cancer Screening: covers low dose CT scan once per year if you meet all of the following conditions: (1) Age 50-69; (2) No signs or symptoms of lung cancer; (3) Current smoker or have quit smoking within the last 15 years; (4) You have a tobacco smoking history of at least 30 pack years (packs per day multiplied by number of years you smoked); (5) You get a written order from a healthcare provider  3  Glaucoma Screening: covered annually if you're considered high risk: (1) You have diabetes OR (2) Family history of glaucoma OR (3)  aged 48 and older OR (3)  American aged 72 and older  3  Osteoporosis Screening: covered every 2 years if you meet one of the following conditions: (1) You're estrogen deficient and at risk for osteoporosis based off medical history and other findings; (2) Have a vertebral abnormality; (3) On glucocorticoid therapy for more than 3 months; (4) Have primary hyperparathyroidism; (5) On osteoporosis medications and need to assess response to drug therapy  · Last bone density test (DXA Scan): 04/10/2018  5  HIV Screening: covered annually if you're between the age of 12-76  Also covered annually if you are younger than 13 and older than 72 with risk factors for HIV infection  For pregnant patients, it is covered up to 3 times per pregnancy      Immunizations:  Immunization Recommendations   Influenza Vaccine Annual influenza vaccination during flu season is recommended for all persons aged >= 6 months who do not have contraindications   Pneumococcal Vaccine (Prevnar and Pneumovax)  * Prevnar = PCV13  * Pneumovax = PPSV23   Adults 25-60 years old: 1-3 doses may be recommended based on certain risk factors  Adults 72 years old: Prevnar (PCV13) vaccine recommended followed by Pneumovax (PPSV23) vaccine  If already received PPSV23 since turning 65, then PCV13 recommended at least one year after PPSV23 dose  Hepatitis B Vaccine 3 dose series if at intermediate or high risk (ex: diabetes, end stage renal disease, liver disease)   Tetanus (Td) Vaccine - COST NOT COVERED BY MEDICARE PART B Following completion of primary series, a booster dose should be given every 10 years to maintain immunity against tetanus  Td may also be given as tetanus wound prophylaxis  Tdap Vaccine - COST NOT COVERED BY MEDICARE PART B Recommended at least once for all adults  For pregnant patients, recommended with each pregnancy  Shingles Vaccine (Shingrix) - COST NOT COVERED BY MEDICARE PART B  2 shot series recommended in those aged 48 and above     Health Maintenance Due:  There are no preventive care reminders to display for this patient  Immunizations Due:      Topic Date Due    Meningococcal ACWY Vaccine (1 - Risk start before 7 months 4-dose series) Never done    HIB Vaccine (1 of 1 - Risk 1-dose series) Never done    DTaP,Tdap,and Td Vaccines (1 - Tdap) Never done    Influenza Vaccine (1) 09/01/2020     Advance Directives   What are advance directives? Advance directives are legal documents that state your wishes and plans for medical care  These plans are made ahead of time in case you lose your ability to make decisions for yourself  Advance directives can apply to any medical decision, such as the treatments you want, and if you want to donate organs  What are the types of advance directives?   There are many types of advance directives, and each state has rules about how to use them  You may choose a combination of any of the following:  · Living will: This is a written record of the treatment you want  You can also choose which treatments you do not want, which to limit, and which to stop at a certain time  This includes surgery, medicine, IV fluid, and tube feedings  · Durable power of  for healthcare [place] [facility]): This is a written record that states who you want to make healthcare choices for you when you are unable to make them for yourself  This person, called a proxy, is usually a family member or a friend  You may choose more than 1 proxy  · Do not resuscitate (DNR) order:  A DNR order is used in case your heart stops beating or you stop breathing  It is a request not to have certain forms of treatment, such as CPR  A DNR order may be included in other types of advance directives  · Medical directive: This covers the care that you want if you are in a coma, near death, or unable to make decisions for yourself  You can list the treatments you want for each condition  Treatment may include pain medicine, surgery, blood transfusions, dialysis, IV or tube feedings, and a ventilator (breathing machine)  · Values history: This document has questions about your views, beliefs, and how you feel and think about life  This information can help others choose the care that you would choose  Why are advance directives important? An advance directive helps you control your care  Although spoken wishes may be used, it is better to have your wishes written down  Spoken wishes can be misunderstood, or not followed  Treatments may be given even if you do not want them  An advance directive may make it easier for your family to make difficult choices about your care  Cigarette Smoking and Your Health   Risks to your health if you smoke:  Nicotine and other chemicals found in tobacco damage every cell in your body   Even if you are a light smoker, you have an increased risk for cancer, heart disease, and lung disease  If you are pregnant or have diabetes, smoking increases your risk for complications  Benefits to your health if you stop smoking:   · You decrease respiratory symptoms such as coughing, wheezing, and shortness of breath  · You reduce your risk for cancers of the lung, mouth, throat, kidney, bladder, pancreas, stomach, and cervix  If you already have cancer, you increase the benefits of chemotherapy  You also reduce your risk for cancer returning or a second cancer from developing  · You reduce your risk for heart disease, blood clots, heart attack, and stroke  · You reduce your risk for lung infections, and diseases such as pneumonia, asthma, chronic bronchitis, and emphysema  · Your circulation improves  More oxygen can be delivered to your body  If you have diabetes, you lower your risk for complications, such as kidney, artery, and eye diseases  You also lower your risk for nerve damage  Nerve damage can lead to amputations, poor vision, and blindness  · You improve your body's ability to heal and to fight infections  For more information and support to stop smoking:   · [employer]  Phone: 5- 618 - 633-3754  Web Address: [employer]  Underweight  Underweight is defined as having a body mass index (BMI) of less than 18 5 kg/m2   Anorexia  is a loss of appetite, decreased food intake, or both  Your appetite naturally decreases as you get older  You also get full faster than you used to  This occurs because your body needs less energy  Other body changes can also lead to a decreased appetite  Even though some appetite loss is normal, you still need to get enough calories and nutrients to keep you healthy  You can start to lose too much weight if you do not eat as much food as your body needs  Unwanted weight loss can cause health problems, or worsen health problems you already have   You can also become dehydrated if you do not drink enough liquid  How to eat healthy and get enough nutrients:   · Choose healthy foods  Eat a variety of fruits, vegetables, whole grains, low-fat dairy foods, lean meats, and other protein foods  Limit foods high in fat, sugar, and salt  Limit or avoid alcohol as directed  Work with a dietitian to help you plan your meals if you need to follow a special diet  A dietitian can also teach you how to modify foods if you have trouble chewing or swallowing  · Snack on healthy foods between meals  if you only eat a small amount during meals  Snacks provide extra healthy nutrients and calories between meals  Examples include fruit, cheese, and whole grain crackers  · Drink liquids as directed  to avoid dehydration  Drink liquids between meals if they cause you to get full too quickly during meals  Ask how much liquid to drink each day and which liquids are best for you  · Use herbs, spices, and flavor enhancers to add flavor to foods  Avoid using herbs and spice blends that also contain sodium  Ask your healthcare provider or dietitian about flavor enhancers  Flavor enhancers with ham, natural brandt, and roast beef flavors can also be sprinkled on food to add flavor  · Share meals with others as often as you can  Eating with others may help you to eat better during meal time  Ask family members, neighbors, or friends to join you for lunch  There are also senior centers where you can meet people, and share meals with them  · Ask family and friends for help  with shopping or preparing foods  Ask for a ride to the grocery store, if needed  © Copyright [employer] 2018 Information is for End User's use only and may not be sold, redistributed or otherwise used for commercial purposes   All illustrations and images included in CareNotes® are the copyrighted property of A D A M , Inc  or [employer] [facility] Preventive Visit Patient Instructions  Thank you for completing your Welcome to [facility] Visit or Medicare Annual Wellness Visit today  Your next wellness visit will be due in one year (4/7/2022)  The screening/preventive services that you may require over the next 5-10 years are detailed below  Some tests may not apply to you based off risk factors and/or age  Screening tests ordered at today's visit but not completed yet may show as past due  Also, please note that scanned in results may not display below  Preventive Screenings:  Service Recommendations Previous Testing/Comments   Colorectal Cancer Screening  * Colonoscopy    * Fecal Occult Blood Test (FOBT)/Fecal Immunochemical Test (FIT)  * Fecal DNA/Cologuard Test  * Flexible Sigmoidoscopy Age: 54-65 years old   Colonoscopy: every 10 years (may be performed more frequently if at higher risk)  OR  FOBT/FIT: every 1 year  OR  Cologuard: every 3 years  OR  Sigmoidoscopy: every 5 years  Screening may be recommended earlier than age 48 if at higher risk for colorectal cancer  Also, an individualized decision between you and your healthcare provider will decide whether screening between the ages of 74-80 would be appropriate  Colonoscopy: Not on file  FOBT/FIT: Not on file  Cologuard: Not on file  Sigmoidoscopy: Not on file          Breast Cancer Screening Age: 36 years old  Frequency: every 1-2 years  Not required if history of left and right mastectomy Mammogram: Not on file        Cervical Cancer Screening Between the ages of 21-29, pap smear recommended once every 3 years  Between the ages of 33-67, can perform pap smear with HPV co-testing every 5 years     Recommendations may differ for women with a history of total hysterectomy, cervical cancer, or abnormal pap smears in past  Pap Smear: Not on file        Hepatitis C Screening Once for adults born between [facility]  More frequently in patients at high risk for Hepatitis C Hep C Antibody: Not on file        Diabetes Screening 1-2 times per year if you're at risk for diabetes or have pre-diabetes Fasting glucose: 88 mg/dL   A1C: No results in last 5 years        Cholesterol Screening Once every 5 years if you don't have a lipid disorder  May order more often based on risk factors  Lipid panel: 01/31/2019          Other Preventive Screenings Covered by Medicare:  6  Abdominal Aortic Aneurysm (AAA) Screening: covered once if your at risk  You're considered to be at risk if you have a family history of AAA  7  Lung Cancer Screening: covers low dose CT scan once per year if you meet all of the following conditions: (1) Age 50-69; (2) No signs or symptoms of lung cancer; (3) Current smoker or have quit smoking within the last 15 years; (4) You have a tobacco smoking history of at least 30 pack years (packs per day multiplied by number of years you smoked); (5) You get a written order from a healthcare provider  8  Glaucoma Screening: covered annually if you're considered high risk: (1) You have diabetes OR (2) Family history of glaucoma OR (3)  aged 48 and older OR (3)  American aged 72 and older  5  Osteoporosis Screening: covered every 2 years if you meet one of the following conditions: (1) You're estrogen deficient and at risk for osteoporosis based off medical history and other findings; (2) Have a vertebral abnormality; (3) On glucocorticoid therapy for more than 3 months; (4) Have primary hyperparathyroidism; (5) On osteoporosis medications and need to assess response to drug therapy  · Last bone density test (DXA Scan): 04/10/2018  10  HIV Screening: covered annually if you're between the age of 12-76  Also covered annually if you are younger than 13 and older than 72 with risk factors for HIV infection  For pregnant patients, it is covered up to 3 times per pregnancy      Immunizations:  Immunization Recommendations   Influenza Vaccine Annual influenza vaccination during flu season is recommended for all persons aged >= 6 months who do not have contraindications Pneumococcal Vaccine (Prevnar and Pneumovax)  * Prevnar = PCV13  * Pneumovax = PPSV23   Adults 25-60 years old: 1-3 doses may be recommended based on certain risk factors  Adults 72 years old: Prevnar (PCV13) vaccine recommended followed by Pneumovax (PPSV23) vaccine  If already received PPSV23 since turning 65, then PCV13 recommended at least one year after PPSV23 dose  Hepatitis B Vaccine 3 dose series if at intermediate or high risk (ex: diabetes, end stage renal disease, liver disease)   Tetanus (Td) Vaccine - COST NOT COVERED BY MEDICARE PART B Following completion of primary series, a booster dose should be given every 10 years to maintain immunity against tetanus  Td may also be given as tetanus wound prophylaxis  Tdap Vaccine - COST NOT COVERED BY MEDICARE PART B Recommended at least once for all adults  For pregnant patients, recommended with each pregnancy  Shingles Vaccine (Shingrix) - COST NOT COVERED BY MEDICARE PART B  2 shot series recommended in those aged 48 and above     Health Maintenance Due:  There are no preventive care reminders to display for this patient  Immunizations Due:      Topic Date Due    Meningococcal ACWY Vaccine (1 - Risk start before 7 months 4-dose series) Never done    HIB Vaccine (1 of 1 - Risk 1-dose series) Never done    DTaP,Tdap,and Td Vaccines (1 - Tdap) Never done    Influenza Vaccine (1) 09/01/2020     Advance Directives   What are advance directives? Advance directives are legal documents that state your wishes and plans for medical care  These plans are made ahead of time in case you lose your ability to make decisions for yourself  Advance directives can apply to any medical decision, such as the treatments you want, and if you want to donate organs  What are the types of advance directives? There are many types of advance directives, and each state has rules about how to use them   You may choose a combination of any of the following:  · Living will:  This is a written record of the treatment you want  You can also choose which treatments you do not want, which to limit, and which to stop at a certain time  This includes surgery, medicine, IV fluid, and tube feedings  · Durable power of  for healthcare [place] [facility]): This is a written record that states who you want to make healthcare choices for you when you are unable to make them for yourself  This person, called a proxy, is usually a family member or a friend  You may choose more than 1 proxy  · Do not resuscitate (DNR) order:  A DNR order is used in case your heart stops beating or you stop breathing  It is a request not to have certain forms of treatment, such as CPR  A DNR order may be included in other types of advance directives  · Medical directive: This covers the care that you want if you are in a coma, near death, or unable to make decisions for yourself  You can list the treatments you want for each condition  Treatment may include pain medicine, surgery, blood transfusions, dialysis, IV or tube feedings, and a ventilator (breathing machine)  · Values history: This document has questions about your views, beliefs, and how you feel and think about life  This information can help others choose the care that you would choose  Why are advance directives important? An advance directive helps you control your care  Although spoken wishes may be used, it is better to have your wishes written down  Spoken wishes can be misunderstood, or not followed  Treatments may be given even if you do not want them  An advance directive may make it easier for your family to make difficult choices about your care  Cigarette Smoking and Your Health   Risks to your health if you smoke:  Nicotine and other chemicals found in tobacco damage every cell in your body  Even if you are a light smoker, you have an increased risk for cancer, heart disease, and lung disease   If you are pregnant or have diabetes, smoking increases your risk for complications  Benefits to your health if you stop smoking:   · You decrease respiratory symptoms such as coughing, wheezing, and shortness of breath  · You reduce your risk for cancers of the lung, mouth, throat, kidney, bladder, pancreas, stomach, and cervix  If you already have cancer, you increase the benefits of chemotherapy  You also reduce your risk for cancer returning or a second cancer from developing  · You reduce your risk for heart disease, blood clots, heart attack, and stroke  · You reduce your risk for lung infections, and diseases such as pneumonia, asthma, chronic bronchitis, and emphysema  · Your circulation improves  More oxygen can be delivered to your body  If you have diabetes, you lower your risk for complications, such as kidney, artery, and eye diseases  You also lower your risk for nerve damage  Nerve damage can lead to amputations, poor vision, and blindness  · You improve your body's ability to heal and to fight infections  For more information and support to stop smoking:   · [employer]  Phone: 2- 864 - 912-7067  Web Address: [employer]  Underweight  Underweight is defined as having a body mass index (BMI) of less than 18 5 kg/m2   Anorexia  is a loss of appetite, decreased food intake, or both  Your appetite naturally decreases as you get older  You also get full faster than you used to  This occurs because your body needs less energy  Other body changes can also lead to a decreased appetite  Even though some appetite loss is normal, you still need to get enough calories and nutrients to keep you healthy  You can start to lose too much weight if you do not eat as much food as your body needs  Unwanted weight loss can cause health problems, or worsen health problems you already have  You can also become dehydrated if you do not drink enough liquid  How to eat healthy and get enough nutrients:   · Choose healthy foods    Eat a variety of fruits, vegetables, whole grains, low-fat dairy foods, lean meats, and other protein foods  Limit foods high in fat, sugar, and salt  Limit or avoid alcohol as directed  Work with a dietitian to help you plan your meals if you need to follow a special diet  A dietitian can also teach you how to modify foods if you have trouble chewing or swallowing  · Snack on healthy foods between meals  if you only eat a small amount during meals  Snacks provide extra healthy nutrients and calories between meals  Examples include fruit, cheese, and whole grain crackers  · Drink liquids as directed  to avoid dehydration  Drink liquids between meals if they cause you to get full too quickly during meals  Ask how much liquid to drink each day and which liquids are best for you  · Use herbs, spices, and flavor enhancers to add flavor to foods  Avoid using herbs and spice blends that also contain sodium  Ask your healthcare provider or dietitian about flavor enhancers  Flavor enhancers with ham, natural brandt, and roast beef flavors can also be sprinkled on food to add flavor  · Share meals with others as often as you can  Eating with others may help you to eat better during meal time  Ask family members, neighbors, or friends to join you for lunch  There are also senior centers where you can meet people, and share meals with them  · Ask family and friends for help  with shopping or preparing foods  Ask for a ride to the grocery store, if needed  © Copyright [address] 2018 Information is for End User's use only and may not be sold, redistributed or otherwise used for commercial purposes  All illustrations and images included in CareNotes® are the copyrighted property of A D A M , Inc  or [address] [place] [address]    · Routine screenings to be completed:  Lipid panel with next blood work, dxa scan to monitor for osteoporosis

## 2021-04-06 NOTE — ASSESSMENT & PLAN NOTE
·  Patient seen for annual wellness visit, doing well  ·  medication list reviewed, history is reviewed  No changes at this time  ·  Immunizations reviewed  ·  no record  Tdap, otherwise WNL  ·  screenings reviewed  ·  needs DEXA- Rx applied  ·  labs reviewed  ·  needs lipid panel, Rx applied for next routine lab check  ·  had 1 fall in which she tripped over sidewalk  This happened remotely  No need for PT OT at this time per patient  ·  no concerns for memory loss  ·  has POLST and advance directive  ·   Weight stable at present    Continue to eat good diet, keep hydrated, exercises able

## 2021-04-06 NOTE — PROGRESS NOTES
Assessment and Plan:     Problem List Items Addressed This Visit        Musculoskeletal and Integument    Osteoporosis     · Has not had DEXA scan since 2018  ·  will Rx to evaluate for osteoporosis         Relevant Orders    DXA bone density spine hip and pelvis       Other    Medicare annual wellness visit, subsequent - Primary     ·  Patient seen for annual wellness visit, doing well  ·  medication list reviewed, history is reviewed  No changes at this time  ·  Immunizations reviewed  ·  no record  Tdap, otherwise WNL  ·  screenings reviewed  ·  needs DEXA- Rx applied  ·  labs reviewed  ·  needs lipid panel, Rx applied for next routine lab check  ·  had 1 fall in which she tripped over sidewalk  This happened remotely  No need for PT OT at this time per patient  ·  no concerns for memory loss  ·  has POLST and advance directive  ·   Weight stable at present  Continue to eat good diet, keep hydrated, exercises able         Relevant Orders    Lipid panel    DXA bone density spine hip and pelvis        BMI Counseling: There is no height or weight on file to calculate BMI  The BMI is below normal  Dietary education for weight gain was provided  Preventive health issues were discussed with patient, and age appropriate screening tests were ordered as noted in patient's After Visit Summary  Personalized health advice and appropriate referrals for health education or preventive services given if needed, as noted in patient's After Visit Summary       History of Present Illness:     Patient presents for Medicare Annual Wellness visit    Patient Care Team:  Saqib Ansari MD as PCP - General (Geriatric Medicine)  Shirlene Viveros, DO Nena Severe, JESSICA Saravia     Problem List:     Patient Active Problem List   Diagnosis    Urinary incontinence    Osteoporosis    Osteoarthritis    Leukemia, 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[facility] [address] )    Hypothyroidism    Essential tremor    Insomnia    S/P TAVR (transcatheter aortic valve replacement)    Left ventricular hypertrophy    Left bundle branch block (LBBB)    Peripheral vertigo    Bilateral impacted cerumen    Chronic right-sided low back pain without sciatica    Difficulty reading due to visual problem    Nonrheumatic mitral valve regurgitation    Aortic insufficiency    Lightheadedness    Hypertension    Macrocytosis    Abnormal EKG    Fall    Polypharmacy    Medicare annual wellness visit, subsequent      Past Medical and Surgical History:     Past Medical History:   Diagnosis Date    Aortic stenosis     severe    CAD (coronary artery disease)     Essential tremor     HTN (hypertension)     HTN (hypertension)     Hyperlipidemia     Hypothyroidism     Leukemia, 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HCC)     s/p splenectomy    Osteoarthritis     Osteoporosis     Urinary incontinence      Past Surgical History:   Procedure Laterality Date    COLONOSCOPY      HYSTERECTOMY      WA REPLACE AORTIC VALVE OPENFEMORAL ARTERY APPROACH N/A 4/12/2016    Procedure: REPLACEMENT AORTIC VALVE TRANSCATHETER (TAVR) TRANSFEMORAL  26mm Lin 3 valve;  Surgeon: Puja Villa DO;  Location: [facility];  Service: Cardiac Surgery    SPLENECTOMY      for hx hairy cell leukemia      Family History:     Family History   Problem Relation Age of Onset    Other Mother         malignant neoplasm of uterus    Coronary artery disease Father     Heart failure Brother       Social History:        Social History     Socioeconomic History    Marital status:       Spouse name: None    Number of children: None    Years of education: None    Highest education level: None   Occupational History    None   Social Needs    Financial resource strain: None    Food insecurity     Worry: None     Inability: None    Transportation needs     Medical: None     Non-medical: None   Tobacco Use    Smoking status: Current Some Day Smoker     Types: Cigarettes    Smokeless tobacco: Former User Substance and Sexual Activity    Alcohol use: Yes     Comment: SOCIAL    Drug use: No    Sexual activity: None   Lifestyle    Physical activity     Days per week: None     Minutes per session: None    Stress: None   Relationships    Social connections     Talks on phone: None     Gets together: None     Attends [employer] service: None     Active member of club or organization: None     Attends meetings of clubs or organizations: None     Relationship status: None    Intimate partner violence     Fear of current or ex partner: None     Emotionally abused: None     Physically abused: None     Forced sexual activity: None   Other Topics Concern    None   Social History Narrative    None      Medications and Allergies:     Current Outpatient Medications   Medication Sig Dispense Refill    acetaminophen (TYLENOL) 500 mg tablet Take 1,000 mg by mouth every 8 (eight) hours No more than 3g a day      amoxicillin (AMOXIL) 500 MG tablet Take 2,000 mg by mouth 60 minutes pre-procedure      APPLE CIDER VINEGAR PO Take by mouth daily      aspirin 81 MG tablet Take 81 mg by mouth daily      Calcium Carbonate-Vitamin D (CALCIUM PLUS VITAMIN D PO) Take by mouth      Glucosamine-Chondroitin (GLUCOSAMINE CHONDR COMPLEX PO) Take by mouth 2 (two) times a day      levothyroxine 75 mcg tablet Take 1 tablet (75 mcg) by mouth mon- fri and take 2 tablets on Saturday and Sunday  112 tablet 3    Loperamide HCl (IMODIUM PO) Take 2 mg by mouth as needed   MELATONIN PO Take 5 mg by mouth daily at bedtime   Multiple Vitamin (MULTIVITAMIN) tablet Take 1 tablet by mouth daily   Polyethyl Glycol-Propyl Glycol (SYSTANE OP) Apply 1 drop to eye 2 (two) times a day  EACH EYE TWICE DAILY        No current facility-administered medications for this visit        No Known Allergies   Immunizations:     Immunization History   Administered Date(s) Administered    Influenza Split High Dose Preservative Free IM 11/19/2001, 11/12/2004, 10/19/2005, 10/01/2006, 10/25/2007, 10/31/2008, 11/02/2009, 10/20/2010, 11/01/2011, 10/23/2012, 10/30/2013, 10/27/2014, 10/19/2015, 10/27/2017    Pneumococcal Polysaccharide PPV23 10/01/1998, 12/22/2006    SARS-CoV-2 / COVID-19 mRNA IM ([employer]) 02/03/2021, 02/22/2021    Zoster 05/14/2008      Health Maintenance: There are no preventive care reminders to display for this patient  Topic Date Due    Meningococcal ACWY Vaccine (1 - Risk start before 7 months 4-dose series) Never done    HIB Vaccine (1 of 1 - Risk 1-dose series) Never done    DTaP,Tdap,and Td Vaccines (1 - Tdap) Never done    Influenza Vaccine (1) 09/01/2020      Medicare Health Risk Assessment:     /70   Pulse 88   Resp 16   Wt 51 4 kg (113 lb 4 8 oz)   SpO2 97%   BMI 18 85 kg/m²      Anita Browning is here for her Subsequent Wellness visit  Last Medicare Wellness visit information reviewed, patient interviewed, no change since last AWV  Health Risk Assessment:   Patient rates overall health as good  Patient feels that their physical health rating is slightly worse  Patient is satisfied with their life  Eyesight was rated as slightly worse  Hearing was rated as much worse  Patient feels that their emotional and mental health rating is same  Patients states they are sometimes angry  Patient states they are often unusually tired/fatigued  Pain experienced in the last 7 days has been some  Patient's pain rating has been 3/10  Patient states that she has experienced no weight loss or gain in last 6 months  Depression Screening:   PHQ-2 Score: 0      Fall Risk Screening: In the past year, patient has experienced: history of falling in past year    Number of falls: 1  Injured during fall?: No    Feels unsteady when standing or walking?: No    Worried about falling?: No      Urinary Incontinence Screening:   Patient has leaked urine accidently in the last six months   Wear depends    Home Safety:  Patient does not have trouble with stairs inside or outside of their home  Patient has working smoke alarms and has working carbon monoxide detector  Home safety hazards include: none  Nutrition:   Current diet is Regular  Medications:   Patient is currently taking over-the-counter supplements  OTC medications include: see medication list  Patient is able to manage medications  Uses mediplanner    Activities of Daily Living (ADLs)/Instrumental Activities of Daily Living (IADLs):   Walk and transfer into and out of bed and chair?: Yes  Dress and groom yourself?: Yes    Bathe or shower yourself?: Yes    Feed yourself? Yes  Do your laundry/housekeeping?: Yes  Manage your money, pay your bills and track your expenses?: Yes  Make your own meals?: Yes    Do your own shopping?: Yes    Previous Hospitalizations:   Any hospitalizations or ED visits within the last 12 months?: Yes    How many hospitalizations have you had in the last year?: 1-2    Hospitalization Comments: Cardiac concern  Pacemaker placement    Advance Care Planning:   Living will: Yes    Durable POA for healthcare:  Yes    Advanced directive: Yes      Comments: Sons  POLST in bathroom    Cognitive Screening:   Provider or family/friend/caregiver concerned regarding cognition?: No    PREVENTIVE SCREENINGS      Cardiovascular Screening:    General: Screening Current and Screening Not Indicated    Due for: Lipid Panel      Diabetes Screening:     General: Screening Current      Colorectal Cancer Screening:     General: Screening Not Indicated      Breast Cancer Screening:     General: Screening Not Indicated      Cervical Cancer Screening:    General: Screening Not Indicated      Osteoporosis Screening:    General: Screening Not Indicated and History Osteoporosis      Abdominal Aortic Aneurysm (AAA) Screening:        General: Screening Not Indicated      Lung Cancer Screening:     General: Screening Not Indicated      Hepatitis C Screening:    General: Screening Not Indicated    Screening, Brief Intervention, and Referral to Treatment (SBIRT)    Screening  Typical number of drinks in a day: 0    Single Item Drug Screening:  How often have you used an illegal drug (including marijuana) or a prescription medication for non-medical reasons in the past year? never    Single Item Drug Screen Score: 0  Interpretation: Negative screen for possible drug use disorder    Other Counseling Topics:   Car/seat belt/driving safety and calcium and vitamin D intake   Derm referal  Wears long sleeves and hat      Leticia Donovan

## 2021-04-28 ENCOUNTER — OFFICE VISIT (OUTPATIENT)
Dept: GERIATRICS | Facility: CLINIC | Age: 86
End: 2021-04-28
Payer: MEDICARE

## 2021-04-28 VITALS
OXYGEN SATURATION: 95 % | RESPIRATION RATE: 16 BRPM | BODY MASS INDEX: 18.64 KG/M2 | DIASTOLIC BLOOD PRESSURE: 70 MMHG | WEIGHT: 112 LBS | SYSTOLIC BLOOD PRESSURE: 132 MMHG | TEMPERATURE: 97.4 F | HEART RATE: 81 BPM

## 2021-04-28 DIAGNOSIS — R42 LIGHTHEADEDNESS: ICD-10-CM

## 2021-04-28 DIAGNOSIS — Z95.2 S/P TAVR (TRANSCATHETER AORTIC VALVE REPLACEMENT): ICD-10-CM

## 2021-04-28 DIAGNOSIS — I10 HYPERTENSION, UNSPECIFIED TYPE: ICD-10-CM

## 2021-04-28 DIAGNOSIS — M81.0 AGE-RELATED OSTEOPOROSIS WITHOUT CURRENT PATHOLOGICAL FRACTURE: ICD-10-CM

## 2021-04-28 DIAGNOSIS — N39.45 CONTINUOUS LEAKAGE OF URINE: ICD-10-CM

## 2021-04-28 DIAGNOSIS — C91.41 HAIRY CELL LEUKEMIA, IN REMISSION (HCC): ICD-10-CM

## 2021-04-28 DIAGNOSIS — F51.01 PRIMARY INSOMNIA: ICD-10-CM

## 2021-04-28 DIAGNOSIS — I49.5 TACHY-BRADY SYNDROME (HCC): Primary | ICD-10-CM

## 2021-04-28 DIAGNOSIS — E03.8 OTHER SPECIFIED HYPOTHYROIDISM: ICD-10-CM

## 2021-04-28 PROCEDURE — 99215 OFFICE O/P EST HI 40 MIN: CPT | Performed by: STUDENT IN AN ORGANIZED HEALTH CARE EDUCATION/TRAINING PROGRAM

## 2021-04-28 NOTE — PROGRESS NOTES
[facility]  [address], [address], [address] [place] [address]    PRIMARY CARE PRACTICE FOR OLDER ADULTS  [facility]      NAME: Rafi Tapia  AGE: 80 y o  SEX: female    DATE OF ENCOUNTER: 4/28/2021    Assessment and Plan     Problem List Items Addressed This Visit        Endocrine    Hypothyroidism     Prior TSH (3/21 was 4 76   Will repeat TSH in T4 the next few weeks  Continue levothyroxine 75 mcg daily         Relevant Orders    TSH + Free T4       Cardiovascular and Mediastinum    Hypertension      /70   Continue aspirin 81 mg daily   Recommend adherence to a heart healthy diet         Tachy-nataliia syndrome (HCC) - Primary      ZIO patch revealed tachy-nataliia syndrome and Mobitz type 2 second-degree block  S/p dual-chamber pacemaker  Follow-up with Cardiology for routine interrogation of device   Recommend adherence to a heart healthy diet   Continue aspirin 81 mg daily            Musculoskeletal and Integument    Osteoporosis      Patient declining DEXA scan for evaluation of osteoporosis  Had a detailed conversation with patient about importance in treating osteoporosis of present  Patient did have a history of a fall in the past   After the significant counseling and Education, patient was in agreement to complete DEXA scan   Maintain Falls precautions            Other    Urinary incontinence      Recommend scheduled toileting every 2 hours once awake  Avoid fluid intake 2 hours prior to bedtime to avoid nocturnal incontinence         Leukemia, 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[address] )      Stable   CBCs have remained stable   Patient previously followed with Oncology while still living in [place]  Does not wish to consult them at this time         Insomnia      Stable   Avoid daytime napping  Avoid caffeinated beverages after 14:00  Continue melatonin 5 mg p o   HS         S/P TAVR (transcatheter aortic valve replacement)      Stable   No overt symptoms of volume overload, patient euvolemic   Continue routine follow-up with Cardiology   Continue aspirin 81 mg daily   Recommend adherence to a heart healthy diet         Lightheadedness      Resolved  Tachy-nataliia syndrome and Mobitz type 2 second-degree block noted on ZIO patch   Pacemaker has since been inserted with resolution of symptoms                   - Counseling Documentation: patient was counseled regarding: diagnostic results, instructions for management, risk factor reductions, prognosis, patient and family education, impressions, risks and benefits of treatment options and importance of compliance with treatment    Chief Complaint     Patient presents for routine follow-up and chronic conditions    History of Present Illness     HPI     This is a very pleasant female with hypothyroidism, insomnia, prior hairy cell leukemia, recent tachy-nataliia syndrome, Mobitz type 2 second-degree block, s/p pacemaker placement , s/p tavr and urinary incontinence amongst other medical conditions who presents for routine follow-up of acute and chronic conditions  Today the patient was very tearful in the office as she related her daughter in-law recently passed away from stage IV metastatic lung cancer  Patient was encouraged and offered support for the grief process  She  recently had placement of a pacemaker after ZIO patch monitoring showed tachy-nataliia syndrome as well as Mobitz type 2 second-degree block  Her symptoms of lightheadedness have since resolved after placement of her pacemaker  I did have a very detailed conversation with the patient today about pursuing a DEXA scan  The patient did have a history of fall in the past   Patient was initially declining DEXA scanning however she did agree thereafter to pursuing a DEXA scan and treating osteoporosis if needed  Patient denied any cardiorespiratory distress, fever, chills, URI or urinary symptoms  She states she has been tolerating oral intake and having regular bowel movements  The patient continues on levothyroxine for a history of hypothyroidism  She has been compliant with melatonin for a history of insomnia  She also continues to do well on aspirin for a history of hypertension  The following portions of the patient's history were reviewed and updated as appropriate: allergies, current medications, past family history, past medical history, past social history, past surgical history and problem list     Review of Systems     Review of Systems   Constitutional: Negative for activity change, chills and fever  HENT: Negative for congestion, ear pain, rhinorrhea and sore throat  Eyes: Positive for visual disturbance (chronic)  Negative for discharge  Respiratory: Negative for cough, chest tightness, shortness of breath, wheezing and stridor  Cardiovascular: Negative for chest pain, palpitations and leg swelling  Gastrointestinal: Negative for abdominal pain, constipation, diarrhea, nausea and vomiting  Genitourinary: Negative for dysuria  Urinary incontinence   Musculoskeletal: Positive for gait problem  Skin: Negative for color change, pallor, rash and wound  Neurological: Negative for dizziness, speech difficulty, weakness and light-headedness  Psychiatric/Behavioral: Positive for sleep disturbance (chronic insomnia)  Negative for confusion and decreased concentration         Active Problem List     Patient Active Problem List   Diagnosis    Urinary incontinence    Osteoporosis    Osteoarthritis    Leukemia, 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[facility] [address] )    Hypothyroidism    Essential tremor    Insomnia    S/P TAVR (transcatheter aortic valve replacement)    Left ventricular hypertrophy    Left bundle branch block (LBBB)    Peripheral vertigo    Bilateral impacted cerumen    Chronic right-sided low back pain without sciatica    Difficulty reading due to visual problem    Nonrheumatic mitral valve regurgitation    Aortic insufficiency    Lightheadedness    Hypertension    Macrocytosis    Abnormal EKG    Fall    Polypharmacy    Medicare annual wellness visit, subsequent    Tachy-nataliia syndrome (HCC)       Objective     /70 (BP Location: Right arm, Patient Position: Sitting, Cuff Size: Standard)   Pulse 81   Temp (!) 97 4 °F (36 3 °C) (Temporal)   Resp 16   Wt 50 8 kg (112 lb)   SpO2 95%   BMI 18 64 kg/m²     Physical Exam  Vitals signs reviewed  Constitutional:       General: She is not in acute distress  Appearance: Normal appearance  She is well-developed  She is not ill-appearing or diaphoretic  Comments: Elderly female sitting comfortably in chair in no obvious cardiorespiratory or painful distress   HENT:      Head: Normocephalic and atraumatic  Right Ear: Tympanic membrane, ear canal and external ear normal       Left Ear: Tympanic membrane, ear canal and external ear normal       Nose: Nose normal  No congestion or rhinorrhea  Mouth/Throat:      Mouth: Mucous membranes are moist       Pharynx: Oropharynx is clear  No oropharyngeal exudate  Eyes:      General: No scleral icterus  Right eye: No discharge  Left eye: No discharge  Conjunctiva/sclera: Conjunctivae normal    Neck:      Musculoskeletal: Normal range of motion and neck supple  Vascular: No JVD  Cardiovascular:      Rate and Rhythm: Normal rate and regular rhythm  Heart sounds: Murmur present  No friction rub  No gallop  Comments: Pacemaker in situ  Pulmonary:      Effort: Pulmonary effort is normal  No respiratory distress  Breath sounds: Normal breath sounds  No stridor  No wheezing or rales  Chest:      Chest wall: No tenderness  Abdominal:      General: Bowel sounds are normal  There is no distension  Palpations: Abdomen is soft  There is no mass  Tenderness: There is no abdominal tenderness  There is no guarding or rebound  Musculoskeletal: Normal range of motion           General: No tenderness or deformity  Skin:     General: Skin is warm  Coloration: Skin is not pale  Findings: No erythema or rash  Neurological:      General: No focal deficit present  Mental Status: She is alert and oriented to person, place, and time  Mental status is at baseline  Cranial Nerves: No cranial nerve deficit  Gait: Gait abnormal (Uses Rollator)  Deep Tendon Reflexes: Reflexes are normal and symmetric  Psychiatric:         Mood and Affect: Mood normal          Behavior: Behavior normal          Pertinent Laboratory/Diagnostic Studies:  Reviewed prior blood work   No new lab orders placed today    Current Medications     Current Outpatient Medications:     acetaminophen (TYLENOL) 500 mg tablet, Take 1,000 mg by mouth every 8 (eight) hours No more than 3g a day, Disp: , Rfl:     amoxicillin (AMOXIL) 500 MG tablet, Take 2,000 mg by mouth 60 minutes pre-procedure, Disp: , Rfl:     APPLE CIDER VINEGAR PO, Take by mouth daily, Disp: , Rfl:     aspirin 81 MG tablet, Take 81 mg by mouth daily, Disp: , Rfl:     Calcium Carbonate-Vitamin D (CALCIUM PLUS VITAMIN D PO), Take by mouth, Disp: , Rfl:     levothyroxine 75 mcg tablet, Take 1 tablet (75 mcg) by mouth mon- fri and take 2 tablets on Saturday and Sunday  , Disp: 112 tablet, Rfl: 3    Loperamide HCl (IMODIUM PO), Take 2 mg by mouth as needed  , Disp: , Rfl:     MELATONIN PO, Take 5 mg by mouth daily at bedtime  , Disp: , Rfl:     Multiple Vitamin (MULTIVITAMIN) tablet, Take 1 tablet by mouth daily  , Disp: , Rfl:     Polyethyl Glycol-Propyl Glycol (SYSTANE OP), Apply 1 drop to eye 2 (two) times a day   EACH EYE TWICE DAILY , Disp: , Rfl:     Health Maintenance     Health Maintenance   Topic Date Due    SLP PLAN OF CARE  Never done    Meningococcal ACWY Vaccine (1 - Risk start before 7 months 4-dose series) Never done    HIB Vaccine (1 of 1 - Risk 1-dose series) Never done    DTaP,Tdap,and Td Vaccines (1 - Tdap) Never done    PT PLAN OF CARE  03/20/2019    Influenza Vaccine (Season Ended) 09/01/2021    Fall Risk  04/06/2022    Depression Screening PHQ  04/06/2022    Medicare Annual Wellness Visit (AWV)  04/06/2022    BMI: Adult  04/28/2022    Pneumococcal Vaccine: 65+ Years  Completed    COVID-19 Vaccine  Completed    Hepatitis B Vaccine  Aged Out    IPV Vaccine  Aged Out    Hepatitis A Vaccine  Aged Out    HPV Vaccine  Aged [employer] History   Administered Date(s) Administered    Influenza Split High Dose Preservative Free IM 11/19/2001, 11/12/2004, 10/19/2005, 10/01/2006, 10/25/2007, 10/31/2008, 11/02/2009, 10/20/2010, 11/01/2011, 10/23/2012, 10/30/2013, 10/27/2014, 10/19/2015, 10/27/2017    Pneumococcal Polysaccharide PPV23 10/01/1998, 12/22/2006    SARS-CoV-2 / COVID-19 mRNA IM ([employer]) 02/03/2021, 02/22/2021    Zoster 05/14/2008       Oral Alva MD  Geriatrics   5/2/2021 10:51 PM

## 2021-05-02 PROBLEM — I49.5 TACHY-BRADY SYNDROME (HCC): Status: ACTIVE | Noted: 2021-05-02

## 2021-05-03 NOTE — ASSESSMENT & PLAN NOTE
Stable   CBCs have remained stable   Patient previously followed with Oncology while still living in [place]  Does not wish to consult them at this time

## 2021-05-03 NOTE — ASSESSMENT & PLAN NOTE
ZIO patch revealed tachy-nataliia syndrome and Mobitz type 2 second-degree block  S/p dual-chamber pacemaker  Follow-up with Cardiology for routine interrogation of device   Recommend adherence to a heart healthy diet   Continue aspirin 81 mg daily

## 2021-05-03 NOTE — ASSESSMENT & PLAN NOTE
Stable   No overt symptoms of volume overload, patient euvolemic   Continue routine follow-up with Cardiology   Continue aspirin 81 mg daily   Recommend adherence to a heart healthy diet

## 2021-05-03 NOTE — ASSESSMENT & PLAN NOTE
Patient declining DEXA scan for evaluation of osteoporosis  Had a detailed conversation with patient about importance in treating osteoporosis of present      Patient did have a history of a fall in the past   After the significant counseling and Education, patient was in agreement to complete DEXA scan   Maintain Falls precautions

## 2021-05-03 NOTE — ASSESSMENT & PLAN NOTE
Resolved  Tachy-nataliia syndrome and Mobitz type 2 second-degree block noted on ZIO patch   Pacemaker has since been inserted with resolution of symptoms

## 2021-05-03 NOTE — ASSESSMENT & PLAN NOTE
Prior TSH (3/21 was 4 76   Will repeat TSH in T4 the next few weeks  Continue levothyroxine 75 mcg daily

## 2021-05-03 NOTE — ASSESSMENT & PLAN NOTE
Stable   Avoid daytime napping  Avoid caffeinated beverages after 14:00  Continue melatonin 5 mg p o  HS

## 2021-05-26 ENCOUNTER — HOSPITAL ENCOUNTER (OUTPATIENT)
Dept: RADIOLOGY | Age: 86
Discharge: HOME | End: 2021-05-26
Payer: MEDICARE

## 2021-05-26 DIAGNOSIS — M81.0 AGE-RELATED OSTEOPOROSIS WITHOUT CURRENT PATHOLOGICAL FRACTURE: ICD-10-CM

## 2021-05-26 DIAGNOSIS — Z00.00 MEDICARE ANNUAL WELLNESS VISIT, SUBSEQUENT: ICD-10-CM

## 2021-05-26 PROCEDURE — 77080 DXA BONE DENSITY AXIAL: CPT

## 2021-06-01 ENCOUNTER — OFFICE VISIT (OUTPATIENT)
Dept: GERIATRICS | Facility: CLINIC | Age: 86
End: 2021-06-01
Payer: MEDICARE

## 2021-06-01 VITALS
HEART RATE: 81 BPM | SYSTOLIC BLOOD PRESSURE: 130 MMHG | OXYGEN SATURATION: 96 % | TEMPERATURE: 97.8 F | WEIGHT: 107.1 LBS | BODY MASS INDEX: 17.82 KG/M2 | DIASTOLIC BLOOD PRESSURE: 70 MMHG

## 2021-06-01 DIAGNOSIS — M81.0 AGE-RELATED OSTEOPOROSIS WITHOUT CURRENT PATHOLOGICAL FRACTURE: Primary | ICD-10-CM

## 2021-06-01 PROCEDURE — 99213 OFFICE O/P EST LOW 20 MIN: CPT | Performed by: NURSE PRACTITIONER

## 2021-06-01 NOTE — PROGRESS NOTES
Assessment/Plan:    Osteoporosis  · Dxa scan + osteoporosis  · Repeat dxa scan in 2 years for follow up  · Reviewed dietary and supplemental intake for ca/vit d3  · Does not take extra d3  Will check level to recommend proper dose  · Reviewed risk/benefits of taking vs not taking prescriptive medication  Reviewed prolia brochure to describe osteoporosis, importance of bone health, increase risk of fx without treatment  Reviewed possible side effects as listed in brochure  · Pt declines prescriptive treatment at this time  She is aware she should notify office if she changes mind  · Cont to stay mobile and stretching exercises  · Cont fall precautions  There are no diagnoses linked to this encounter  Subjective:      Patient ID: Donna Marques is a 80 y o  female  Mrs Pablo Mae is a 79yo female at [address] here to review dxa results  Comorbidities include hypothyroid, hypertension, leukemia  Reviewed pt's dxa scan with her:  Lumbar spine:  -1 2  Left hip:  -1 9  Left femoral neck:  -2 7  Right forearm:  -5 1  These results are indicative of osteoporosis  We spent time reviewed recommendations for pts with osteoporosis, risk and benefits of taking and not taking medication and supplementation  Has to have teeth removed x3  Pt denies pain  Denies cp/sob/cough  Denies gi/gu distress  No change in sleep, appetite, function  No concerns for fall  The following portions of the patient's history were reviewed and updated as appropriate: past family history, past medical history, past social history and past surgical history  Review of Systems   Constitutional: Negative for activity change, appetite change, chills and fatigue  HENT: Negative for congestion  Respiratory: Negative for cough and shortness of breath  Cardiovascular: Negative for chest pain  Gastrointestinal: Negative for abdominal pain, constipation, diarrhea, nausea and vomiting     Genitourinary: Negative for difficulty urinating  Musculoskeletal: Positive for arthralgias, back pain and gait problem (walker)  Neurological: Negative for dizziness and light-headedness  Psychiatric/Behavioral: Negative for decreased concentration  Objective:      /70 (BP Location: Left leg, Patient Position: Sitting, Cuff Size: Adult)   Pulse 81   Temp 97 8 °F (36 6 °C) (Temporal)   Wt 48 6 kg (107 lb 1 6 oz)   SpO2 96%   BMI 17 82 kg/m²          Physical Exam  Vitals signs and nursing note reviewed  Constitutional:       General: She is not in acute distress  Appearance: Normal appearance  She is well-developed  She is not diaphoretic  HENT:      Head: Normocephalic  Cardiovascular:      Rate and Rhythm: Normal rate  Heart sounds: No murmur  No friction rub  No gallop  Pulmonary:      Effort: Pulmonary effort is normal  No respiratory distress  Breath sounds: Normal breath sounds  No wheezing or rales  Abdominal:      General: Bowel sounds are normal  There is no distension  Palpations: Abdomen is soft  Tenderness: There is no abdominal tenderness  There is no rebound  Musculoskeletal: Normal range of motion  Skin:     General: Skin is warm and dry  Neurological:      General: No focal deficit present  Mental Status: She is alert and oriented to person, place, and time  Mental status is at baseline     Psychiatric:         Mood and Affect: Mood normal          Behavior: Behavior normal

## 2021-06-01 NOTE — ASSESSMENT & PLAN NOTE
· Dxa scan + osteoporosis  · Repeat dxa scan in 2 years for follow up  · Reviewed dietary and supplemental intake for ca/vit d3  · Does not take extra d3  Will check level to recommend proper dose  · Reviewed risk/benefits of taking vs not taking prescriptive medication  Reviewed prolia brochure to describe osteoporosis, importance of bone health, increase risk of fx without treatment  Reviewed possible side effects as listed in brochure  · Pt declines prescriptive treatment at this time  She is aware she should notify office if she changes mind  · Cont to stay mobile and stretching exercises  · Cont fall precautions

## 2021-06-01 NOTE — PATIENT INSTRUCTIONS
· Check vitamin d level with next blood draw  · Recommendation is prescriptive treatment of osteoporosis such as fosamax, prolia, boniva  · At this time Mrs Gaona is declining further medication  Please discuss with dr Radha John further at upcoming appt if concerns    · Cont dietary intake ca/vit d, cont supplementation  · Will check d level with next blood work to ensure adequate intake  · F/U dxa in 2 years

## 2021-06-02 ENCOUNTER — TRANSCRIBE ORDERS (OUTPATIENT)
Dept: ADMINISTRATIVE | Facility: HOSPITAL | Age: 86
End: 2021-06-02

## 2021-06-02 DIAGNOSIS — M81.0 AGE RELATED OSTEOPOROSIS, UNSPECIFIED PATHOLOGICAL FRACTURE PRESENCE: Primary | ICD-10-CM

## 2021-06-07 ENCOUNTER — TELEPHONE (OUTPATIENT)
Dept: GERIATRICS | Facility: CLINIC | Age: 86
End: 2021-06-07

## 2021-06-07 NOTE — TELEPHONE ENCOUNTER
Spoke with pt informed her of vitamin d level and doctors recommendation  ----- Message from [address] sent at 6/7/2021 10:41 AM EDT -----  Can you please let Mrs Paredes Winfield known that here vitamin d level is perfect  She should continue taking her supplement the way she does now

## 2021-06-18 ENCOUNTER — IN-CLINIC DEVICE VISIT (OUTPATIENT)
Dept: CARDIOLOGY CLINIC | Facility: CLINIC | Age: 86
End: 2021-06-18
Payer: MEDICARE

## 2021-06-18 DIAGNOSIS — Z95.0 PRESENCE OF PERMANENT CARDIAC PACEMAKER: Primary | ICD-10-CM

## 2021-06-18 PROCEDURE — 93280 PM DEVICE PROGR EVAL DUAL: CPT | Performed by: INTERNAL MEDICINE

## 2021-06-18 NOTE — PROGRESS NOTES
Results for orders placed or performed in visit on 06/18/21   Cardiac EP device report    Narrative    MDT Chauncey Coyle INTERROGATED IN THE [facility] OFFICE  BATTERY VOLTAGE ADEQUATE  (11 6 YRS) AP 26%  99%  ALL LEAD PARAMETERS WITHIN NORMAL LIMITS  1 AF EPISODE DETECTED 6 MIN LONG  PATIENT IS ON ASA 81MG  NO SIGNIFICANT HIGH RATE EPISODES  NO PROGRAMMING CHANGES MADE TO DEVICE PARAMETERS  NORMAL DEVICE FUNCTION  ----MCCLELLAND

## 2021-06-29 ENCOUNTER — OFFICE VISIT (OUTPATIENT)
Dept: GERIATRICS | Facility: CLINIC | Age: 86
End: 2021-06-29
Payer: MEDICARE

## 2021-06-29 VITALS
DIASTOLIC BLOOD PRESSURE: 70 MMHG | BODY MASS INDEX: 18.39 KG/M2 | TEMPERATURE: 97.6 F | WEIGHT: 110.4 LBS | HEART RATE: 81 BPM | SYSTOLIC BLOOD PRESSURE: 124 MMHG | RESPIRATION RATE: 16 BRPM | HEIGHT: 65 IN | OXYGEN SATURATION: 96 %

## 2021-06-29 DIAGNOSIS — R26.2 AMBULATORY DYSFUNCTION: Primary | ICD-10-CM
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
## 2021-06-29 PROCEDURE — 99212 OFFICE O/P EST SF 10 MIN: CPT | Performed by: NURSE PRACTITIONER

## 2021-06-29 NOTE — PROGRESS NOTES
Assessment/Plan:    Fall  · Tripped over shedder - mechanical fall  · Resolving ecchymosis left cheek - no change in vision, no ha, no pain  · PT following for balance training  · Discussed use of cane, fall precautions      Ambulatory dysfunction  · Cont using cane as directed  · Cont with pt for balance training  · Keep hydrated in hot weather       Diagnoses and all orders for this visit:    Ambulatory dysfunction    Fall, initial encounter        Subjective:      Patient ID: Elsy Love is a 80 y o  female  Mrs Fatou Smalls is a 79yo female resident of [facility]  She is here for fall check  Comorbidities include htn, OA, leukemia    Per nursing report, pt tripped over shredder while making bed  She states she hit edge of night table and hit left eye  This hurt for awhile, but better now  Denies ha/dizziness at time  No change in vision, no ha since  Has not had any issues since  Does use walker, but does not use in apartment  PT did eval - will be seeing her balance training  Pt has not had any further falls or "near misses "  She has no other injuries      The following portions of the patient's history were reviewed and updated as appropriate: past family history, past medical history, past social history and past surgical history  Review of Systems   Constitutional: Negative for activity change, appetite change, chills and fatigue  HENT: Negative for congestion  Respiratory: Negative for cough and shortness of breath  Cardiovascular: Negative for chest pain  Gastrointestinal: Negative for abdominal pain, constipation, diarrhea, nausea and vomiting  Genitourinary: Negative for difficulty urinating  Musculoskeletal: Positive for gait problem  Negative for arthralgias and back pain  Neurological: Negative for dizziness and light-headedness  Psychiatric/Behavioral: Negative for decreased concentration           Objective:      /70 (BP Location: Right arm, Patient Position: Sitting, Cuff Size: Standard)   Pulse 81   Temp 97 6 °F (36 4 °C) (Temporal)   Resp 16   Ht 5' 5 25" (1 657 m)   Wt 50 1 kg (110 lb 6 4 oz)   SpO2 96%   BMI 18 23 kg/m²          Physical Exam  Vitals reviewed  Constitutional:       General: She is not in acute distress  Appearance: She is well-developed  She is not diaphoretic  HENT:      Head: Normocephalic  Cardiovascular:      Rate and Rhythm: Normal rate  Heart sounds: No murmur heard  No friction rub  No gallop  Pulmonary:      Effort: Pulmonary effort is normal  No respiratory distress  Breath sounds: Normal breath sounds  No wheezing or rales  Abdominal:      General: Bowel sounds are normal  There is no distension  Palpations: Abdomen is soft  Tenderness: There is no abdominal tenderness  There is no rebound  Musculoskeletal:         General: Normal range of motion  Skin:     General: Skin is warm and dry  Comments: Resolving ecchymosis on left cheek bone-no swelling   Neurological:      General: No focal deficit present  Mental Status: She is alert and oriented to person, place, and time  Mental status is at baseline     Psychiatric:         Mood and Affect: Mood normal          Behavior: Behavior normal

## 2021-06-29 NOTE — ASSESSMENT & PLAN NOTE
· Tripped over shedder - mechanical fall  · Resolving ecchymosis left cheek - no change in vision, no ha, no pain  · PT following for balance training  · Discussed use of cane, fall precautions

## 2021-06-30 ENCOUNTER — TELEPHONE (OUTPATIENT)
Dept: GERIATRICS | Age: 86
End: 2021-06-30

## 2021-06-30 NOTE — TELEPHONE ENCOUNTER
----- Message from Mandy Trinh MD sent at 6/30/2021 12:13 PM EDT -----  Pls call the pt and let her know her thyroid function is stable

## 2021-07-12 ENCOUNTER — TELEPHONE (OUTPATIENT)
Dept: GERIATRICS | Facility: CLINIC | Age: 86
End: 2021-07-12

## 2021-07-12 NOTE — TELEPHONE ENCOUNTER
Spoke with pt relayed doctor recommendation  "stop aspirin 3 days prior"      but ensure her dentist gave her instructions

## 2021-07-12 NOTE — TELEPHONE ENCOUNTER
Mathew Gutierrez called to inform her Doctor that she will be having 3 teeth removed, on   July 15, 2021

## 2021-08-24 ENCOUNTER — OFFICE VISIT (OUTPATIENT)
Dept: CARDIOLOGY CLINIC | Facility: CLINIC | Age: 86
End: 2021-08-24
Payer: MEDICARE

## 2021-08-24 VITALS
WEIGHT: 110.3 LBS | BODY MASS INDEX: 18.38 KG/M2 | SYSTOLIC BLOOD PRESSURE: 122 MMHG | DIASTOLIC BLOOD PRESSURE: 62 MMHG | HEART RATE: 66 BPM | HEIGHT: 65 IN

## 2021-08-24 DIAGNOSIS — I10 BENIGN ESSENTIAL HYPERTENSION: ICD-10-CM

## 2021-08-24 DIAGNOSIS — I35.0 SEVERE AORTIC STENOSIS: Primary | ICD-10-CM

## 2021-08-24 DIAGNOSIS — I49.5 TACHY-BRADY SYNDROME (HCC): ICD-10-CM

## 2021-08-24 DIAGNOSIS — Z95.0 PRESENCE OF CARDIAC PACEMAKER: ICD-10-CM

## 2021-08-24 DIAGNOSIS — Z95.2 S/P TAVR (TRANSCATHETER AORTIC VALVE REPLACEMENT): ICD-10-CM

## 2021-08-24 DIAGNOSIS — I44.7 LEFT BUNDLE BRANCH BLOCK (LBBB): ICD-10-CM

## 2021-08-24 PROCEDURE — 99214 OFFICE O/P EST MOD 30 MIN: CPT | Performed by: INTERNAL MEDICINE

## 2021-08-24 NOTE — PROGRESS NOTES
Cardiology Follow Up    [address]  4/7/1928  [id number]  [address] [facility]  [address] [place] [address] [address]    1  History of severe aortic stenosis     2  S/P TAVR (transcatheter aortic valve replacement)     3  Tachy-nataliia syndrome ([address] )     4  Left bundle branch block (LBBB)     5  Presence of cardiac pacemaker     6  Benign essential hypertension         Discussion/Summary:  Ms Terry Mcginnis is a pleasant 70-year-old female who presents to the office today for a routine follow-up  Since her last visit she has been feeling well  Since her last visit she did undergo implantation of a dual-chamber permanent pacemaker due to second-degree AV block  Her most recent check reveals that his function appropriately  She does have episodes of atrial tachycardia with which she is asymptomatic  Her blood pressure is well controlled in the office today on no medication  She underwent a repeat echocardiogram last year revealing the TAVR with moderate perivalvular regurgitation  We discussed further imaging with echos  She would not be interested in any further evaluation or intervention should any changes be noted hence no imaging was requested  She was reminded of the need for antibiotics prior to the dentist     I will see her back in the office in six months or sooner if deemed necessary  Interval History:  Ms Terry Mcginnis is a pleasant 70-year-old female who presents to the office today for routine followup  After her last visit she was hospitalized with lightheadedness  She underwent a Zio patch which revealed second-degree heart block for which she underwent dual-chamber permanent pacemaker implantation  Since her last visit she has been feeling well  She has not been as active as she had been  She no longer swims  She ambulates with the aid of a walker    With the activity she performs she is asymptomatic without any chest pain or shortness of breath  She denies any signs or symptoms of congestive heart failure including increasing lower extremity edema, paroxysmal nocturnal dyspnea, orthopnea, acute weight gain or increasing abdominal girth  She denies syncope or presyncope  She denies symptoms of claudication  She quit smoking in March after her daughter-in-law was diagnosed with lung cancer  Problem List     Aortic stenosis, severe    Urinary incontinence    Osteoporosis    Osteoarthritis    Leukemia, 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HCC)    Overview Signed 4/12/2016 12:57 PM by Yariel Davis PA-C     s/p splenectomy         Hypothyroidism    Hyperlipidemia    HTN (hypertension)    Essential tremor    CAD (coronary artery disease)    Insomnia    S/P TAVR (transcatheter aortic valve replacement)        Past Medical History:   Diagnosis Date    Aortic stenosis     severe    CAD (coronary artery disease)     Essential tremor     HTN (hypertension)     HTN (hypertension)     Hyperlipidemia     Hypothyroidism     Leukemia, 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HCC)     s/p splenectomy    Osteoarthritis     Osteoporosis     Urinary incontinence      Social History     Socioeconomic History    Marital status:       Spouse name: Not on file    Number of children: Not on file    Years of education: Not on file    Highest education level: Not on file   Occupational History    Not on file   Tobacco Use    Smoking status: Current Some Day Smoker     Types: Cigarettes    Smokeless tobacco: Former User   Vaping Use    Vaping Use: Never used   Substance and Sexual Activity    Alcohol use: Yes     Comment: SOCIAL    Drug use: No    Sexual activity: Not on file   Other Topics Concern    Not on file   Social History Narrative    Not on file     Social Determinants of Health     Financial Resource Strain:     Difficulty of Paying Living Expenses:    Food Insecurity:     Worried About [address] in the Last Year:  Ran Out of Food in the Last Year:    Transportation Needs:     Lack of Transportation (Medical):  Lack of Transportation (Non-Medical):    Physical Activity:     Days of Exercise per Week:     Minutes of Exercise per Session:    Stress:     Feeling of Stress :    Social Connections:     Frequency of Communication with Friends and Family:     Frequency of Social Gatherings with Friends and Family:     Attends Jew Services:     Active Member of Clubs or Organizations:     Attends Club or Organization Meetings:     Marital Status:    Intimate Partner Violence:     Fear of Current or Ex-Partner:     Emotionally Abused:     Physically Abused:     Sexually Abused:       Family History   Problem Relation Age of Onset    Other Mother         malignant neoplasm of uterus    Coronary artery disease Father     Heart failure Brother      Past Surgical History:   Procedure Laterality Date    COLONOSCOPY      HYSTERECTOMY      MD REPLACE AORTIC VALVE OPENFEMORAL ARTERY APPROACH N/A 4/12/2016    Procedure: REPLACEMENT AORTIC VALVE TRANSCATHETER (TAVR) TRANSFEMORAL  26mm Lin 3 valve;  Surgeon: Chace Ansari DO;  Location: [facility];  Service: Cardiac Surgery    SPLENECTOMY      for hx hairy cell leukemia       Current Outpatient Medications:     acetaminophen (TYLENOL) 500 mg tablet, Take 1,000 mg by mouth every 8 (eight) hours No more than 3g a day, Disp: , Rfl:     amoxicillin (AMOXIL) 500 MG tablet, Take 2,000 mg by mouth 60 minutes pre-procedure, Disp: , Rfl:     APPLE CIDER VINEGAR PO, Take by mouth daily, Disp: , Rfl:     aspirin 81 MG tablet, Take 81 mg by mouth daily, Disp: , Rfl:     Calcium Carbonate-Vitamin D (CALCIUM PLUS VITAMIN D PO), Take by mouth, Disp: , Rfl:     levothyroxine 75 mcg tablet, Take 1 tablet (75 mcg) by mouth mon- fri and take 2 tablets on Saturday and Sunday  , Disp: 112 tablet, Rfl: 3    Loperamide HCl (IMODIUM PO), Take 2 mg by mouth as needed    , Disp: , Rfl:     MELATONIN PO, Take 5 mg by mouth daily at bedtime  , Disp: , Rfl:     Multiple Vitamin (MULTIVITAMIN) tablet, Take 1 tablet by mouth daily  , Disp: , Rfl:     Polyethyl Glycol-Propyl Glycol (SYSTANE OP), Apply 1 drop to eye 2 (two) times a day  EACH EYE TWICE DAILY  (Patient not taking: Reported on 8/24/2021), Disp: , Rfl:   No Known Allergies    Labs:     Chemistry        Component Value Date/Time     06/02/2015 1237    K 4 2 02/26/2021 0941    K 4 3 06/02/2015 1237     (H) 02/26/2021 0941     06/02/2015 1237    CO2 27 02/26/2021 0941    CO2 26 04/12/2016 1502    BUN 25 02/26/2021 0941    BUN 20 06/02/2015 1237    CREATININE 0 75 02/26/2021 0941    CREATININE 0 68 06/02/2015 1237        Component Value Date/Time    CALCIUM 9 1 02/26/2021 0941    CALCIUM 9 0 06/02/2015 1237    ALKPHOS 63 12/08/2020 1026    ALKPHOS 65 06/02/2015 1237    AST 25 12/08/2020 1026    AST 22 06/02/2015 1237    ALT 21 12/08/2020 1026    ALT 22 06/02/2015 1237    BILITOT 0 81 06/02/2015 1237            Lab Results   Component Value Date    CHOL 202 11/25/2014    CHOL 192 03/13/2014     Lab Results   Component Value Date    HDL 84 11/25/2014    HDL 69 03/13/2014     Lab Results   Component Value Date    LDLCALC 102 (H) 11/25/2014    LDLCALC 111 (H) 03/13/2014     Lab Results   Component Value Date    TRIG 80 11/25/2014    TRIG 62 03/13/2014     No results found for: CHOLHDL    Imaging: No results found  Review of Systems   Constitutional: Positive for malaise/fatigue  Musculoskeletal: Positive for arthritis and back pain  All other systems reviewed and are negative  Vitals:    08/24/21 0958   BP: 122/62   Pulse:      Vitals:    08/24/21 0935   Weight: 50 kg (110 lb 4 8 oz)     Height: 5' 5" (165 1 cm)   Body mass index is 18 35 kg/m²      Physical Exam:  General:  Alert and cooperative, appears stated age  HEENT:  ARPAN EOMI, no scleral icterus, no conjunctival pallor  Neck:  No lymphadenopathy, no thyromegaly, no carotid bruits, no elevated JVP  Heart:  Regular rate and rhythm, normal S1/S2,2/6 early peaking ALDA RUSB without radiation   Lungs:  Clear to auscultation bilaterally   Abdomen:  Soft, non-tender, positive bowel sounds, no rebound or guarding,   no organomegaly   Extremities:  No clubbing, cyanosis or edema   Vascular:  2+ pedal pulses  Skin:  No rashes or lesions on exposed skin  Neurologic:  Cranial nerves II-XII grossly intact without focal deficits

## 2021-08-25 ENCOUNTER — DOCUMENTATION (OUTPATIENT)
Dept: CARDIOLOGY CLINIC | Facility: CLINIC | Age: 86
End: 2021-08-25

## 2021-08-25 NOTE — PROGRESS NOTES
I will complete the form of please let the patient know she needs antibiotics prior to the dentist   I will call in a prescription for amoxicillin 2 g to be taken 1 hour prior to dental procedure  Please let me know where she would like this sent      Kelby Cortes

## 2021-08-25 NOTE — PROGRESS NOTES
[facility], called needing a clearance letter on Ms Dc Boykin, she is having dental cleaning on 08/26/21 and the would like a clearance from the cardiologist, I did advise them that her last EKG was from February 2021, which they said it was ok  I will also send you a pre op assessment letter  Thank you

## 2021-08-31 ENCOUNTER — OFFICE VISIT (OUTPATIENT)
Dept: GERIATRICS | Facility: CLINIC | Age: 86
End: 2021-08-31
Payer: MEDICARE

## 2021-08-31 VITALS
SYSTOLIC BLOOD PRESSURE: 138 MMHG | DIASTOLIC BLOOD PRESSURE: 64 MMHG | HEART RATE: 73 BPM | WEIGHT: 110.1 LBS | TEMPERATURE: 97.1 F | OXYGEN SATURATION: 98 % | BODY MASS INDEX: 18.32 KG/M2

## 2021-08-31 DIAGNOSIS — H54.7 VISION IMPAIRMENT: Primary | ICD-10-CM

## 2021-08-31 DIAGNOSIS — H61.23 BILATERAL IMPACTED CERUMEN: ICD-10-CM

## 2021-08-31 DIAGNOSIS — H53.9 DIFFICULTY READING DUE TO VISUAL PROBLEM: ICD-10-CM

## 2021-08-31 PROCEDURE — 69210 REMOVE IMPACTED EAR WAX UNI: CPT | Performed by: NURSE PRACTITIONER

## 2021-08-31 PROCEDURE — 99212 OFFICE O/P EST SF 10 MIN: CPT | Performed by: NURSE PRACTITIONER

## 2021-08-31 NOTE — ASSESSMENT & PLAN NOTE
· Requested ear cleaning bilat for wax buildup  · Has had in past, no h/o tympanic membrane issues  · Bilat ears irrigated, freed from wax buildup  · Intact grey tm's visualized after procedure    No complaints, canals intact post procedure

## 2021-08-31 NOTE — PROGRESS NOTES
Assessment/Plan:    Bilateral impacted cerumen  · Requested ear cleaning bilat for wax buildup  · Has had in past, no h/o tympanic membrane issues  · Bilat ears irrigated, freed from wax buildup  · Intact grey tm's visualized after procedure  No complaints, canals intact post procedure    Ear cerumen removal    Date/Time: 8/31/2021 11:24 AM  Performed by: JESSICA Valdes  Authorized by: JESSICA Valdes   Universal Protocol:  Consent: Verbal consent obtained  Risks and benefits: risks, benefits and alternatives were discussed  Consent given by: patient  Patient understanding: patient states understanding of the procedure being performed  Patient identity confirmed: verbally with patient      Patient location:  Clinic  Procedure details:     Location:  L ear and R ear    Procedure type: irrigation with instrumentation      Instrumentation: curette      Approach:  External    Visualization (free text):  Bilat wax build up - removed with irrigation, wax in outer canal removed with currette  Intact grey tm bilat noted after procedure  Post-procedure details:     Complication:  None    Hearing quality:  Improved    Patient tolerance of procedure: Tolerated well, no immediate complications         Diagnoses and all orders for this visit:    Vision impairment  -     Ambulatory referral to Ophthalmology; Future    Other orders  -     Cancel: Ambulatory referral to Ophthalmology; Future  -     Ear cerumen removal        Subjective:      Patient ID: Olaf Garibay is a 80 y o  female  Mrs Christie Quinones is a 79yo female resident of [facility]  Comorbidities include htn, AS, hearing impairment    Pt here for bilat ear cleaning  Has had procedure done in past with out incident  Pt denies h/o tm issues  Reports ears feels clogged  Both ears with complete cerumen impaction  Reviewed procedure, poss side effects, pt would like to proceed  Ears cleaned out  Large amt wax removed bilat  Hearing improved    Ear visualized post procedure: intact     Pt mentions right eye vision is starting to diminish  Would like referral to eye doc as she hasn't seen one in a while  The following portions of the patient's history were reviewed and updated as appropriate: past family history, past medical history, past social history and past surgical history  Review of Systems   Constitutional: Negative for activity change and appetite change  HENT: Positive for hearing loss  Negative for ear discharge and ear pain  Eyes: Positive for visual disturbance (right eye vision worsening, wears glasses)  Respiratory: Negative for cough and shortness of breath  Cardiovascular: Negative for chest pain  Gastrointestinal: Negative for abdominal pain  Musculoskeletal: Negative for gait problem  Neurological: Negative for dizziness  Objective:      /64 (BP Location: Right arm, Patient Position: Sitting, Cuff Size: Standard)   Pulse 73   Temp (!) 97 1 °F (36 2 °C) (Temporal)   Wt 49 9 kg (110 lb 1 6 oz)   SpO2 98%   BMI 18 32 kg/m²          Physical Exam  Vitals and nursing note reviewed  Constitutional:       General: She is not in acute distress  Appearance: Normal appearance  She is not ill-appearing  HENT:      Right Ear: Tympanic membrane, ear canal and external ear normal  There is impacted cerumen  Left Ear: Tympanic membrane, ear canal and external ear normal  There is impacted cerumen  Neurological:      General: No focal deficit present  Mental Status: She is alert and oriented to person, place, and time  Mental status is at baseline        Comments: [place]   Psychiatric:         Mood and Affect: Mood normal          Behavior: Behavior normal

## 2021-08-31 NOTE — PATIENT INSTRUCTIONS
· Ears cleaned:  Return if pain, loss of hearing, drainage, fever  · Ophthalmology referral given per request

## 2021-09-22 ENCOUNTER — REMOTE DEVICE CLINIC VISIT (OUTPATIENT)
Dept: CARDIOLOGY CLINIC | Facility: CLINIC | Age: 86
End: 2021-09-22
Payer: MEDICARE

## 2021-09-22 DIAGNOSIS — Z95.0 PRESENCE OF PERMANENT CARDIAC PACEMAKER: Primary | ICD-10-CM

## 2021-09-22 PROCEDURE — 93294 REM INTERROG EVL PM/LDLS PM: CPT | Performed by: INTERNAL MEDICINE

## 2021-09-22 PROCEDURE — 93296 REM INTERROG EVL PM/IDS: CPT | Performed by: INTERNAL MEDICINE

## 2021-09-22 NOTE — PROGRESS NOTES
MDT DUAL PM/ ACTIVE SYSTEM IS MRI CONDITIONAL   CARELINK TRANSMISSION:  BATTERY VOLTAGE ADEQUATE (11 1 YR)    AP 40 6%  98 7% (>40%/AVB, HIS)   ALL LEAD PARAMETERS WITHIN NORMAL LIMITS   NO HIGH RATE EPISODES   NORMAL DEVICE FUNCTION   RG

## 2021-10-18 DIAGNOSIS — E03.9 HYPOTHYROIDISM, UNSPECIFIED TYPE: ICD-10-CM

## 2021-10-18 RX ORDER — LEVOTHYROXINE SODIUM 0.07 MG/1
TABLET ORAL
Qty: 112 TABLET | Refills: 3 | Status: SHIPPED | OUTPATIENT
Start: 2021-10-18 | End: 2021-12-01

## 2021-11-17 ENCOUNTER — OFFICE VISIT (OUTPATIENT)
Dept: GERIATRICS | Facility: CLINIC | Age: 86
End: 2021-11-17
Payer: MEDICARE

## 2021-11-17 VITALS
BODY MASS INDEX: 17.69 KG/M2 | RESPIRATION RATE: 16 BRPM | SYSTOLIC BLOOD PRESSURE: 140 MMHG | WEIGHT: 106.2 LBS | OXYGEN SATURATION: 99 % | DIASTOLIC BLOOD PRESSURE: 68 MMHG | TEMPERATURE: 97.1 F | HEART RATE: 61 BPM | HEIGHT: 65 IN

## 2021-11-17 DIAGNOSIS — Z95.0 PRESENCE OF CARDIAC PACEMAKER: ICD-10-CM

## 2021-11-17 DIAGNOSIS — R53.82 CHRONIC FATIGUE: ICD-10-CM

## 2021-11-17 DIAGNOSIS — M81.0 AGE-RELATED OSTEOPOROSIS WITHOUT CURRENT PATHOLOGICAL FRACTURE: ICD-10-CM

## 2021-11-17 DIAGNOSIS — N39.45 CONTINUOUS LEAKAGE OF URINE: ICD-10-CM

## 2021-11-17 DIAGNOSIS — E03.8 OTHER SPECIFIED HYPOTHYROIDISM: ICD-10-CM

## 2021-11-17 DIAGNOSIS — I10 BENIGN ESSENTIAL HYPERTENSION: ICD-10-CM

## 2021-11-17 DIAGNOSIS — R26.2 AMBULATORY DYSFUNCTION: ICD-10-CM

## 2021-11-17 DIAGNOSIS — R89.9 ABNORMAL LABORATORY TEST: ICD-10-CM

## 2021-11-17 DIAGNOSIS — Z95.2 S/P TAVR (TRANSCATHETER AORTIC VALVE REPLACEMENT): Primary | ICD-10-CM

## 2021-11-17 DIAGNOSIS — F51.01 PRIMARY INSOMNIA: ICD-10-CM

## 2021-11-17 DIAGNOSIS — E55.9 VITAMIN D DEFICIENCY: ICD-10-CM

## 2021-11-17 DIAGNOSIS — I35.0 SEVERE AORTIC STENOSIS: ICD-10-CM

## 2021-11-17 DIAGNOSIS — R79.9 ABNORMAL FINDING OF BLOOD CHEMISTRY, UNSPECIFIED: ICD-10-CM

## 2021-11-17 PROCEDURE — 99215 OFFICE O/P EST HI 40 MIN: CPT | Performed by: STUDENT IN AN ORGANIZED HEALTH CARE EDUCATION/TRAINING PROGRAM

## 2021-11-18 PROBLEM — R53.82 CHRONIC FATIGUE: Status: ACTIVE | Noted: 2021-11-18

## 2021-11-19 ENCOUNTER — TELEPHONE (OUTPATIENT)
Dept: GERIATRICS | Facility: CLINIC | Age: 86
End: 2021-11-19

## 2021-12-01 ENCOUNTER — TELEPHONE (OUTPATIENT)
Dept: GERIATRICS | Facility: CLINIC | Age: 86
End: 2021-12-01

## 2021-12-01 DIAGNOSIS — E03.8 OTHER SPECIFIED HYPOTHYROIDISM: Primary | ICD-10-CM

## 2021-12-01 RX ORDER — LEVOTHYROXINE SODIUM 88 UG/1
88 TABLET ORAL DAILY
Qty: 90 TABLET | Refills: 0 | Status: SHIPPED | OUTPATIENT
Start: 2021-12-01

## 2021-12-22 ENCOUNTER — REMOTE DEVICE CLINIC VISIT (OUTPATIENT)
Dept: CARDIOLOGY CLINIC | Facility: CLINIC | Age: 86
End: 2021-12-22
Payer: MEDICARE

## 2021-12-22 DIAGNOSIS — Z95.0 PRESENCE OF PERMANENT CARDIAC PACEMAKER: Primary | ICD-10-CM

## 2021-12-22 PROCEDURE — 93294 REM INTERROG EVL PM/LDLS PM: CPT | Performed by: INTERNAL MEDICINE

## 2021-12-22 PROCEDURE — 93296 REM INTERROG EVL PM/IDS: CPT | Performed by: INTERNAL MEDICINE

## 2022-02-07 ENCOUNTER — TELEPHONE (OUTPATIENT)
Dept: GERIATRICS | Age: 87
End: 2022-02-07

## 2022-02-07 NOTE — TELEPHONE ENCOUNTER
Left voicemail message for patient requesting call back to office regarding TSH lab results   When patient calls back, relay results received and they were forwarded to patient's PCP, Ya Glynn as well as faxed to [facility] per provider request     (Faxed 2/3/22 TSH results directly to [facility] this morning w/note stating results also forwarded to patient's PCP, JESSICA Stewart)

## 2022-02-07 NOTE — TELEPHONE ENCOUNTER
----- Message from Rasheeda Broussard MD sent at 2/6/2022  4:48 PM EST -----  Pls fax most recent TSH completed on 2/3/22 to [facility] and bring to PCP's attention    Also pls let the pt know that her TSH results were forwarded to Flynn Patton whom she is following with as her PCP to address

## 2022-03-23 ENCOUNTER — REMOTE DEVICE CLINIC VISIT (OUTPATIENT)
Dept: CARDIOLOGY CLINIC | Facility: CLINIC | Age: 87
End: 2022-03-23
Payer: MEDICARE

## 2022-03-23 DIAGNOSIS — Z95.0 PRESENCE OF PERMANENT CARDIAC PACEMAKER: Primary | ICD-10-CM

## 2022-03-23 PROCEDURE — 93296 REM INTERROG EVL PM/IDS: CPT | Performed by: INTERNAL MEDICINE

## 2022-03-23 PROCEDURE — 93294 REM INTERROG EVL PM/LDLS PM: CPT | Performed by: INTERNAL MEDICINE

## 2022-03-23 NOTE — PROGRESS NOTES
Results for orders placed or performed in visit on 03/23/22   Cardiac EP device report    Narrative    MDT DUAL PM/ ACTIVE SYSTEM IS MRI CONDITIONAL  CARELINK TRANSMISSION: BATTERY VOLTAGE ADEQUATE  (10 6 YRS) AP 44%  99%  ALL AVAILABLE LEAD PARAMETERS WITHIN NORMAL LIMITS  NO SIGNIFICANT HIGH RATE EPISODES  NORMAL DEVICE FUNCTION  ---MCCLELLAND

## 2022-05-09 NOTE — PROGRESS NOTES
Assessment    1  Essential hypertension (401 9) (I10)   2  Encounter for preventive health examination (V70 0) (Z00 00)   3  Family history of malignant neoplasm of uterus (V16 49) (Z80 49) : Mother    Plan  Essential hypertension    · (1) CBC/PLT/DIFF; Status:Active; Requested DFU:[id number]; Health Maintenance    · Medicare Annual Wellness Visit ; every 1 year; Last [id number]; Next 21Nov2018;  Status:Active    Discussion/Summary    Medicare wellness visit-Patient doing well, stable in own home  feels safe  no health concerns at this time  does need follow up for regular routine visit  Will add routine lab work at that time CBC, CMP  1  advanced directives- has living will, poa - given polst to review and bring back to next visit   2  cognition-minicog 5/5 - no concerns  Will monitor   3  Functional- no falls, no concerns - aware to notify if falls or weakness   4  screenings- reviewed screenings- recommended dexa, patient declines - goals are she does not want a lot of interventions at this point if she if comfortable  Denies depression  Recommend derm screening annually  5  Urinary incontinency - time spend reviewed nonpharm techniques to limit incont such as frequent toileting, periarea hygiene  WIll return if she wants medications  5  immunizations- had flu shot, unclear on pneumovax status  Impression: Subsequent Annual Wellness Visit  Cardiovascular screening and counseling: screening is current  Diabetes screening and counseling: screening is current  Colorectal cancer screening and counseling: the patient declines screening  Cervical cancer screening and counseling: the risks and benefits of screening were discussed  Osteoporosis screening and counseling: the risks and benefits of screening were discussed, the patient declines screening and counseling was given on obtaining adequate amounts of calcium and vitamin D on a daily basis     Abdominal aortic aneurysm screening and counseling: Postpartum visit      Ginette Lara is a 32 y.o.  s/p Vaginal, Spontaneous on 2022 at 39w0d secondary to EIOL who presents today for postpartum check.  The patient states she is doing well.  Patient denies postpartum depression.  Menstrual cycles have not resumed.  Breastfeeding.  Desires POPs for contraception.  She has not resumed sexual intercourse.  Denies bowel or bladder issues.    PHYSICAL EXAM:    LMP 2021 (Exact Date)   Postpartum Depression Screening Questionnaire: 0    IMPRESSION/PLAN: Ginette Lara is a 32 y.o.  s/p Vaginal, Spontaneous on 2022.  Doing well.   - Recovered nicely from her delivery  - Contraception: POPs, will start at 6 wk PP visit  - RTC 4 weeks for final PP visit    This document has been electronically signed by Clarissa Carias MD on May 9, 2022 14:09 CDT.    This visit has been rescheduled as a phone visit to comply with patient safety concerns in accordance with CDC recommendations.  Total time of discussion was 7 minutes.  The use of a telephone visit has been reviewed with the patient and verbal informed consent has been obtained.   the risks and benefits of screening were discussed and the patient declines screening  Glaucoma screening and counseling: screening is current  HIV screening and counseling: the patient declines screening  Hepatitis C Screening: the patient was counseled on Hepatitis C screening  The patient declines Hepatitis C screening  Advance Directive Planning: paperwork and instructions were given to the patient  Patient Discussion: plan discussed with the patient, follow-up visit needed in one year  Chief Complaint  Pt here for Medicare Annual Visit      Advance Directives  Advance Directive [employer]: given POLST form to review and bring to next visit  Durable Power of  For Healthcare:    son Vahid Smyth and son Alexander Ba and son Robin Dawkins  History of Present Illness  Mrs Mia Arriaga is an 80-year-old female who presents today for Medicare annual wellness visit  Has completed cardiac rehab  COnt to exercise and eat well  Up to date on annual check ups including cards, optham, dental  requesting information on POLST form- this was reviewed  no concerns with memory or ambulation this time  No concerns for anxiety/depression /insomnia  concern for urinary incontinence, but does want medication at this time  taking calcium supplements for bone health  The patient is being seen for the subsequent annual wellness visit  Medicare Screening and Risk Factors   Hospitalizations: no previous hospitalizations  Once per lifetime medicare screening tests: AAA screening US has not yet been done  Medicare Screening Tests Risk Questions   Osteoporosis risk assessment: over 48years of age  Drug and Alcohol Use: The patient is a current cigarette smoker and 1 pack 2 month  She has declined tobacco cessation intervention  The patient reports occasional alcohol use     Diet and Physical Activity: Current diet includes well balanced meals, 1 servings of fruit per day, 1 servings of vegetables per day, 1 servings of meat per day, 1 servings of whole grains per day and 0 cans of diet soda per day  She exercises 3 times per week  Exercise: walking, swimming  Functional Ability/Level of Safety: Hearing is slightly decreased, slightly decreased in the right ear and slightly decreased in the left ear  She uses a hearing aid  The patient is currently able to drive with limitations, but able to do activities of daily living without limitations and able to participate in social activities without limitations  Fall risk factors: The patient fell 0 times in the past 12 months  Advance Directives: Advance directives: living will and durable power of  for health care directives  Co-Managers and Medical Equipment/Suppliers: See Patient Care Team   Falls Risk: The patient fell 0 times in the past 12 months  Urinary Incontinence Symptoms includes: incomplete bladder emptying, but no urinary frequency, no urinary urgency and no urinary hesitancy    Date of last glaucoma screen was last month      Patient Care Team    Care Team Member Role Specialty Office Number   Heladio BEASLEY  Geriatrics [phone]   Nilsa Zhang DO  Cardiology [phone] A DO  Cardiac Surgery [phone]   [place] [phone]   Edwinaris Ebbing DO  Cardiology 61 12 61, [address] [facility]  Cardiac Surgery [phone]     Review of Systems    Constitutional: negative  Head and Face: negative  Eyes: negative  The patient presents with complaints of hearing loss (has hearing aid)  Cardiovascular: negative  Respiratory: negative  Gastrointestinal: h/o diarrhea, but improved  Genitourinary: as noted in HPI  Musculoskeletal: negative  Integumentary and Breasts: negative  Neurological: occ lightheadedness  Psychiatric: insomnia, but no anxiety and no depression  Hematologic and Lymphatic: negative       Over the past 2 weeks, how often have you been bothered by the following problems? 1 ) Little interest or pleasure in doing things? Not at all    2 ) Feeling down, depressed or hopeless? Not at all    3 ) Trouble falling asleep or sleeping too much? Not at all    4 ) Feeling tired or having little energy? Not at all    5 ) Poor appetite or overeating? Not at all    6 ) Feeling bad about yourself, or that you are a failure, or have let yourself or your family down? Not at all    7 ) Trouble concentrating on things, such as reading a newspaper or watching television? Not at all    8 ) Moving or speaking so slowly that other people could have noticed, or the opposite, moving or speaking faster than usual? Not at all  How difficult have these problems made it for you to do your work, take care of things at home, or get along with people? 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)   9  IBS (irritable bowel syndrome) (564 1) (K58 9)   10  Insomnia (780 52) (G47 00)   11  Left ventricular hypertrophy (429 3) (I51 7)   12  Near syncope (780 2) (R55)   13  Onychomycosis of toenail (110 1) (B35 1)   14  Osteoarthritis (715 90) (M19 90)   15  Osteoporosis (733 00) (M81 0)   16  Postop check (V67 00) (Z09)   17  Symptoms involving cardiovascular system (785 9) (R09 89)   18  Urinary incontinence (788 30) (R32)    Past Medical History    1  History of hypothyroidism (V12 29) (Z86 39)   2  History of upper respiratory infection (V12 09) (Z87 09)   3  History of viral infection (V12 09) (Z86 19)    The active problems and past medical history were reviewed and updated today  Surgical History    1   History of Aortic Valve Replacement Transcatheter 2  History of Cataract Surgery   3  History of Hysterectomy   4  History of Splenectomy    The surgical history was reviewed and updated today  Family History  Mother    1  Family history of malignant neoplasm of uterus (V16 49) (Z80 49)  Father    2  Family history of Coronary Artery Disease (V17 49)  Brother    3  Family history of Congestive Heart Failure    The family history was reviewed and updated today  Social History    · Alcohol Use (History)   · Current some day smoker (305 1) (F17 200)  The social history was reviewed and updated today  The social history was reviewed and is unchanged  Current Meds   1  Acetaminophen 500 MG Oral Tablet; take 2 tablet twice daily; Therapy: 21HLH7806 to Recorded   2  Amoxicillin 500 MG Oral Tablet; TAKE 4 TABLET Other one hour prior to procedure   TDD:2000 mg; Therapy: 84GHS8598 to (Evaluate:74Kot6715); Last FA:68WDM1171 Ordered   3  Aspirin 81 MG CAPS; take 1 capsule daily; Therapy: (Gladys Pedraza) to Recorded   4  Calcium 600-200 MG-UNIT Oral Tablet; TAKE 1 TABLET Bedtime; Therapy: 36YGS9958 to Recorded   5  Glucosamine Chondroitin Complx Oral Capsule; one tablet twice daisha; Therapy: 64INP9371 to Recorded   6  Imodium Advanced 2-125 MG TABS; USE AS DIRECTED; Therapy: 99SLV8079 to Recorded   7  Levothyroxine Sodium 75 MCG Oral Tablet; Take 1 tablet daily  Requested for:   28VWA4320; Last Rx:25Oct2017; Status: ACTIVE - Retrospective By Protocol Authorization   Ordered   8  Melatonin 5 MG Oral Tablet; TAKE AS DIRECTED; Therapy: 93LYN6955 to (Evaluate:27Nov2014); Last Rx:10Nov2014 Ordered   9  Multivitamins TABS; TAKE 1 TABLET DAILY; Therapy: 94PBY7956 to Recorded   10  Systane Contacts Ophthalmic Solution; one drop in each eye twice daily; Therapy: 98MCS7620 to Recorded    The medication list was reviewed and updated today  Allergies    1  No Known Drug Allergies    Immunizations  Influenza --- Mónica Garcia: 19-Nov-2001;  Rossy Sotomayor: 12-Nov-2004; Series3: 19-Oct-2005; Jyoti Meza:  25-Oct-2007; Series5: 31-Oct-2008; Series6: [id number]-Goy-[id number] Milian: 20-Oct-2010; Jarod Caldwell:  [id number]-[id number] Jose: 23-Oct-2012; TKDPAI79: 30-Oct-2013; GYZTYK48: 27-Oct-2014; MMMEIJ51:  19-Oct-2015; RGVQNR03: 10/06; QFVZQG69: 27-Oct-2017   PPSV --- Suellyn Esme: Oct 1998; Series2: 22-Dec-2006   Zoster --- Suellyn Rodessa: 14-May-2008     Vitals  Signs   Recorded: 38OZT9919 08:40AM   Heart Rate: 76, L Radial  Respiration: 16  Systolic: 422, LUE, Sitting  Diastolic: 72, LUE, Sitting  Height: 5 ft 5 in  Weight: 120 lb 0 4 oz  BMI Calculated: 19 97  BSA Calculated: 1 59  O2 Saturation: 95    Procedure    Procedure:   Results: 20/20/30 in both eyes with corrective device, 20/20/40 in the right eye with corrective device, 20/20/30 in the left eye with corrective device      Health Management  Health Maintenance   Medicare Annual Wellness Visit; every 1 year; Last 37CPF6378; Next Due: 21Nov2018;  Active    Signatures   Electronically signed by : JESSICA Roberson; Nov 21 2017 12:47PM EST                       (Author)    Electronically signed by : La Roberson ; Jan 19 2018  1:28PM EST                       (Author)

## 2022-06-16 NOTE — PROGRESS NOTES
Cardiology Follow Up    [address]  4/7/1928  [id number]  [address] [facility]  [address] [place] [address] [address]    1  History of severe aortic stenosis  POCT ECG   2  S/P TAVR (transcatheter aortic valve replacement)  POCT ECG   3  Left bundle branch block (LBBB)  POCT ECG   4  Tachy-nataliia syndrome (HCC)  POCT ECG   5  Presence of cardiac pacemaker     6  Benign essential hypertension  POCT ECG   7  Nonrheumatic mitral valve regurgitation  POCT ECG       Discussion/Summary:  Ms Anusha Burleson is a pleasant 66-year-old female who presents to the office today for a routine follow-up  Since her last visit she has been feeling well  Her most recent pacemaker checks reveal an appropriately functioning device without any events  She underwent a repeat echocardiogram in 2020 revealing the TAVR with moderate perivalvular regurgitation  We discussed further imaging with echos  She would not be interested in any further evaluation or intervention should any changes be noted hence no imaging was requested  She was reminded of the need for antibiotics prior to the dentist     Her blood pressure is borderline in the office today and was when she saw her primary care provider  She is on no antihypertensive medication  For now recommend continued observation and a low-salt diet  I will see her back in the office in six months or sooner if deemed necessary  Interval History:  Ms Anusha Burleson is a pleasant 66-year-old female who presents to the office today for routine followup  Since her last visit she has been feeling well  She is not as active as she had been  She no longer swims  She ambulates with the aid of a walker  With the activity she performs she feels well  She denies any exertional chest pain or shortness of breath    She denies any signs or symptoms of congestive heart failure including lower extremity edema, paroxysmal nocturnal dyspnea, orthopnea, acute weight gain or increasing abdominal girth  She does report some lightheadedness with positional changes  She denies any syncope or presyncope  She denies symptoms of claudication  She remains tobacco free since her last visit         Problem List     Aortic stenosis, severe    Urinary incontinence    Osteoporosis    Osteoarthritis    Leukemia, 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HCC)    Overview Signed 4/12/2016 12:57 PM by Fabien Crowder PA-C     s/p splenectomy         Hypothyroidism    Hyperlipidemia    HTN (hypertension)    Essential tremor    CAD (coronary artery disease)    Insomnia    S/P TAVR (transcatheter aortic valve replacement)        Past Medical History:   Diagnosis Date    Aortic stenosis     severe    CAD (coronary artery disease)     Essential tremor     HTN (hypertension)     HTN (hypertension)     Hyperlipidemia     Hypothyroidism     Leukemia, 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HCC)     s/p splenectomy    Osteoarthritis     Osteoporosis     Urinary incontinence      Social History     Socioeconomic History    Marital status:       Spouse name: Not on file    Number of children: Not on file    Years of education: Not on file    Highest education level: Not on file   Occupational History    Not on file   Tobacco Use    Smoking status: Current Some Day Smoker     Types: Cigarettes    Smokeless tobacco: Former User   Vaping Use    Vaping Use: Never used   Substance and Sexual Activity    Alcohol use: Yes     Comment: SOCIAL    Drug use: No    Sexual activity: Not on file   Other Topics Concern    Not on file   Social History Narrative    Not on file     Social Determinants of Health     Financial Resource Strain: Not on file   Food Insecurity: Not on file   Transportation Needs: Not on file   Physical Activity: Not on file   Stress: Not on file   Social Connections: Not on file   Intimate Partner Violence: Not on file   Housing Stability: Not on file Family History   Problem Relation Age of Onset    Other Mother         malignant neoplasm of uterus    Coronary artery disease Father     Heart failure Brother      Past Surgical History:   Procedure Laterality Date    COLONOSCOPY      HYSTERECTOMY      LA REPLACE AORTIC VALVE OPENFEMORAL ARTERY APPROACH N/A 4/12/2016    Procedure: REPLACEMENT AORTIC VALVE TRANSCATHETER (TAVR) TRANSFEMORAL  26mm Lin 3 valve;  Surgeon: Vinay Cordova DO;  Location: [facility];  Service: Cardiac Surgery    SPLENECTOMY      for hx hairy cell leukemia       Current Outpatient Medications:     acetaminophen (TYLENOL) 500 mg tablet, Take 1,000 mg by mouth every 8 (eight) hours No more than 3g a day "As needed", Disp: , Rfl:     amoxicillin (AMOXIL) 500 MG tablet, Take four tablets orally one hour prior to procedure  (Patient taking differently: Take four tablets orally one hour prior to procedure  As NEEDED), Disp: 4 tablet, Rfl: 1    APPLE CIDER VINEGAR PO, Take by mouth daily, Disp: , Rfl:     aspirin 81 MG tablet, Take 81 mg by mouth daily, Disp: , Rfl:     Calcium Carbonate-Vitamin D (CALCIUM PLUS VITAMIN D PO), Take by mouth, Disp: , Rfl:     levothyroxine (Euthyrox) 88 mcg tablet, Take 1 tablet (88 mcg total) by mouth daily, Disp: 90 tablet, Rfl: 0    Loperamide HCl (IMODIUM PO), Take 2 mg by mouth as needed  , Disp: , Rfl:     MELATONIN PO, Take 10 mg by mouth daily at bedtime, Disp: , Rfl:     Multiple Vitamin (MULTIVITAMIN) tablet, Take 1 tablet by mouth daily  , Disp: , Rfl:     Polyethyl Glycol-Propyl Glycol (SYSTANE OP), Apply 1 drop to eye 2 (two) times a day EACH EYE TWICE DAILY  (Patient not taking: Reported on 6/17/2022), Disp: , Rfl:   No Known Allergies    Labs:     Chemistry        Component Value Date/Time     06/02/2015 1237    K 4 2 02/26/2021 0941    K 4 3 06/02/2015 1237     (H) 02/26/2021 0941     06/02/2015 1237    CO2 27 02/26/2021 0941    CO2 26 04/12/2016 1502    BUN 25 02/26/2021 0941    BUN 20 06/02/2015 1237    CREATININE 0 75 02/26/2021 0941    CREATININE 0 68 06/02/2015 1237        Component Value Date/Time    CALCIUM 9 1 02/26/2021 0941    CALCIUM 9 0 06/02/2015 1237    ALKPHOS 63 12/08/2020 1026    ALKPHOS 65 06/02/2015 1237    AST 25 12/08/2020 1026    AST 22 06/02/2015 1237    ALT 21 12/08/2020 1026    ALT 22 06/02/2015 1237    BILITOT 0 81 06/02/2015 1237            Lab Results   Component Value Date    CHOL 202 11/25/2014    CHOL 192 03/13/2014     Lab Results   Component Value Date    HDL 84 11/25/2014    HDL 69 03/13/2014     Lab Results   Component Value Date    LDLCALC 102 (H) 11/25/2014    LDLCALC 111 (H) 03/13/2014     Lab Results   Component Value Date    TRIG 80 11/25/2014    TRIG 62 03/13/2014     No results found for: CHOLHDL    Imaging: No results found  Review of Systems   Constitutional: Positive for malaise/fatigue  Cardiovascular: Negative for chest pain, claudication, cyanosis, dyspnea on exertion, irregular heartbeat, leg swelling, near-syncope and orthopnea  All other systems reviewed and are negative  Vitals:    06/17/22 1152   BP: 142/70   Pulse:      Vitals:    06/17/22 1130   Weight: 48 3 kg (106 lb 8 oz)     Height: 5' 5" (165 1 cm)   Body mass index is 17 72 kg/m²      Physical Exam:  General:  Alert and cooperative, appears stated age  HEENT:  PERRLA, EOMI, no scleral icterus, no conjunctival pallor  Neck:  No lymphadenopathy, no thyromegaly, no carotid bruits, no elevated JVP  Heart:  Regular rate and rhythm, normal S1/S2, no S3/S4, no murmur  Lungs:  Clear to auscultation bilaterally   Abdomen:  Soft, non-tender, positive bowel sounds, no rebound or guarding,   no organomegaly   Extremities:  No clubbing, cyanosis or edema   Vascular:  2+ pedal pulses  Skin:  No rashes or lesions on exposed skin  Neurologic:  Cranial nerves II-XII grossly intact without focal deficits

## 2022-06-17 ENCOUNTER — OFFICE VISIT (OUTPATIENT)
Dept: CARDIOLOGY CLINIC | Facility: CLINIC | Age: 87
End: 2022-06-17
Payer: MEDICARE

## 2022-06-17 VITALS
HEART RATE: 63 BPM | BODY MASS INDEX: 17.74 KG/M2 | WEIGHT: 106.5 LBS | SYSTOLIC BLOOD PRESSURE: 142 MMHG | DIASTOLIC BLOOD PRESSURE: 70 MMHG | HEIGHT: 65 IN

## 2022-06-17 DIAGNOSIS — Z95.0 PRESENCE OF CARDIAC PACEMAKER: ICD-10-CM

## 2022-06-17 DIAGNOSIS — I34.0 NONRHEUMATIC MITRAL VALVE REGURGITATION: ICD-10-CM

## 2022-06-17 DIAGNOSIS — Z95.2 S/P TAVR (TRANSCATHETER AORTIC VALVE REPLACEMENT): ICD-10-CM

## 2022-06-17 DIAGNOSIS — I35.0 SEVERE AORTIC STENOSIS: Primary | ICD-10-CM

## 2022-06-17 DIAGNOSIS — I49.5 TACHY-BRADY SYNDROME (HCC): ICD-10-CM

## 2022-06-17 DIAGNOSIS — I44.7 LEFT BUNDLE BRANCH BLOCK (LBBB): ICD-10-CM

## 2022-06-17 DIAGNOSIS — I10 BENIGN ESSENTIAL HYPERTENSION: ICD-10-CM

## 2022-06-17 PROCEDURE — 99214 OFFICE O/P EST MOD 30 MIN: CPT | Performed by: INTERNAL MEDICINE

## 2022-06-17 PROCEDURE — 93000 ELECTROCARDIOGRAM COMPLETE: CPT | Performed by: INTERNAL MEDICINE

## 2022-06-21 ENCOUNTER — IN-CLINIC DEVICE VISIT (OUTPATIENT)
Dept: CARDIOLOGY CLINIC | Facility: CLINIC | Age: 87
End: 2022-06-21
Payer: MEDICARE

## 2022-06-21 DIAGNOSIS — Z95.0 CARDIAC PACEMAKER IN SITU: Primary | ICD-10-CM

## 2022-06-21 PROCEDURE — 93280 PM DEVICE PROGR EVAL DUAL: CPT | Performed by: INTERNAL MEDICINE

## 2022-06-21 NOTE — PROGRESS NOTES
Results for orders placed or performed in visit on 06/21/22   Cardiac EP device report    Narrative    MDT DUAL PM/ ACTIVE SYSTEM IS MRI CONDITIONAL  DEVICE INTERROGATED IN THE [place] OFFICE: BATTERY VOLTAGE ADEQUATE-10 YRS  AP 41%  100%  ALL AVAILABLE LEAD PARAMETERS WITHIN NORMAL LIMITS  1 NSVT NOTED; APPROX 5 BEATS @ 154 BPM  PT ON ASA & EF 50% (2020)  NO PROGRAMMING CHANGES MADE TO DEVICE PARAMETERS  NORMAL DEVICE FUNCTION   NC

## 2022-09-27 ENCOUNTER — REMOTE DEVICE CLINIC VISIT (OUTPATIENT)
Dept: CARDIOLOGY CLINIC | Facility: CLINIC | Age: 87
End: 2022-09-27
Payer: MEDICARE

## 2022-09-27 DIAGNOSIS — Z95.0 PRESENCE OF CARDIAC PACEMAKER: Primary | ICD-10-CM

## 2022-09-27 PROCEDURE — 93296 REM INTERROG EVL PM/IDS: CPT | Performed by: INTERNAL MEDICINE

## 2022-09-27 PROCEDURE — 93294 REM INTERROG EVL PM/LDLS PM: CPT | Performed by: INTERNAL MEDICINE

## 2022-09-27 NOTE — PROGRESS NOTES
Results for orders placed or performed in visit on 09/27/22   Cardiac EP device report    Narrative    MDT DUAL PM/ ACTIVE SYSTEM IS MRI CONDITIONAL  CARELINK TRANSMISSION: BATTERY VOLTAGE ADEQUATE (10 YRS)  AP: 39 9%  : 99% (>40%~AVB)  ALL AVAILABLE LEAD PARAMETERS WITHIN NORMAL LIMITS  1 VT EPISODE W/ EGM SHOWING NSVT 5 BEATS  @162 BPM  PT TAKES ASA 81MG  EF: 50% (ECHO 2/12/20)  PACEMAKER FUNCTIONING APPROPRIATELY    [address] [place] [address] Street

## 2022-10-12 PROBLEM — Z00.00 MEDICARE ANNUAL WELLNESS VISIT, SUBSEQUENT: Status: RESOLVED | Noted: 2021-04-06 | Resolved: 2022-10-12

## 2022-12-27 ENCOUNTER — REMOTE DEVICE CLINIC VISIT (OUTPATIENT)
Dept: CARDIOLOGY CLINIC | Facility: CLINIC | Age: 87
End: 2022-12-27

## 2022-12-27 DIAGNOSIS — Z95.0 PRESENCE OF PERMANENT CARDIAC PACEMAKER: Primary | ICD-10-CM

## 2022-12-27 NOTE — PROGRESS NOTES
MDT DUAL PM/ ACTIVE SYSTEM IS MRI CONDITIONAL   CARELINK TRANSMISSION:  BATTERY VOLTAGE ADEQUATE (9 8 YR )   AP 37 5%  97 8% (>40%/AVB/HIS)   ALL LEAD PARAMETERS WITHIN NORMAL LIMITS   NO SIGNIFICANT HIGH RATE EPISODES   NORMAL DEVICE FUNCTION   RG

## 2023-01-11 NOTE — PROGRESS NOTES
Cardiology Follow Up    [address]  4/7/1928  [id number]  [address] [facility]  6 [address] [place] [address] [address]    1  History of severe aortic stenosis        2  S/P TAVR (transcatheter aortic valve replacement)        3  Nonrheumatic mitral valve regurgitation        4  Nonrheumatic aortic valve insufficiency        5  Left bundle branch block (LBBB)        6  Tachy-nataliia syndrome ([address] )        7  Presence of cardiac pacemaker        8  Benign essential hypertension            Discussion/Summary:  Ms Snow Sher is a pleasant 20-year-old female who presents to the office today for a routine follow-up  Since her last visit she has been feeling well  Her most recent pacemaker checks reveal an appropriately functioning device without any events  She underwent a repeat echocardiogram in 2020 revealing the TAVR with moderate perivalvular regurgitation  She again declines further imaging as this would not  given she is not interested in any invasive testing or procedures given her advanced age  Her bood pressure is well controlled in the office today on no medication  I will see her back in the office in one year or sooner if deemed necessary  Interval History:  Ms Snow Sher is a pleasant 20-year-old female who presents to the office today for routine followup  Since her last visit she has been feeling well  She is not as active as she had been  She no longer swims  She ambulates with the aid of a walker or cane  With the activity she performs she feels well  She denies any exertional chest pain or shortness of breath  She denies any signs or symptoms of congestive heart failure including lower extremity edema, paroxysmal nocturnal dyspnea, orthopnea, acute weight gain or increasing abdominal girth  She does report some lightheadedness with positional changes    She denies any syncope or presyncope  She denies symptoms of claudication  She remains tobacco free  Problem List     Aortic stenosis, severe    Urinary incontinence    Osteoporosis    Osteoarthritis    Leukemia, 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HCC)    Overview Signed 4/12/2016 12:57 PM by Jeison Oro PA-C     s/p splenectomy         Hypothyroidism    Hyperlipidemia    HTN (hypertension)    Essential tremor    CAD (coronary artery disease)    Insomnia    S/P TAVR (transcatheter aortic valve replacement)        Past Medical History:   Diagnosis Date   • Aortic stenosis     severe   • CAD (coronary artery disease)    • Essential tremor    • HTN (hypertension)    • HTN (hypertension)    • Hyperlipidemia    • Hypothyroidism    • Leukemia, 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HCC)     s/p splenectomy   • Osteoarthritis    • Osteoporosis    • Urinary incontinence      Social History     Socioeconomic History   • Marital status:       Spouse name: Not on file   • Number of children: Not on file   • Years of education: Not on file   • Highest education level: Not on file   Occupational History   • Not on file   Tobacco Use   • Smoking status: Former     Types: Cigarettes   • Smokeless tobacco: Former   Vaping Use   • Vaping Use: Never used   Substance and Sexual Activity   • Alcohol use: Yes     Comment: SOCIAL   • Drug use: No   • Sexual activity: Not on file   Other Topics Concern   • Not on file   Social History Narrative   • Not on file     Social Determinants of Health     Financial Resource Strain: Not on file   Food Insecurity: Not on file   Transportation Needs: Not on file   Physical Activity: Not on file   Stress: Not on file   Social Connections: Not on file   Intimate Partner Violence: Not on file   Housing Stability: Not on file      Family History   Problem Relation Age of Onset   • Other Mother         malignant neoplasm of uterus   • Coronary artery disease Father    • Heart failure Brother      Past Surgical History:   Procedure Laterality Date • COLONOSCOPY     • HYSTERECTOMY     • MI REPLACE AORTIC VALVE OPENFEMORAL ARTERY APPROACH N/A 4/12/2016    Procedure: REPLACEMENT AORTIC VALVE TRANSCATHETER (TAVR) TRANSFEMORAL  26mm Lin 3 valve;  Surgeon: Zuri Warner DO;  [facility];  Service: Cardiac Surgery   • SPLENECTOMY      for hx hairy cell leukemia       Current Outpatient Medications:   •  acetaminophen (TYLENOL) 500 mg tablet, Take 1,000 mg by mouth every 8 (eight) hours No more than 3g a day "As needed", Disp: , Rfl:   •  APPLE CIDER VINEGAR PO, Take by mouth daily, Disp: , Rfl:   •  aspirin 81 MG tablet, Take 81 mg by mouth daily, Disp: , Rfl:   •  Calcium Carbonate-Vitamin D (CALCIUM PLUS VITAMIN D PO), Take by mouth, Disp: , Rfl:   •  levothyroxine (Euthyrox) 88 mcg tablet, Take 1 tablet (88 mcg total) by mouth daily, Disp: 90 tablet, Rfl: 0  •  Loperamide HCl (IMODIUM PO), Take 2 mg by mouth as needed  , Disp: , Rfl:   •  MELATONIN PO, Take 10 mg by mouth daily at bedtime, Disp: , Rfl:   •  Multiple Vitamin (MULTIVITAMIN) tablet, Take 1 tablet by mouth daily  , Disp: , Rfl:   •  Polyethyl Glycol-Propyl Glycol (SYSTANE OP), Apply 1 drop to eye 2 (two) times a day EACH EYE TWICE DAILY, Disp: , Rfl:   No Known Allergies    Labs:     Chemistry        Component Value Date/Time     06/02/2015 1237    K 4 2 02/26/2021 0941    K 4 3 06/02/2015 1237     (H) 02/26/2021 0941     06/02/2015 1237    CO2 27 02/26/2021 0941    CO2 26 04/12/2016 1502    BUN 25 02/26/2021 0941    BUN 20 06/02/2015 1237    CREATININE 0 75 02/26/2021 0941    CREATININE 0 68 06/02/2015 1237        Component Value Date/Time    CALCIUM 9 1 02/26/2021 0941    CALCIUM 9 0 06/02/2015 1237    ALKPHOS 63 12/08/2020 1026    ALKPHOS 65 06/02/2015 1237    AST 25 12/08/2020 1026    AST 22 06/02/2015 1237    ALT 21 12/08/2020 1026    ALT 22 06/02/2015 1237    BILITOT 0 81 06/02/2015 1237            Lab Results   Component Value Date    CHOL 202 11/25/2014 CHOL 192 03/13/2014     Lab Results   Component Value Date    HDL 84 11/25/2014    HDL 69 03/13/2014     Lab Results   Component Value Date    LDLCALC 102 (H) 11/25/2014    LDLCALC 111 (H) 03/13/2014     Lab Results   Component Value Date    TRIG 80 11/25/2014    TRIG 62 03/13/2014     No results found for: CHOLHDL    Imaging: No results found  Review of Systems   Cardiovascular: Negative for chest pain, claudication, cyanosis, dyspnea on exertion, irregular heartbeat, leg swelling, near-syncope and orthopnea  All other systems reviewed and are negative  Vitals:    01/12/23 1422   BP: 116/60   Pulse:    SpO2:      Vitals:    01/12/23 1401   Weight: 50 3 kg (110 lb 14 4 oz)     Height: 5' 4" (162 6 cm)   Body mass index is 19 04 kg/m²      Physical Exam:  General:  Alert and cooperative, appears stated age  HEENT:  PERRLA, EOMI, no scleral icterus, no conjunctival pallor  Neck:  No lymphadenopathy, no thyromegaly, no carotid bruits, no elevated JVP  Heart:  Regular rate and rhythm, normal S1/S2, no S3/S4, no murmur  Lungs:  Clear to auscultation bilaterally   Abdomen:  Soft, non-tender, positive bowel sounds, no rebound or guarding,   no organomegaly   Extremities:  No edema    Skin:  No rashes or lesions on exposed skin  Neurologic:  Cranial nerves II-XII grossly intact without focal deficits

## 2023-01-12 ENCOUNTER — OFFICE VISIT (OUTPATIENT)
Dept: CARDIOLOGY CLINIC | Facility: CLINIC | Age: 88
End: 2023-01-12

## 2023-01-12 VITALS
SYSTOLIC BLOOD PRESSURE: 116 MMHG | HEIGHT: 64 IN | DIASTOLIC BLOOD PRESSURE: 60 MMHG | BODY MASS INDEX: 18.93 KG/M2 | OXYGEN SATURATION: 94 % | WEIGHT: 110.9 LBS | HEART RATE: 68 BPM

## 2023-01-12 DIAGNOSIS — I44.7 LEFT BUNDLE BRANCH BLOCK (LBBB): ICD-10-CM

## 2023-01-12 DIAGNOSIS — I35.0 SEVERE AORTIC STENOSIS: Primary | ICD-10-CM

## 2023-01-12 DIAGNOSIS — Z95.2 S/P TAVR (TRANSCATHETER AORTIC VALVE REPLACEMENT): ICD-10-CM

## 2023-01-12 DIAGNOSIS — I35.1 NONRHEUMATIC AORTIC VALVE INSUFFICIENCY: ICD-10-CM

## 2023-01-12 DIAGNOSIS — Z95.0 PRESENCE OF CARDIAC PACEMAKER: ICD-10-CM

## 2023-01-12 DIAGNOSIS — I49.5 TACHY-BRADY SYNDROME (HCC): ICD-10-CM

## 2023-01-12 DIAGNOSIS — I34.0 NONRHEUMATIC MITRAL VALVE REGURGITATION: ICD-10-CM

## 2023-03-28 ENCOUNTER — REMOTE DEVICE CLINIC VISIT (OUTPATIENT)
Dept: CARDIOLOGY CLINIC | Facility: CLINIC | Age: 88
End: 2023-03-28

## 2023-03-28 DIAGNOSIS — Z95.0 CARDIAC PACEMAKER IN SITU: Primary | ICD-10-CM

## 2023-03-28 NOTE — PROGRESS NOTES
"Results for orders placed or performed in visit on 03/28/23   Cardiac EP device report    Narrative    MDT DUAL PM/ ACTIVE SYSTEM IS MRI CONDITIONAL  CARELINK TRANSMISSION: BATTERY STATUS \"9 YRS  \"  AP 37%  97%  ALL AVAILABLE LEAD PARAMETERS WITHIN NORMAL LIMITS  NO SIGNIFICANT HIGH RATE EPISODES  NORMAL DEVICE FUNCTION   NC         "

## 2023-05-08 ENCOUNTER — NEW PATIENT COMPREHENSIVE (OUTPATIENT)
Dept: URBAN - METROPOLITAN AREA CLINIC 6 | Facility: CLINIC | Age: 88
End: 2023-05-08

## 2023-05-08 DIAGNOSIS — H40.051: ICD-10-CM

## 2023-05-08 DIAGNOSIS — Z96.1: ICD-10-CM

## 2023-05-08 DIAGNOSIS — H40.1121: ICD-10-CM

## 2023-05-08 DIAGNOSIS — H40.1113: ICD-10-CM

## 2023-05-08 PROCEDURE — 92133 CPTRZD OPH DX IMG PST SGM ON: CPT

## 2023-05-08 PROCEDURE — 92202 OPSCPY EXTND ON/MAC DRAW: CPT

## 2023-05-08 PROCEDURE — 76514 ECHO EXAM OF EYE THICKNESS: CPT

## 2023-05-08 PROCEDURE — 92004 COMPRE OPH EXAM NEW PT 1/>: CPT

## 2023-05-08 ASSESSMENT — TONOMETRY
OD_IOP_MMHG: 45
OS_IOP_MMHG: 18

## 2023-05-08 ASSESSMENT — PACHYMETRY
OD_CT_UM: 472
OS_CT_UM: 462

## 2023-05-08 ASSESSMENT — VISUAL ACUITY
OU_CC: J2
OS_CC: 20/25-1
OD_CC: CF 2FT

## 2023-05-31 ENCOUNTER — IOP CHECK (OUTPATIENT)
Dept: URBAN - METROPOLITAN AREA CLINIC 6 | Facility: CLINIC | Age: 88
End: 2023-05-31

## 2023-05-31 DIAGNOSIS — H40.051: ICD-10-CM

## 2023-05-31 DIAGNOSIS — H40.1121: ICD-10-CM

## 2023-05-31 DIAGNOSIS — H40.1113: ICD-10-CM

## 2023-05-31 DIAGNOSIS — Z96.1: ICD-10-CM

## 2023-05-31 PROCEDURE — 92020 GONIOSCOPY: CPT

## 2023-05-31 PROCEDURE — 92012 INTRM OPH EXAM EST PATIENT: CPT

## 2023-05-31 PROCEDURE — 92083 EXTENDED VISUAL FIELD XM: CPT

## 2023-05-31 ASSESSMENT — TONOMETRY
OS_IOP_MMHG: 17
OD_IOP_MMHG: 50

## 2023-05-31 ASSESSMENT — VISUAL ACUITY
OS_CC: 20/30+1
OD_CC: CF 2FT

## 2023-06-27 ENCOUNTER — IN-CLINIC DEVICE VISIT (OUTPATIENT)
Dept: CARDIOLOGY CLINIC | Facility: CLINIC | Age: 88
End: 2023-06-27
Payer: MEDICARE

## 2023-06-27 DIAGNOSIS — Z95.0 PRESENCE OF PERMANENT CARDIAC PACEMAKER: Primary | ICD-10-CM

## 2023-06-27 PROCEDURE — 93280 PM DEVICE PROGR EVAL DUAL: CPT | Performed by: INTERNAL MEDICINE

## 2023-06-28 NOTE — PROGRESS NOTES
Results for orders placed or performed in visit on 06/27/23   Cardiac EP device report    Narrative    MDT DUAL PM/ ACTIVE SYSTEM IS MRI CONDITIONAL  DEVICE INTERROGATED IN THE [facility] OFFICE  BATTERY VOLTAGE ADEQUATE (9 4 YRS)  AP 38 7%  98 3% ALL LEAD PARAMETERS WITHIN NORMAL LIMITS  NO NEW SIGNIFICANT HIGH RATE EPISODES  NO PROGRAMMING CHANGES MADE TO DEVICE PARAMETERS  NORMAL DEVICE FUNCTION   AM/[place]

## 2023-08-30 ENCOUNTER — PROBLEM (OUTPATIENT)
Dept: URBAN - METROPOLITAN AREA CLINIC 6 | Facility: CLINIC | Age: 88
End: 2023-08-30

## 2023-08-30 DIAGNOSIS — H53.10: ICD-10-CM

## 2023-08-30 DIAGNOSIS — Z96.1: ICD-10-CM

## 2023-08-30 DIAGNOSIS — H40.1113: ICD-10-CM

## 2023-08-30 DIAGNOSIS — H40.1121: ICD-10-CM

## 2023-08-30 PROCEDURE — 92012 INTRM OPH EXAM EST PATIENT: CPT

## 2023-08-30 ASSESSMENT — VISUAL ACUITY
OU_CC: J3
OS_CC: 20/40-2
OD_CC: 20/800
OU_CC: 20/40-2

## 2023-08-30 ASSESSMENT — TONOMETRY
OD_IOP_MMHG: 33
OS_IOP_MMHG: 24

## 2023-09-28 ENCOUNTER — REMOTE DEVICE CLINIC VISIT (OUTPATIENT)
Dept: CARDIOLOGY CLINIC | Facility: CLINIC | Age: 88
End: 2023-09-28
Payer: MEDICARE

## 2023-09-28 DIAGNOSIS — Z95.0 PRESENCE OF PERMANENT CARDIAC PACEMAKER: Primary | ICD-10-CM

## 2023-09-28 PROCEDURE — 93294 REM INTERROG EVL PM/LDLS PM: CPT | Performed by: INTERNAL MEDICINE

## 2023-09-28 PROCEDURE — 93296 REM INTERROG EVL PM/IDS: CPT | Performed by: INTERNAL MEDICINE

## 2023-09-28 NOTE — PROGRESS NOTES
Results for orders placed or performed in visit on 09/28/23   Cardiac EP device report    Narrative    MDT DUAL PM/ ACTIVE SYSTEM IS MRI CONDITIONAL  CARELINK TRANSMISSION: BATTERY VOLTAGE ADEQUATE (9 YRS).  98.7% AP 52.1%. ALL AVAILABLE LEAD PARAMETERS WITHIN NORMAL LIMITS. 2 VT-NS EPISODES, EGRAMS SHOWING NSVT (11 @ 151 BPM,  5 @ 171 BPM). PT ON ASA 81 MG, NO B BLOCKER. (PT 89 Y/O)   EF 50% (ECHO 2/12/2020. NO PROGRAMMING CHANGES MADE TO DEVICE PARAMETERS. PACEMAKER FUNCTIONING APPROPRIATELY.  PAS/ES

## 2023-10-23 ENCOUNTER — FOLLOW UP (OUTPATIENT)
Dept: URBAN - METROPOLITAN AREA CLINIC 6 | Facility: CLINIC | Age: 88
End: 2023-10-23

## 2023-10-23 DIAGNOSIS — H40.1113: ICD-10-CM

## 2023-10-23 DIAGNOSIS — Z96.1: ICD-10-CM

## 2023-10-23 DIAGNOSIS — H40.1121: ICD-10-CM

## 2023-10-23 PROCEDURE — 92250 FUNDUS PHOTOGRAPHY W/I&R: CPT

## 2023-10-23 PROCEDURE — 92014 COMPRE OPH EXAM EST PT 1/>: CPT

## 2023-10-23 ASSESSMENT — TONOMETRY
OS_IOP_MMHG: 22
OD_IOP_MMHG: 42

## 2023-10-23 ASSESSMENT — VISUAL ACUITY
OD_CC: CF 1FT
OS_CC: 20/40

## 2023-12-22 NOTE — ASSESSMENT & PLAN NOTE
"VIDEO VISIT PROGRESS NOTE  This visit was performed via live interactive two-way video. During their visit, the patient was informed that their consent to treat includes permission to submit claims to their insurance on their behalf for the services received. Clinician Location: [place] [address] [address]    Patient Location: Home    Chief Complaint  Video Visit and Derm Problem (Skin infection around LT thumb and wrist)    HISTORY OF PRESENT ILLNESS  Social Determinants  Trend Sabas Hanson is a pleasant 29year old female who is requesting a Video Visit who had concerns including Video Visit and Derm Problem (Skin infection around LT thumb and wrist). 28 y/o F with no significant medical history. Getting over covid infection. 2 weeks ago, was cleaning her cuticle to left thumb and developed redness thereafter. Started to worsen with swelling 3 days ago. Pt denies pain on ROM. She also have extension of swelling to her arm, and new rash as well. Social history:   Social History Narrative    (none)    Tobacco, alcohol and other:  reports that she has never smoked. She has never used smokeless tobacco. She reports that she does not drink alcohol and does not use drugs. REVIEW OF SYSTEMS  All other pertinent systems are reviewed and are negative except as documented above. HISTORIES  I have personally reviewed and updated the following electronic medical record sections: Current medications, Allergies, Problem list, and Past Medical History      MEDICATIONS  The medication list in the medical record as well as updates to the medication list submitted by the patient with this V-Visit were reviewed and updated today.        PHYSICAL EXAM     Vital Signs Per Patient:   Vitals - Patient Reported  Weight - Patient Reported: 195 lb (88.5 kg)  Height - Patient Reported: 5' 4"" (162.6 cm)  BMI kg/m2: 33.47    Constitutional: well-nourished, well-developed, well-appearing  Ears, " Patient's TSH was 5 36 showing slight elevation free T4 was within normal range I will need to check another TSH in approximately 3 months  nose, mouth, throat: normocephalic, atraumatic, external ears normal by inspection  Eyes: no proptosis, extra-ocular eye movement intact, normal sclerae, conjunctivae not injected  Neck: No visible goiter, range of motion of neck appears normal  Respiratory: No increased respiratory effort  Skin: limited exam due to vv. However her left thumb appears red/greyish. No limitation to ROM or pain on ROM. She has swelling to her mid-left FA. Cannot visualize the rash very well. Psychiatric: non-anxious, normal affect      ASSESSMENT AND PLAN   Hakeem Schulz is a pleasant 29year old female we discussed the followin. Cellulitis of finger of left hand  -     cephalexin 500 MG tablet; Take 1 tablet by mouth in the morning and 1 tablet in the evening. Do all this for 7 days. - if symptoms worsen to notify me. Follow Up: No follow-ups on file.     Upcoming Appointments Already Scheduled  Future Appointments   Date Time Provider [facility] [address]   2023 10:30 AM Godwin Saldana MD [id number] Salvador Goodman MD [facility]

## 2023-12-28 ENCOUNTER — REMOTE DEVICE CLINIC VISIT (OUTPATIENT)
Dept: CARDIOLOGY CLINIC | Facility: CLINIC | Age: 88
End: 2023-12-28
Payer: MEDICARE

## 2023-12-28 DIAGNOSIS — Z95.0 PRESENCE OF PERMANENT CARDIAC PACEMAKER: Primary | ICD-10-CM

## 2023-12-28 PROCEDURE — 93294 REM INTERROG EVL PM/LDLS PM: CPT | Performed by: INTERNAL MEDICINE

## 2023-12-28 PROCEDURE — 93296 REM INTERROG EVL PM/IDS: CPT | Performed by: INTERNAL MEDICINE

## 2023-12-28 NOTE — PROGRESS NOTES
Results for orders placed or performed in visit on 12/28/23   Cardiac EP device report    Narrative    MDT DUAL PM/ ACTIVE SYSTEM IS MRI CONDITIONAL  CARELINK TRANSMISSION: BATTERY VOLTAGE ADEQUATE.(8.7 YRS) AP 29%  99%. ALL AVAILABLE LEAD PARAMETERS WITHIN NORMAL LIMITS. NO SIGNIFICANT HIGH RATE EPISODES.NORMAL DEVICE FUNCTION.--[place]

## 2024-02-21 PROBLEM — H61.23 BILATERAL IMPACTED CERUMEN: Status: RESOLVED | Noted: 2019-02-06 | Resolved: 2024-02-21

## 2024-03-12 ENCOUNTER — FOLLOW UP (OUTPATIENT)
Dept: URBAN - METROPOLITAN AREA CLINIC 6 | Facility: CLINIC | Age: 89
End: 2024-03-12

## 2024-03-12 DIAGNOSIS — H40.1121: ICD-10-CM

## 2024-03-12 DIAGNOSIS — Z96.1: ICD-10-CM

## 2024-03-12 DIAGNOSIS — H40.1113: ICD-10-CM

## 2024-03-12 PROCEDURE — 92014 COMPRE OPH EXAM EST PT 1/>: CPT

## 2024-03-12 PROCEDURE — 92133 CPTRZD OPH DX IMG PST SGM ON: CPT

## 2024-03-12 PROCEDURE — 92202 OPSCPY EXTND ON/MAC DRAW: CPT

## 2024-03-12 ASSESSMENT — TONOMETRY
OS_IOP_MMHG: 17
OD_IOP_MMHG: 40

## 2024-03-12 ASSESSMENT — VISUAL ACUITY: OS_CC: 20/40-1

## 2024-03-24 NOTE — PROGRESS NOTES
Cardiology Follow Up    Helen Gaona  4/7/1928  [id number]  HEART & VASCULAR [facility]  [address]  [facility] [address]    1. History of severe aortic stenosis        2. Nonrheumatic aortic valve insufficiency        3. S/P TAVR (transcatheter aortic valve replacement)        4. Nonrheumatic mitral valve regurgitation        5. Left bundle branch block (LBBB)        6. Tachy-nataliia syndrome (HCC)        7. Presence of cardiac pacemaker        8. Benign essential hypertension              Discussion/Summary:  Ms. Gaona is a pleasant 89-year-old female who presents to the office today for a routine follow-up. Since her last visit she has been feeling well.     Her most recent pacemaker checks reveal an appropriately functioning device without any events.    She underwent a repeat echocardiogram in 2020 revealing the TAVR with moderate perivalvular regurgitation.  She again declines further imaging as this would not  given she is not interested in any invasive testing or procedures given her advanced age.    Her blood pressure is well controlled in the office today on no medication.    I will see her back in the office in one year or sooner if deemed necessary.    Interval History:  Ms. Gaona is a pleasant 89-year-old female who presents to the office today for routine followup.    Since her last visit she has been feeling well.  She is not as active as she had been.  She no longer swims.  She ambulates with the aid of a walker or cane, more so a walker.  With the activity she performs she feels well.  She denies any exertional chest pain or shortness of breath.  She denies any signs or symptoms of congestive heart failure including lower extremity edema, paroxysmal nocturnal dyspnea, orthopnea, acute weight gain or increasing abdominal girth.  She denies any syncope or presyncope.  She denies symptoms of  claudication.    She remains tobacco free.      Problem List       Aortic stenosis, severe    Urinary incontinence    Osteoporosis    Osteoarthritis    Leukemia, 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HCC)    Overview Signed 4/12/2016 12:57 PM by Sarah Way PA-C     s/p splenectomy         Hypothyroidism    Hyperlipidemia    HTN (hypertension)    Essential tremor    CAD (coronary artery disease)    Insomnia    S/P TAVR (transcatheter aortic valve replacement)          Past Medical History:   Diagnosis Date    Aortic stenosis     severe    CAD (coronary artery disease)     Essential tremor     HTN (hypertension)     HTN (hypertension)     Hyperlipidemia     Hypothyroidism     Leukemia, 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HCC)     s/p splenectomy    Osteoarthritis     Osteoporosis     Urinary incontinence      Social History     Socioeconomic History    Marital status:      Spouse name: Not on file    Number of children: Not on file    Years of education: Not on file    Highest education level: Not on file   Occupational History    Not on file   Tobacco Use    Smoking status: Former     Types: Cigarettes    Smokeless tobacco: Former   Vaping Use    Vaping status: Never Used   Substance and Sexual Activity    Alcohol use: Yes     Comment: SOCIAL    Drug use: No    Sexual activity: Not on file   Other Topics Concern    Not on file   Social History Narrative    Not on file     Social Determinants of Health     Financial Resource Strain: Not on file   Food Insecurity: Not on file   Transportation Needs: Not on file   Physical Activity: Not on file   Stress: Not on file   Social Connections: Not on file   Intimate Partner Violence: Not on file   Housing Stability: Not on file      Family History   Problem Relation Age of Onset    Other Mother         malignant neoplasm of uterus    Coronary artery disease Father     Heart failure Brother      Past Surgical History:   Procedure Laterality Date    COLONOSCOPY      HYSTERECTOMY      [place] REPLACE AORTIC VALVE  "OPENFEMORAL ARTERY APPROACH N/A 4/12/2016    Procedure: REPLACEMENT AORTIC VALVE TRANSCATHETER (TAVR) TRANSFEMORAL  26mm Lin 3 valve;  Surgeon: Que Thurston DO;  Location: [facility];  Service: Cardiac Surgery    SPLENECTOMY      for hx hairy cell leukemia       Current Outpatient Medications:     acetaminophen (TYLENOL) 500 mg tablet, Take 1,000 mg by mouth every 8 (eight) hours No more than 3g a day \"As needed\", Disp: , Rfl:     APPLE CIDER VINEGAR PO, Take by mouth daily, Disp: , Rfl:     aspirin 81 MG tablet, Take 81 mg by mouth daily, Disp: , Rfl:     Calcium Carbonate-Vitamin D (CALCIUM PLUS VITAMIN D PO), Take by mouth, Disp: , Rfl:     levothyroxine (Euthyrox) 88 mcg tablet, Take 1 tablet (88 mcg total) by mouth daily, Disp: 90 tablet, Rfl: 0    Loperamide HCl (IMODIUM PO), Take 2 mg by mouth as needed.  , Disp: , Rfl:     MELATONIN PO, Take 10 mg by mouth daily at bedtime, Disp: , Rfl:     Multiple Vitamin (MULTIVITAMIN) tablet, Take 1 tablet by mouth daily., Disp: , Rfl:     Polyethyl Glycol-Propyl Glycol (SYSTANE OP), Apply 1 drop to eye 2 (two) times a day EACH EYE TWICE DAILY, Disp: , Rfl:   No Known Allergies    Labs:     Chemistry        Component Value Date/Time     06/02/2015 1237    K 4.2 10/24/2023 0948     10/24/2023 0948    CO2 29 10/24/2023 0948    BUN 21 10/24/2023 0948    CREATININE 0.79 10/24/2023 0948        Component Value Date/Time    CALCIUM 9.8 10/24/2023 0948    ALKPHOS 53 10/24/2023 0948    AST 24 10/24/2023 0948    ALT 16 10/24/2023 0948    BILITOT 0.81 06/02/2015 1237            Lab Results   Component Value Date    CHOL 202 11/25/2014    CHOL 192 03/13/2014     Lab Results   Component Value Date    HDL 84 11/25/2014    HDL 69 03/13/2014     Lab Results   Component Value Date    LDLCALC 102 (H) 11/25/2014    LDLCALC 111 (H) 03/13/2014     Lab Results   Component Value Date    TRIG 80 11/25/2014    TRIG 62 03/13/2014     No results found for: " "\"CHOLHDL\"    Imaging: No results found.    Review of Systems   Constitutional: Positive for malaise/fatigue.   Cardiovascular:  Negative for chest pain, cyanosis, dyspnea on exertion, irregular heartbeat, orthopnea and palpitations.   Neurological:  Positive for dizziness.   All other systems reviewed and are negative.      There were no vitals filed for this visit.    There were no vitals filed for this visit.        There is no height or weight on file to calculate BMI.    Physical Exam:  General:  Alert and cooperative, appears stated age  HEENT:  PERRLA, EOMI, no scleral icterus, no conjunctival pallor  Neck:  No lymphadenopathy, no thyromegaly, no carotid bruits, no elevated JVP  Heart:  Regular rate and rhythm, normal S1/S2, no S3/S4, no murmur  Lungs:  Clear to auscultation bilaterally   Abdomen:  Soft, non-tender, positive bowel sounds, no rebound or guarding,   no organomegaly   Extremities:  No clubbing, cyanosis or edema   Vascular:  2+ pedal pulses  Skin:  No rashes or lesions on exposed skin  Neurologic:  Cranial nerves II-XII grossly intact without focal deficits   "

## 2024-03-25 ENCOUNTER — OFFICE VISIT (OUTPATIENT)
Dept: CARDIOLOGY CLINIC | Facility: CLINIC | Age: 89
End: 2024-03-25
Payer: MEDICARE

## 2024-03-25 VITALS
BODY MASS INDEX: 18.35 KG/M2 | DIASTOLIC BLOOD PRESSURE: 68 MMHG | WEIGHT: 106.9 LBS | SYSTOLIC BLOOD PRESSURE: 118 MMHG | HEART RATE: 66 BPM

## 2024-03-25 DIAGNOSIS — I35.1 NONRHEUMATIC AORTIC VALVE INSUFFICIENCY: ICD-10-CM

## 2024-03-25 DIAGNOSIS — I34.0 NONRHEUMATIC MITRAL VALVE REGURGITATION: ICD-10-CM

## 2024-03-25 DIAGNOSIS — I44.7 LEFT BUNDLE BRANCH BLOCK (LBBB): ICD-10-CM

## 2024-03-25 DIAGNOSIS — I49.5 TACHY-BRADY SYNDROME (HCC): ICD-10-CM

## 2024-03-25 DIAGNOSIS — I35.0 SEVERE AORTIC STENOSIS: Primary | ICD-10-CM

## 2024-03-25 DIAGNOSIS — Z95.2 S/P TAVR (TRANSCATHETER AORTIC VALVE REPLACEMENT): ICD-10-CM

## 2024-03-25 DIAGNOSIS — I10 BENIGN ESSENTIAL HYPERTENSION: ICD-10-CM

## 2024-03-25 DIAGNOSIS — Z95.0 PRESENCE OF CARDIAC PACEMAKER: ICD-10-CM

## 2024-03-25 PROCEDURE — 99214 OFFICE O/P EST MOD 30 MIN: CPT | Performed by: INTERNAL MEDICINE

## 2024-03-25 PROCEDURE — 93000 ELECTROCARDIOGRAM COMPLETE: CPT | Performed by: INTERNAL MEDICINE

## 2024-03-28 ENCOUNTER — REMOTE DEVICE CLINIC VISIT (OUTPATIENT)
Dept: CARDIOLOGY CLINIC | Facility: CLINIC | Age: 89
End: 2024-03-28
Payer: MEDICARE

## 2024-03-28 DIAGNOSIS — Z95.0 PRESENCE OF CARDIAC PACEMAKER: Primary | ICD-10-CM

## 2024-03-28 PROCEDURE — 93294 REM INTERROG EVL PM/LDLS PM: CPT | Performed by: INTERNAL MEDICINE

## 2024-03-28 PROCEDURE — 93296 REM INTERROG EVL PM/IDS: CPT | Performed by: INTERNAL MEDICINE

## 2024-03-28 NOTE — PROGRESS NOTES
Results for orders placed or performed in visit on 03/28/24   Cardiac EP device report    Narrative    MDT DUAL PM/ ACTIVE SYSTEM IS MRI CONDITIONAL  CARELINK TRANSMISSION: BATTERY VOLTAGE ADEQUATE (8.5 YRS). AP: 23.6%. : 96.6% (>40%~MVP-OFF~AVB). ALL AVAILABLE LEAD PARAMETERS WITHIN NORMAL LIMITS. NO SIGNIFICANT HIGH RATE EPISODES. NORMAL DEVICE FUNCTION. CH

## 2024-07-01 ENCOUNTER — IN-CLINIC DEVICE VISIT (OUTPATIENT)
Dept: CARDIOLOGY CLINIC | Facility: CLINIC | Age: 89
End: 2024-07-01
Payer: MEDICARE

## 2024-07-01 DIAGNOSIS — Z95.0 PRESENCE OF PERMANENT CARDIAC PACEMAKER: Primary | ICD-10-CM

## 2024-07-01 PROCEDURE — 93280 PM DEVICE PROGR EVAL DUAL: CPT | Performed by: INTERNAL MEDICINE

## 2024-07-01 NOTE — PROGRESS NOTES
5/23/2019     RE: Tucker Slater  [address] [facility] [address]     Dear Colleague,    Thank you for referring your patient, Tucker Slater, to the [facility] [place] at [facility]. Please see a copy of my visit note below.    Tucker Slater is an 88-year-old gentleman who I met in the hospital with clot urinary retention.  He underwent clot evacuation and cautery of prostatic veins.  He has a history of prostate cancer treated many years ago with external radiation.  He also has a history of bladder cancer and has had no recurrence in almost 20 years.  He used to see a urologist in [place] on a regular basis but was told he needed no further follow-up more than 2 years ago.  He is on Coumadin for atrial fibrillation.  He is currently having no difficulty voiding, dysuria or hematuria.  His PSA is stable at 1.94.  Hopefully, we can keep his INR at 2.5 to avoid further bleeding  Other past medical history: Balance issues, bradycardia, hypertension, osteoarthrosis, pacemaker, joint pain, renal disease, peripheral neuropathy, former smoker  Medications: Tylenol, vitamin C, calcium carbonate, Coreg, finasteride, losartan, multivitamin, Maxide, vitamin B, vitamin C complex, vitamin D, Coumadin  Allergies: merbroman, morphine, amlodipine  Exam: Alert and oriented, normal appearance, normal respirations, neuro grossly intact  Patient had a normal digital rectal exam on EUA  Assessment: History of bladder cancer-no recurrence                         History of prostate cancer, PSA stable and normal CONSTANZA recent gross hematuria with urinary retention secondary to Coumadin and enlarged prostatic veins  Plan: See me in 1 year for digital rectal exam, PSA, urinalysis and urine FISH test.  Possible cystoscopy.  Continue finasteride daily long-term                            Again, thank you for allowing me to participate in the care of  Results for orders placed or performed in visit on 07/01/24   Cardiac EP device report    Narrative    MDT DUAL PM/ ACTIVE SYSTEM IS MRI CONDITIONAL  [facility]. BATTERY VOLTAGE ADEQUATE (8.2 YRS). AP 32.3%  97.9% (>40%/AVB) ALL AVAILABLE LEAD PARAMETERS WITHIN NORMAL LIMITS. NO NEW SIGNIFICANT HIGH RATE EPISODES. NO PROGRAMMING CHANGES MADE TO DEVICE PARAMETERS. NORMAL DEVICE FUNCTION. AM          your patient.      Sincerely,    Ilir Ramirez MD

## 2024-09-06 ENCOUNTER — TELEPHONE (OUTPATIENT)
Age: 89
End: 2024-09-06

## 2024-09-06 ENCOUNTER — PATIENT OUTREACH (OUTPATIENT)
Dept: HEMATOLOGY ONCOLOGY | Facility: CLINIC | Age: 89
End: 2024-09-06

## 2024-09-06 DIAGNOSIS — Z85.6 HISTORY OF LEUKEMIA: ICD-10-CM

## 2024-09-06 DIAGNOSIS — D69.6 THROMBOCYTOPENIA (HCC): Primary | ICD-10-CM

## 2024-09-06 NOTE — PROGRESS NOTES
Returned call to Loli after call expressing frustration that pt was not shceduled.  Advised that we had no records on this patient or labs which were both needed to determine scheduling.  Informed her that they have now been received and I was reviewing the chart and the access center will call to schedule.

## 2024-10-02 ENCOUNTER — REMOTE DEVICE CLINIC VISIT (OUTPATIENT)
Dept: CARDIOLOGY CLINIC | Facility: CLINIC | Age: 89
End: 2024-10-02
Payer: MEDICARE

## 2024-10-02 DIAGNOSIS — Z95.0 PRESENCE OF PERMANENT CARDIAC PACEMAKER: Primary | ICD-10-CM

## 2024-10-02 PROCEDURE — 93296 REM INTERROG EVL PM/IDS: CPT | Performed by: INTERNAL MEDICINE

## 2024-10-02 PROCEDURE — 93294 REM INTERROG EVL PM/LDLS PM: CPT | Performed by: INTERNAL MEDICINE

## 2024-10-02 NOTE — PROGRESS NOTES
Results for orders placed or performed in visit on 10/02/24   Cardiac EP device report    Narrative    MDT DUAL PM/ ACTIVE SYSTEM IS MRI CONDITIONAL  CARELINK TRANSMISSION: BATTERY VOLTAGE ADEQUATE. (8 YRS) AP 25%  96%. ALL AVAILABLE LEAD PARAMETERS WITHIN NORMAL LIMITS. NO SIGNIFICANT HIGH RATE EPISODES. NORMAL DEVICE FUNCTION.---[place]CCLE[place]

## 2024-10-04 ENCOUNTER — CONSULT (OUTPATIENT)
Dept: HEMATOLOGY ONCOLOGY | Facility: CLINIC | Age: 89
End: 2024-10-04
Payer: MEDICARE

## 2024-10-04 VITALS
HEART RATE: 75 BPM | RESPIRATION RATE: 16 BRPM | DIASTOLIC BLOOD PRESSURE: 60 MMHG | TEMPERATURE: 98.2 F | HEIGHT: 65 IN | BODY MASS INDEX: 17.66 KG/M2 | OXYGEN SATURATION: 96 % | WEIGHT: 106 LBS | SYSTOLIC BLOOD PRESSURE: 122 MMHG

## 2024-10-04 DIAGNOSIS — I49.5 TACHY-BRADY SYNDROME (HCC): ICD-10-CM

## 2024-10-04 DIAGNOSIS — C91.41 HAIRY CELL LEUKEMIA, IN REMISSION (HCC): ICD-10-CM

## 2024-10-04 DIAGNOSIS — D53.9 MACROCYTIC ANEMIA: ICD-10-CM

## 2024-10-04 DIAGNOSIS — E53.8 VITAMIN B 12 DEFICIENCY: ICD-10-CM

## 2024-10-04 DIAGNOSIS — R23.3 EASY BRUISABILITY: ICD-10-CM

## 2024-10-04 DIAGNOSIS — E53.8 FOLATE DEFICIENCY: ICD-10-CM

## 2024-10-04 DIAGNOSIS — E07.9 THYROID DISEASE: ICD-10-CM

## 2024-10-04 DIAGNOSIS — D69.6 THROMBOCYTOPENIA (HCC): Primary | ICD-10-CM

## 2024-10-04 DIAGNOSIS — D75.9 BONE MARROW DISEASE: ICD-10-CM

## 2024-10-04 PROCEDURE — 99204 OFFICE O/P NEW MOD 45 MIN: CPT | Performed by: INTERNAL MEDICINE

## 2024-10-04 NOTE — PROGRESS NOTES
Hematology/Oncology Outpatient Consult Note  Helen Gaona 89 y.o. female 4/7/1928 [id number]    Date:  10/4/2024      Assessment and Plan:    1. Thrombocytopenia (HCC)  Patient's recent lab work noted to have thrombocytopenia with platelet counts of 65, patient complains of feeling tired and easy bruising.  Her lab work is also concerning for erythroid Leukoplast take picture as myeloid and nucleated RBCs are high.  Cannot rule out underlying myeloproliferative disorder.  Will repeat CBC with differential, CMP, LDH, uric acid, reticulocyte count and ultrasound abdomen to evaluate for any splenomegaly.  Bone marrow biopsy was strongly recommended, Dr. Huynh discussed above plan with patient's son Jose Guadalupe over phone per patient's request.  Patient and her family want to discuss regarding bone marrow biopsy and call us if they decide to pursue hence orders not placed for bone marrow biopsy.  She is agreeable for lab work and ultrasound, will see her back in 2 weeks.  She knows to call office if she develops any symptoms or have any questions.    - CBC and differential; Future  - Comprehensive metabolic panel; Future  - LD,Blood; Future  - Uric acid; Future  - US abdomen complete; Future  - Retic Count; Future    2. Hairy cell leukemia, in remission (HCC)  No records found in care everywhere or in our system.  Per patient she was treated with hairy cell leukemia about 15 years back in [place].  Will need bone marrow biopsy to further evaluate if it is relapse of leukemia, patient and her family to discuss amongst himself and let us know if they decide to pursue.    - CBC and differential; Future  - Comprehensive metabolic panel; Future  - LD,Blood; Future  - Uric acid; Future  - US abdomen complete; Future  - Retic Count; Future    3. Macrocytic anemia  On chart review patient does have chronic microcytosis now with anemia his hemoglobin is 10.6.  Will obtain vitamin B12, folate and reticulocyte count.  - Retic  Count; Future    4. Tachy-nataliia syndrome (HCC)  Follows with cardiology, reports feeling on and off palpitation.    5. Vitamin B 12 deficiency  6. Folate deficiency  7. Thyroid disease  Persistent macrocytosis, will obtain B12 and folate level to rule out nutritional deficiency.    - Vitamin B12; Future  - Folate; Future  - TSH, 3rd generation with Free T4 reflex; Future     HPI:  Patient is 89-year-old female with past medical history significant for aortic stenosis s/p TAVR, post pacemaker, hypothyroidism on Synthroid, heavy cell leukemia treated in [place] about 15 years back is seen as consultation for thrombocytopenia.  Patient is resident of [facility], and had routine lab work that was remarkable for WBC 8.4, hemoglobin 10.6, , platelets 65, absolute lymphocytes 5.4, myelocytes 3 and nucleated RBCs 16.  She reports feeling tired and energy levels are significantly decreased recently she has also noted easy bruising.  Denies any fever, chills or night sweats.  Denies any changes in appetite or weight.  She has history of hairy cell leukemia diagnosed about 15 years back when she participated in clinical trial in [place] and was treated transiently.  Per patient she was told back then to discontinue treatment as she was in remission.  She is not able to recall what therapy she got, tried to look into care everywhere I was not able to find any records as well.    Oncology History    No history exists.       Interval history:    ROS: Review of Systems   Constitutional:  Positive for fatigue. Negative for appetite change, chills and fever.   Respiratory:  Negative for shortness of breath.    Cardiovascular:  Negative for chest pain.   Gastrointestinal:  Negative for abdominal pain, constipation and diarrhea.   Musculoskeletal:  Negative for arthralgias and myalgias.   Skin:  Negative for color change and rash.   Hematological:  Bruises/bleeds easily.   All other systems reviewed and are  "negative.      Past Medical History:   Diagnosis Date    Aortic stenosis     severe    CAD (coronary artery disease)     Essential tremor     HTN (hypertension)     HTN (hypertension)     Hyperlipidemia     Hypothyroidism     Leukemia, 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HCC)     s/p splenectomy    Osteoarthritis     Osteoporosis     Urinary incontinence        Past Surgical History:   Procedure Laterality Date    COLONOSCOPY      HYSTERECTOMY      [place] REPLACE AORTIC VALVE OPENFEMORAL ARTERY APPROACH N/A 4/12/2016    Procedure: REPLACEMENT AORTIC VALVE TRANSCATHETER (TAVR) TRANSFEMORAL  26mm Lin 3 valve;  Surgeon: Que Thurston DO;  Location: [facility];  Service: Cardiac Surgery    SPLENECTOMY      for hx hairy cell leukemia       Social History     Socioeconomic History    Marital status:      Spouse name: None    Number of children: None    Years of education: None    Highest education level: None   Occupational History    None   Tobacco Use    Smoking status: Former     Types: Cigarettes    Smokeless tobacco: Former   Vaping Use    Vaping status: Never Used   Substance and Sexual Activity    Alcohol use: Yes     Comment: SOCIAL    Drug use: No    Sexual activity: None   Other Topics Concern    None   Social History Narrative    None     Social Determinants of Health     Financial Resource Strain: Not on file   Food Insecurity: Not on file   Transportation Needs: Not on file   Physical Activity: Not on file   Stress: Not on file   Social Connections: Not on file   Intimate Partner Violence: Not on file   Housing Stability: Not on file       Family History   Problem Relation Age of Onset    Other Mother         malignant neoplasm of uterus    Coronary artery disease Father     Heart failure Brother        No Known Allergies      Current Outpatient Medications:     acetaminophen (TYLENOL) 500 mg tablet, Take 1,000 mg by mouth every 8 (eight) hours No more than 3g a day \"As needed\", Disp: , Rfl:     APPLE CIDER VINEGAR PO, " "Take by mouth daily, Disp: , Rfl:     aspirin 81 MG tablet, Take 81 mg by mouth daily, Disp: , Rfl:     Calcium Carbonate-Vitamin D (CALCIUM PLUS VITAMIN D PO), Take by mouth, Disp: , Rfl:     levothyroxine (Euthyrox) 88 mcg tablet, Take 1 tablet (88 mcg total) by mouth daily, Disp: 90 tablet, Rfl: 0    Loperamide HCl (IMODIUM PO), Take 2 mg by mouth as needed.  , Disp: , Rfl:     MELATONIN PO, Take 10 mg by mouth daily at bedtime, Disp: , Rfl:     Multiple Vitamin (MULTIVITAMIN) tablet, Take 1 tablet by mouth daily., Disp: , Rfl:     Polyethyl Glycol-Propyl Glycol (SYSTANE OP), Apply 1 drop to eye 2 (two) times a day EACH EYE TWICE DAILY, Disp: , Rfl:       Physical Exam:  /60 (BP Location: Left arm, Patient Position: Sitting, Cuff Size: Adult)   Pulse 75   Temp 98.2 °F (36.8 °C) (Temporal)   Resp 16   Ht 5' 5\" (1.651 m)   Wt 48.1 kg (106 lb)   SpO2 96%   BMI 17.64 kg/m²     Physical Exam  Constitutional:       Appearance: Normal appearance.   HENT:      Head: Normocephalic and atraumatic.      Mouth/Throat:      Mouth: Mucous membranes are moist.      Pharynx: Oropharynx is clear.   Eyes:      Conjunctiva/sclera: Conjunctivae normal.      Pupils: Pupils are equal, round, and reactive to light.   Cardiovascular:      Rate and Rhythm: Normal rate and regular rhythm.      Pulses: Normal pulses.   Pulmonary:      Effort: Pulmonary effort is normal.      Breath sounds: Normal breath sounds.   Abdominal:      General: Abdomen is flat. Bowel sounds are normal.   Musculoskeletal:      Cervical back: Normal range of motion and neck supple.   Skin:     General: Skin is warm and dry.      Findings: Bruising present.   Neurological:      General: No focal deficit present.      Mental Status: She is alert and oriented to person, place, and time.   Psychiatric:         Mood and Affect: Mood normal.         Behavior: Behavior normal.       Labs:            Patient voiced understanding and agreement in the above " discussion. Aware to contact our office with questions/symptoms in the interim.     This note has been generated by voice recognition software system.  Therefore, there may be spelling, grammar, and or syntax errors. Please contact if questions arise.

## 2024-10-04 NOTE — PATIENT INSTRUCTIONS
Please get your blood work, US abdomen to evaluate splenomegaly.  Bone marrow biopsy reccommended to rule out myeloproliferative disorder.  Dr Huynh discussed with pt's son Luis Alberto over phone regarding plan, they will discuss amongst family and decide about further plan.

## 2024-10-06 PROBLEM — D75.9 BONE MARROW DISEASE: Status: ACTIVE | Noted: 2024-10-06

## 2024-10-07 ENCOUNTER — TELEPHONE (OUTPATIENT)
Dept: HEMATOLOGY ONCOLOGY | Facility: CLINIC | Age: 89
End: 2024-10-07

## 2024-10-07 ENCOUNTER — TELEPHONE (OUTPATIENT)
Age: 89
End: 2024-10-07

## 2024-10-07 NOTE — TELEPHONE ENCOUNTER
Dede CAMARA from [facility] called to ask that we fax the lab orders from Dr. Huynh over to her at [phone]. Faxed per her request.

## 2024-10-08 ENCOUNTER — HOSPITAL ENCOUNTER (OUTPATIENT)
Dept: RADIOLOGY | Age: 89
Discharge: HOME | End: 2024-10-08
Payer: MEDICARE

## 2024-10-08 DIAGNOSIS — C91.41 HAIRY CELL LEUKEMIA, IN REMISSION (HCC): ICD-10-CM

## 2024-10-08 DIAGNOSIS — D69.6 THROMBOCYTOPENIA (HCC): ICD-10-CM

## 2024-10-08 PROCEDURE — 76700 US EXAM ABDOM COMPLETE: CPT

## 2024-10-14 ENCOUNTER — TELEPHONE (OUTPATIENT)
Dept: HEMATOLOGY ONCOLOGY | Facility: CLINIC | Age: 89
End: 2024-10-14

## 2024-10-14 ENCOUNTER — IOP CHECK (OUTPATIENT)
Dept: URBAN - METROPOLITAN AREA CLINIC 6 | Facility: CLINIC | Age: 89
End: 2024-10-14

## 2024-10-14 DIAGNOSIS — H11.31: ICD-10-CM

## 2024-10-14 DIAGNOSIS — H40.1113: ICD-10-CM

## 2024-10-14 DIAGNOSIS — H40.1121: ICD-10-CM

## 2024-10-14 PROCEDURE — 92012 INTRM OPH EXAM EST PATIENT: CPT

## 2024-10-14 ASSESSMENT — VISUAL ACUITY
OU_CC: J2
OS_CC: 20/40-1

## 2024-10-14 ASSESSMENT — TONOMETRY
OS_IOP_MMHG: 13
OD_IOP_MMHG: 54

## 2024-10-14 NOTE — TELEPHONE ENCOUNTER
Left message for patient in regards to obtaining labwork prior to upcoming appointment with Dr. Huynh on 10/18 at 1:00pm.  Advised patient labs are non fasting and in system.  Advised patient to call office back if they are unable to obtain labwork prior to appointment.  [employer] number provided.

## 2024-10-18 ENCOUNTER — OFFICE VISIT (OUTPATIENT)
Dept: HEMATOLOGY ONCOLOGY | Facility: CLINIC | Age: 89
End: 2024-10-18
Payer: MEDICARE

## 2024-10-18 ENCOUNTER — APPOINTMENT (OUTPATIENT)
Dept: LAB | Facility: CLINIC | Age: 89
End: 2024-10-18
Payer: MEDICARE

## 2024-10-18 VITALS
RESPIRATION RATE: 18 BRPM | WEIGHT: 102.5 LBS | HEIGHT: 65 IN | BODY MASS INDEX: 17.08 KG/M2 | TEMPERATURE: 98.2 F | HEART RATE: 82 BPM | SYSTOLIC BLOOD PRESSURE: 120 MMHG | DIASTOLIC BLOOD PRESSURE: 76 MMHG | OXYGEN SATURATION: 92 %

## 2024-10-18 DIAGNOSIS — D53.9 MACROCYTIC ANEMIA: ICD-10-CM

## 2024-10-18 DIAGNOSIS — E53.8 VITAMIN B 12 DEFICIENCY: ICD-10-CM

## 2024-10-18 DIAGNOSIS — C91.41 HAIRY CELL LEUKEMIA, IN REMISSION (HCC): ICD-10-CM

## 2024-10-18 DIAGNOSIS — D69.6 THROMBOCYTOPENIA (HCC): ICD-10-CM

## 2024-10-18 DIAGNOSIS — E07.9 THYROID DISEASE: ICD-10-CM

## 2024-10-18 DIAGNOSIS — D75.9 BONE MARROW DISEASE: Primary | ICD-10-CM

## 2024-10-18 DIAGNOSIS — E53.8 FOLATE DEFICIENCY: ICD-10-CM

## 2024-10-18 DIAGNOSIS — R23.3 EASY BRUISABILITY: ICD-10-CM

## 2024-10-18 DIAGNOSIS — D75.9 BONE MARROW DISEASE: ICD-10-CM

## 2024-10-18 LAB
ALBUMIN SERPL BCG-MCNC: 3.9 G/DL (ref 3.5–5)
ALP SERPL-CCNC: 74 U/L (ref 34–104)
ALT SERPL W P-5'-P-CCNC: 11 U/L (ref 7–52)
ANION GAP SERPL CALCULATED.3IONS-SCNC: 8 MMOL/L (ref 4–13)
APTT PPP: 27 SECONDS (ref 23–34)
AST SERPL W P-5'-P-CCNC: 26 U/L (ref 13–39)
BILIRUB SERPL-MCNC: 0.91 MG/DL (ref 0.2–1)
BUN SERPL-MCNC: 21 MG/DL (ref 5–25)
CALCIUM SERPL-MCNC: 9 MG/DL (ref 8.4–10.2)
CHLORIDE SERPL-SCNC: 105 MMOL/L (ref 96–108)
CO2 SERPL-SCNC: 27 MMOL/L (ref 21–32)
CREAT SERPL-MCNC: 0.63 MG/DL (ref 0.6–1.3)
FIBRINOGEN PPP-MCNC: 323 MG/DL (ref 206–523)
FOLATE SERPL-MCNC: >22.3 NG/ML
GFR SERPL CREATININE-BSD FRML MDRD: 75 ML/MIN/1.73SQ M
GLUCOSE SERPL-MCNC: 73 MG/DL (ref 65–140)
INR PPP: 1.16 (ref 0.85–1.19)
LDH SERPL-CCNC: 266 U/L (ref 140–271)
POTASSIUM SERPL-SCNC: 4.5 MMOL/L (ref 3.5–5.3)
PROT SERPL-MCNC: 7.1 G/DL (ref 6.4–8.4)
PROTHROMBIN TIME: 15.1 SECONDS (ref 12.3–15)
RETICS # AUTO: ABNORMAL 10*3/UL (ref 14097–95744)
RETICS # CALC: 3.95 % (ref 0.37–1.87)
SODIUM SERPL-SCNC: 140 MMOL/L (ref 135–147)
TSH SERPL DL<=0.05 MIU/L-ACNC: 4.76 UIU/ML (ref 0.45–4.5)
URATE SERPL-MCNC: 3.3 MG/DL (ref 2–7.5)
VIT B12 SERPL-MCNC: 534 PG/ML (ref 180–914)

## 2024-10-18 PROCEDURE — 84443 ASSAY THYROID STIM HORMONE: CPT

## 2024-10-18 PROCEDURE — 88185 FLOWCYTOMETRY/TC ADD-ON: CPT | Performed by: INTERNAL MEDICINE

## 2024-10-18 PROCEDURE — 80053 COMPREHEN METABOLIC PANEL: CPT

## 2024-10-18 PROCEDURE — 84550 ASSAY OF BLOOD/URIC ACID: CPT

## 2024-10-18 PROCEDURE — 88184 FLOWCYTOMETRY/ TC 1 MARKER: CPT

## 2024-10-18 PROCEDURE — 36415 COLL VENOUS BLD VENIPUNCTURE: CPT

## 2024-10-18 PROCEDURE — G2211 COMPLEX E/M VISIT ADD ON: HCPCS | Performed by: INTERNAL MEDICINE

## 2024-10-18 PROCEDURE — 99214 OFFICE O/P EST MOD 30 MIN: CPT | Performed by: INTERNAL MEDICINE

## 2024-10-18 PROCEDURE — 85027 COMPLETE CBC AUTOMATED: CPT

## 2024-10-18 PROCEDURE — 82607 VITAMIN B-12: CPT

## 2024-10-18 PROCEDURE — 85384 FIBRINOGEN ACTIVITY: CPT

## 2024-10-18 PROCEDURE — 83615 LACTATE (LD) (LDH) ENZYME: CPT

## 2024-10-18 PROCEDURE — 85730 THROMBOPLASTIN TIME PARTIAL: CPT

## 2024-10-18 PROCEDURE — 85610 PROTHROMBIN TIME: CPT

## 2024-10-18 PROCEDURE — 84439 ASSAY OF FREE THYROXINE: CPT

## 2024-10-18 PROCEDURE — 85045 AUTOMATED RETICULOCYTE COUNT: CPT

## 2024-10-18 PROCEDURE — 82746 ASSAY OF FOLIC ACID SERUM: CPT

## 2024-10-18 PROCEDURE — 85007 BL SMEAR W/DIFF WBC COUNT: CPT

## 2024-10-19 LAB — T4 FREE SERPL-MCNC: 1.05 NG/DL (ref 0.61–1.12)

## 2024-10-20 NOTE — PROGRESS NOTES
"  HPI:   Helen Gaona is a 89 y.o. female .  She is here with her 2 sons.  Her son Clemente was on the speaker phone.  Patient's son Luis Alberto did most of the talking and he wants to be the .  Patient looked somewhat more confused this time and kept asking  the same question again and again what we were going to do and this question pertained  to bone marrow test that I mentioned.  She mentioned that she was feeling more tired.  Has tiredness and weakness and arthritic symptoms.  She states she has good appetite but I noticed that she has lost some weight.  Also there is history of easy bruisability.  Patient has anemia and thrombocytopenia, high MCV and has nucleated RBCs and myelocytes in peripheral blood.  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  History of hysterectomy, pacemaker, osteoporosis, cataract surgeries, hypothyroidism, IBS and insomnia.  Patient was supposed to have additional blood tests that she did not have.  Family was going to decide about bone marrow test.  Family knows that I am suspecting bone marrow disease, something infiltrating the bone marrow.  They wanted to know if she has leukemia but I could not answer that without additional blood tests and bone marrow test.  Final decision was to get additional blood tests today and I could call patient's son Luis Alberto with results and he could tell me whether to proceed with the bone marrow test or not at her age.        Current Outpatient Medications:     acetaminophen (TYLENOL) 500 mg tablet, Take 1,000 mg by mouth every 8 (eight) hours No more than 3g a day \"As needed\", Disp: , Rfl:     APPLE CIDER VINEGAR PO, Take by mouth daily, Disp: , Rfl:     aspirin 81 MG tablet, Take 81 mg by mouth daily, Disp: , Rfl:     Calcium Carbonate-Vitamin D (CALCIUM PLUS VITAMIN D PO), Take by mouth, Disp: , Rfl:     levothyroxine (Euthyrox) 88 mcg tablet, Take 1 tablet (88 mcg total) by mouth daily, Disp: 90 tablet, " "Rfl: 0    Loperamide HCl (IMODIUM PO), Take 2 mg by mouth as needed.  , Disp: , Rfl:     MELATONIN PO, Take 10 mg by mouth daily at bedtime, Disp: , Rfl:     Multiple Vitamin (MULTIVITAMIN) tablet, Take 1 tablet by mouth daily., Disp: , Rfl:     Polyethyl Glycol-Propyl Glycol (SYSTANE OP), Apply 1 drop to eye 2 (two) times a day EACH EYE TWICE DAILY, Disp: , Rfl:     No Known Allergies    Oncology History    No history exists.       ONCOLOGY HISTORY OF PRESENT ILLNESS        Oncology History    No history exists.       ROS:  10/20/24 Reviewed 12 systems: See symptoms in HPI  Presently no other neurological, cardiac, pulmonary, GI and  symptoms other than mentioned in HPI.  Other symptoms are in HPI. No fever, chills, active bleeding, bone pains, skin rash, night sweats, numbness,  claudication and gait problem. No frequent infections.  Not unusually sensitive to heat or cold. No swelling of the ankles. No swollen glands.  Patient is anxious.       /76 (BP Location: Left arm, Patient Position: Sitting, Cuff Size: Adult)   Pulse 82   Temp 98.2 °F (36.8 °C) (Temporal)   Resp 18   Ht 5' 5\" (1.651 m)   Wt 46.5 kg (102 lb 8 oz)   SpO2 92%   BMI 17.06 kg/m²     Physical Exam:  Awake, may be somewhat confused, not in distress, vitals are above, no icterus, no oral thrush, no palpable neck mass, clear lung fields, regular heart rate, systolic murmur ,abdomen  soft and non tender, no palpable abdominal mass, no ascites, no edema of ankles, no calf tenderness, no focal neurological deficit, no skin rash, no palpable lymphadenopathy in the neck and axillary areas, no clubbing.    Patient is anxious.  Performance status 3 .      Pathology Result:        Image Results:   Image result are reviewed and documented in Hematology/Oncology history    US abdomen complete  Narrative: ABDOMEN ULTRASOUND, COMPLETE    INDICATION: D69.6: Thrombocytopenia, unspecified  C91.41: Hairy cell leukemia, in remission.    COMPARISON: " Right upper quadrant abdomen ultrasound 11/12/2008    TECHNIQUE: Real-time ultrasound of the abdomen was performed with a curvilinear transducer with both volumetric sweeps and still imaging techniques.    FINDINGS:    PANCREAS: 6 mm cyst in the distal body..    AORTA AND IVC: Visualized portions are normal for patient age.    LIVER:  Size: Within normal range. The liver measures 15.9 cm in the midclavicular line.  Contour: Surface contour is smooth.  Parenchyma: Echogenicity and echotexture are within normal limits.  No liver mass identified.  Limited imaging of the main portal vein shows it to be patent and hepatopetal.    BILIARY:  The gallbladder is normal in caliber.  No wall thickening or pericholecystic fluid.  No stones or sludge identified.  No sonographic Rojas's sign.  No intrahepatic biliary dilatation.  CBD measures 3.0 mm.  No choledocholithiasis.    KIDNEY:  Right kidney measures 9.7 x 4.9 x 5.0 cm. Volume 123.8 mL  Kidney within normal limits.    Left kidney measures 10.0 x 5.2 x 3.9 cm. Volume 106.8 mL  1.1 cm lower pole simple cyst. Otherwise within normal limits.    SPLEEN:  Prior splenectomy.    ASCITES: None.  Impression: 6 mm pancreatic cyst. Ultrasound follow-up in 12 months may be considered.    Splenectomy.    1.1 cm left renal simple cyst.    Remainder of the examination is normal.    Workstation performed: [id number]              Lab: 10/8/2024  Normal PT, PTT and fibrinogen  LABS: After the visit on 10/18/2024  Comprehensive metabolic panel  Status: Final result     Comprehensive metabolic panel  Order: [id number]   Status: Final result       Visible to patient: Yes (not seen)       Next appt: 11/07/2024 at 12:20 PM in Hematology and Oncology (Jan Huynh MD)       Dx: Thrombocytopenia (HCC); Hairy cell le...    0 Result Notes            Component  Ref Range & Units 10/18/24  2:03 PM 10/24/23  9:48 AM 8/8/23  8:06 AM 11/30/21  8:52 AM 3/4/21  8:43 AM 2/26/21  9:41 AM 2/23/21  8:10 AM    Sodium  135 - 147 mmol/L 140 143 R 141 R 142 R 141 R 143 R 140 R   Potassium  3.5 - 5.3 mmol/L 4.5 4.2 R 4.8 R 4.6 R 4.0 R 4.2 CM 4.1 R   Chloride  96 - 108 mmol/L 105 105 R 103 R 107 R 109 R 113 High  R 107 R   CO2  21 - 32 mmol/L 27 29 R 29 R 30 R 28 R 27 28 R   ANION GAP  4 - 13 mmol/L 8 9 R 9 R 5 R 4 R 3 Low  5 R   BUN  5 - 25 mg/dL 21 21 R 33 High  R 28 High  R 24 R 25 22 R   Creatinine  0.60 - 1.30 mg/dL 0.63 0.79 R 0.82 R 0.79 R 0.72 R 0.75 CM 0.74 R   Comment: Standardized to IDMS reference method   Glucose  65 - 140 mg/dL 73 84 R 83 R 88 R 87 R 88 CM 84 R   Comment: If the patient is fasting, the ADA then defines impaired fasting glucose as > 100 mg/dL and diabetes as > or equal to 123 mg/dL.   Calcium  8.4 - 10.2 mg/dL 9.0 9.8 R 10.0 R 9.6 R 9.3 R 9.1 R 9.3 R   AST  13 - 39 U/L 26 24 R  26 R 16 R  27 R   ALT  7 - 52 U/L 11 16 R  22 R 22 R  21 R   Comment: Specimen collection should occur prior to Sulfasalazine administration due to the potential for falsely depressed results.   Alkaline Phosphatase  34 - 104 U/L 74 53 R  71 R 65 R  61 R   Total Protein  6.4 - 8.4 g/dL 7.1 7.2 R  7.3 R 7.1 R  7.4 R   Albumin  3.5 - 5.0 g/dL 3.9 4.3 R  3.7 R 3.6 R  3.8 R   Total Bilirubin  0.20 - 1.00 mg/dL 0.91 0.8 R, CM  0.6 R, CM 0.8 R, CM  0.9 R, CM   Comment: Use of this assay is not recommended for patients undergoing treatment with eltrombopag due to the potential for falsely elevated results.  N-acetyl-p-benzoquinone imine (metabolite of Acetaminophen) will generate erroneously low results in samples for patients that have taken an overdose of Acetaminophen.   eGFR  ml/min/1.73sq m 75 69 R 66 R   69                    Component  Ref Range & Units 10/18/24  2:03 PM   LD  140 - 271 U/L 266              Specimen Collected: 10/18/24  2:03 PM Last Resulted: 10/18/24  4:37 PM                     Component  Ref Range & Units 10/18/24  2:03 PM   Uric Acid  2.0 - 7.5 mg/dL 3.3   Comment: Specimen collection should occur  prior to Metamizole administration due to the potential for falsely depressed results.                       Component  Ref Range & Units 10/18/24  2:03 PM   Vitamin B-12  180 - 914 pg/mL 534           Component  Ref Range & Units 10/18/24  2:03 PM   Folate  >5.9 ng/mL >22.3            Component  Ref Range & Units 10/18/24  2:03 PM   Retic Ct Abs  14,097 - 95,744 98,800 High    Retic Ct Pct  0.37 - 1.87 % 3.95 High                    Component  Ref Range & Units 10/18/24  2:03 PM 12/8/20 12:50 PM 1/5/16 11:05 AM 6/2/15 12:37 PM 11/25/14  9:43 AM 5/13/14 10:29 AM   TSH 3RD GENERATON  0.450 - 4.500 uIU/mL 4.760 High  3.060 R, CM 3.540 R, CM 3.359 R, CM 3.896 R, CM 2.882 R, CM   Comment:         Pending results of CBCD and leukemia/lymphoma flow.          Reviewed available test results and discussed with patient.    Assessment and plan: See diagnoses, orders and instructions below.  Helen Gaona is a 89 y.o. female .  She is here with her 2 sons.  Her son Clemente was on the speaker phone.  Patient's son Luis Alberto did most of the talking and he wants to be the .  Patient looked somewhat more confused this time and kept asking  the same question again and again what we were going to do and this question pertained  to bone marrow test that I mentioned.  She mentioned that she was feeling more tired.  Has tiredness and weakness and arthritic symptoms.  She states she has good appetite but I noticed that she has lost some weight.  Also there is history of easy bruisability.  Patient has anemia and thrombocytopenia, high MCV and has nucleated RBCs and myelocytes in peripheral blood.  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  History of hysterectomy, pacemaker, osteoporosis, cataract surgeries, hypothyroidism, IBS and insomnia.  Patient was supposed to have additional blood tests that she did not have.  Family was going to decide about bone marrow test.  Family knows that I am  suspecting bone marrow disease, something infiltrating the bone marrow.  They wanted to know if she has leukemia but I could not answer that without additional blood tests and bone marrow test.  Final decision was to get additional blood tests today and I could call patient's son Luis Alberto with results and he could tell me whether to proceed with the bone marrow test or not at her age.  Physical examination and available test results are as recorded.  Patient will have additional blood tests and that we will determine need for bone marrow test at her age.  I will be discussing blood test results with patient's son Luis Alberto.  He is a .  1. Bone marrow disease    - Leukemia/Lymphoma flow cytometry; Future    2. Thrombocytopenia (HCC)    - Leukemia/Lymphoma flow cytometry; Future    3. Hairy cell leukemia, in remission (HCC)      4. Macrocytic anemia      Please get your blood work now and follow up in office in 2 weeks.    Discussed healthy diet.  Patient to avoid falls and trauma.  Patient needs assistance in her care.  Goal is to find out more about her hematological picture.       .  Patient will continue to follow with  primary physician and other consultants.  Patient  voiced understanding and agrees      Disclaimer: I used a dictation device to dictate this note and there could be mistakes in my note and for that patient's family may contact my office    Counseling / Coordination of Care   .  Provided counseling and support

## 2024-10-20 NOTE — H&P (VIEW-ONLY)
"  HPI:   Helen Gaona is a 89 y.o. female .  She is here with her 2 sons.  Her son Clemente was on the speaker phone.  Patient's son Luis Alberto did most of the talking and he wants to be the .  Patient looked somewhat more confused this time and kept asking  the same question again and again what we were going to do and this question pertained  to bone marrow test that I mentioned.  She mentioned that she was feeling more tired.  Has tiredness and weakness and arthritic symptoms.  She states she has good appetite but I noticed that she has lost some weight.  Also there is history of easy bruisability.  Patient has anemia and thrombocytopenia, high MCV and has nucleated RBCs and myelocytes in peripheral blood.  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  History of hysterectomy, pacemaker, osteoporosis, cataract surgeries, hypothyroidism, IBS and insomnia.  Patient was supposed to have additional blood tests that she did not have.  Family was going to decide about bone marrow test.  Family knows that I am suspecting bone marrow disease, something infiltrating the bone marrow.  They wanted to know if she has leukemia but I could not answer that without additional blood tests and bone marrow test.  Final decision was to get additional blood tests today and I could call patient's son Luis Alberto with results and he could tell me whether to proceed with the bone marrow test or not at her age.        Current Outpatient Medications:     acetaminophen (TYLENOL) 500 mg tablet, Take 1,000 mg by mouth every 8 (eight) hours No more than 3g a day \"As needed\", Disp: , Rfl:     APPLE CIDER VINEGAR PO, Take by mouth daily, Disp: , Rfl:     aspirin 81 MG tablet, Take 81 mg by mouth daily, Disp: , Rfl:     Calcium Carbonate-Vitamin D (CALCIUM PLUS VITAMIN D PO), Take by mouth, Disp: , Rfl:     levothyroxine (Euthyrox) 88 mcg tablet, Take 1 tablet (88 mcg total) by mouth daily, Disp: 90 tablet, " "Rfl: 0    Loperamide HCl (IMODIUM PO), Take 2 mg by mouth as needed.  , Disp: , Rfl:     MELATONIN PO, Take 10 mg by mouth daily at bedtime, Disp: , Rfl:     Multiple Vitamin (MULTIVITAMIN) tablet, Take 1 tablet by mouth daily., Disp: , Rfl:     Polyethyl Glycol-Propyl Glycol (SYSTANE OP), Apply 1 drop to eye 2 (two) times a day EACH EYE TWICE DAILY, Disp: , Rfl:     No Known Allergies    Oncology History    No history exists.       ONCOLOGY HISTORY OF PRESENT ILLNESS        Oncology History    No history exists.       ROS:  10/20/24 Reviewed 12 systems: See symptoms in HPI  Presently no other neurological, cardiac, pulmonary, GI and  symptoms other than mentioned in HPI.  Other symptoms are in HPI. No fever, chills, active bleeding, bone pains, skin rash, night sweats, numbness,  claudication and gait problem. No frequent infections.  Not unusually sensitive to heat or cold. No swelling of the ankles. No swollen glands.  Patient is anxious.       /76 (BP Location: Left arm, Patient Position: Sitting, Cuff Size: Adult)   Pulse 82   Temp 98.2 °F (36.8 °C) (Temporal)   Resp 18   Ht 5' 5\" (1.651 m)   Wt 46.5 kg (102 lb 8 oz)   SpO2 92%   BMI 17.06 kg/m²     Physical Exam:  Awake, may be somewhat confused, not in distress, vitals are above, no icterus, no oral thrush, no palpable neck mass, clear lung fields, regular heart rate, systolic murmur ,abdomen  soft and non tender, no palpable abdominal mass, no ascites, no edema of ankles, no calf tenderness, no focal neurological deficit, no skin rash, no palpable lymphadenopathy in the neck and axillary areas, no clubbing.    Patient is anxious.  Performance status 3 .      Pathology Result:        Image Results:   Image result are reviewed and documented in Hematology/Oncology history    US abdomen complete  Narrative: ABDOMEN ULTRASOUND, COMPLETE    INDICATION: D69.6: Thrombocytopenia, unspecified  C91.41: Hairy cell leukemia, in remission.    COMPARISON: " Right upper quadrant abdomen ultrasound 11/12/2008    TECHNIQUE: Real-time ultrasound of the abdomen was performed with a curvilinear transducer with both volumetric sweeps and still imaging techniques.    FINDINGS:    PANCREAS: 6 mm cyst in the distal body..    AORTA AND IVC: Visualized portions are normal for patient age.    LIVER:  Size: Within normal range. The liver measures 15.9 cm in the midclavicular line.  Contour: Surface contour is smooth.  Parenchyma: Echogenicity and echotexture are within normal limits.  No liver mass identified.  Limited imaging of the main portal vein shows it to be patent and hepatopetal.    BILIARY:  The gallbladder is normal in caliber.  No wall thickening or pericholecystic fluid.  No stones or sludge identified.  No sonographic Rojas's sign.  No intrahepatic biliary dilatation.  CBD measures 3.0 mm.  No choledocholithiasis.    KIDNEY:  Right kidney measures 9.7 x 4.9 x 5.0 cm. Volume 123.8 mL  Kidney within normal limits.    Left kidney measures 10.0 x 5.2 x 3.9 cm. Volume 106.8 mL  1.1 cm lower pole simple cyst. Otherwise within normal limits.    SPLEEN:  Prior splenectomy.    ASCITES: None.  Impression: 6 mm pancreatic cyst. Ultrasound follow-up in 12 months may be considered.    Splenectomy.    1.1 cm left renal simple cyst.    Remainder of the examination is normal.    Workstation performed: [id number]              Lab: 10/8/2024  Normal PT, PTT and fibrinogen  LABS: After the visit on 10/18/2024  Comprehensive metabolic panel  Status: Final result     Comprehensive metabolic panel  Order: [id number]   Status: Final result       Visible to patient: Yes (not seen)       Next appt: 11/07/2024 at 12:20 PM in Hematology and Oncology (Jan Huynh MD)       Dx: Thrombocytopenia (HCC); Hairy cell le...    0 Result Notes            Component  Ref Range & Units 10/18/24  2:03 PM 10/24/23  9:48 AM 8/8/23  8:06 AM 11/30/21  8:52 AM 3/4/21  8:43 AM 2/26/21  9:41 AM 2/23/21  8:10 AM    Sodium  135 - 147 mmol/L 140 143 R 141 R 142 R 141 R 143 R 140 R   Potassium  3.5 - 5.3 mmol/L 4.5 4.2 R 4.8 R 4.6 R 4.0 R 4.2 CM 4.1 R   Chloride  96 - 108 mmol/L 105 105 R 103 R 107 R 109 R 113 High  R 107 R   CO2  21 - 32 mmol/L 27 29 R 29 R 30 R 28 R 27 28 R   ANION GAP  4 - 13 mmol/L 8 9 R 9 R 5 R 4 R 3 Low  5 R   BUN  5 - 25 mg/dL 21 21 R 33 High  R 28 High  R 24 R 25 22 R   Creatinine  0.60 - 1.30 mg/dL 0.63 0.79 R 0.82 R 0.79 R 0.72 R 0.75 CM 0.74 R   Comment: Standardized to IDMS reference method   Glucose  65 - 140 mg/dL 73 84 R 83 R 88 R 87 R 88 CM 84 R   Comment: If the patient is fasting, the ADA then defines impaired fasting glucose as > 100 mg/dL and diabetes as > or equal to 123 mg/dL.   Calcium  8.4 - 10.2 mg/dL 9.0 9.8 R 10.0 R 9.6 R 9.3 R 9.1 R 9.3 R   AST  13 - 39 U/L 26 24 R  26 R 16 R  27 R   ALT  7 - 52 U/L 11 16 R  22 R 22 R  21 R   Comment: Specimen collection should occur prior to Sulfasalazine administration due to the potential for falsely depressed results.   Alkaline Phosphatase  34 - 104 U/L 74 53 R  71 R 65 R  61 R   Total Protein  6.4 - 8.4 g/dL 7.1 7.2 R  7.3 R 7.1 R  7.4 R   Albumin  3.5 - 5.0 g/dL 3.9 4.3 R  3.7 R 3.6 R  3.8 R   Total Bilirubin  0.20 - 1.00 mg/dL 0.91 0.8 R, CM  0.6 R, CM 0.8 R, CM  0.9 R, CM   Comment: Use of this assay is not recommended for patients undergoing treatment with eltrombopag due to the potential for falsely elevated results.  N-acetyl-p-benzoquinone imine (metabolite of Acetaminophen) will generate erroneously low results in samples for patients that have taken an overdose of Acetaminophen.   eGFR  ml/min/1.73sq m 75 69 R 66 R   69                    Component  Ref Range & Units 10/18/24  2:03 PM   LD  140 - 271 U/L 266              Specimen Collected: 10/18/24  2:03 PM Last Resulted: 10/18/24  4:37 PM                     Component  Ref Range & Units 10/18/24  2:03 PM   Uric Acid  2.0 - 7.5 mg/dL 3.3   Comment: Specimen collection should occur  prior to Metamizole administration due to the potential for falsely depressed results.                       Component  Ref Range & Units 10/18/24  2:03 PM   Vitamin B-12  180 - 914 pg/mL 534           Component  Ref Range & Units 10/18/24  2:03 PM   Folate  >5.9 ng/mL >22.3            Component  Ref Range & Units 10/18/24  2:03 PM   Retic Ct Abs  14,097 - 95,744 98,800 High    Retic Ct Pct  0.37 - 1.87 % 3.95 High                    Component  Ref Range & Units 10/18/24  2:03 PM 12/8/20 12:50 PM 1/5/16 11:05 AM 6/2/15 12:37 PM 11/25/14  9:43 AM 5/13/14 10:29 AM   TSH 3RD GENERATON  0.450 - 4.500 uIU/mL 4.760 High  3.060 R, CM 3.540 R, CM 3.359 R, CM 3.896 R, CM 2.882 R, CM   Comment:         Pending results of CBCD and leukemia/lymphoma flow.          Reviewed available test results and discussed with patient.    Assessment and plan: See diagnoses, orders and instructions below.  Helen Gaona is a 89 y.o. female .  She is here with her 2 sons.  Her son Clemente was on the speaker phone.  Patient's son Luis Alberto did most of the talking and he wants to be the .  Patient looked somewhat more confused this time and kept asking  the same question again and again what we were going to do and this question pertained  to bone marrow test that I mentioned.  She mentioned that she was feeling more tired.  Has tiredness and weakness and arthritic symptoms.  She states she has good appetite but I noticed that she has lost some weight.  Also there is history of easy bruisability.  Patient has anemia and thrombocytopenia, high MCV and has nucleated RBCs and myelocytes in peripheral blood.  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  History of hysterectomy, pacemaker, osteoporosis, cataract surgeries, hypothyroidism, IBS and insomnia.  Patient was supposed to have additional blood tests that she did not have.  Family was going to decide about bone marrow test.  Family knows that I am  suspecting bone marrow disease, something infiltrating the bone marrow.  They wanted to know if she has leukemia but I could not answer that without additional blood tests and bone marrow test.  Final decision was to get additional blood tests today and I could call patient's son Luis Alberto with results and he could tell me whether to proceed with the bone marrow test or not at her age.  Physical examination and available test results are as recorded.  Patient will have additional blood tests and that we will determine need for bone marrow test at her age.  I will be discussing blood test results with patient's son Luis Alberto.  He is a .  1. Bone marrow disease    - Leukemia/Lymphoma flow cytometry; Future    2. Thrombocytopenia (HCC)    - Leukemia/Lymphoma flow cytometry; Future    3. Hairy cell leukemia, in remission (HCC)      4. Macrocytic anemia      Please get your blood work now and follow up in office in 2 weeks.    Discussed healthy diet.  Patient to avoid falls and trauma.  Patient needs assistance in her care.  Goal is to find out more about her hematological picture.       .  Patient will continue to follow with  primary physician and other consultants.  Patient  voiced understanding and agrees      Disclaimer: I used a dictation device to dictate this note and there could be mistakes in my note and for that patient's family may contact my office    Counseling / Coordination of Care   .  Provided counseling and support

## 2024-10-21 ENCOUNTER — TELEPHONE (OUTPATIENT)
Age: 89
End: 2024-10-21

## 2024-10-21 LAB
ANISOCYTOSIS BLD QL SMEAR: PRESENT
BASOPHILS # BLD MANUAL: 0 THOUSAND/UL (ref 0–0.1)
BASOPHILS NFR MAR MANUAL: 0 % (ref 0–1)
DIFFERENTIAL COMMENT: ABNORMAL
EOSINOPHIL # BLD MANUAL: 0.1 THOUSAND/UL (ref 0–0.4)
EOSINOPHIL NFR BLD MANUAL: 1 % (ref 0–6)
ERYTHROCYTE [DISTWIDTH] IN BLOOD BY AUTOMATED COUNT: 20.3 % (ref 11.6–15.1)
HCT VFR BLD AUTO: 30.2 % (ref 34.8–46.1)
HGB BLD-MCNC: 9.6 G/DL (ref 11.5–15.4)
HOWELL-JOLLY BOD BLD QL SMEAR: PRESENT
LYMPHOCYTES # BLD AUTO: 5.72 THOUSAND/UL (ref 0.6–4.47)
LYMPHOCYTES # BLD AUTO: 53 % (ref 14–44)
MACROCYTES BLD QL AUTO: PRESENT
MCH RBC QN AUTO: 38.4 PG (ref 26.8–34.3)
MCHC RBC AUTO-ENTMCNC: 31.8 G/DL (ref 31.4–37.4)
MCV RBC AUTO: 121 FL (ref 82–98)
METAMYELOCYTE ABSOLUTE CT: 0.39 THOUSAND/UL (ref 0–0.1)
METAMYELOCYTES NFR BLD MANUAL: 4 % (ref 0–1)
MONOCYTES # BLD AUTO: 0.19 THOUSAND/UL (ref 0–1.22)
MONOCYTES NFR BLD: 2 % (ref 4–12)
MYELOCYTE ABSOLUTE CT: 0.1 THOUSAND/UL (ref 0–0.1)
MYELOCYTES NFR BLD MANUAL: 1 % (ref 0–1)
NEUTROPHILS # BLD MANUAL: 3.2 THOUSAND/UL (ref 1.85–7.62)
NEUTS BAND NFR BLD MANUAL: 12 % (ref 0–8)
NEUTS SEG NFR BLD AUTO: 21 % (ref 43–75)
NRBC BLD AUTO-RTO: 48 /100 WBC (ref 0–2)
OVALOCYTES BLD QL SMEAR: PRESENT
PATHOLOGY REVIEW: YES
PLATELET # BLD AUTO: 53 THOUSANDS/UL (ref 149–390)
PLATELET BLD QL SMEAR: ABNORMAL
PMV BLD AUTO: 11.1 FL (ref 8.9–12.7)
POIKILOCYTOSIS BLD QL SMEAR: PRESENT
POLYCHROMASIA BLD QL SMEAR: PRESENT
RBC # BLD AUTO: 2.5 MILLION/UL (ref 3.81–5.12)
RBC MORPH BLD: PRESENT
SCAN RESULT: NORMAL
TARGETS BLD QL SMEAR: PRESENT
VARIANT LYMPHS # BLD AUTO: 6 %
WBC # BLD AUTO: 9.69 THOUSAND/UL (ref 4.31–10.16)

## 2024-10-21 PROCEDURE — 85060 BLOOD SMEAR INTERPRETATION: CPT | Performed by: PATHOLOGY

## 2024-10-21 NOTE — TELEPHONE ENCOUNTER
Patient wants to go through with the bone marrow biopsy that Dr Huynh suggested.  Please call Larry to discuss next steps

## 2024-10-22 ENCOUNTER — TELEPHONE (OUTPATIENT)
Dept: HEMATOLOGY ONCOLOGY | Facility: CLINIC | Age: 89
End: 2024-10-22

## 2024-10-22 ENCOUNTER — TELEPHONE (OUTPATIENT)
Age: 89
End: 2024-10-22

## 2024-10-22 DIAGNOSIS — D75.9 BONE MARROW DISEASE: ICD-10-CM

## 2024-10-22 DIAGNOSIS — D47.9 LYMPHOPROLIFERATIVE DISORDER (HCC): Primary | ICD-10-CM

## 2024-10-22 DIAGNOSIS — D59.19 OTHER AUTOIMMUNE HEMOLYTIC ANEMIA (HCC): ICD-10-CM

## 2024-10-22 DIAGNOSIS — D53.9 MACROCYTIC ANEMIA: ICD-10-CM

## 2024-10-22 DIAGNOSIS — C91.41 HAIRY CELL LEUKEMIA, IN REMISSION (HCC): ICD-10-CM

## 2024-10-22 DIAGNOSIS — D69.3 ACUTE ITP (HCC): ICD-10-CM

## 2024-10-22 DIAGNOSIS — R23.3 EASY BRUISABILITY: ICD-10-CM

## 2024-10-22 RX ORDER — PREDNISONE 20 MG/1
20 TABLET ORAL 2 TIMES DAILY WITH MEALS
Qty: 30 TABLET | Refills: 1 | Status: SHIPPED | OUTPATIENT
Start: 2024-10-22

## 2024-10-22 RX ORDER — FAMOTIDINE 20 MG/1
20 TABLET, FILM COATED ORAL DAILY
Qty: 30 TABLET | Refills: 1 | Status: SHIPPED | OUTPATIENT
Start: 2024-10-22

## 2024-10-22 NOTE — TELEPHONE ENCOUNTER
Call received from patients son Luis Alberto. Questioning what patients next steps are after her Bone marrow tests. Informed son that as of now we are waiting for that to be done for Dr Huynh to determine her plan going forward and would need those results first. Luis Alberto voiced frustration and stated that is not answering his question and that he heard this from Dr Huynh already, and repeated that he wants to know what is going to be done after the bone marrow test. Advised him again that this will be determined by Dr Huynh after he gets the results from her procedure, due to him needing those results to come up with the next step. Mentioned that patient will be getting the bone marrow tests.  Luis Alberto stated he is frustrated and upset that he is not getting the answers he would like and ended the phone call.

## 2024-10-22 NOTE — TELEPHONE ENCOUNTER
I spoke with the patient's son Larry.  He had called.  Also I called the patient's son Luis Alberto and left message on his cell phone and requested return phone.  I left my name and office phone number.  I explained to patient's son Larry that there could be 2 processes going on and they could be related to each other.  She could have infiltrated bone marrow and also she could have immune destruction of platelets secondary to what is going on in the bone marrow.  She could also have some hemolysis.  And explained these to him.  He okayed for me to go ahead and order more blood tests and prednisone.  He is leaning towards getting bone marrow test done.  He is going to speak with his brothers to make it final and get back to me.  See orders in epic.

## 2024-10-23 ENCOUNTER — TELEPHONE (OUTPATIENT)
Dept: HEMATOLOGY ONCOLOGY | Facility: CLINIC | Age: 89
End: 2024-10-23

## 2024-10-23 DIAGNOSIS — C91.42 HAIRY CELL LEUKEMIA, IN RELAPSE (HCC): Primary | ICD-10-CM

## 2024-10-23 DIAGNOSIS — D61.82 MYELOPHTHISIC ANEMIA (HCC): ICD-10-CM

## 2024-10-23 DIAGNOSIS — D69.6 THROMBOCYTOPENIA (HCC): ICD-10-CM

## 2024-10-23 NOTE — TELEPHONE ENCOUNTER
Left voicemail for patient's son for Dr. Huynh.  Advised Dr. Huynh has ordered labwork for his mother as well as a prescription for Prednisone.  Also asked Luis Alberto to return call to let Dr. Hyunh know of decision of discussion of the Bone Marrow Testing.  Hope line number provided.

## 2024-10-23 NOTE — TELEPHONE ENCOUNTER
I called patient's son Luis Alberto and left my name and office phone number for return call.  I will try to call him again later today or tomorrow morning.  I am not working tomorrow and the day after.  I wanted him to know that blood work favors a recent leukemia.  I spoke with patient's son Larry and explained about to him and he said to put in the referral for bone marrow test.  He said he has spoken with his mother couple of times and she wants to have it done.  He said he will speak with his brother Luis Alberto.

## 2024-10-24 ENCOUNTER — TELEPHONE (OUTPATIENT)
Age: 89
End: 2024-10-24

## 2024-10-24 ENCOUNTER — TELEPHONE (OUTPATIENT)
Dept: HEMATOLOGY ONCOLOGY | Facility: CLINIC | Age: 89
End: 2024-10-24

## 2024-10-24 NOTE — TELEPHONE ENCOUNTER
"Spoke with patient's son Luis Alberto. I informed him the blood work can be completed at any time and the orders are in place and IR will reach out to schedule bone marrow test. He wanted to know the results of 10/18 labs -  Dr. Huynh tried to reach him yesterday to further discuss and spoke to Larry. Dr. Huynh stated \"blood work favors a recent leukemia. I spoke with patient's son Larry and explained about to him and he said to put in the referral for bone marrow test.\"    Luis Alberto informed me his mother has stated that she does not want to live longer than she already it and she agreed to bone marrow test to \"get it over with and be done with it\". I let him know it is ultimately up to her and what she would like to do. She is not in any pain currently. Luis Alberto wants to have a family meeting with Larry and his mother to discuss what Helen would like to do and her plan before scheduling with IR. He will call back after decision has been made to move forward with testing or not. She will still have labs completed.   "

## 2024-10-24 NOTE — TELEPHONE ENCOUNTER
Patient's son Larry called to follow up on scheduling the bone marrow test that was discussed with Dr Huynh yesterday. He wanted to know if he should call to schedule it, or if the office will be scheduling it for Mum. He would also like to know when Mum should get the blood tests that Dr Huynh ordered for her. Please call Larry back on  to regarding bone marrow test and with recommendations on when to get blood tests.

## 2024-10-24 NOTE — TELEPHONE ENCOUNTER
Please advise patient's son that blood work can be completed at any time  IR will reach out directly to schedule the bone marrow test  Order was placed on 10/22 I believe and it is still pending review

## 2024-10-24 NOTE — TELEPHONE ENCOUNTER
Left message for patient's son, Luis Alberto, to call back so we can relay the information because he is listed on the communication form. [employer] number provided.

## 2024-10-24 NOTE — TELEPHONE ENCOUNTER
I called  patient's son Luis Alberto again today and left message that I called him yesterday and day before and left messages.  I also mentioned that I have been in touch with his brother Larry and he may get more information from his brother.  I told him I am not working today and I won't be working tomorrow but I will try to call him again tomorrow.  I left my name.

## 2024-10-28 ENCOUNTER — TELEPHONE (OUTPATIENT)
Dept: INTERVENTIONAL RADIOLOGY/VASCULAR | Facility: HOSPITAL | Age: 89
End: 2024-10-28

## 2024-10-28 RX ORDER — SODIUM CHLORIDE 9 MG/ML
75 INJECTION, SOLUTION INTRAVENOUS CONTINUOUS
Status: CANCELLED | OUTPATIENT
Start: 2024-10-28

## 2024-10-29 ENCOUNTER — TELEPHONE (OUTPATIENT)
Dept: HEMATOLOGY ONCOLOGY | Facility: CLINIC | Age: 89
End: 2024-10-29

## 2024-10-29 NOTE — TELEPHONE ENCOUNTER
I called patient's son Luis Alberto again today to give him an update on his mother's condition about hairy cell leukemia.  I have been in touch with patient's son Larry.  I left my name and office phone number for Luis Alberto.

## 2024-11-01 ENCOUNTER — TELEPHONE (OUTPATIENT)
Age: 89
End: 2024-11-01

## 2024-11-01 DIAGNOSIS — C91.42 HAIRY CELL LEUKEMIA, IN RELAPSE (HCC): Primary | ICD-10-CM

## 2024-11-01 NOTE — TELEPHONE ENCOUNTER
Call received from [facility]. Questioning if patients prednisone prescription was to be ordered daily, since the prescription they have right now is only good for 7 days. Requesting this order to be fixed and send in a new prescription to them.

## 2024-11-01 NOTE — TELEPHONE ENCOUNTER
"Called Larry to communicate information from Dr. Huynh:    \"The appt on 11/7 is OK to keep. Dr. Huynh would like a CBC w/ diff, CMP, and LDH on Monday 11/4 (orders placed). Her prednisone dose will now be 10mg once daily and I am faxing it to the pharmacy now. - OK to have labs done today if patient is going to lab.\"    Larry did not  call, voicemail left with call back information for return call to review.  "

## 2024-11-01 NOTE — TELEPHONE ENCOUNTER
[facility], [facility] called and reported that pt will be getting the prescription 10 mg prescription there once order because they blister pack the medication for her. If you can change it back to send to [facility] in [place]. Thanks!

## 2024-11-01 NOTE — TELEPHONE ENCOUNTER
"Patient's son Larry calling in, not on communication consent. Advised that I would be able to collect information but cannot give any information out. Patient will need to fill out updated communication consent either through [employer] or in person. Son verbalized understanding.    Son Larry explained that patient has a bone marrow biopsy scheduled for 11/11 to determine possible recurrence of Hairy Cell Leukemia and to determine future plan for possible treatment. Patient was originally scheduled for 11/5 but the procedure was rescheduled to 11/11. Patient has appointment with Dr. Huynh on 11/7. Larry is wondering if biopsy and/or appointment with Dr. Huynh can be moved up earlier, as he is anxious to get treatment started as soon as possible if warranted. If biopsy cannot be moved up, Larry is wondering if Dr. Huynh will be able to discuss options for treatment without biopsy results, based on anticipated results of biopsy at appointment on 11/7 so a potential plan can be made.    Larry verbalizes that the patient has been in significant pain for which she has been prescribed Prednisone approximately a week and a half ago. She started having home health aids come to the house when Prednisone was prescribed to help with medication management. This has changed her sleep/wake schedule and routine and Larry has noted that she has been more confused, run down, and exhausted. He states that she is alert and oriented to person, place, time,  but not always situation. States this tends to be worse at night. Stating things such as \"I have to go get my suitcase to leave\" but there is no suitcase and she is not going anywhere. Also asking where her son (who lives in [place]) is and when he will be there.    Helen joined the call towards the end and did give verbal consent to speak with Larry over the phone about her care after I confirmed her personal identifying information.     Larry would like to see " if there is any way to expedite her diagnosis and treatment start to be seen earlier or if there is any concern with her increased fatigue and episodes of confusion. He would appreciate a call back at [phone] to discuss after reviewing with Dr. Huynh, and states he can get Helen on the line at that time to give a new verbal consent to discuss her information.     Patient requesting call transfer to IR to ask himself if biopsy date can be moved up. Call transferred to IR.

## 2024-11-02 RX ORDER — PREDNISONE 10 MG/1
10 TABLET ORAL DAILY
Qty: 20 TABLET | Refills: 0 | Status: SHIPPED | OUTPATIENT
Start: 2024-11-02 | End: 2024-11-04 | Stop reason: SDUPTHER

## 2024-11-04 ENCOUNTER — APPOINTMENT (OUTPATIENT)
Dept: LAB | Facility: CLINIC | Age: 89
End: 2024-11-04
Payer: MEDICARE

## 2024-11-04 DIAGNOSIS — C91.42 HAIRY CELL LEUKEMIA, IN RELAPSE (HCC): ICD-10-CM

## 2024-11-04 DIAGNOSIS — D75.9 ARTHROPATHY ASSOCIATED WITH A HEMATOLOGICAL DISORDER: ICD-10-CM

## 2024-11-04 DIAGNOSIS — D59.19 ANTIBODY-MEDIATED ANEMIA (HCC): ICD-10-CM

## 2024-11-04 DIAGNOSIS — D47.9 ATYPICAL LYMPHOPROLIFERATIVE DISORDER (HCC): ICD-10-CM

## 2024-11-04 DIAGNOSIS — D47.9 LYMPHOPROLIFERATIVE DISORDER (HCC): ICD-10-CM

## 2024-11-04 DIAGNOSIS — D69.3 ACUTE ITP (HCC): ICD-10-CM

## 2024-11-04 DIAGNOSIS — M36.3 ARTHROPATHY ASSOCIATED WITH A HEMATOLOGICAL DISORDER: ICD-10-CM

## 2024-11-04 DIAGNOSIS — D69.3 IMMUNE THROMBOCYTOPENIC PURPURA (HCC): ICD-10-CM

## 2024-11-04 DIAGNOSIS — D75.9 BONE MARROW DISEASE: ICD-10-CM

## 2024-11-04 DIAGNOSIS — D59.19 OTHER AUTOIMMUNE HEMOLYTIC ANEMIA (HCC): ICD-10-CM

## 2024-11-04 LAB
ALBUMIN SERPL BCG-MCNC: 4 G/DL (ref 3.5–5)
ALP SERPL-CCNC: 58 U/L (ref 34–104)
ALT SERPL W P-5'-P-CCNC: 22 U/L (ref 7–52)
ANA SER QL IA: NEGATIVE
ANION GAP SERPL CALCULATED.3IONS-SCNC: 6 MMOL/L (ref 4–13)
ANISOCYTOSIS BLD QL SMEAR: PRESENT
AST SERPL W P-5'-P-CCNC: 30 U/L (ref 13–39)
BASOPHILS # BLD MANUAL: 0 THOUSAND/UL (ref 0–0.1)
BASOPHILS NFR MAR MANUAL: 0 % (ref 0–1)
BILIRUB SERPL-MCNC: 1.04 MG/DL (ref 0.2–1)
BUN SERPL-MCNC: 30 MG/DL (ref 5–25)
CALCIUM SERPL-MCNC: 9.3 MG/DL (ref 8.4–10.2)
CHLORIDE SERPL-SCNC: 101 MMOL/L (ref 96–108)
CO2 SERPL-SCNC: 31 MMOL/L (ref 21–32)
CREAT SERPL-MCNC: 0.78 MG/DL (ref 0.6–1.3)
DAT POLY-SP REAG RBC QL: NEGATIVE
EOSINOPHIL # BLD MANUAL: 0 THOUSAND/UL (ref 0–0.4)
EOSINOPHIL NFR BLD MANUAL: 0 % (ref 0–6)
ERYTHROCYTE [DISTWIDTH] IN BLOOD BY AUTOMATED COUNT: 22.1 % (ref 11.6–15.1)
ERYTHROCYTE [SEDIMENTATION RATE] IN BLOOD: 12 MM/HOUR (ref 0–29)
GFR SERPL CREATININE-BSD FRML MDRD: 64 ML/MIN/1.73SQ M
GLUCOSE SERPL-MCNC: 155 MG/DL (ref 65–140)
HCT VFR BLD AUTO: 32 % (ref 34.8–46.1)
HGB BLD-MCNC: 10.2 G/DL (ref 11.5–15.4)
LDH SERPL-CCNC: 325 U/L (ref 140–271)
LYMPHOCYTES # BLD AUTO: 2.01 THOUSAND/UL (ref 0.6–4.47)
LYMPHOCYTES # BLD AUTO: 22 % (ref 14–44)
MACROCYTES BLD QL AUTO: PRESENT
MCH RBC QN AUTO: 39.4 PG (ref 26.8–34.3)
MCHC RBC AUTO-ENTMCNC: 31.9 G/DL (ref 31.4–37.4)
MCV RBC AUTO: 124 FL (ref 82–98)
MONOCYTES # BLD AUTO: 1.31 THOUSAND/UL (ref 0–1.22)
MONOCYTES NFR BLD: 15 % (ref 4–12)
MYELOCYTE ABSOLUTE CT: 0.09 THOUSAND/UL (ref 0–0.1)
MYELOCYTES NFR BLD MANUAL: 1 % (ref 0–1)
NEUTROPHILS # BLD MANUAL: 5.33 THOUSAND/UL (ref 1.85–7.62)
NEUTS BAND NFR BLD MANUAL: 5 % (ref 0–8)
NEUTS SEG NFR BLD AUTO: 56 % (ref 43–75)
NRBC BLD AUTO-RTO: 42 /100 WBC (ref 0–2)
PLATELET # BLD AUTO: 44 THOUSANDS/UL (ref 149–390)
PLATELET BLD QL SMEAR: ABNORMAL
PMV BLD AUTO: 12 FL (ref 8.9–12.7)
POIKILOCYTOSIS BLD QL SMEAR: PRESENT
POLYCHROMASIA BLD QL SMEAR: PRESENT
POTASSIUM SERPL-SCNC: 4.3 MMOL/L (ref 3.5–5.3)
PROT SERPL-MCNC: 6.8 G/DL (ref 6.4–8.4)
RBC # BLD AUTO: 2.59 MILLION/UL (ref 3.81–5.12)
RBC MORPH BLD: PRESENT
SODIUM SERPL-SCNC: 138 MMOL/L (ref 135–147)
SPHEROCYTES BLD QL SMEAR: PRESENT
TARGETS BLD QL SMEAR: PRESENT
VARIANT LYMPHS # BLD AUTO: 1 %
WBC # BLD AUTO: 8.74 THOUSAND/UL (ref 4.31–10.16)

## 2024-11-04 PROCEDURE — 85027 COMPLETE CBC AUTOMATED: CPT

## 2024-11-04 PROCEDURE — 36415 COLL VENOUS BLD VENIPUNCTURE: CPT

## 2024-11-04 PROCEDURE — 80053 COMPREHEN METABOLIC PANEL: CPT

## 2024-11-04 PROCEDURE — 83010 ASSAY OF HAPTOGLOBIN QUANT: CPT

## 2024-11-04 PROCEDURE — 86880 COOMBS TEST DIRECT: CPT

## 2024-11-04 PROCEDURE — 85007 BL SMEAR W/DIFF WBC COUNT: CPT

## 2024-11-04 PROCEDURE — 83615 LACTATE (LD) (LDH) ENZYME: CPT

## 2024-11-04 PROCEDURE — 86038 ANTINUCLEAR ANTIBODIES: CPT

## 2024-11-04 PROCEDURE — 85652 RBC SED RATE AUTOMATED: CPT

## 2024-11-04 NOTE — TELEPHONE ENCOUNTER
Reason for call:   [x] Refill   [] Prior Auth  [] Other: not duplicate request- pharmacy change    Office:   [] PCP/Provider -   [x] Specialty/Provider -     Medication: predniSONE 10 mg tablet     Dose/Frequency:  Take 1 tablet (10 mg total) by mouth daily,     Quantity: 20 tab    Pharmacy: [facility]  P: [phone]    Does the patient have enough for 3 days?   [] Yes   [] No - Send as HP to POD

## 2024-11-05 LAB — HAPTOGLOB SERPL-MCNC: <10 MG/DL (ref 41–333)

## 2024-11-05 RX ORDER — PREDNISONE 10 MG/1
10 TABLET ORAL DAILY
Qty: 20 TABLET | Refills: 0 | Status: SHIPPED | OUTPATIENT
Start: 2024-11-05

## 2024-11-06 ENCOUNTER — HOSPITAL ENCOUNTER (OUTPATIENT)
Dept: RADIOLOGY | Facility: HOSPITAL | Age: 89
Discharge: HOME | End: 2024-11-06
Attending: INTERNAL MEDICINE
Payer: MEDICARE

## 2024-11-06 VITALS
WEIGHT: 102 LBS | SYSTOLIC BLOOD PRESSURE: 98 MMHG | DIASTOLIC BLOOD PRESSURE: 58 MMHG | RESPIRATION RATE: 18 BRPM | TEMPERATURE: 98.2 F | HEIGHT: 65 IN | OXYGEN SATURATION: 93 % | BODY MASS INDEX: 17 KG/M2 | HEART RATE: 73 BPM

## 2024-11-06 DIAGNOSIS — D69.6 THROMBOCYTOPENIA (HCC): ICD-10-CM

## 2024-11-06 DIAGNOSIS — C91.42 HAIRY CELL LEUKEMIA, IN RELAPSE (HCC): ICD-10-CM

## 2024-11-06 DIAGNOSIS — D61.82 MYELOPHTHISIC ANEMIA (HCC): ICD-10-CM

## 2024-11-06 LAB
ERYTHROCYTE [DISTWIDTH] IN BLOOD BY AUTOMATED COUNT: 22.4 % (ref 11.6–15.1)
HCT VFR BLD AUTO: 32.3 % (ref 34.8–46.1)
HGB BLD-MCNC: 10.3 G/DL (ref 11.5–15.4)
MCH RBC QN AUTO: 38.9 PG (ref 26.8–34.3)
MCHC RBC AUTO-ENTMCNC: 31.9 G/DL (ref 31.4–37.4)
MCV RBC AUTO: 122 FL (ref 82–98)
PLATELET # BLD AUTO: 45 THOUSANDS/UL (ref 149–390)
PMV BLD AUTO: 11.3 FL (ref 8.9–12.7)
RBC # BLD AUTO: 2.65 MILLION/UL (ref 3.81–5.12)
WBC # BLD AUTO: 9.49 THOUSAND/UL (ref 4.31–10.16)

## 2024-11-06 PROCEDURE — 85097 BONE MARROW INTERPRETATION: CPT | Performed by: PATHOLOGY

## 2024-11-06 PROCEDURE — 88360 TUMOR IMMUNOHISTOCHEM/MANUAL: CPT | Performed by: PATHOLOGY

## 2024-11-06 PROCEDURE — 85027 COMPLETE CBC AUTOMATED: CPT | Performed by: RADIOLOGY

## 2024-11-06 PROCEDURE — 88313 SPECIAL STAINS GROUP 2: CPT | Performed by: PATHOLOGY

## 2024-11-06 PROCEDURE — 88305 TISSUE EXAM BY PATHOLOGIST: CPT | Performed by: PATHOLOGY

## 2024-11-06 PROCEDURE — C1830 POWER BONE MARROW BX NEEDLE: HCPCS | Performed by: PHYSICIAN ASSISTANT

## 2024-11-06 PROCEDURE — 88341 IMHCHEM/IMCYTCHM EA ADD ANTB: CPT | Performed by: PATHOLOGY

## 2024-11-06 PROCEDURE — 88184 FLOWCYTOMETRY/ TC 1 MARKER: CPT | Performed by: INTERNAL MEDICINE

## 2024-11-06 PROCEDURE — 38222 DX BONE MARROW BX & ASPIR: CPT

## 2024-11-06 PROCEDURE — 85007 BL SMEAR W/DIFF WBC COUNT: CPT | Performed by: RADIOLOGY

## 2024-11-06 PROCEDURE — 38220 DX BONE MARROW ASPIRATIONS: CPT

## 2024-11-06 PROCEDURE — 99152 MOD SED SAME PHYS/QHP 5/>YRS: CPT

## 2024-11-06 PROCEDURE — 99153 MOD SED SAME PHYS/QHP EA: CPT

## 2024-11-06 PROCEDURE — 88311 DECALCIFY TISSUE: CPT | Performed by: PATHOLOGY

## 2024-11-06 PROCEDURE — 88342 IMHCHEM/IMCYTCHM 1ST ANTB: CPT | Performed by: PATHOLOGY

## 2024-11-06 PROCEDURE — 88185 FLOWCYTOMETRY/TC ADD-ON: CPT

## 2024-11-06 RX ORDER — FENTANYL CITRATE 50 UG/ML
INJECTION, SOLUTION INTRAMUSCULAR; INTRAVENOUS AS NEEDED
Status: COMPLETED | OUTPATIENT
Start: 2024-11-06 | End: 2024-11-06

## 2024-11-06 RX ORDER — SODIUM CHLORIDE 9 MG/ML
75 INJECTION, SOLUTION INTRAVENOUS CONTINUOUS
Status: DISCONTINUED | OUTPATIENT
Start: 2024-11-06 | End: 2024-11-07 | Stop reason: HOSPADM

## 2024-11-06 RX ORDER — MIDAZOLAM HYDROCHLORIDE 2 MG/2ML
INJECTION, SOLUTION INTRAMUSCULAR; INTRAVENOUS AS NEEDED
Status: COMPLETED | OUTPATIENT
Start: 2024-11-06 | End: 2024-11-06

## 2024-11-06 RX ORDER — LIDOCAINE WITH 8.4% SOD BICARB 0.9%(10ML)
SYRINGE (ML) INJECTION AS NEEDED
Status: COMPLETED | OUTPATIENT
Start: 2024-11-06 | End: 2024-11-06

## 2024-11-06 RX ADMIN — Medication 10 ML: at 09:11

## 2024-11-06 RX ADMIN — FENTANYL CITRATE 25 MCG: 50 INJECTION INTRAMUSCULAR; INTRAVENOUS at 09:00

## 2024-11-06 RX ADMIN — SODIUM CHLORIDE 75 ML/HR: 0.9 INJECTION, SOLUTION INTRAVENOUS at 07:49

## 2024-11-06 RX ADMIN — MIDAZOLAM 0.5 MG: 1 INJECTION INTRAMUSCULAR; INTRAVENOUS at 09:00

## 2024-11-06 NOTE — DISCHARGE INSTRUCTIONS
Moderate Sedation   WHAT YOU NEED TO KNOW:   Moderate sedation, or conscious sedation, is medicine used during procedures to help you feel relaxed and calm. You will be awake and able to follow directions without anxiety or pain. You will remember little to none of the procedure. You may feel tired, weak, or unsteady on your feet after you get sedation. You may also have trouble concentrating or short-term memory loss. These symptoms should go away in 24 hours or less.   DISCHARGE INSTRUCTIONS:   Call 911 or have someone else call for any of the following:   You have sudden trouble breathing.     You cannot be woken.  Seek care immediately if:   You have a severe headache or dizziness.     Your heart is beating faster than usual.  Contact your healthcare provider if:   You have a fever.     You have nausea or are vomiting for more than 8 hours after the procedure.      Your skin is itchy, swollen, or you have a rash.     You have questions or concerns about your condition or care.  Self-care:   Have someone stay with you for 24 hours. This person can drive you to errands and help you do things around the house. This person can also watch for problems.      Rest and do quiet activities for 24 hours. Do not exercise, ride a bike, or play sports. Stand up slowly to prevent dizziness and falls. Take short walks around the house with another person. Slowly return to your usual activities the next day.      Do not drive or use dangerous machines or tools for 24 hours. You may injure yourself or others. Examples include a lawnmower, saw, or drill. Do not return to work for 24 hours if you use dangerous machines or tools for work.      Do not make important decisions for 24 hours. For example, do not sign important papers or invest money.      Drink liquids as directed. Liquids help flush the sedation medicine out of your body. Ask how much liquid to drink each day and which liquids are best for you.      Eat small,  frequent meals to prevent nausea and vomiting. Start with clear liquids such as juice or broth. If you do not vomit after clear liquids, you can eat your usual foods.      Do not drink alcohol or take medicines that make you drowsy. This includes medicines that help you sleep and anxiety medicines. Ask your healthcare provider if it is safe for you to take pain medicine.  Follow up with your healthcare provider as directed: Write down your questions so you remember to ask them during your visits.   © 2017 [email or web address] Information is for End User's use only and may not be sold, redistributed or otherwise used for commercial purposes. All illustrations and images included in CareNotes® are the copyrighted property of [employer]. or [employer].  The above information is an  only. It is not intended as medical advice for individual conditions or treatments. Talk to your doctor, nurse or pharmacist before following any medical regimen to see if it is safe and effective for you.    Bone Marrow Biopsy     WHAT YOU NEED TO KNOW:   A bone marrow biopsy is a procedure to remove a small amount of bone marrow from your bone. Bone marrow is the soft tissue inside your bone that helps to make blood cells. The sample is tested for disease or infection.    DISCHARGE INSTRUCTIONS:     1. Limit your activities day of biopsy as directed by your doctor.    2. Use medication as ordered.    3. Return to your normal diet.Small sips of flat soda will help with nausea.    4. Remove band-aid or dressing 24 hours after procedure.    Contact Interventional Radiology at [phone] (ROBLES PATIENTS: Contact Interventional Radiology at [phone]) (ANJELICA PATIENTS: Contact Interventional Radiology at [phone]) if:    1. Difficulty breathing, nausea or vomiting.    2. Chills or fever above 101 F.    3. Pain at biopsy site not relieved by medication.    4. Develop any redness, swelling, heat,  unusual drainage, heavy bruising or bleeding from biopsy site.

## 2024-11-06 NOTE — INTERVAL H&P NOTE
"H&P reviewed. There have been no interval changes since the time the H&P was written.    /79   Pulse 69   Temp 98.2 °F (36.8 °C) (Temporal)   Resp 16   Ht 5' 5\" (1.651 m)   Wt 46.3 kg (102 lb)   SpO2 93%   BMI 16.97 kg/m²     Prior imaging was reviewed. Discussed bone marrow biopsy with patient and son at bedside. Risks, benefits, and alternatives reviewed. Patient and son wishes to proceed.     Informed written consent was obtained.    Lenora Nelson PA-C   "

## 2024-11-07 ENCOUNTER — OFFICE VISIT (OUTPATIENT)
Dept: HEMATOLOGY ONCOLOGY | Facility: CLINIC | Age: 89
End: 2024-11-07
Payer: MEDICARE

## 2024-11-07 VITALS
HEART RATE: 84 BPM | BODY MASS INDEX: 17.33 KG/M2 | SYSTOLIC BLOOD PRESSURE: 114 MMHG | OXYGEN SATURATION: 96 % | HEIGHT: 65 IN | WEIGHT: 104 LBS | DIASTOLIC BLOOD PRESSURE: 66 MMHG | TEMPERATURE: 98.1 F | RESPIRATION RATE: 18 BRPM

## 2024-11-07 DIAGNOSIS — C91.41 HAIRY CELL LEUKEMIA, IN REMISSION (HCC): Primary | ICD-10-CM

## 2024-11-07 DIAGNOSIS — D69.6 THROMBOCYTOPENIA (HCC): ICD-10-CM

## 2024-11-07 DIAGNOSIS — C91.42 HAIRY CELL LEUKEMIA, IN RELAPSE (HCC): Primary | ICD-10-CM

## 2024-11-07 DIAGNOSIS — D61.82 MYELOPHTHISIC ANEMIA (HCC): ICD-10-CM

## 2024-11-07 PROCEDURE — G2211 COMPLEX E/M VISIT ADD ON: HCPCS | Performed by: INTERNAL MEDICINE

## 2024-11-07 PROCEDURE — 99215 OFFICE O/P EST HI 40 MIN: CPT | Performed by: INTERNAL MEDICINE

## 2024-11-07 NOTE — PATIENT INSTRUCTIONS
Blood work in 1 week.  Arrangements are being made for patient to be admitted within 2 weeks for chemotherapy.  Follow-up in 4 weeks.

## 2024-11-08 PROCEDURE — 85060 BLOOD SMEAR INTERPRETATION: CPT | Performed by: PATHOLOGY

## 2024-11-10 LAB
ACANTHOCYTES BLD QL SMEAR: PRESENT
ANISOCYTOSIS BLD QL SMEAR: PRESENT
BASOPHILS # BLD MANUAL: 0 THOUSAND/UL (ref 0–0.1)
BASOPHILS NFR MAR MANUAL: 0 % (ref 0–1)
BURR CELLS BLD QL SMEAR: PRESENT
EOSINOPHIL # BLD MANUAL: 0.09 THOUSAND/UL (ref 0–0.4)
EOSINOPHIL NFR BLD MANUAL: 1 % (ref 0–6)
HOWELL-JOLLY BOD BLD QL SMEAR: PRESENT
LYMPHOCYTES # BLD AUTO: 33 % (ref 14–44)
LYMPHOCYTES # BLD AUTO: 4.27 THOUSAND/UL (ref 0.6–4.47)
METAMYELOCYTE ABSOLUTE CT: 0.38 THOUSAND/UL (ref 0–0.1)
METAMYELOCYTES NFR BLD MANUAL: 4 % (ref 0–1)
MONOCYTES # BLD AUTO: 0.76 THOUSAND/UL (ref 0–1.22)
MONOCYTES NFR BLD: 8 % (ref 4–12)
MYELOCYTE ABSOLUTE CT: 0.28 THOUSAND/UL (ref 0–0.1)
MYELOCYTES NFR BLD MANUAL: 3 % (ref 0–1)
NEUTROPHILS # BLD MANUAL: 3.7 THOUSAND/UL (ref 1.85–7.62)
NEUTS BAND NFR BLD MANUAL: 7 % (ref 0–8)
NEUTS SEG NFR BLD AUTO: 32 % (ref 43–75)
NRBC BLD AUTO-RTO: 47 /100 WBC (ref 0–2)
OVALOCYTES BLD QL SMEAR: PRESENT
PATHOLOGY REVIEW: YES
PLATELET BLD QL SMEAR: ABNORMAL
POIKILOCYTOSIS BLD QL SMEAR: PRESENT
POLYCHROMASIA BLD QL SMEAR: PRESENT
RBC MORPH BLD: PRESENT
SCHISTOCYTES BLD QL SMEAR: PRESENT
SMUDGE CELLS BLD QL SMEAR: PRESENT
TARGETS BLD QL SMEAR: PRESENT
VARIANT LYMPHS # BLD AUTO: 12 %

## 2024-11-11 ENCOUNTER — TELEPHONE (OUTPATIENT)
Age: 89
End: 2024-11-11

## 2024-11-11 ENCOUNTER — HOSPITAL ENCOUNTER (OUTPATIENT)
Dept: CT IMAGING | Facility: HOSPITAL | Age: 89
Discharge: HOME | End: 2024-11-11
Attending: INTERNAL MEDICINE

## 2024-11-11 LAB — SCAN RESULT: NORMAL

## 2024-11-11 NOTE — PROGRESS NOTES
"  HPI:   Helen Gaona is a 89 y.o. female.  She is here with her 2 sons Luis Alberto and Huang.  Larry was on the speaker phone.  Patient has tiredness.   Feels tired.  Has arthritic symptoms.  He is eating well.  Weight is stable.  Has anemia and thrombocytopenia.  Patient had bone marrow test on 11/6/2024 and I sent a secure chat message to Dr. Gamez and he will try to expedite the preliminary report.  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  This could explain anemia and thrombocytopenia.  To wait for final report before starting patient on 2-CdA.  We discussed 2-CdA continuous intravenous infusion for 7 days versus 5 days of outpatient infusions, 2 hours each day.  All of them agreed for 7-day continuous in hospital infusion.  They received information verbally and in printed form from myself and my nurse and she signed informed consent for 2-CdA and PICC line and her 3 sons agreed.  She will have frequent blood tests, transfusions as needed and she will have Neulasta.  If needed she will have prophylactic antibiotics and antiviral medications to prevent infections.  I explained all that to them.  In addition to above she also has history of hysterectomy, pacemaker, osteoporosis, cataract surgeries, hypothyroidism, IBS and insomnia.      Current Outpatient Medications:     acetaminophen (TYLENOL) 500 mg tablet, Take 1,000 mg by mouth every 8 (eight) hours No more than 3g a day \"As needed\", Disp: , Rfl:     APPLE CIDER VINEGAR PO, Take by mouth daily, Disp: , Rfl:     aspirin 81 MG tablet, Take 81 mg by mouth daily, Disp: , Rfl:     Calcium Carbonate-Vitamin D (CALCIUM PLUS VITAMIN D PO), Take by mouth, Disp: , Rfl:     famotidine (PEPCID) 20 mg tablet, Take 1 tablet (20 mg total) by mouth in the morning, Disp: 30 tablet, Rfl: 1    levothyroxine (Euthyrox) 88 mcg tablet, Take 1 tablet (88 mcg total) by mouth daily, Disp: 90 tablet, Rfl: " "0    Loperamide HCl (IMODIUM PO), Take 2 mg by mouth as needed.  , Disp: , Rfl:     MELATONIN PO, Take 10 mg by mouth daily at bedtime, Disp: , Rfl:     Multiple Vitamin (MULTIVITAMIN) tablet, Take 1 tablet by mouth daily., Disp: , Rfl:     Polyethyl Glycol-Propyl Glycol (SYSTANE OP), Apply 1 drop to eye 2 (two) times a day EACH EYE TWICE DAILY, Disp: , Rfl:     predniSONE 10 mg tablet, Take 1 tablet (10 mg total) by mouth daily, Disp: 20 tablet, Rfl: 0    No Known Allergies    Oncology History   Leukemia, 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HCC)   4/12/2016 Initial Diagnosis    Leukemia, 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HCC)     11/18/2024 -  Chemotherapy    alteplase (CATHFLO), 2 mg, Intracatheter, Every 1 Minute as needed, 0 of 1 cycle  pegfilgrastim-apgf (Nyvepria), 6 mg, Subcutaneous, Once, 0 of 1 cycle  cladribine (LEUSTATIN) infusion, 0.1 mg/kg, Intravenous, Once, 0 of 1 cycle         ONCOLOGY HISTORY OF PRESENT ILLNESS        Oncology History   Leukemia, 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HCC)   4/12/2016 Initial Diagnosis    Leukemia, 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HCC)     11/18/2024 -  Chemotherapy    alteplase (CATHFLO), 2 mg, Intracatheter, Every 1 Minute as needed, 0 of 1 cycle  pegfilgrastim-apgf (Nyvepria), 6 mg, Subcutaneous, Once, 0 of 1 cycle  cladribine (LEUSTATIN) infusion, 0.1 mg/kg, Intravenous, Once, 0 of 1 cycle         ROS:  11/10/24 Reviewed 12 systems: See symptoms in HPI  Presently no other neurological, cardiac, pulmonary, GI and  symptoms other than mentioned in HPI.  Other symptoms are in HPI. No symptoms like fever, chills, active bleeding, bone pains, skin rash, weight loss , night sweats, numbness,  claudication and gait problem. No frequent infections.  Not unusually sensitive to heat or cold. No swelling of the ankles. No swollen glands.  Patient is anxious.       /66 (BP Location: Left arm, Patient Position: Sitting, Cuff Size: Standard)   Pulse 84   Temp 98.1 °F (36.7 °C) (Temporal)   Resp 18   Ht 5' 5\" (1.651 m)   Wt 47.2 kg (104 lb)   SpO2 96%   " BMI 17.31 kg/m²     Physical Exam:  Awake and oriented to place and person, not in distress, vitals are above, there is no icterus, there is no oral thrush, there is no palpable neck mass, clear lung fields to percussion and auscultation, regular heart rate, abdomen is soft and non tender, there is no palpable abdominal mass, there is no ascites, there is no edema of ankles, no calf tenderness, no focal neurological deficit, no skin rash, no palpable lymphadenopathy in the neck and axillary areas, no clubbing.    Patient is anxious.  Performance status 3 .      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 The procedure was performed in the prone position. 1% lidocaine was used for local anesthetic and moderate sedation   was administered. The right posterior inferior iliac spine was localized by fluoroscopy and the low back was prepped and draped in sterile fashion. Using fluoroscopic guidance, an 11g bone marrow needle was advanced through the cortex of the iliac spine   into the marrow. Aspiration was performed and submitted to pathology for slide preparation. The needle was slightly withdrawn and redirected to obtain a core biopsy using the Trek system. The core was submitted to pathology. The core sample appeared to   be small, so a second core sample was taken after positioning confirmed under fluoroscopic guidance. The puncture site was cleansed and a dressing was applied.    Fluoroscopy time: 1.4 MINUTES    Images: 2    Sedation (min) : 25 MINUTES  Impression: Impression:    Fluoroscopy image guided bone marrow aspiration  and core biopsy    Signed, performed, and dictated by Lenora Nelson PA-C under supervision of Dr. Umer Wren    Workstation performed: [id number]      LABS:  Lab data are reviewed and [facility].       Lab Results   Component Value Date    HGB 10.3 (L) 11/06/2024    HCT 32.3 (L) 11/06/2024     (H) 11/06/2024    PLT 45 (L) 11/06/2024    WBC 9.49 11/06/2024    NRBC 47 (H) 11/06/2024    BANDSPCT 7 11/06/2024    ATYLMPCT 12 (H) 11/06/2024   Manual Differential(PHLEBS Do Not Order)  Status: Final result      Contains abnormal data Manual Differential(PHLEBS Do Not Order)  Order: [id number] - Reflex for Order [id number]   Status: Final result       Visible to patient: Yes (not seen)       Next appt: 12/12/2024 at 12:00 PM in Hematology and Oncology (Jan Huynh MD)    0 Result Notes            Component  Ref Range & Units 11/6/24  7:37 AM 11/4/24  2:26 PM 10/18/24  2:03 PM 12/8/20 10:26 AM 6/2/15 12:37 PM 11/25/14  9:35 AM 5/13/14 10:29 AM   Segmented %  43 - 75 % 32 Low  56 21 Low  51      Bands %  0 - 8 % 7 5 12 High        Lymphocytes %  14 - 44 % 33 22 53 High  33      Monocytes %  4 - 12 % 8 15 High  2 Low  12      Eosinophils %  0 - 6 % 1 0 1 2 3 5 4   Basophils %  0 - 1 % 0 0 0 1 1 1 1   Metamyelocytes %  0 - 1 % 4 High   4 High        Myelocytes %  0 - 1 % 3 High  1 1       Atypical Lymphocytes %  <=0 % 12 High  1 High  6 High        Absolute Neutrophils  1.85 - 7.62 Thousand/uL 3.70 5.33 3.20 2.24 R      Absolute Lymphocytes  0.60 - 4.47 Thousand/uL 4.27 2.01 5.72 High  1.47 R 1.59 1.38 1.48 CM   Absolute Monocytes  0.00 - 1.22 Thousand/uL 0.76 1.31 High  0.19 0.54 R 0.49 R 0.54 R 0.49 R   Absolute Eosinophils  0.00 - 0.40 Thousand/uL 0.09 0.00 0.10 0.08 R 0.13 R 0.21 R 0.16 R   Absolute Basophils  0.00 - 0.10 Thousand/uL 0.00 0.00 0.00 0.02 R 0.04 CM 0.04 CM 0.04 CM   Absolute Metamyelocytes  0.00 - 0.10 Thousand/uL 0.38 High   0.39 High        Absolute Myelocytes  0.00 - 0.10  Thousand/uL 0.28 High  0.09 0.10       Total Counted          nRBC  0 - 2 /100 WBC 47 High  42 High  48 High        Smudge Cells Present         RBC Morphology Present Present Present       Platelet Estimate  Adequate Decreased Abnormal  Decreased Abnormal  Decreased Abnormal        Pathology Review  No Yes Abnormal   Yes Abnormal        Acanthocytes Present         Anisocytosis Present Present Present       Gibson Cells Present         Baumann-Peabody Bodies Present  Present       Ovalocytes Present  Present       Poikilocytes Present Present Present       Polychromasia Present Present Present       Schistocytes Present         Target Cells Present Present Present                  Specimen Collected: 11/06/24  7:37 AM Last Resulted: 11/10/24  7:46 AM               Lab Results   Component Value Date     06/02/2015    K 4.3 11/04/2024     11/04/2024    CO2 31 11/04/2024    ANIONGAP 4 06/02/2015    BUN 30 (H) 11/04/2024    CREATININE 0.78 11/04/2024    GLUCOSE 109 04/12/2016    GLUF 88 02/26/2021    CALCIUM 9.3 11/04/2024    AST 30 11/04/2024    ALT 22 11/04/2024    ALKPHOS 58 11/04/2024    PROT 7.4 06/02/2015    BILITOT 0.81 06/02/2015    EGFR 64 11/04/2024         Lab Results   Component Value Date    NVANJOPL77 534 10/18/2024          Component  Ref Range & Units 11/4/24  2:26 PM 10/18/24  2:03 PM   LD  140 - 271 U/L 325 High  266                           Reviewed test results and discussed with patient especially possibility of hairy cell  leukemia on flow.    Assessment and plan: See diagnoses, orders and instructions below.  1. Hairy cell leukemia, in relapse (HCC)-planning inpatient 7-day infusion of 2-CdA.  Monitoring blood counts and transfusions as needed.  Neulasta postchemotherapy.  Possibility of prophylactic anti-infection medications.  Patient signed informed consent for 2-CdA and all 3 sons agreed.  Patient will have PICC line.  She also signed for PICC line.  - CBC and differential; Future  -  Comprehensive metabolic panel; Future  - LD,Blood; Future  - Uric acid; Future  - APTT; Future  - Protime-INR; Future    2. Thrombocytopenia (HCC)-see above in #1    - CBC and differential; Future  - Comprehensive metabolic panel; Future  - LD,Blood; Future  - Uric acid; Future  - APTT; Future  - Protime-INR; Future    3. Myelophthisic anemia (HCC)-see above #1    - CBC and differential; Future  - Comprehensive metabolic panel; Future  - LD,Blood; Future  - Uric acid; Future  - APTT; Future  - Protime-INR; Future    All discussed in very much detail.  Questions answered.  Discussed eating healthy foods.  Patient to avoid falls and trauma.  Goal will be remission from hairy cell leukemia.  Patient needs assistance in her care.  Cell phone number of Larry: [phone]  Cell number of Huang: [phone]  Information of patient's son Luis Alberto is in the chart.   .  Patient will continue to follow with  primary physician and other consultants.  Patient  voiced understanding and agrees      Disclaimer: This document was prepared using a dictation device.  If a word or phrase is confusing, or does not make sense, this is likely due to recognition error which was not discovered during the providers review. If you believe an error has occurred, please Contact me through [facility] Line service for kiara?cation.    Counseling / Coordination of Care   .  Provided counseling and support

## 2024-11-11 NOTE — TELEPHONE ENCOUNTER
Pts son , Larry, called to report that the results for the bone biopsy completed on 11/6 is in the system now and would like to discuss with DR. Huynh and get an idea of the next steps. Please advise.

## 2024-11-11 NOTE — TELEPHONE ENCOUNTER
Dr. Huynh, patient was consented and your note says 'inpatient admission within 2 weeks'.  Should this be scheduled?

## 2024-11-12 NOTE — TELEPHONE ENCOUNTER
Patient's son Larry calling in requesting an update regarding his mother being admitted for treatment. Please call him at [phone].

## 2024-11-13 ENCOUNTER — TELEPHONE (OUTPATIENT)
Dept: LAB | Facility: HOSPITAL | Age: 89
End: 2024-11-13

## 2024-11-13 DIAGNOSIS — C91.42 HAIRY CELL LEUKEMIA, IN RELAPSE (HCC): Primary | ICD-10-CM

## 2024-11-13 NOTE — TELEPHONE ENCOUNTER
Reviewed with Dr. Huynh. Called patient's son back and patient will be admitted Monday 11/18 for chemo. Placed ADT21 order and sent Rhodhiss plan for signature. Called the mobile lab to outreach the patient's son so patient can have labs completed this week prior to admission. Patient's son verbalized understanding.

## 2024-11-14 ENCOUNTER — TELEPHONE (OUTPATIENT)
Dept: HEMATOLOGY ONCOLOGY | Facility: CLINIC | Age: 89
End: 2024-11-14

## 2024-11-14 NOTE — TELEPHONE ENCOUNTER
Spoke with patient regarding need to reschedule upcoming appointment with Dr. Huynh on 12/12 at 12:00pm to 12/9 at 2:00pm due to provider availability.  Patient verbalized understanding.

## 2024-11-14 NOTE — TELEPHONE ENCOUNTER
11/14 - pt looking for appt by end of the week, informed her son that nothing is available, will go to outpatient lab, also informed caller that mobile may not service the independent living facility pt is located at

## 2024-11-15 ENCOUNTER — TELEPHONE (OUTPATIENT)
Dept: HEMATOLOGY ONCOLOGY | Facility: CLINIC | Age: 89
End: 2024-11-15

## 2024-11-15 ENCOUNTER — APPOINTMENT (OUTPATIENT)
Dept: LAB | Facility: CLINIC | Age: 89
DRG: 846 | End: 2024-11-15
Payer: MEDICARE

## 2024-11-15 DIAGNOSIS — C91.42 HAIRY CELL LEUKEMIA, IN RELAPSE (HCC): ICD-10-CM

## 2024-11-15 DIAGNOSIS — D69.6 THROMBOCYTOPENIA (HCC): ICD-10-CM

## 2024-11-15 DIAGNOSIS — D61.82 MYELOPHTHISIC ANEMIA (HCC): ICD-10-CM

## 2024-11-15 DIAGNOSIS — C91.41 HAIRY CELL LEUKEMIA, IN REMISSION (HCC): Primary | ICD-10-CM

## 2024-11-15 LAB
ALBUMIN SERPL BCG-MCNC: 4 G/DL (ref 3.5–5)
ALP SERPL-CCNC: 59 U/L (ref 34–104)
ALT SERPL W P-5'-P-CCNC: 16 U/L (ref 7–52)
ANION GAP SERPL CALCULATED.3IONS-SCNC: 10 MMOL/L (ref 4–13)
ANISOCYTOSIS BLD QL SMEAR: PRESENT
APTT PPP: 26 SECONDS (ref 23–34)
AST SERPL W P-5'-P-CCNC: 25 U/L (ref 13–39)
BASOPHILS # BLD MANUAL: 0 THOUSAND/UL (ref 0–0.1)
BASOPHILS NFR MAR MANUAL: 0 % (ref 0–1)
BILIRUB SERPL-MCNC: 0.83 MG/DL (ref 0.2–1)
BUN SERPL-MCNC: 28 MG/DL (ref 5–25)
BURR CELLS BLD QL SMEAR: PRESENT
CALCIUM SERPL-MCNC: 9.1 MG/DL (ref 8.4–10.2)
CHLORIDE SERPL-SCNC: 105 MMOL/L (ref 96–108)
CO2 SERPL-SCNC: 28 MMOL/L (ref 21–32)
CREAT SERPL-MCNC: 0.79 MG/DL (ref 0.6–1.3)
DIFFERENTIAL COMMENT: ABNORMAL
EOSINOPHIL # BLD MANUAL: 0 THOUSAND/UL (ref 0–0.4)
EOSINOPHIL NFR BLD MANUAL: 0 % (ref 0–6)
ERYTHROCYTE [DISTWIDTH] IN BLOOD BY AUTOMATED COUNT: 22.5 % (ref 11.6–15.1)
GFR SERPL CREATININE-BSD FRML MDRD: 63 ML/MIN/1.73SQ M
GLUCOSE SERPL-MCNC: 155 MG/DL (ref 65–140)
HCT VFR BLD AUTO: 33.9 % (ref 34.8–46.1)
HGB BLD-MCNC: 10.8 G/DL (ref 11.5–15.4)
HOWELL-JOLLY BOD BLD QL SMEAR: PRESENT
INR PPP: 1.08 (ref 0.85–1.19)
LDH SERPL-CCNC: 299 U/L (ref 140–271)
LYMPHOCYTES # BLD AUTO: 1.53 THOUSAND/UL (ref 0.6–4.47)
LYMPHOCYTES # BLD AUTO: 27 % (ref 14–44)
MACROCYTES BLD QL AUTO: PRESENT
MCH RBC QN AUTO: 41.2 PG (ref 26.8–34.3)
MCHC RBC AUTO-ENTMCNC: 31.9 G/DL (ref 31.4–37.4)
MCV RBC AUTO: 129 FL (ref 82–98)
MISCELLANEOUS LAB TEST RESULT: NORMAL
MONOCYTES # BLD AUTO: 1.48 THOUSAND/UL (ref 0–1.22)
MONOCYTES NFR BLD: 29 % (ref 4–12)
NEUTROPHILS # BLD MANUAL: 2.09 THOUSAND/UL (ref 1.85–7.62)
NEUTS SEG NFR BLD AUTO: 41 % (ref 43–75)
NRBC BLD AUTO-RTO: 53 /100 WBC (ref 0–2)
PLATELET # BLD AUTO: 38 THOUSANDS/UL (ref 149–390)
PLATELET BLD QL SMEAR: ABNORMAL
PMV BLD AUTO: 12.7 FL (ref 8.9–12.7)
POIKILOCYTOSIS BLD QL SMEAR: PRESENT
POTASSIUM SERPL-SCNC: 4.1 MMOL/L (ref 3.5–5.3)
PROT SERPL-MCNC: 6.7 G/DL (ref 6.4–8.4)
PROTHROMBIN TIME: 14.3 SECONDS (ref 12.3–15)
RBC # BLD AUTO: 2.62 MILLION/UL (ref 3.81–5.12)
RBC MORPH BLD: PRESENT
SCHISTOCYTES BLD QL SMEAR: PRESENT
SMUDGE CELLS BLD QL SMEAR: PRESENT
SODIUM SERPL-SCNC: 143 MMOL/L (ref 135–147)
TARGETS BLD QL SMEAR: PRESENT
URATE SERPL-MCNC: 3.7 MG/DL (ref 2–7.5)
VARIANT LYMPHS # BLD AUTO: 3 %
WBC # BLD AUTO: 5.1 THOUSAND/UL (ref 4.31–10.16)

## 2024-11-15 PROCEDURE — 36415 COLL VENOUS BLD VENIPUNCTURE: CPT

## 2024-11-15 PROCEDURE — 80053 COMPREHEN METABOLIC PANEL: CPT

## 2024-11-15 PROCEDURE — 85007 BL SMEAR W/DIFF WBC COUNT: CPT

## 2024-11-15 PROCEDURE — 85027 COMPLETE CBC AUTOMATED: CPT

## 2024-11-15 PROCEDURE — 83615 LACTATE (LD) (LDH) ENZYME: CPT

## 2024-11-15 PROCEDURE — 85730 THROMBOPLASTIN TIME PARTIAL: CPT

## 2024-11-15 PROCEDURE — 85610 PROTHROMBIN TIME: CPT

## 2024-11-15 PROCEDURE — 84550 ASSAY OF BLOOD/URIC ACID: CPT

## 2024-11-15 NOTE — TELEPHONE ENCOUNTER
LVM for pt to call the infusion center to schedule d9 shot, I scheduled her at [facility] at 9am for now but pt is not aware of appt.

## 2024-11-15 NOTE — TELEPHONE ENCOUNTER
I phoned the patient but her phone rang and then went to . I then phoned her son, Larry, and introduced myself and indicated that I was calling from [facility], Dr. Huynh's office, regarding his Mom's upcoming admission on 11/18 to [facility] for chemotherapy. Larry is familiar with how to reach the [facility], so we reviewed that he should enter the campus at Entrance A ([facility]). We reviewed the parking for Entrance A, as well as how to reach P from Entrance A. Additionally, we reviewed that Dr. Huynh has entered some labs for Helen to have drawn in preparation for her admission. I explained that the labs are entered in her medical record and she would be able to have these drawn at any [facility] lab. In the event that she is unable to have these drawn, per Dr. Lino Kelley's nurse, these can be drawn on Monday on P9. Larry indicated that one of his brothers would likely be able to take her. We discussed that Helen can arrive to  as close to 0800 on Monday as she is safely able. Larry indicated that he would be bringing her, and is traveling from [place]. We reviewed that they do not have to arrive right at 0800 due to the distance he will be traveling, as traffic may cause delays. Larry and I discussed that his Mom will be able to wear a hospital gown while here, but she may want to bring a cardigan or shawl to put over her shoulders to stay warm. Larry expressed understanding and was appreciative of the call.

## 2024-11-17 DIAGNOSIS — D70.1 CHEMOTHERAPY INDUCED NEUTROPENIA (HCC): ICD-10-CM

## 2024-11-17 DIAGNOSIS — Z91.89 AT HIGH RISK OF TUMOR LYSIS SYNDROME: Primary | ICD-10-CM

## 2024-11-17 DIAGNOSIS — T45.1X5A CHEMOTHERAPY INDUCED NEUTROPENIA (HCC): ICD-10-CM

## 2024-11-17 DIAGNOSIS — D69.6 THROMBOCYTOPENIA (HCC): ICD-10-CM

## 2024-11-17 PROBLEM — D61.9 ANEMIA DUE TO BONE MARROW FAILURE (HCC): Status: ACTIVE | Noted: 2024-11-17

## 2024-11-17 RX ORDER — SODIUM CHLORIDE 9 MG/ML
20 INJECTION, SOLUTION INTRAVENOUS ONCE AS NEEDED
Status: CANCELLED | OUTPATIENT
Start: 2024-11-18

## 2024-11-17 RX ORDER — SODIUM CHLORIDE 9 MG/ML
20 INJECTION, SOLUTION INTRAVENOUS ONCE AS NEEDED
Status: CANCELLED | OUTPATIENT
Start: 2024-11-20

## 2024-11-17 RX ORDER — SODIUM CHLORIDE 9 MG/ML
20 INJECTION, SOLUTION INTRAVENOUS ONCE AS NEEDED
Status: CANCELLED | OUTPATIENT
Start: 2024-11-22

## 2024-11-17 RX ORDER — SODIUM CHLORIDE 9 MG/ML
20 INJECTION, SOLUTION INTRAVENOUS ONCE AS NEEDED
Status: CANCELLED | OUTPATIENT
Start: 2024-11-23

## 2024-11-17 RX ORDER — SODIUM CHLORIDE 9 MG/ML
20 INJECTION, SOLUTION INTRAVENOUS ONCE AS NEEDED
Status: CANCELLED | OUTPATIENT
Start: 2024-11-19

## 2024-11-17 RX ORDER — ONDANSETRON 2 MG/ML
4 INJECTION INTRAMUSCULAR; INTRAVENOUS EVERY 6 HOURS PRN
Status: CANCELLED | OUTPATIENT
Start: 2024-11-18

## 2024-11-17 RX ORDER — SULFAMETHOXAZOLE AND TRIMETHOPRIM 800; 160 MG/1; MG/1
1 TABLET ORAL 3 TIMES WEEKLY
Status: CANCELLED | OUTPATIENT
Start: 2024-11-18

## 2024-11-17 RX ORDER — ACYCLOVIR 200 MG/1
400 CAPSULE ORAL 2 TIMES DAILY
Status: CANCELLED | OUTPATIENT
Start: 2024-11-18

## 2024-11-17 RX ORDER — SODIUM CHLORIDE 9 MG/ML
20 INJECTION, SOLUTION INTRAVENOUS ONCE AS NEEDED
Status: CANCELLED | OUTPATIENT
Start: 2024-11-24

## 2024-11-17 RX ORDER — SODIUM CHLORIDE 9 MG/ML
20 INJECTION, SOLUTION INTRAVENOUS ONCE AS NEEDED
Status: CANCELLED | OUTPATIENT
Start: 2024-11-21

## 2024-11-18 ENCOUNTER — APPOINTMENT (INPATIENT)
Dept: RADIOLOGY | Facility: HOSPITAL | Age: 89
DRG: 846 | End: 2024-11-18
Payer: MEDICARE

## 2024-11-18 ENCOUNTER — HOSPITAL ENCOUNTER (INPATIENT)
Facility: HOSPITAL | Age: 89
LOS: 8 days | Discharge: NURSING, REHAB OR LONG-TERM CARE | DRG: 846 | End: 2024-11-26
Attending: STUDENT IN AN ORGANIZED HEALTH CARE EDUCATION/TRAINING PROGRAM | Admitting: STUDENT IN AN ORGANIZED HEALTH CARE EDUCATION/TRAINING PROGRAM
Payer: MEDICARE

## 2024-11-18 DIAGNOSIS — D69.6 THROMBOCYTOPENIA (HCC): ICD-10-CM

## 2024-11-18 DIAGNOSIS — D61.89 ANEMIA DUE TO OTHER BONE MARROW FAILURE (HCC): ICD-10-CM

## 2024-11-18 DIAGNOSIS — K59.01 SLOW TRANSIT CONSTIPATION: ICD-10-CM

## 2024-11-18 DIAGNOSIS — Z91.89 AT HIGH RISK OF TUMOR LYSIS SYNDROME: ICD-10-CM

## 2024-11-18 DIAGNOSIS — D70.1 CHEMOTHERAPY INDUCED NEUTROPENIA (HCC): ICD-10-CM

## 2024-11-18 DIAGNOSIS — C91.42 HAIRY CELL LEUKEMIA, IN RELAPSE (HCC): ICD-10-CM

## 2024-11-18 DIAGNOSIS — T45.1X5A CHEMOTHERAPY INDUCED NEUTROPENIA (HCC): ICD-10-CM

## 2024-11-18 DIAGNOSIS — Z91.89 AT HIGH RISK OF TUMOR LYSIS SYNDROME: Primary | ICD-10-CM

## 2024-11-18 DIAGNOSIS — C91.42 HAIRY CELL LEUKEMIA, IN RELAPSE (HCC): Primary | ICD-10-CM

## 2024-11-18 PROBLEM — R09.02 HYPOXIA: Status: ACTIVE | Noted: 2024-11-18

## 2024-11-18 PROCEDURE — C1751 CATH, INF, PER/CENT/MIDLINE: HCPCS

## 2024-11-18 PROCEDURE — 3E03305 INTRODUCTION OF OTHER ANTINEOPLASTIC INTO PERIPHERAL VEIN, PERCUTANEOUS APPROACH: ICD-10-PCS | Performed by: STUDENT IN AN ORGANIZED HEALTH CARE EDUCATION/TRAINING PROGRAM

## 2024-11-18 PROCEDURE — 36569 INSJ PICC 5 YR+ W/O IMAGING: CPT

## 2024-11-18 PROCEDURE — 99223 1ST HOSP IP/OBS HIGH 75: CPT | Performed by: STUDENT IN AN ORGANIZED HEALTH CARE EDUCATION/TRAINING PROGRAM

## 2024-11-18 PROCEDURE — 99223 1ST HOSP IP/OBS HIGH 75: CPT | Performed by: INTERNAL MEDICINE

## 2024-11-18 PROCEDURE — 71045 X-RAY EXAM CHEST 1 VIEW: CPT

## 2024-11-18 PROCEDURE — 02HV33Z INSERTION OF INFUSION DEVICE INTO SUPERIOR VENA CAVA, PERCUTANEOUS APPROACH: ICD-10-PCS | Performed by: STUDENT IN AN ORGANIZED HEALTH CARE EDUCATION/TRAINING PROGRAM

## 2024-11-18 RX ORDER — ALLOPURINOL 300 MG/1
300 TABLET ORAL DAILY
Status: CANCELLED
Start: 2024-11-24

## 2024-11-18 RX ORDER — ONDANSETRON 2 MG/ML
4 INJECTION INTRAMUSCULAR; INTRAVENOUS EVERY 6 HOURS PRN
Status: DISCONTINUED | OUTPATIENT
Start: 2024-11-18 | End: 2024-11-26 | Stop reason: HOSPADM

## 2024-11-18 RX ORDER — ACYCLOVIR 200 MG/1
400 CAPSULE ORAL 2 TIMES DAILY
Status: DISCONTINUED | OUTPATIENT
Start: 2024-11-18 | End: 2024-11-26 | Stop reason: HOSPADM

## 2024-11-18 RX ORDER — BIMATOPROST 0.1 MG/ML
1 SOLUTION/ DROPS OPHTHALMIC
COMMUNITY
Start: 2024-10-30

## 2024-11-18 RX ORDER — ASPIRIN 81 MG/1
81 TABLET, CHEWABLE ORAL DAILY
Status: DISCONTINUED | OUTPATIENT
Start: 2024-11-19 | End: 2024-11-26

## 2024-11-18 RX ORDER — ACETAMINOPHEN 325 MG/1
650 TABLET ORAL EVERY 6 HOURS PRN
Status: DISCONTINUED | OUTPATIENT
Start: 2024-11-18 | End: 2024-11-26 | Stop reason: HOSPADM

## 2024-11-18 RX ORDER — FAMOTIDINE 20 MG/1
20 TABLET, FILM COATED ORAL DAILY
Status: DISCONTINUED | OUTPATIENT
Start: 2024-11-19 | End: 2024-11-26 | Stop reason: HOSPADM

## 2024-11-18 RX ORDER — BIMATOPROST 0.3 MG/ML
1 SOLUTION/ DROPS OPHTHALMIC
Status: DISCONTINUED | OUTPATIENT
Start: 2024-11-18 | End: 2024-11-26 | Stop reason: HOSPADM

## 2024-11-18 RX ORDER — ALLOPURINOL 300 MG/1
300 TABLET ORAL DAILY
Status: CANCELLED
Start: 2024-11-19

## 2024-11-18 RX ORDER — LEVOTHYROXINE SODIUM 88 UG/1
88 TABLET ORAL
Status: DISCONTINUED | OUTPATIENT
Start: 2024-11-19 | End: 2024-11-26 | Stop reason: HOSPADM

## 2024-11-18 RX ORDER — ALLOPURINOL 300 MG/1
300 TABLET ORAL DAILY
Status: DISCONTINUED | OUTPATIENT
Start: 2024-11-18 | End: 2024-11-26

## 2024-11-18 RX ORDER — SULFAMETHOXAZOLE AND TRIMETHOPRIM 800; 160 MG/1; MG/1
1 TABLET ORAL 3 TIMES WEEKLY
Status: DISCONTINUED | OUTPATIENT
Start: 2024-11-18 | End: 2024-11-26 | Stop reason: HOSPADM

## 2024-11-18 RX ORDER — ALLOPURINOL 300 MG/1
300 TABLET ORAL DAILY
Status: CANCELLED
Start: 2024-11-18

## 2024-11-18 RX ORDER — SODIUM CHLORIDE 9 MG/ML
20 INJECTION, SOLUTION INTRAVENOUS ONCE AS NEEDED
Status: DISCONTINUED | OUTPATIENT
Start: 2024-11-18 | End: 2024-11-21 | Stop reason: SDUPTHER

## 2024-11-18 RX ORDER — SODIUM CHLORIDE, SODIUM GLUCONATE, SODIUM ACETATE, POTASSIUM CHLORIDE, MAGNESIUM CHLORIDE, SODIUM PHOSPHATE, DIBASIC, AND POTASSIUM PHOSPHATE .53; .5; .37; .037; .03; .012; .00082 G/100ML; G/100ML; G/100ML; G/100ML; G/100ML; G/100ML; G/100ML
75 INJECTION, SOLUTION INTRAVENOUS CONTINUOUS
Status: DISCONTINUED | OUTPATIENT
Start: 2024-11-18 | End: 2024-11-19

## 2024-11-18 RX ORDER — PREDNISONE 10 MG/1
10 TABLET ORAL DAILY
Status: DISCONTINUED | OUTPATIENT
Start: 2024-11-19 | End: 2024-11-26

## 2024-11-18 RX ADMIN — SULFAMETHOXAZOLE AND TRIMETHOPRIM 1 TABLET: 800; 160 TABLET ORAL at 15:56

## 2024-11-18 RX ADMIN — Medication 9 MG: at 21:17

## 2024-11-18 RX ADMIN — SODIUM CHLORIDE, SODIUM GLUCONATE, SODIUM ACETATE, POTASSIUM CHLORIDE, MAGNESIUM CHLORIDE, SODIUM PHOSPHATE, DIBASIC, AND POTASSIUM PHOSPHATE 75 ML/HR: .53; .5; .37; .037; .03; .012; .00082 INJECTION, SOLUTION INTRAVENOUS at 16:09

## 2024-11-18 RX ADMIN — ALLOPURINOL 300 MG: 300 TABLET ORAL at 15:56

## 2024-11-18 RX ADMIN — SODIUM CHLORIDE 4.7 MG: 0.9 INJECTION, SOLUTION INTRAVENOUS at 18:31

## 2024-11-18 RX ADMIN — ACYCLOVIR 400 MG: 200 CAPSULE ORAL at 17:18

## 2024-11-18 RX ADMIN — DEXAMETHASONE SODIUM PHOSPHATE 10 MG: 10 INJECTION, SOLUTION INTRAMUSCULAR; INTRAVENOUS at 17:29

## 2024-11-18 RX ADMIN — ONDANSETRON 8 MG: 2 INJECTION INTRAMUSCULAR; INTRAVENOUS at 17:29

## 2024-11-18 RX ADMIN — BIMATOPROST 1 DROP: 0.3 SOLUTION/ DROPS OPHTHALMIC at 21:17

## 2024-11-18 RX ADMIN — SODIUM CHLORIDE 20 ML/HR: 0.9 INJECTION, SOLUTION INTRAVENOUS at 17:26

## 2024-11-18 NOTE — CASE MANAGEMENT
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) 2024     Chemotherapy induced neutropenia (HCC) 2024     Bone marrow disease 10/06/2024     Chronic fatigue 2021     Presence of cardiac pacemaker 2021     Ambulatory dysfunction 2021     Tachy-nataliia syndrome (HCC) 2021     Polypharmacy 2021     Fall 2020     Macrocytosis 2020     Abnormal EKG 2020     Aortic insufficiency 2020     Lightheadedness 2020     Nonrheumatic mitral valve regurgitation 2020     Difficulty reading due to visual problem 2020     Chronic right-sided low back pain without sciatica 2019     Peripheral vertigo 2019     Left bundle branch block (LBBB) 2018     Left ventricular hypertrophy 2018     S/P TAVR (transcatheter aortic valve replacement) 2018     Insomnia 2016     Urinary incontinence      Osteoporosis      Osteoarthritis      Hairy cell leukemia, in relapse (HCC)      Hypothyroidism      Benign essential hypertension      Essential tremor      History of severe aortic stenosis 2016       LOS (days): 0  Geometric Mean LOS (GMLOS) (days):   Days to GMLOS:     OBJECTIVE:    Risk of Unplanned Readmission Score: 9.93         Current admission status: Inpatient       Preferred Pharmacy:   [id number] - [place], [place] - [address]  [address]  [place] [address]  Phone: [phone] Fax: [phone]    [facility] - [place] - [address]  [facility] [address]  Phone: [phone] Fax: [phone]    Primary Care Provider: JESSICA Pal    Primary Insurance: MEDICARE  Secondary Insurance: AARP    ASSESSMENT:  Active Health Care Proxies    There are no active Health Care Proxies on file.                 Readmission Root Cause  30 Day Readmission: No    Patient Information  Admitted from:: Facility ([facility])  Mental Status: Alert  During Assessment patient was accompanied by: Not accompanied during assessment  Assessment information provided by:: Patient  Primary Caregiver: Self  Support Systems: Self, Children  County of Residence: [place]  What city [place] you live in[place] [place]  Home entry access options. Select all that apply.: No steps to enter home  Type of Current Residence: Facility  Upon entering residence, is there a bedroom on the main floor (no further steps)?: Yes  Upon entering residence, is there a bathroom on the main floor (no further steps)?: Yes  Living Arrangements: Other (Comment) ([facility])  Is patient a ?: No    Activities of Daily Living Prior to Admission  Functional Status: Independent  Completes ADLs independently?: Yes  Ambulates independently?: Yes  Does patient use assisted devices?: Yes  Assisted Devices (DME) used: Walker  Does patient currently own DME?: Yes  What DME does the patient currently own?: Walker  Does patient have a history of Outpatient Therapy (PT/OT)?: No  Does the patient have a history of Short-Term Rehab?: Yes  Does patient have a history of HHC?: Yes  Does patient currently have HHC?: No         Patient Information Continued  Income Source: Pension/senior living  Does patient have prescription coverage?: Yes  Does patient receive dialysis treatments?: No  Does patient have a history of substance abuse?: No  Does patient have a history of Mental Health Diagnosis?: No         Means of Transportation  Means of  Transport to Appts:: Family transport          DISCHARGE DETAILS:    Discharge planning discussed with:: Pt  Freedom of Choice: Yes  Comments - Freedom of Choice: Pt to return to [facility] on discharge  CM contacted family/caregiver?: No- see comments  Were Treatment Team discharge recommendations reviewed with patient/caregiver?: Yes  Did patient/caregiver verbalize understanding of patient care needs?: N/A- going to facility  Were patient/caregiver advised of the risks associated with not following Treatment Team discharge recommendations?: Yes         Requested Home Health Care         Is the patient interested in HHC at discharge?: No    DME Referral Provided  Referral made for DME?: No    Other Referral/Resources/Interventions Provided:  Interventions: Facility Return  Referral Comments: Pt to return to [facility] on discharge         Treatment Team Recommendation: Facility Return  Discharge Destination Plan:: Facility Return             CM met with pt to complete assessment and explain CM role.  Pt lives alone at [facility] for the last 20 years.  Pt reports being independent with all ADL's.  Pt uses a walker for DME.  Pt reports previous HHC and STR.  Pt denies any mental health or substance use hx.  Pt's children or KV assists pt to all appointments.  CM will continue to follow for discharge planning needs.

## 2024-11-18 NOTE — ASSESSMENT & PLAN NOTE
Presents today for direct admit for chemotherapy of relapse hairy cell leukemia   Oncology consulted  Start IVF for hydration given small risk of TLS with treatment  Follow-up CBC and CMP

## 2024-11-18 NOTE — ASSESSMENT & PLAN NOTE
No shortness of breath  Noted to have persistent hypoxia in the 80s after admitted  Unclear etiology  CXR, CBC ordered  Supportive care with oxygen

## 2024-11-18 NOTE — H&P
H&P - Hospitalist   Name: Helen Gaona 89 y.o. female I MRN: [id number]  [facility] I Date of Admission: 11/18/2024   Date of Service: 11/18/2024 I Hospital Day: 0     Assessment & Plan  Hairy cell leukemia, in relapse (HCC)  Presents today for direct admit for chemotherapy of relapse hairy cell leukemia   Oncology consulted  Start IVF for hydration given small risk of TLS with treatment  Follow-up CBC and CMP  Hypothyroidism  Continue levothyroxine  Presence of cardiac pacemaker  Known history  Outpatient follow-up cardiology  Thrombocytopenia (HCC)  Platelets slowly trending down to 38 with last labs on 11/15  Repeat CMP  Holding DVT PPx  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 Sequential Compression Devices Ordered.  Code Status: Level 1 - Full Code   Discussion with family: Updated  (son) at bedside.    Anticipated Length of Stay: Patient will be admitted on an inpatient basis with an anticipated length of stay of greater than 2 midnights secondary to inpatient chemotherapy for hairy cell leukemia.    History of Present Illness   Chief Complaint: Presents for chemotherapy    Helen Gaona is a 89 y.o. female with a PMH of relapsed hairy cell leukemia, s/p PPM, hypothyroidism, s/p TAVR who presents as a direct admit for chemotherapy treatment for relapsed hairy cell leukemia.  Recent bone marrow biopsy results on 11/6 indicate hairy cell leukemia.  No acute complaints currently.    Review of Systems   All other systems reviewed and are negative.      Historical Information   Past Medical History:   Diagnosis Date    Aortic stenosis     severe    CAD (coronary artery disease)   "   Essential tremor     HTN (hypertension)     HTN (hypertension)     Hyperlipidemia     Hypothyroidism     Leukemia, 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HCC)     s/p splenectomy    Osteoarthritis     Osteoporosis     Urinary incontinence      Past Surgical History:   Procedure Laterality Date    COLONOSCOPY      HYSTERECTOMY      IR BIOPSY BONE MARROW  11/6/2024    MO REPLACE AORTIC VALVE OPENFEMORAL ARTERY APPROACH N/A 4/12/2016    Procedure: REPLACEMENT AORTIC VALVE TRANSCATHETER (TAVR) TRANSFEMORAL  26mm Lin 3 valve;  Surgeon: Que Thurston DO;  [facility]: [facility];  Service: Cardiac Surgery    SPLENECTOMY      for hx hairy cell leukemia     Social History     Tobacco Use    Smoking status: Former     Types: Cigarettes    Smokeless tobacco: Former   Vaping Use    Vaping status: Never Used   Substance and Sexual Activity    Alcohol use: Yes     Comment: SOCIAL    Drug use: No    Sexual activity: Not on file     E-Cigarette/Vaping    E-Cigarette Use Never User      E-Cigarette/Vaping Substances     Family history non-contributory  Social History:  Marital Status:    Occupation:   Patient Pre-hospital [facility] Situation: [facility]  Patient Pre-hospital Level of Mobility: walks with walker  Patient Pre-hospital Diet Restrictions:     Meds/Allergies   I have reviewed home medications using recent Epic encounter.  Prior to Admission medications    Medication Sig Start Date End Date Taking? Authorizing Provider   Lumigan 0.01 % ophthalmic drops Administer 1 drop to the right eye daily at bedtime 10/30/24  Yes Historical Provider, MD   acetaminophen (TYLENOL) 500 mg tablet Take 1,000 mg by mouth every 8 (eight) hours No more than 3g a day \"As needed\"    Historical Provider, MD   APPLE CIDER VINEGAR PO Take by mouth daily  Patient not taking: Reported on 11/18/2024    Historical Provider, MD   aspirin 81 MG tablet Take 81 mg by mouth daily    Historical Provider, MD   Calcium Carbonate-Vitamin D (CALCIUM PLUS VITAMIN D PO) " Take by mouth    Historical Provider, MD   famotidine (PEPCID) 20 mg tablet Take 1 tablet (20 mg total) by mouth in the morning 10/22/24   Jan Huynh MD   levothyroxine (Euthyrox) 88 mcg tablet Take 1 tablet (88 mcg total) by mouth daily 12/1/21   Donna Veloz MD   Loperamide HCl (IMODIUM PO) Take 2 mg by mouth as needed.      Historical Provider, MD   MELATONIN PO Take 10 mg by mouth daily at bedtime    Historical Provider, MD   Multiple Vitamin (MULTIVITAMIN) tablet Take 1 tablet by mouth daily.    Historical Provider, MD   Polyethyl Glycol-Propyl Glycol (SYSTANE OP) Apply 1 drop to eye 2 (two) times a day EACH EYE TWICE DAILY    Historical Provider, MD   predniSONE 10 mg tablet Take 1 tablet (10 mg total) by mouth daily 11/5/24   Jan Huynh MD     No Known Allergies    Objective :  Temp:  [97.6 °F (36.4 °C)-97.8 °F (36.6 °C)] 97.6 °F (36.4 °C)  HR:  [62-72] 71  BP: (101-109)/(53-61) 109/61  Resp:  [17-18] 18  SpO2:  [85 %-88 %] 88 %    Physical Exam  Vitals reviewed.   Constitutional:       General: She is not in acute distress.     Appearance: Normal appearance. She is not ill-appearing.   HENT:      Head: Normocephalic and atraumatic.   Cardiovascular:      Rate and Rhythm: Normal rate and regular rhythm.   Pulmonary:      Effort: Pulmonary effort is normal. No respiratory distress.      Breath sounds: No wheezing or rales.   Abdominal:      General: Bowel sounds are normal.      Tenderness: There is no abdominal tenderness.   Musculoskeletal:      Right lower leg: No edema.      Left lower leg: No edema.   Skin:     Findings: Bruising present.   Neurological:      Mental Status: She is alert. Mental status is at baseline.        Lines/Drains:  Lines/Drains/Airways       Active Status       Name Placement date Placement time Site Days    PICC Line 11/18/24 Right Brachial 11/18/24  1343  Brachial  less than 1                          Lab Results: I have reviewed the following results:  Results  from last 7 days   Lab Units 11/15/24  1514   WBC Thousand/uL 5.10   HEMOGLOBIN g/dL 10.8*   HEMATOCRIT % 33.9*   PLATELETS Thousands/uL 38*   LYMPHO PCT % 27   MONO PCT % 29*   EOS PCT % 0     Results from last 7 days   Lab Units 11/15/24  1514   SODIUM mmol/L 143   POTASSIUM mmol/L 4.1   CHLORIDE mmol/L 105   CO2 mmol/L 28   BUN mg/dL 28*   CREATININE mg/dL 0.79   ANION GAP mmol/L 10   CALCIUM mg/dL 9.1   ALBUMIN g/dL 4.0   TOTAL BILIRUBIN mg/dL 0.83   ALK PHOS U/L 59   ALT U/L 16   AST U/L 25   GLUCOSE RANDOM mg/dL 155*     Results from last 7 days   Lab Units 11/15/24  1514   INR  1.08         Lab Results   Component Value Date    HGBA1C 5.5 11/30/2021    HGBA1C 5.5 11/30/2021    HGBA1C 5.5 04/01/2016           Imaging Results Review: No pertinent imaging studies reviewed.  Other Study Results Review: No additional pertinent studies reviewed.    Administrative Statements   I have spent a total time of 45 minutes in caring for this patient on the day of the visit/encounter including Counseling / Coordination of care, Documenting in the medical record, Reviewing / ordering tests, medicine, procedures  , Obtaining or reviewing history  , and Communicating with other healthcare professionals .    ** Please Note: This note has been constructed using a voice recognition system. **

## 2024-11-18 NOTE — CONSULTS
[facility]  Consultation Note  Encounter: [id number]     PATIENT INFO     Name: Helen Gaona  YOB: 1928   Age: 89 y.o.   Sex: female   MRN: [id number]  Unit/Bed#: [facility] 914-01     REASON FOR CONSULTATION   Hairy cell leukemia, in relapse  Admitted for inpatient cladribine, continuous infusion for 7 days       ASSESSMENT & PLAN     Helen Gaona is a 89 y.o. female with medical history significant for distant history of hairy   cell leukemia status post splenectomy [1985] likely treated with interferon was admitted   for inpatient cladribine [continuous infusion for 7 days] for relapsed hairy cell leukemia.    Patient follows up with oncology at [facility].   As of 10/18/2024, lab values indicate a decline in H&H / platelets and normal WBC count.    This prompted further evaluation.  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  Patient appears stable.  PICC in place. Chemotherapy can be initiated.     HISTORY OF PRESENT ILLNESS       Helen Gaona is a 89 y.o. female with medical history significant for hairy cell leukemia,   hysterectomy, pacemaker, osteoporosis, cataract surgeries, hypothyroidism, IBS ,    insomnia, splenectomy (1985) is admitted for inpatient cladribine [continuous infusion for 7 days]   for relapsed hairy cell leukemia.  Patient follows up with oncology at [facility].  As of 10/18/2024, lab values indicate a decline in H&H and platelets and normal WBC count.    This prompted further evaluation.  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 Markedly decreased maturing trilineage hematopoiesis without significant   features of atypia/dysplasia or increased blasts.  Patient appears well.  She was eating her lunch with no difficulties.   Vitals are stable but oxygen saturation indicates 85%.   Patient denies any shortness of breath.  She stays in the the range. Not on any home oxygen as per patient.  Labs as of 11/15/2024 indicated normal uric acid, PTT, INR.  Plt 38, WBC 5.1, H/H 10.8/33.9.         REVIEW OF SYSTEMS     CONSTITUTIONAL: Denies any fever, chills, rigors, and weight loss  HEENT: No earache or tinnitus, denies hearing loss or visual disturbances  CARDIOVASCULAR: No chest pain or palpitations  RESPIRATORY: Denies any cough, hemoptysis, shortness of breath or dyspnea on exertion  GASTROINTESTINAL: As noted in the History of Present Illness  GENITOURINARY: No problems with urination, denies any hematuria or dysuria  NEUROLOGIC: No dizziness or vertigo, denies headaches   MUSCULOSKELETAL: Denies any muscle or joint pain   SKIN: Denies skin rashes or itching  ENDOCRINE: Denies excessive thirst, denies intolerance to heat or cold      Historical Information   Past Medical History:   Diagnosis Date    Aortic stenosis     severe    CAD (coronary artery disease)     Essential tremor     HTN (hypertension)     HTN (hypertension)     Hyperlipidemia     Hypothyroidism     Leukemia, 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HCC)     s/p splenectomy    Osteoarthritis     Osteoporosis     Urinary incontinence      Past Surgical History:   Procedure Laterality Date    COLONOSCOPY      HYSTERECTOMY      IR BIOPSY BONE MARROW  11/6/2024    KS REPLACE AORTIC VALVE OPENFEMORAL ARTERY APPROACH N/A 4/12/2016    Procedure: REPLACEMENT AORTIC VALVE TRANSCATHETER (TAVR) TRANSFEMORAL  26mm Lin 3 valve;  Surgeon: Que Thurston DO;  [facility]: [facility];  Service: Cardiac Surgery    SPLENECTOMY      for hx hairy cell leukemia     Social History   Social History     Substance and Sexual Activity   Alcohol Use Yes    Comment: SOCIAL     Social History     Substance and Sexual Activity   Drug Use No     Social History     Tobacco Use   Smoking Status Former    Types: Cigarettes   Smokeless Tobacco Former     Family History   Problem Relation  Age of Onset    Other Mother         malignant neoplasm of uterus    Coronary artery disease Father     Heart failure Brother         MEDICATIONS & ALLERGIES     Meds/Allergies     Medications Prior to Admission:     acetaminophen (TYLENOL) 500 mg tablet    APPLE CIDER VINEGAR PO    aspirin 81 MG tablet    Calcium Carbonate-Vitamin D (CALCIUM PLUS VITAMIN D PO)    famotidine (PEPCID) 20 mg tablet    levothyroxine (Euthyrox) 88 mcg tablet    Loperamide HCl (IMODIUM PO)    MELATONIN PO    Multiple Vitamin (MULTIVITAMIN) tablet    Polyethyl Glycol-Propyl Glycol (SYSTANE OP)    predniSONE 10 mg tablet  No current facility-administered medications for this encounter.  No Known Allergies     PHYSICAL EXAM     Objective   Blood pressure 108/53, pulse 69, temperature 97.8 °F (36.6 °C), resp. rate 17, SpO2 (!) 86%. There is no height or weight on file to calculate BMI.  No intake or output data in the 24 hours ending 11/18/24 1013      General Appearance:   Alert, cooperative, no distress   HEENT:   Normocephalic, atraumatic, anicteric     Lungs:   Equal chest rise, respirations unlabored    Heart:   Regular rate and rhythm   Abdomen:   Soft, non-tender, non-distended; normal bowel sounds; no masses, no organomegaly    Extremities:   No cyanosis, clubbing or edema    Neuro:   Moves all 4 extremities    Skin:   No jaundice, rashes, or lesions      Invasive Devices       Airway  Duration             Non-Surgical Airway Oral pharyngeal airway 1360 days                     LABORATORY RESULTS     No visits with results within 1 Day(s) from this visit.   Latest known visit with results is:   Appointment on 11/15/2024   Component Date Value    WBC 11/15/2024 5.10     RBC 11/15/2024 2.62 (L)     Hemoglobin 11/15/2024 10.8 (L)     Hematocrit 11/15/2024 33.9 (L)     MCV 11/15/2024 129 (H)     MCH 11/15/2024 41.2 (H)     MCHC 11/15/2024 31.9     RDW 11/15/2024 22.5 (H)     MPV 11/15/2024 12.7     Platelets 11/15/2024 38 (L)     Sodium  11/15/2024 143     Potassium 11/15/2024 4.1     Chloride 11/15/2024 105     CO2 11/15/2024 28     ANION GAP 11/15/2024 10     BUN 11/15/2024 28 (H)     Creatinine 11/15/2024 0.79     Glucose 11/15/2024 155 (H)     Calcium 11/15/2024 9.1     AST 11/15/2024 25     ALT 11/15/2024 16     Alkaline Phosphatase 11/15/2024 59     Total Protein 11/15/2024 6.7     Albumin 11/15/2024 4.0     Total Bilirubin 11/15/2024 0.83     eGFR 11/15/2024 63     LD 11/15/2024 299 (H)     Uric Acid 11/15/2024 3.7     PTT 11/15/2024 26     Protime 11/15/2024 14.3     INR 11/15/2024 1.08     Segmented % 11/15/2024 41 (L)     Lymphocytes % 11/15/2024 27     Monocytes % 11/15/2024 29 (H)     Eosinophils % 11/15/2024 0     Basophils % 11/15/2024 0     Atypical Lymphocytes % 11/15/2024 3 (H)     Absolute Neutrophils 11/15/2024 2.09     Absolute Lymphocytes 11/15/2024 1.53     Absolute Monocytes 11/15/2024 1.48 (H)     Absolute Eosinophils 11/15/2024 0.00     Absolute Basophils 11/15/2024 0.00     nRBC 11/15/2024 53 (H)     Smudge Cells 11/15/2024 Present     RBC Morphology 11/15/2024 Present     Platelet Estimate 11/15/2024 Decreased (A)     Differential Comment 11/15/2024 see note     Anisocytosis 11/15/2024 Present     Scotia Cells 11/15/2024 Present     Baumann-Glencoe Bodies 11/15/2024 Present     Macrocytes 11/15/2024 Present     Poikilocytes 11/15/2024 Present     Schistocytes 11/15/2024 Present     Target Cells 11/15/2024 Present         IMAGING RESULTS     IR biopsy bone marrow  Result Date: 11/7/2024  Narrative: IMAGE-GUIDED BONE MARROW BIOPSY Indication:  C91.42: Hairy cell leukemia, 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 The procedure was performed in the prone position. 1% lidocaine was used for local anesthetic and moderate sedation was administered. The right posterior  inferior iliac spine was localized by fluoroscopy and the low back was prepped and draped in sterile fashion. Using fluoroscopic guidance, an 11g bone marrow needle was advanced through the cortex of the iliac spine into the marrow. Aspiration was performed and submitted to pathology for slide preparation. The needle was slightly withdrawn and redirected to obtain a core biopsy using the Trek system. The core was submitted to pathology. The core sample appeared to be small, so a second core sample was taken after positioning confirmed under fluoroscopic guidance. The puncture site was cleansed and a dressing was applied. Fluoroscopy time: 1.4 MINUTES Images: 2 Sedation (min) : 25 MINUTES     Impression: Impression: Fluoroscopy image guided bone marrow aspiration and core biopsy Signed, performed, and dictated by Lenora Nelson PA-C under supervision of Dr. Umer Wren Workstation performed: [id number]     I have personally reviewed pertinent imaging study reports.      Ismael Owens M.D.  [facility]  Division of Hematology & Oncology  Available on TigerText  [email or web address]    ** Please Note: This note is constructed using a voice recognition dictation system. **

## 2024-11-18 NOTE — PROGRESS NOTES
Oxygen saturation between 85 - 90% on room air.  Patient is comfortable, has no complaints, does not feel short of breath.  Oncology and attending team made aware.  No new orders. Patient remains monitored via safety net.

## 2024-11-18 NOTE — PROCEDURES
Insert Complex Venous Access Line    Date/Time: 11/18/2024 1:41 PM    Performed by: Cheyenne Chandra RN  Authorized by: Hanna Heredia DO    Patient location:  Bedside  Other Assisting Provider: No    Consent:     Consent obtained:  Written    Consent given by:  Patient    Risks discussed:  Arterial puncture, incorrect placement, nerve damage, bleeding, infection and pneumothorax    Alternatives discussed:  No treatment, alternative treatment and delayed treatment  Universal protocol:     Procedure explained and questions answered to patient or proxy's satisfaction: yes      Immediately prior to procedure, a time out was called: yes      Relevant documents present and verified: yes      Test results available and properly labeled: yes      Radiology Images displayed and confirmed.  If images not available, report reviewed: yes      Required blood products, implants, devices, and special equipment available: yes      Site/side marked: yes      Patient identity confirmed:  Hospital-assigned identification number and arm band  Pre-procedure details:     Hand hygiene: Hand hygiene performed prior to insertion      Sterile barrier technique: All elements of maximal sterile technique followed      Skin preparation:  ChloraPrep (2ml Lidocaine Injected)  Procedure details:     Complex Venous Access Line Type: PICC      Complex Venous Access Line Indications: chemotherapy      Catheter tip vessel location: atriocaval junction      Orientation:  Right    Location:  Brachial    Procedural supplies:  Double lumen (REF: [id number], LOT: [id number], EXP: 2025-08-31)    Catheter size:  5 Fr    Total catheter length (cm):  36    Catheter out on skin (cm):  0    Max flow rate:  999    Arm circumference:  22    Patient evaluated for contraindications to access (i.e. fistula, thrombosis, etc): Yes      Site selection rationale:  Largest most patent vein; Pacemaker Left Chest Wall    Approach: percutaneous technique used       Patient position:  Flat    Ultrasound image availability:  Still images obtained    Sterile ultrasound techniques: Sterile gel and sterile probe covers were used      Number of attempts:  1    Successful placement: yes      Landmarks identified: yes      Vessel of catheter tip end:  Sherlock 3CG confirmed  Anesthesia (see MAR for exact dosages):     Anesthesia method:  Local infiltration    Local anesthetic:  Lidocaine 1% w/o epi  Post-procedure details:     Post-procedure:  Dressing applied and securement device placed    Assessment:  Blood return through all ports and free fluid flow    Post-procedure complications: none      Patient tolerance of procedure:  Tolerated well, no immediate complications

## 2024-11-18 NOTE — QUICK NOTE
Labs reviewed     Patient labs acceptable for chemo from 11/15/2024    Please start chemotherapy today    Mercedes Storm MD PhD

## 2024-11-19 LAB
ALBUMIN SERPL BCG-MCNC: 3.3 G/DL (ref 3.5–5)
ALP SERPL-CCNC: 48 U/L (ref 34–104)
ALT SERPL W P-5'-P-CCNC: 12 U/L (ref 7–52)
ANION GAP SERPL CALCULATED.3IONS-SCNC: 5 MMOL/L (ref 4–13)
ANISOCYTOSIS BLD QL SMEAR: PRESENT
AST SERPL W P-5'-P-CCNC: 18 U/L (ref 13–39)
BASOPHILS # BLD MANUAL: 0 THOUSAND/UL (ref 0–0.1)
BASOPHILS NFR MAR MANUAL: 0 % (ref 0–1)
BILIRUB SERPL-MCNC: 0.56 MG/DL (ref 0.2–1)
BUN SERPL-MCNC: 19 MG/DL (ref 5–25)
BURR CELLS BLD QL SMEAR: PRESENT
CALCIUM ALBUM COR SERPL-MCNC: 9 MG/DL (ref 8.3–10.1)
CALCIUM SERPL-MCNC: 8.4 MG/DL (ref 8.4–10.2)
CHLORIDE SERPL-SCNC: 106 MMOL/L (ref 96–108)
CO2 SERPL-SCNC: 29 MMOL/L (ref 21–32)
CREAT SERPL-MCNC: 0.61 MG/DL (ref 0.6–1.3)
EOSINOPHIL # BLD MANUAL: 0 THOUSAND/UL (ref 0–0.4)
EOSINOPHIL NFR BLD MANUAL: 0 % (ref 0–6)
ERYTHROCYTE [DISTWIDTH] IN BLOOD BY AUTOMATED COUNT: 22 % (ref 11.6–15.1)
GFR SERPL CREATININE-BSD FRML MDRD: 76 ML/MIN/1.73SQ M
GLUCOSE SERPL-MCNC: 134 MG/DL (ref 65–140)
HCT VFR BLD AUTO: 25.5 % (ref 34.8–46.1)
HGB BLD-MCNC: 8.2 G/DL (ref 11.5–15.4)
HOWELL-JOLLY BOD BLD QL SMEAR: PRESENT
LYMPHOCYTES # BLD AUTO: 1.9 THOUSAND/UL (ref 0.6–4.47)
LYMPHOCYTES # BLD AUTO: 28 % (ref 14–44)
MCH RBC QN AUTO: 40.6 PG (ref 26.8–34.3)
MCHC RBC AUTO-ENTMCNC: 32.2 G/DL (ref 31.4–37.4)
MCV RBC AUTO: 126 FL (ref 82–98)
METAMYELOCYTE ABSOLUTE CT: 0.09 THOUSAND/UL (ref 0–0.1)
METAMYELOCYTES NFR BLD MANUAL: 2 % (ref 0–1)
MONOCYTES # BLD AUTO: 0.09 THOUSAND/UL (ref 0–1.22)
MONOCYTES NFR BLD: 2 % (ref 4–12)
MYELOCYTE ABSOLUTE CT: 0.18 THOUSAND/UL (ref 0–0.1)
MYELOCYTES NFR BLD MANUAL: 4 % (ref 0–1)
NEUTROPHILS # BLD MANUAL: 2.17 THOUSAND/UL (ref 1.85–7.62)
NEUTS BAND NFR BLD MANUAL: 4 % (ref 0–8)
NEUTS SEG NFR BLD AUTO: 45 % (ref 43–75)
NRBC BLD AUTO-RTO: 33 /100 WBC (ref 0–2)
PLATELET # BLD AUTO: 31 THOUSANDS/UL (ref 149–390)
PLATELET BLD QL SMEAR: ABNORMAL
PMV BLD AUTO: 11.3 FL (ref 8.9–12.7)
POIKILOCYTOSIS BLD QL SMEAR: PRESENT
POLYCHROMASIA BLD QL SMEAR: PRESENT
POTASSIUM SERPL-SCNC: 4.4 MMOL/L (ref 3.5–5.3)
PROT SERPL-MCNC: 5.3 G/DL (ref 6.4–8.4)
RBC # BLD AUTO: 2.02 MILLION/UL (ref 3.81–5.12)
SCHISTOCYTES BLD QL SMEAR: PRESENT
SODIUM SERPL-SCNC: 140 MMOL/L (ref 135–147)
TARGETS BLD QL SMEAR: PRESENT
VARIANT LYMPHS # BLD AUTO: 15 %
WBC # BLD AUTO: 4.43 THOUSAND/UL (ref 4.31–10.16)

## 2024-11-19 PROCEDURE — 80053 COMPREHEN METABOLIC PANEL: CPT | Performed by: STUDENT IN AN ORGANIZED HEALTH CARE EDUCATION/TRAINING PROGRAM

## 2024-11-19 PROCEDURE — 99233 SBSQ HOSP IP/OBS HIGH 50: CPT | Performed by: INTERNAL MEDICINE

## 2024-11-19 PROCEDURE — 87081 CULTURE SCREEN ONLY: CPT | Performed by: STUDENT IN AN ORGANIZED HEALTH CARE EDUCATION/TRAINING PROGRAM

## 2024-11-19 PROCEDURE — 85007 BL SMEAR W/DIFF WBC COUNT: CPT | Performed by: STUDENT IN AN ORGANIZED HEALTH CARE EDUCATION/TRAINING PROGRAM

## 2024-11-19 PROCEDURE — 85027 COMPLETE CBC AUTOMATED: CPT | Performed by: STUDENT IN AN ORGANIZED HEALTH CARE EDUCATION/TRAINING PROGRAM

## 2024-11-19 PROCEDURE — 99232 SBSQ HOSP IP/OBS MODERATE 35: CPT

## 2024-11-19 RX ORDER — ALLOPURINOL 300 MG/1
300 TABLET ORAL DAILY
Qty: 30 TABLET | Refills: 0 | Status: SHIPPED | OUTPATIENT
Start: 2024-11-19

## 2024-11-19 RX ORDER — SODIUM CHLORIDE 9 MG/ML
20 INJECTION, SOLUTION INTRAVENOUS ONCE AS NEEDED
Status: DISCONTINUED | OUTPATIENT
Start: 2024-11-19 | End: 2024-11-21 | Stop reason: SDUPTHER

## 2024-11-19 RX ADMIN — Medication 9 MG: at 22:12

## 2024-11-19 RX ADMIN — LEVOTHYROXINE SODIUM 88 MCG: 88 TABLET ORAL at 05:49

## 2024-11-19 RX ADMIN — SODIUM CHLORIDE 4.7 MG: 0.9 INJECTION, SOLUTION INTRAVENOUS at 17:47

## 2024-11-19 RX ADMIN — ACYCLOVIR 400 MG: 200 CAPSULE ORAL at 17:45

## 2024-11-19 RX ADMIN — FAMOTIDINE 20 MG: 20 TABLET, FILM COATED ORAL at 09:01

## 2024-11-19 RX ADMIN — ASPIRIN 81 MG CHEWABLE TABLET 81 MG: 81 TABLET CHEWABLE at 09:01

## 2024-11-19 RX ADMIN — DEXAMETHASONE SODIUM PHOSPHATE 10 MG: 10 INJECTION, SOLUTION INTRAMUSCULAR; INTRAVENOUS at 17:16

## 2024-11-19 RX ADMIN — SODIUM CHLORIDE, SODIUM GLUCONATE, SODIUM ACETATE, POTASSIUM CHLORIDE, MAGNESIUM CHLORIDE, SODIUM PHOSPHATE, DIBASIC, AND POTASSIUM PHOSPHATE 75 ML/HR: .53; .5; .37; .037; .03; .012; .00082 INJECTION, SOLUTION INTRAVENOUS at 05:55

## 2024-11-19 RX ADMIN — PREDNISONE 10 MG: 10 TABLET ORAL at 09:01

## 2024-11-19 RX ADMIN — SODIUM CHLORIDE 20 ML/HR: 0.9 INJECTION, SOLUTION INTRAVENOUS at 16:31

## 2024-11-19 RX ADMIN — ONDANSETRON 8 MG: 2 INJECTION INTRAMUSCULAR; INTRAVENOUS at 16:37

## 2024-11-19 RX ADMIN — ACYCLOVIR 400 MG: 200 CAPSULE ORAL at 09:01

## 2024-11-19 RX ADMIN — BIMATOPROST 1 DROP: 0.3 SOLUTION/ DROPS OPHTHALMIC at 22:13

## 2024-11-19 RX ADMIN — ALLOPURINOL 300 MG: 300 TABLET ORAL at 09:01

## 2024-11-19 NOTE — ASSESSMENT & PLAN NOTE
No shortness of breath  Noted to have persistent hypoxia in the 80s after admitted  Unclear etiology  CXR; mild interstitial edema.  Otherwise no other findings      Discontinue IV fluids  Monitor I/os  Continue oxygen supplementation per protocol for SpO2 >93%

## 2024-11-19 NOTE — PROGRESS NOTES
Progress Note - Hospitalist   Name: Helen Gaona 89 y.o. female I MRN: [id number]  [facility] I Date of Admission: 11/18/2024   Date of Service: 11/19/2024 I Hospital Day: 1    Assessment & Plan  Hairy cell leukemia, in relapse (HCC)  Presents today for direct admit for chemotherapy of relapse hairy cell leukemia   Oncology consulted.  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  Otherwise no other findings      Discontinue IV fluids  Monitor I/os  Continue oxygen supplementation per protocol for SpO2 >93%    VTE Pharmacologic Prophylaxis:   Moderate Risk (Score 3-4) - Pharmacological DVT Prophylaxis Contraindicated. Sequential Compression Devices Ordered.    Mobility:   Basic Mobility Inpatient Raw Score: 20  JH-HLM Goal: 6: Walk 10 steps or more  JH-HLM Achieved: 7: Walk 25 feet or more  JH-HLM Goal achieved. Continue to encourage appropriate mobility.    Patient Centered Rounds: I performed bedside rounds with nursing staff today.   Discussions with Specialists or Other Care Team Provider: hem/oncology    Education and Discussions with Family / Patient: Updated  (significant other) at bedside.    Current Length of Stay: 1 day(s)  Current Patient Status: Inpatient   Certification Statement: The patient will continue to require additional inpatient hospital stay due to hairy cell leukemia, thrombocytopenia  Discharge Plan: Anticipate discharge in 48 hrs to TBD     Code Status: Level 1 - Full Code    Subjective    Patient seen today at bedside reports no active complaints.  No chest pain or tightness, SOB or cough, dizziness or light headedness, N/V, Diarrhea of constipation.   No active urinary symptoms  Tolerating oral diet.       Objective :  Temp:  [97.3 °F (36.3 °C)-98.3 °F (36.8 °C)] 97.5 °F (36.4 °C)  HR:  [61-88] 61  BP: ()/(50-62) 99/53  Resp:  [12-20] 18  SpO2:  [83 %-88 %] 83 %  O2 Device: None (Room air)    Body mass index is 16.73 kg/m².     Input and Output Summary (last 24 hours):     Intake/Output Summary (Last 24 hours) at 11/19/2024 1505  Last data filed at 11/19/2024 1320  Gross per 24 hour   Intake 1800 ml   Output 300 ml   Net 1500 ml       Physical Exam  Vitals and nursing note reviewed.   Constitutional:       General: She is not in acute distress.     Appearance: Normal appearance.   HENT:      Head: Normocephalic and atraumatic.      Mouth/Throat:      Mouth: Mucous membranes are moist.   Eyes:      Conjunctiva/sclera: Conjunctivae normal.      Pupils: Pupils are equal, round, and reactive to light.   Cardiovascular:      Rate and Rhythm: Normal rate and regular rhythm.      Pulses: Normal pulses.           Carotid pulses are 2+ on the right side and 2+ on the left side.       Radial pulses are 2+ on the right side and 2+ on the left side.        Dorsalis pedis pulses are 2+ on the right side and 2+ on the left side.      Heart sounds: Normal heart sounds, S1 normal and S2 normal. No murmur heard.  Pulmonary:      Effort: No tachypnea, bradypnea or accessory muscle usage.      Breath sounds: Normal breath sounds and air entry. No decreased breath sounds, wheezing, rhonchi or rales.   Abdominal:      General: Abdomen is flat. Bowel sounds are normal. There is no distension.      Palpations: Abdomen is soft.      Tenderness: There is no abdominal tenderness.   Musculoskeletal:      Right lower leg: No edema.      Left lower leg: No edema.   Neurological:      Mental Status: She is alert and  oriented to person, place, and time. Mental status is at baseline.   Psychiatric:         Mood and Affect: Mood normal.         Behavior: Behavior normal.           Lines/Drains:  Lines/Drains/Airways       Active Status       Name Placement date Placement time Site Days    PICC Line 11/18/24 Right Brachial 11/18/24  1343  Brachial  1                    Central Line:  Goal for removal: N/A - Chronic PICC               Lab Results: I have reviewed the following results:   Results from last 7 days   Lab Units 11/19/24  0549   WBC Thousand/uL 4.43   HEMOGLOBIN g/dL 8.2*   HEMATOCRIT % 25.5*   PLATELETS Thousands/uL 31*   BANDS PCT % 4   LYMPHO PCT % 28   MONO PCT % 2*   EOS PCT % 0     Results from last 7 days   Lab Units 11/19/24  0549   SODIUM mmol/L 140   POTASSIUM mmol/L 4.4   CHLORIDE mmol/L 106   CO2 mmol/L 29   BUN mg/dL 19   CREATININE mg/dL 0.61   ANION GAP mmol/L 5   CALCIUM mg/dL 8.4   ALBUMIN g/dL 3.3*   TOTAL BILIRUBIN mg/dL 0.56   ALK PHOS U/L 48   ALT U/L 12   AST U/L 18   GLUCOSE RANDOM mg/dL 134     Results from last 7 days   Lab Units 11/15/24  1514   INR  1.08                   Recent Cultures (last 7 days):         Imaging Results Review: I reviewed radiology reports from this admission including: chest xray.  Other Study Results Review: EKG was reviewed.     Last 24 Hours Medication List:     Current Facility-Administered Medications:     acetaminophen (TYLENOL) tablet 650 mg, Q6H PRN    acyclovir (ZOVIRAX) capsule 400 mg, BID    allopurinol (ZYLOPRIM) tablet 300 mg, Daily    alteplase (CATHFLO) injection 2 mg, Q1MIN PRN    aspirin chewable tablet 81 mg, Daily    bimatoprost (LUMIGAN) 0.03 % ophthalmic drops 1 drop, HS    cladribine (LEUSTATIN) 4.7 mg in sodium chloride 0.9 % 500 mL infusion, Once, Last Rate: 4.7 mg (11/18/24 1831)    cladribine (LEUSTATIN) 4.7 mg in sodium chloride 0.9 % 500 mL infusion, Once    dexamethasone (DECADRON) 10 mg in sodium chloride 0.9 % 50 mL IVPB, Once    famotidine  (PEPCID) tablet 20 mg, Daily    levothyroxine tablet 88 mcg, Early Morning    melatonin tablet 9 mg, HS    ondansetron (ZOFRAN) 8 mg in sodium chloride 0.9 % 50 mL IVPB, Once    ondansetron (ZOFRAN) injection 4 mg, Q6H PRN    predniSONE tablet 10 mg, Daily    sodium chloride 0.9 % infusion, Once PRN, Last Rate: 20 mL/hr (11/18/24 1726)    sodium chloride 0.9 % infusion, Once PRN    sulfamethoxazole-trimethoprim (BACTRIM DS) 800-160 mg per tablet 1 tablet, Once per day on Monday Wednesday Friday    Administrative Statements   Today, Patient Was Seen By: Sherri Coyle MD  I have spent a total time of 30 minutes in caring for this patient on the day of the visit/encounter including Diagnostic results, Prognosis, Risks and benefits of tx options, and Instructions for management.    **Please Note: This note may have been constructed using a voice recognition system.**

## 2024-11-19 NOTE — ASSESSMENT & PLAN NOTE
Platelets slowly trending down to 38 with last labs on 11/15  Holding DVT Ppx  Rest of care per hematology/oncology team

## 2024-11-19 NOTE — PROGRESS NOTES
"Oncology Progress Note  Helen Gaona 89 y.o. female MRN: [id number]  Unit/Bed#: [facility] 914-01 Encounter: [id number]      Oncology History   Hairy cell leukemia, in relapse (HCC)   4/12/2016 Initial Diagnosis    Leukemia, 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HCC)     11/18/2024 -  Chemotherapy    alteplase (CATHFLO), 2 mg, Intracatheter, Every 1 Minute as needed, 1 of 1 cycle  pegfilgrastim-apgf (Nyvepria), 6 mg, Subcutaneous, Once, 1 of 1 cycle  cladribine (LEUSTATIN) infusion, 0.1 mg/kg = 4.7 mg, Intravenous, Once, 1 of 1 cycle  Administration: 4.7 mg (11/18/2024)         Assessment and Plan :      HCL     Recurrent      Admitted chemo Day 2/7 today     Plan:        Cladrabine x 7 days     Rituxan as OP     BRAF V600E mutated     Follow labs, clinical picture     Low volume disease, unlikely to see lysis; may be able to stop allopurinol     No evidence of tumor lysis on labs today      Daily follow up         Subjective:    Doing well, no evidence tumor lysis, eating wtihout issues.  No N/V    Objective:      BP 99/53   Pulse 61   Temp 97.5 °F (36.4 °C)   Resp 18   Ht 5' 5\" (1.651 m)   Wt 45.6 kg (100 lb 8.5 oz)   SpO2 (!) 83%   BMI 16.73 kg/m²   General Appearance:    Alert, oriented        Eyes:    PERRL   Ears:    Normal external ear canals, both ears   Nose:   Nares normal, septum midline   Throat:   Mucosa moist. Pharynx without injection.    Neck:   Supple       Lungs:     Clear to auscultation bilaterally   Chest Wall:    No tenderness or deformity    Heart:    Regular rate and rhythm       Abdomen:     Soft, non-tender, bowel sounds +, no organomegaly           Extremities:   Extremities no cyanosis or edema       Skin:   no rash or icterus.    Lymph nodes:   Cervical, supraclavicular, and axillary nodes normal   Neurologic:   CNII-XII intact, normal strength, sensation and reflexes     throughout        Recent Results (from the past 48 hours)   Comprehensive metabolic panel    Collection Time: 11/19/24  5:49 AM   Result " Value Ref Range    Sodium 140 135 - 147 mmol/L    Potassium 4.4 3.5 - 5.3 mmol/L    Chloride 106 96 - 108 mmol/L    CO2 29 21 - 32 mmol/L    ANION GAP 5 4 - 13 mmol/L    BUN 19 5 - 25 mg/dL    Creatinine 0.61 0.60 - 1.30 mg/dL    Glucose 134 65 - 140 mg/dL    Calcium 8.4 8.4 - 10.2 mg/dL    Corrected Calcium 9.0 8.3 - 10.1 mg/dL    AST 18 13 - 39 U/L    ALT 12 7 - 52 U/L    Alkaline Phosphatase 48 34 - 104 U/L    Total Protein 5.3 (L) 6.4 - 8.4 g/dL    Albumin 3.3 (L) 3.5 - 5.0 g/dL    Total Bilirubin 0.56 0.20 - 1.00 mg/dL    eGFR 76 ml/min/1.73sq m   CBC and differential    Collection Time: 11/19/24  5:49 AM   Result Value Ref Range    WBC 4.43 4.31 - 10.16 Thousand/uL    RBC 2.02 (L) 3.81 - 5.12 Million/uL    Hemoglobin 8.2 (L) 11.5 - 15.4 g/dL    Hematocrit 25.5 (L) 34.8 - 46.1 %     (H) 82 - 98 fL    MCH 40.6 (H) 26.8 - 34.3 pg    MCHC 32.2 31.4 - 37.4 g/dL    RDW 22.0 (H) 11.6 - 15.1 %    MPV 11.3 8.9 - 12.7 fL    Platelets 31 (L) 149 - 390 Thousands/uL   Manual Differential(PHLEBS Do Not Order)    Collection Time: 11/19/24  5:49 AM   Result Value Ref Range    Segmented % 45 43 - 75 %    Bands % 4 0 - 8 %    Lymphocytes % 28 14 - 44 %    Monocytes % 2 (L) 4 - 12 %    Eosinophils % 0 0 - 6 %    Basophils % 0 0 - 1 %    Metamyelocytes % 2 (H) 0 - 1 %    Myelocytes % 4 (H) 0 - 1 %    Atypical Lymphocytes % 15 (H) <=0 %    Absolute Neutrophils 2.17 1.85 - 7.62 Thousand/uL    Absolute Lymphocytes 1.90 0.60 - 4.47 Thousand/uL    Absolute Monocytes 0.09 0.00 - 1.22 Thousand/uL    Absolute Eosinophils 0.00 0.00 - 0.40 Thousand/uL    Absolute Basophils 0.00 0.00 - 0.10 Thousand/uL    Absolute Metamyelocytes 0.09 0.00 - 0.10 Thousand/uL    Absolute Myelocytes 0.18 (H) 0.00 - 0.10 Thousand/uL    Total Counted      nRBC 33 (H) 0 - 2 /100 WBC    Platelet Estimate Decreased (A) Adequate    Anisocytosis Present     Finley Cells Present     Baumann-Loomis Bodies Present     Poikilocytes Present     Polychromasia Present      Schistocytes Present     Target Cells Present          XR chest portable  Result Date: 11/19/2024  Narrative: XR CHEST PORTABLE INDICATION: hypoxia. COMPARISON: February 26, 2021 FINDINGS: Right-sided PICC line, tip to the level of the cavoatrial junction. Left-sided pacer, leads intact and stable in position. Prior aortic valve repair noted. Mild prominent bronchovascular reticular markings in the mid and lower lung zones, suggesting interstitial edema. No pneumothorax or pleural effusion. Normal cardiomediastinal silhouette. Bones are unremarkable for age. Normal upper abdomen.     Impression: Right-sided PICC line with tip at the caval atrial junction. Mildly prominent reticular interstitial markings. Correlate for interstitial edema/CHF. 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 The procedure was performed in the prone position. 1% lidocaine was used for local anesthetic and moderate sedation was administered. The right posterior inferior iliac spine was localized by fluoroscopy and the low back was prepped and draped in sterile fashion. Using fluoroscopic guidance, an 11g bone marrow needle was advanced through the cortex of the iliac spine into the marrow. Aspiration was performed and submitted to pathology for slide preparation. The needle was slightly withdrawn and redirected to obtain a core biopsy using the Trek system. The core was submitted to pathology. The core sample appeared to be small, so a second core sample was taken after positioning confirmed under fluoroscopic guidance. The puncture site was cleansed and a dressing was applied. Fluoroscopy time: 1.4 MINUTES  Images: 2 Sedation (min) : 25 MINUTES     Impression: Impression: Fluoroscopy image guided bone marrow aspiration and core biopsy Signed, performed, and dictated by Lenora Nelson PA-C under supervision of Dr. Umer Wren Workstation performed: [id number]         I spent 30 minutes in chart review, face to face counseling, coordination of care and documentation.     Mercedes Storm MD PhD

## 2024-11-19 NOTE — PROGRESS NOTES
Patient is not short of breath and does not have any increased work of breathing even with oxygen saturation in mid to high 80's.  Patient and patient's sons have expressed that they prefer no oxygen at this time as patient is not struggling or uncomfortable.  Oxygen use to be revisited should patient become symptomatic.  Attending team made aware.  Patient continues to be monitored via safety net.

## 2024-11-19 NOTE — PLAN OF CARE
Problem: PAIN - ADULT  Goal: Verbalizes/displays adequate comfort level or baseline comfort level  Description: Interventions:  - Encourage patient to monitor pain and request assistance  - Assess pain using appropriate pain scale  - Administer analgesics based on type and severity of pain and evaluate response  - Implement non-pharmacological measures as appropriate and evaluate response  - Consider cultural and social influences on pain and pain management  - Notify physician/advanced practitioner if interventions unsuccessful or patient reports new pain  Outcome: Progressing     Problem: INFECTION - ADULT  Goal: Absence or prevention of progression during hospitalization  Description: INTERVENTIONS:  - Assess and monitor for signs and symptoms of infection  - Monitor lab/diagnostic results  - Monitor all insertion sites, i.e. indwelling lines, tubes, and drains  - Monitor endotracheal if appropriate and nasal secretions for changes in amount and color  - Hayden appropriate cooling/warming therapies per order  - Administer medications as ordered  - Instruct and encourage patient and family to use good hand hygiene technique  - Identify and instruct in appropriate isolation precautions for identified infection/condition  Outcome: Progressing     Problem: SAFETY ADULT  Goal: Patient will remain free of falls  Description: INTERVENTIONS:  - Educate patient/family on patient safety including physical limitations  - Instruct patient to call for assistance with activity   - Consult OT/PT to assist with strengthening/mobility   - Keep Call bell within reach  - Keep bed low and locked with side rails adjusted as appropriate  - Keep care items and personal belongings within reach  - Initiate and maintain comfort rounds  - Make Fall Risk Sign visible to staff  - Offer Toileting every 2 Hours, in advance of need  - Initiate/Maintain alarm  - Obtain necessary fall risk management equipment:   - Apply yellow socks and  bracelet for high fall risk patients  - Consider moving patient to room near nurses station  Outcome: Progressing     Problem: DISCHARGE PLANNING  Goal: Discharge to home or other facility with appropriate resources  Description: INTERVENTIONS:  - Identify barriers to discharge w/patient and caregiver  - Arrange for needed discharge resources and transportation as appropriate  - Identify discharge learning needs (meds, wound care, etc.)  - Arrange for interpretive services to assist at discharge as needed  - Refer to Case Management Department for coordinating discharge planning if the patient needs post-hospital services based on physician/advanced practitioner order or complex needs related to functional status, cognitive ability, or social support system  Outcome: Progressing     Problem: Knowledge Deficit  Goal: Patient/family/caregiver demonstrates understanding of disease process, treatment plan, medications, and discharge instructions  Description: Complete learning assessment and assess knowledge base.  Interventions:  - Provide teaching at level of understanding  - Provide teaching via preferred learning methods  Outcome: Progressing     Problem: Prexisting or High Potential for Compromised Skin Integrity  Goal: Skin integrity is maintained or improved  Description: INTERVENTIONS:  - Identify patients at risk for skin breakdown  - Assess and monitor skin integrity  - Assess and monitor nutrition and hydration status  - Monitor labs   - Assess for incontinence   - Turn and reposition patient  - Assist with mobility/ambulation  - Relieve pressure over bony prominences  - Avoid friction and shearing  - Provide appropriate hygiene as needed including keeping skin clean and dry  - Evaluate need for skin moisturizer/barrier cream  - Collaborate with interdisciplinary team   - Patient/family teaching  - Consider wound care consult   Outcome: Progressing

## 2024-11-19 NOTE — ASSESSMENT & PLAN NOTE
Presents today for direct admit for chemotherapy of relapse hairy cell leukemia   Oncology consulted.  Following  Follow-up CBC and CMP

## 2024-11-20 PROBLEM — I50.33 ACUTE ON CHRONIC DIASTOLIC CONGESTIVE HEART FAILURE (HCC): Status: ACTIVE | Noted: 2024-11-20

## 2024-11-20 LAB
ALBUMIN SERPL BCG-MCNC: 3.5 G/DL (ref 3.5–5)
ALP SERPL-CCNC: 52 U/L (ref 34–104)
ALT SERPL W P-5'-P-CCNC: 23 U/L (ref 7–52)
ANION GAP SERPL CALCULATED.3IONS-SCNC: 5 MMOL/L (ref 4–13)
ANISOCYTOSIS BLD QL SMEAR: PRESENT
AST SERPL W P-5'-P-CCNC: 28 U/L (ref 13–39)
BASOPHILS # BLD MANUAL: 0 THOUSAND/UL (ref 0–0.1)
BASOPHILS NFR MAR MANUAL: 0 % (ref 0–1)
BILIRUB SERPL-MCNC: 0.72 MG/DL (ref 0.2–1)
BNP SERPL-MCNC: 201 PG/ML (ref 0–100)
BUN SERPL-MCNC: 27 MG/DL (ref 5–25)
BURR CELLS BLD QL SMEAR: PRESENT
CALCIUM SERPL-MCNC: 8.7 MG/DL (ref 8.4–10.2)
CHLORIDE SERPL-SCNC: 107 MMOL/L (ref 96–108)
CO2 SERPL-SCNC: 28 MMOL/L (ref 21–32)
CREAT SERPL-MCNC: 0.64 MG/DL (ref 0.6–1.3)
EOSINOPHIL # BLD MANUAL: 0 THOUSAND/UL (ref 0–0.4)
EOSINOPHIL NFR BLD MANUAL: 0 % (ref 0–6)
ERYTHROCYTE [DISTWIDTH] IN BLOOD BY AUTOMATED COUNT: 21.9 % (ref 11.6–15.1)
GFR SERPL CREATININE-BSD FRML MDRD: 75 ML/MIN/1.73SQ M
GLUCOSE SERPL-MCNC: 117 MG/DL (ref 65–140)
HCT VFR BLD AUTO: 27.4 % (ref 34.8–46.1)
HGB BLD-MCNC: 8.7 G/DL (ref 11.5–15.4)
HOWELL-JOLLY BOD BLD QL SMEAR: PRESENT
LYMPHOCYTES # BLD AUTO: 1.23 THOUSAND/UL (ref 0.6–4.47)
LYMPHOCYTES # BLD AUTO: 17 % (ref 14–44)
MACROCYTES BLD QL AUTO: PRESENT
MCH RBC QN AUTO: 40.1 PG (ref 26.8–34.3)
MCHC RBC AUTO-ENTMCNC: 31.8 G/DL (ref 31.4–37.4)
MCV RBC AUTO: 126 FL (ref 82–98)
METAMYELOCYTE ABSOLUTE CT: 0.12 THOUSAND/UL (ref 0–0.1)
METAMYELOCYTES NFR BLD MANUAL: 2 % (ref 0–1)
MONOCYTES # BLD AUTO: 1.29 THOUSAND/UL (ref 0–1.22)
MONOCYTES NFR BLD: 22 % (ref 4–12)
MRSA NOSE QL CULT: NORMAL
MYELOCYTE ABSOLUTE CT: 0.12 THOUSAND/UL (ref 0–0.1)
MYELOCYTES NFR BLD MANUAL: 2 % (ref 0–1)
NEUTROPHILS # BLD MANUAL: 3.1 THOUSAND/UL (ref 1.85–7.62)
NEUTS BAND NFR BLD MANUAL: 5 % (ref 0–8)
NEUTS SEG NFR BLD AUTO: 48 % (ref 43–75)
NRBC BLD AUTO-RTO: 48 /100 WBC (ref 0–2)
PAPPENHEIMER BODIES: PRESENT
PLATELET # BLD AUTO: 40 THOUSANDS/UL (ref 149–390)
PLATELET BLD QL SMEAR: ABNORMAL
PMV BLD AUTO: 12.1 FL (ref 8.9–12.7)
POIKILOCYTOSIS BLD QL SMEAR: PRESENT
POLYCHROMASIA BLD QL SMEAR: PRESENT
POTASSIUM SERPL-SCNC: 4.3 MMOL/L (ref 3.5–5.3)
PROT SERPL-MCNC: 6.1 G/DL (ref 6.4–8.4)
RBC # BLD AUTO: 2.17 MILLION/UL (ref 3.81–5.12)
RBC MORPH BLD: PRESENT
SODIUM SERPL-SCNC: 140 MMOL/L (ref 135–147)
TARGETS BLD QL SMEAR: PRESENT
VARIANT LYMPHS # BLD AUTO: 4 %
WBC # BLD AUTO: 5.85 THOUSAND/UL (ref 4.31–10.16)

## 2024-11-20 PROCEDURE — 85007 BL SMEAR W/DIFF WBC COUNT: CPT

## 2024-11-20 PROCEDURE — 92610 EVALUATE SWALLOWING FUNCTION: CPT

## 2024-11-20 PROCEDURE — 83880 ASSAY OF NATRIURETIC PEPTIDE: CPT | Performed by: FAMILY MEDICINE

## 2024-11-20 PROCEDURE — 99232 SBSQ HOSP IP/OBS MODERATE 35: CPT | Performed by: FAMILY MEDICINE

## 2024-11-20 PROCEDURE — 80053 COMPREHEN METABOLIC PANEL: CPT

## 2024-11-20 PROCEDURE — 85027 COMPLETE CBC AUTOMATED: CPT

## 2024-11-20 PROCEDURE — 99232 SBSQ HOSP IP/OBS MODERATE 35: CPT | Performed by: INTERNAL MEDICINE

## 2024-11-20 RX ORDER — FUROSEMIDE 10 MG/ML
10 INJECTION INTRAMUSCULAR; INTRAVENOUS ONCE
Status: COMPLETED | OUTPATIENT
Start: 2024-11-20 | End: 2024-11-20

## 2024-11-20 RX ORDER — SODIUM CHLORIDE 9 MG/ML
20 INJECTION, SOLUTION INTRAVENOUS ONCE AS NEEDED
Status: DISCONTINUED | OUTPATIENT
Start: 2024-11-20 | End: 2024-11-21 | Stop reason: SDUPTHER

## 2024-11-20 RX ADMIN — FUROSEMIDE 10 MG: 10 INJECTION, SOLUTION INTRAMUSCULAR; INTRAVENOUS at 14:29

## 2024-11-20 RX ADMIN — ACYCLOVIR 400 MG: 200 CAPSULE ORAL at 09:01

## 2024-11-20 RX ADMIN — CLADRIBINE 4.7 MG: 1 INJECTION INTRAVENOUS at 18:26

## 2024-11-20 RX ADMIN — SODIUM CHLORIDE 20 ML/HR: 0.9 INJECTION, SOLUTION INTRAVENOUS at 06:08

## 2024-11-20 RX ADMIN — ACYCLOVIR 400 MG: 200 CAPSULE ORAL at 17:58

## 2024-11-20 RX ADMIN — BIMATOPROST 1 DROP: 0.3 SOLUTION/ DROPS OPHTHALMIC at 22:03

## 2024-11-20 RX ADMIN — ASPIRIN 81 MG CHEWABLE TABLET 81 MG: 81 TABLET CHEWABLE at 09:01

## 2024-11-20 RX ADMIN — FAMOTIDINE 20 MG: 20 TABLET, FILM COATED ORAL at 09:01

## 2024-11-20 RX ADMIN — Medication 9 MG: at 22:03

## 2024-11-20 RX ADMIN — LEVOTHYROXINE SODIUM 88 MCG: 88 TABLET ORAL at 06:01

## 2024-11-20 RX ADMIN — SULFAMETHOXAZOLE AND TRIMETHOPRIM 1 TABLET: 800; 160 TABLET ORAL at 09:00

## 2024-11-20 RX ADMIN — PREDNISONE 10 MG: 10 TABLET ORAL at 09:01

## 2024-11-20 RX ADMIN — DEXAMETHASONE SODIUM PHOSPHATE 10 MG: 10 INJECTION, SOLUTION INTRAMUSCULAR; INTRAVENOUS at 17:59

## 2024-11-20 RX ADMIN — ALLOPURINOL 300 MG: 300 TABLET ORAL at 09:01

## 2024-11-20 RX ADMIN — ONDANSETRON 8 MG: 2 INJECTION INTRAMUSCULAR; INTRAVENOUS at 17:32

## 2024-11-20 NOTE — ASSESSMENT & PLAN NOTE
Platelets slowly trending down to 38 with last labs on 11/15 now back to [address]  Holding DVT Ppx  Rest of care per hematology/oncology team

## 2024-11-20 NOTE — PROGRESS NOTES
Progress Note - Hospitalist   Name: Helen Gaona 89 y.o. female I MRN: [id number]  [facility] I Date of Admission: 11/18/2024   Date of Service: 11/20/2024 I Hospital Day: 2    Assessment & Plan  Hairy cell leukemia, in relapse (HCC)  Presents today for direct admit for chemotherapy of relapse hairy cell leukemia   Oncology consulted.  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)  Likely related to malignancy  Monitor  Hypoxia  Found in 80's and now on 2 liters stable  Cxr  with pulm edema-   Suspect in light of acte on chronic diastolic heart failure as based on 2020 TTE ef was 50 %  Will give lasix 10 mg iv x1 and see response if still with hypoxia and lungs clear would need cta pe   Acute on chronic diastolic congestive heart failure (HCC)  Wt Readings from Last 3 Encounters:   11/18/24 45.6 kg (100 lb 8.5 oz)   11/07/24 47.2 kg (104 lb)   11/06/24 46.3 kg (102 lb)     TTE 2020 ef 50 %  Diastolic dysfunction   Cxr with pulm edema  On 2 liters  Check probnp  Lasix will give small dose lasix 10 g iv x1 as she is small and on chemo to avoid kidney failure and monitor response  Repeat tte          VTE Pharmacologic Prophylaxis:   Moderate Risk (Score 3-4) - Pharmacological DVT Prophylaxis Contraindicated. Sequential Compression Devices Ordered.    Mobility:   Basic Mobility Inpatient Raw Score: 19  JH-HLM Goal: 6: Walk 10 steps or more  JH-HLM Achieved: 7: Walk 25 feet or more  JH-HLM Goal achieved. Continue to encourage appropriate mobility.    Patient Centered Rounds: I performed bedside rounds with nursing staff today.   Discussions with Specialists or Other Care Team  Provider: none    Education and Discussions with Family / Patient: Updated  (son) at bedside.    Current Length of Stay: 2 day(s)  Current Patient Status: Inpatient   Certification Statement: The patient will continue to require additional inpatient hospital stay due to chemo  Discharge Plan: Anticipate discharge in 48-72 hrs to discharge location to be determined pending rehab evaluations.    Code Status: Level 1 - Full Code    Subjective   See and examined denies sob    Objective :  Temp:  [97.5 °F (36.4 °C)-97.9 °F (36.6 °C)] 97.6 °F (36.4 °C)  HR:  [61-72] 61  BP: ()/(53-68) 120/62  Resp:  [17-18] 18  SpO2:  [83 %-96 %] 96 %  O2 Device: Nasal cannula  Nasal Cannula O2 Flow Rate (L/min):  [2 L/min] 2 L/min    Body mass index is 16.73 kg/m².     Input and Output Summary (last 24 hours):     Intake/Output Summary (Last 24 hours) at 11/20/2024 1109  Last data filed at 11/19/2024 1841  Gross per 24 hour   Intake 2557.94 ml   Output 100 ml   Net 2457.94 ml       Physical Exam  Vitals and nursing note reviewed.   Constitutional:       General: She is not in acute distress.     Appearance: She is well-developed.   HENT:      Head: Normocephalic and atraumatic.   Eyes:      Conjunctiva/sclera: Conjunctivae normal.   Cardiovascular:      Rate and Rhythm: Normal rate and regular rhythm.      Heart sounds: No murmur heard.  Pulmonary:      Effort: Pulmonary effort is normal. No respiratory distress.      Breath sounds: Rales present. No wheezing.   Abdominal:      Palpations: Abdomen is soft.      Tenderness: There is no abdominal tenderness.   Musculoskeletal:         General: No swelling.      Cervical back: Neck supple.   Skin:     General: Skin is warm and dry.      Capillary Refill: Capillary refill takes less than 2 seconds.   Neurological:      Mental Status: She is alert and oriented to person, place, and time.   Psychiatric:         Mood and Affect: Mood normal.            Lines/Drains:  Lines/Drains/Airways       Active Status       Name Placement date Placement time Site Days    PICC Line 11/18/24 Right Brachial 11/18/24  1343  Brachial  1                    Central Line:  Goal for removal: Continuing for TPN.                Lab Results: I have reviewed the following results:   Results from last 7 days   Lab Units 11/20/24  0601   WBC Thousand/uL 5.85   HEMOGLOBIN g/dL 8.7*   HEMATOCRIT % 27.4*   PLATELETS Thousands/uL 40*   BANDS PCT % 5   LYMPHO PCT % 17   MONO PCT % 22*   EOS PCT % 0     Results from last 7 days   Lab Units 11/20/24  0601   SODIUM mmol/L 140   POTASSIUM mmol/L 4.3   CHLORIDE mmol/L 107   CO2 mmol/L 28   BUN mg/dL 27*   CREATININE mg/dL 0.64   ANION GAP mmol/L 5   CALCIUM mg/dL 8.7   ALBUMIN g/dL 3.5   TOTAL BILIRUBIN mg/dL 0.72   ALK PHOS U/L 52   ALT U/L 23   AST U/L 28   GLUCOSE RANDOM mg/dL 117     Results from last 7 days   Lab Units 11/15/24  1514   INR  1.08                   Recent Cultures (last 7 days):         Imaging Results Review: No pertinent imaging studies reviewed.  Other Study Results Review: No additional pertinent studies reviewed.    Last 24 Hours Medication List:     Current Facility-Administered Medications:     acetaminophen (TYLENOL) tablet 650 mg, Q6H PRN    acyclovir (ZOVIRAX) capsule 400 mg, BID    allopurinol (ZYLOPRIM) tablet 300 mg, Daily    alteplase (CATHFLO) injection 2 mg, Q1MIN PRN    aspirin chewable tablet 81 mg, Daily    bimatoprost (LUMIGAN) 0.03 % ophthalmic drops 1 drop, HS    cladribine (LEUSTATIN) 4.7 mg in sodium chloride 0.9 % 500 mL infusion, Once, Last Rate: 22.6 mL/hr at 11/19/24 1841    famotidine (PEPCID) tablet 20 mg, Daily    furosemide (LASIX) injection 10 mg, Once    levothyroxine tablet 88 mcg, Early Morning    melatonin tablet 9 mg, HS    ondansetron (ZOFRAN) injection 4 mg, Q6H PRN    predniSONE tablet 10 mg, Daily    sodium chloride 0.9 % infusion, Once PRN, Last Rate: Stopped (11/19/24 1630)     sodium chloride 0.9 % infusion, Once PRN, Last Rate: 20 mL/hr (11/20/24 0608)    sulfamethoxazole-trimethoprim (BACTRIM DS) 800-160 mg per tablet 1 tablet, Once per day on Monday Wednesday Friday    Administrative Statements   Today, Patient Was Seen By: Racheal Feliciano MD  I have spent a total time of >35 minutes in caring for this patient on the day of the visit/encounter including Documenting in the medical record, Reviewing / ordering tests, medicine, procedures  , and Communicating with other healthcare professionals .    **Please Note: This note may have been constructed using a voice recognition system.**

## 2024-11-20 NOTE — ASSESSMENT & PLAN NOTE
Presents today for direct admit for chemotherapy of relapse hairy cell leukemia   Oncology consulted.  Following  Follow-up CBC and CMP has bicytopenia

## 2024-11-20 NOTE — QUICK NOTE
Patient seen today; responding to Cladrabine     This is not a vesicant and does not cause skin issues like vesicant chemotherapy    Issue with incontinence, refusing Salgado    Labs reviewed, acceptable for patient to continue with chemotherapy    Please see PN 11/20/2024 for further details    Please continue with chemotherapy    Mercedes Storm MD PhD

## 2024-11-20 NOTE — SPEECH THERAPY NOTE
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)    Acute on chronic diastolic congestive heart failure (HCC)         Past Medical History  Past Medical History:   Diagnosis Date    Aortic stenosis     severe    CAD (coronary artery disease)     Essential tremor     HTN (hypertension)     HTN (hypertension)     Hyperlipidemia     Hypothyroidism     Leukemia, 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HCC)     s/p splenectomy    Osteoarthritis     Osteoporosis     Urinary incontinence        Past Surgical History  Past Surgical History:   Procedure Laterality Date    COLONOSCOPY      HYSTERECTOMY      IR BIOPSY BONE MARROW  11/6/2024    AK REPLACE AORTIC VALVE OPENFEMORAL ARTERY APPROACH N/A 4/12/2016    Procedure: REPLACEMENT AORTIC VALVE TRANSCATHETER (TAVR) TRANSFEMORAL  26mm Lin 3 valve;  Surgeon: Que Thurston DO;  Location: [facility];  Service: Cardiac Surgery    SPLENECTOMY      for hx hairy cell leukemia       Summary    Pt presents with oropharyngeal swallow that appears generally WFL. Mastication was prolonged, but overall functional. Pt consistently swallowed every time she brought the next bite to her mouth There were no overt s/s of aspiration. Pt was asked about placing her pills so far posteriorly in the mouth; she states that if she doesn't do this, the pills start to dissolve or fall into her cheeks (states she has been doing this for at least 20 years). Pt is appropriate to continue current diet for now. ST to follow up briefly, 1-2 sessions, to ensure diet toleration.      Recommendations:   Diet: regular diet and thin liquids   Meds: whole with liquid   Frequent Oral care  Aspiration precautions and  compensatory swallowing strategies: upright posture, only feed when fully alert, slow rate of feeding, and small bites/sips  Other Recommendations/ considerations: ST to see briefly for dysphagia tx, likely 1-2 sessions.       Current Medical Status  Copied from admission/physician notes:  Helen Gaona is a 89 y.o. female with a PMH of relapsed hairy cell leukemia, s/p PPM, hypothyroidism, s/p TAVR who presents as a direct admit for chemotherapy treatment for relapsed hairy cell leukemia.  Recent bone marrow biopsy results on 11/6 indicate hairy cell leukemia.  No acute complaints currently.     Order received and chart reviewed. Discussed with RN, who reports at breakfast pt had episodes of very prolonged mastication. When she took her pills, pt reportedly had to place them way into the posterior oral cavity and then swallowed with sips of water. Nurse tried ice cream with whole pills to see if this helped, but pt swallowed the ice cream but not the pill. Pt told nurse that she did have trouble swallowing in the past. Pt's son present for assessment; he reports no dysphagia prior to admission.    Past medical history:   Please see H&P for details    Special Studies:  CXR-  Right-sided PICC line with tip at the caval atrial junction. Mildly prominent reticular interstitial markings. Correlate for interstitial edema/CHF.       Social/Education/Vocational Hx:  Pt lives in independent living facility    Swallow Information   Current Risks for Dysphagia & Aspiration:  advanced age  Current Symptoms/Concerns:  difficulty swallowing pills, prolonged mastication  Current Diet: regular diet and thin liquids   Baseline Diet: regular diet and thin liquids    Baseline Assessment   Behavior/Cognition: alert and ?mild confusion vs [place]  Speech/Language Status: able to participate in basic conversation and able to follow commands  Patient Positioning: upright in bed     Swallow Mechanism Exam   Facial: symmetrical  Labial:  WFL  Lingual: WFL  Velum: symmetrical  Mandible: adequate ROM  Dentition: adequate  Vocal quality:clear/adequate   Volitional Cough: strong/productive   Respiratory: room air      Consistencies Assessed and Performance   Consistencies Administered: thin liquids, hard solids, and mixed consistency  -chicken noodle soup, grilled cheese    Oral Stage: Adequate bolus retrieval and draw from straw. Mildly to moderately prolonged but overall functional mastication, bolus formation and transfer. Minimal lingual residue cleared with liquid wash. No pocketing, adequate lip seal.     Pharyngeal Stage: Hyolaryngeal elevation observed and palpated. Swallows appeared prompt. No overt s/s of aspiration.       Esophageal Concerns: none reported      Results Reviewed with: patient, RN, and family   Dysphagia Goals:  1. Pt will tolerate regular diet and thin liquids with prompt oropharyngeal swallows and no overt s/s of aspiration. 2. Pt will tolerate LRD without s/s of aspiration.   Discharge recommendation: likely no follow up needed for swallowing    Speech Therapy Prognosis   Prognosis: good    Prognosis Considerations: medical status and therapeutic potential

## 2024-11-20 NOTE — RESTORATIVE TECHNICIAN NOTE
Restorative Technician Note      Patient Name: Helen Gaona     Restorative Tech Visit Date: 11/20/24  Note Type: Mobility  Patient Position Upon Consult: Supine  Activity Performed: Ambulated  Assistive Device: Roller walker  Patient Position at End of Consult: Supine; All needs within reach  Nurse Communication: Nurse aware of consult, application of brace    Delio Smiley, Restorative Technician

## 2024-11-20 NOTE — PROGRESS NOTES
"Oncology Progress Note  Helen Gaona 89 y.o. female MRN: [id number]  Unit/Bed#: [facility] 914-01 Encounter: [id number]      Oncology History   Hairy cell leukemia, in relapse (HCC)   4/12/2016 Initial Diagnosis    Leukemia, 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HCC)     11/18/2024 -  Chemotherapy    alteplase (CATHFLO), 2 mg, Intracatheter, Every 1 Minute as needed, 1 of 1 cycle  pegfilgrastim-apgf (Nyvepria), 6 mg, Subcutaneous, Once, 1 of 1 cycle  cladribine (LEUSTATIN) infusion, 0.1 mg/kg = 4.7 mg, Intravenous, Once, 1 of 1 cycle  Administration: 4.7 mg (11/18/2024), 4.7 mg (11/19/2024)         Assessment and Plan :  HCL  Recurrent HCL with initial treatment 40+ years ago  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 Concern from nursing staff that patient is incontinent and could potentially contaminate herself of others with urine or feces. Patient denied abraham catheter at this time. Patient denies n/v/c/d and appears to be tolerating treatment well.    Objective:      /59   Pulse 70   Temp 97.5 °F (36.4 °C)   Resp 19   Ht 5' 5\" (1.651 m)   Wt 45.6 kg (100 lb 8.5 oz)   SpO2 (!) 89%   BMI 16.73 kg/m²   General Appearance:    Alert, oriented        Eyes:    PERRL   Ears:    Normal external ear canals, both ears   Nose:   Nares normal, septum midline   Throat:   Mucosa moist. Pharynx without injection.    Neck:   Supple       Lungs:     Clear to auscultation bilaterally   Chest Wall:    No tenderness or deformity    Heart:    Regular rate and rhythm       Abdomen:     Soft, non-tender, bowel sounds +, no organomegaly           Extremities:   Extremities no cyanosis or " edema       Skin:   no rash or icterus.    Lymph nodes:   Cervical, supraclavicular, and axillary nodes normal   Neurologic:   CNII-XII intact, normal strength, sensation and reflexes     throughout        Recent Results (from the past 48 hours)   Comprehensive metabolic panel    Collection Time: 11/19/24  5:49 AM   Result Value Ref Range    Sodium 140 135 - 147 mmol/L    Potassium 4.4 3.5 - 5.3 mmol/L    Chloride 106 96 - 108 mmol/L    CO2 29 21 - 32 mmol/L    ANION GAP 5 4 - 13 mmol/L    BUN 19 5 - 25 mg/dL    Creatinine 0.61 0.60 - 1.30 mg/dL    Glucose 134 65 - 140 mg/dL    Calcium 8.4 8.4 - 10.2 mg/dL    Corrected Calcium 9.0 8.3 - 10.1 mg/dL    AST 18 13 - 39 U/L    ALT 12 7 - 52 U/L    Alkaline Phosphatase 48 34 - 104 U/L    Total Protein 5.3 (L) 6.4 - 8.4 g/dL    Albumin 3.3 (L) 3.5 - 5.0 g/dL    Total Bilirubin 0.56 0.20 - 1.00 mg/dL    eGFR 76 ml/min/1.73sq m   CBC and differential    Collection Time: 11/19/24  5:49 AM   Result Value Ref Range    WBC 4.43 4.31 - 10.16 Thousand/uL    RBC 2.02 (L) 3.81 - 5.12 Million/uL    Hemoglobin 8.2 (L) 11.5 - 15.4 g/dL    Hematocrit 25.5 (L) 34.8 - 46.1 %     (H) 82 - 98 fL    MCH 40.6 (H) 26.8 - 34.3 pg    MCHC 32.2 31.4 - 37.4 g/dL    RDW 22.0 (H) 11.6 - 15.1 %    MPV 11.3 8.9 - 12.7 fL    Platelets 31 (L) 149 - 390 Thousands/uL   Manual Differential(PHLEBS Do Not Order)    Collection Time: 11/19/24  5:49 AM   Result Value Ref Range    Segmented % 45 43 - 75 %    Bands % 4 0 - 8 %    Lymphocytes % 28 14 - 44 %    Monocytes % 2 (L) 4 - 12 %    Eosinophils % 0 0 - 6 %    Basophils % 0 0 - 1 %    Metamyelocytes % 2 (H) 0 - 1 %    Myelocytes % 4 (H) 0 - 1 %    Atypical Lymphocytes % 15 (H) <=0 %    Absolute Neutrophils 2.17 1.85 - 7.62 Thousand/uL    Absolute Lymphocytes 1.90 0.60 - 4.47 Thousand/uL    Absolute Monocytes 0.09 0.00 - 1.22 Thousand/uL    Absolute Eosinophils 0.00 0.00 - 0.40 Thousand/uL    Absolute Basophils 0.00 0.00 - 0.10 Thousand/uL    Absolute  Metamyelocytes 0.09 0.00 - 0.10 Thousand/uL    Absolute Myelocytes 0.18 (H) 0.00 - 0.10 Thousand/uL    Total Counted      nRBC 33 (H) 0 - 2 /100 WBC    Platelet Estimate Decreased (A) Adequate    Anisocytosis Present     [place]an Cells Present     Baumann-Kenefic Bodies Present     Poikilocytes Present     Polychromasia Present     Schistocytes Present     Target Cells Present    MRSA culture    Collection Time: 11/19/24  5:54 AM    Specimen: Nose; Nares   Result Value Ref Range    MRSA Culture Only       No Methicillin Resistant Staphlyococcus aureus (MRSA) isolated   CBC and differential    Collection Time: 11/20/24  6:01 AM   Result Value Ref Range    WBC 5.85 4.31 - 10.16 Thousand/uL    RBC 2.17 (L) 3.81 - 5.12 Million/uL    Hemoglobin 8.7 (L) 11.5 - 15.4 g/dL    Hematocrit 27.4 (L) 34.8 - 46.1 %     (H) 82 - 98 fL    MCH 40.1 (H) 26.8 - 34.3 pg    MCHC 31.8 31.4 - 37.4 g/dL    RDW 21.9 (H) 11.6 - 15.1 %    MPV 12.1 8.9 - 12.7 fL    Platelets 40 (L) 149 - 390 Thousands/uL   Comprehensive metabolic panel    Collection Time: 11/20/24  6:01 AM   Result Value Ref Range    Sodium 140 135 - 147 mmol/L    Potassium 4.3 3.5 - 5.3 mmol/L    Chloride 107 96 - 108 mmol/L    CO2 28 21 - 32 mmol/L    ANION GAP 5 4 - 13 mmol/L    BUN 27 (H) 5 - 25 mg/dL    Creatinine 0.64 0.60 - 1.30 mg/dL    Glucose 117 65 - 140 mg/dL    Calcium 8.7 8.4 - 10.2 mg/dL    AST 28 13 - 39 U/L    ALT 23 7 - 52 U/L    Alkaline Phosphatase 52 34 - 104 U/L    Total Protein 6.1 (L) 6.4 - 8.4 g/dL    Albumin 3.5 3.5 - 5.0 g/dL    Total Bilirubin 0.72 0.20 - 1.00 mg/dL    eGFR 75 ml/min/1.73sq m   Manual Differential(PHLEBS Do Not Order)    Collection Time: 11/20/24  6:01 AM   Result Value Ref Range    Segmented % 48 43 - 75 %    Bands % 5 0 - 8 %    Lymphocytes % 17 14 - 44 %    Monocytes % 22 (H) 4 - 12 %    Eosinophils % 0 0 - 6 %    Basophils % 0 0 - 1 %    Metamyelocytes % 2 (H) 0 - 1 %    Myelocytes % 2 (H) 0 - 1 %    Atypical Lymphocytes % 4 (H)  <=0 %    Absolute Neutrophils 3.10 1.85 - 7.62 Thousand/uL    Absolute Lymphocytes 1.23 0.60 - 4.47 Thousand/uL    Absolute Monocytes 1.29 (H) 0.00 - 1.22 Thousand/uL    Absolute Eosinophils 0.00 0.00 - 0.40 Thousand/uL    Absolute Basophils 0.00 0.00 - 0.10 Thousand/uL    Absolute Metamyelocytes 0.12 (H) 0.00 - 0.10 Thousand/uL    Absolute Myelocytes 0.12 (H) 0.00 - 0.10 Thousand/uL    Total Counted      nRBC 48 (H) 0 - 2 /100 WBC    RBC Morphology Present     Platelet Estimate Decreased (A) Adequate    Anisocytosis Present     [place] Cells Present     Baumann-[place] Bodies Present     Macrocytes Present     Pappenheimer Bodies Present     Poikilocytes Present     Polychromasia Present     Target Cells Present    B-Type Natriuretic Peptide(BNP)    Collection Time: 11/20/24  6:01 AM   Result Value Ref Range     (H) 0 - 100 pg/mL         XR chest portable  Result Date: 11/19/2024  Narrative: XR CHEST PORTABLE INDICATION: hypoxia. COMPARISON: February 26, 2021 FINDINGS: Right-sided PICC line, tip to the level of the cavoatrial junction. Left-sided pacer, leads intact and stable in position. Prior aortic valve repair noted. Mild prominent bronchovascular reticular markings in the mid and lower lung zones, suggesting interstitial edema. No pneumothorax or pleural effusion. Normal cardiomediastinal silhouette. Bones are unremarkable for age. Normal upper abdomen.     Impression: Right-sided PICC line with tip at the caval atrial junction. Mildly prominent reticular interstitial markings. Correlate for interstitial edema/CHF. 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 The procedure was  performed in the prone position. 1% lidocaine was used for local anesthetic and moderate sedation was administered. The right posterior inferior iliac spine was localized by fluoroscopy and the low back was prepped and draped in sterile fashion. Using fluoroscopic guidance, an 11g bone marrow needle was advanced through the cortex of the iliac spine into the marrow. Aspiration was performed and submitted to pathology for slide preparation. The needle was slightly withdrawn and redirected to obtain a core biopsy using the [place] system. The core was submitted to pathology. The core sample appeared to be small, so a second core sample was taken after positioning confirmed under fluoroscopic guidance. The puncture site was cleansed and a dressing was applied. Fluoroscopy time: 1.4 MINUTES Images: 2 Sedation (min) : 25 MINUTES     Impression: Impression: Fluoroscopy image guided bone marrow aspiration and core biopsy Signed, performed, and dictated by Lenora Nelson PA-C under supervision of Dr. Umer Wren Workstation performed: [id number]         I spent 30 minutes in chart review, face to face counseling, coordination of care and documentation.     Jose Luis Trujillo, MS3

## 2024-11-20 NOTE — ASSESSMENT & PLAN NOTE
Wt Readings from Last 3 Encounters:   11/18/24 45.6 kg (100 lb 8.5 oz)   11/07/24 47.2 kg (104 lb)   11/06/24 46.3 kg (102 lb)     TTE 2020 ef 50 %  Diastolic dysfunction   Cxr with pulm edema  On 2 liters  Check probnp  Lasix will give small dose lasix 10 g iv x1 as she is small and on chemo to avoid kidney failure and monitor response  Repeat tte

## 2024-11-20 NOTE — PLAN OF CARE
Problem: PAIN - ADULT  Goal: Verbalizes/displays adequate comfort level or baseline comfort level  Description: Interventions:  - Encourage patient to monitor pain and request assistance  - Assess pain using appropriate pain scale  - Administer analgesics based on type and severity of pain and evaluate response  - Implement non-pharmacological measures as appropriate and evaluate response  - Consider cultural and social influences on pain and pain management  - Notify physician/advanced practitioner if interventions unsuccessful or patient reports new pain  Outcome: Progressing     Problem: INFECTION - ADULT  Goal: Absence or prevention of progression during hospitalization  Description: INTERVENTIONS:  - Assess and monitor for signs and symptoms of infection  - Monitor lab/diagnostic results  - Monitor all insertion sites, i.e. indwelling lines, tubes, and drains  - Monitor endotracheal if appropriate and nasal secretions for changes in amount and color  - [place] appropriate cooling/warming therapies per order  - Administer medications as ordered  - Instruct and encourage patient and family to use good hand hygiene technique  - Identify and instruct in appropriate isolation precautions for identified infection/condition  Outcome: Progressing     Problem: SAFETY ADULT  Goal: Patient will remain free of falls  Description: INTERVENTIONS:  - Educate patient/family on patient safety including physical limitations  - Instruct patient to call for assistance with activity   - Consult OT/PT to assist with strengthening/mobility   - Keep Call bell within reach  - Keep bed low and locked with side rails adjusted as appropriate  - Keep care items and personal belongings within reach  - Initiate and maintain comfort rounds  - Make Fall Risk Sign visible to staff  - Offer Toileting every 2 Hours, in advance of need  - Initiate/Maintain alarm  - Obtain necessary fall risk management equipment:   - Apply yellow socks and  bracelet for high fall risk patients  - Consider moving patient to room near nurses station  Outcome: Progressing     Problem: DISCHARGE PLANNING  Goal: Discharge to home or other facility with appropriate resources  Description: INTERVENTIONS:  - Identify barriers to discharge w/patient and caregiver  - Arrange for needed discharge resources and transportation as appropriate  - Identify discharge learning needs (meds, wound care, etc.)  - Arrange for interpretive services to assist at discharge as needed  - Refer to Case Management Department for coordinating discharge planning if the patient needs post-hospital services based on physician/advanced practitioner order or complex needs related to functional status, cognitive ability, or social support system  Outcome: Progressing     Problem: Knowledge Deficit  Goal: Patient/family/caregiver demonstrates understanding of disease process, treatment plan, medications, and discharge instructions  Description: Complete learning assessment and assess knowledge base.  Interventions:  - Provide teaching at level of understanding  - Provide teaching via preferred learning methods  Outcome: Progressing     Problem: Prexisting or High Potential for Compromised Skin Integrity  Goal: Skin integrity is maintained or improved  Description: INTERVENTIONS:  - Identify patients at risk for skin breakdown  - Assess and monitor skin integrity  - Assess and monitor nutrition and hydration status  - Monitor labs   - Assess for incontinence   - Turn and reposition patient  - Assist with mobility/ambulation  - Relieve pressure over bony prominences  - Avoid friction and shearing  - Provide appropriate hygiene as needed including keeping skin clean and dry  - Evaluate need for skin moisturizer/barrier cream  - Collaborate with interdisciplinary team   - Patient/family teaching  - Consider wound care consult   Outcome: Progressing

## 2024-11-21 ENCOUNTER — APPOINTMENT (INPATIENT)
Dept: NON INVASIVE DIAGNOSTICS | Facility: HOSPITAL | Age: 89
DRG: 846 | End: 2024-11-21
Payer: MEDICARE

## 2024-11-21 LAB
ANION GAP SERPL CALCULATED.3IONS-SCNC: 4 MMOL/L (ref 4–13)
AORTIC ROOT: 2.4 CM
AORTIC VALVE MEAN VELOCITY: 14.3 M/S
APICAL FOUR CHAMBER EJECTION FRACTION: 67 %
ASCENDING AORTA: 3.5 CM
AV AREA BY CONTINUOUS VTI: 1.9 CM2
AV AREA PEAK VELOCITY: 1.8 CM2
AV LVOT MEAN GRADIENT: 3 MMHG
AV LVOT PEAK GRADIENT: 6 MMHG
AV MEAN GRADIENT: 10 MMHG
AV PEAK GRADIENT: 20 MMHG
AV REGURGITATION PRESSURE HALF TIME: 445 MS
AV VALVE AREA: 1.92 CM2
BSA FOR ECHO PROCEDURE: 1.47 M2
BUN SERPL-MCNC: 29 MG/DL (ref 5–25)
CALCIUM SERPL-MCNC: 8.9 MG/DL (ref 8.4–10.2)
CHLORIDE SERPL-SCNC: 103 MMOL/L (ref 96–108)
CO2 SERPL-SCNC: 31 MMOL/L (ref 21–32)
CREAT SERPL-MCNC: 0.67 MG/DL (ref 0.6–1.3)
DOP CALC AO VTI: 46.9 CM
DOP CALC LVOT AREA: 3.14
DOP CALC LVOT DIAMETER: 2 CM
DOP CALC LVOT PEAK VEL VTI: 28.7 CM
DOP CALC LVOT PEAK VEL: 1.26 M/S
DOP CALC LVOT STROKE INDEX: 60.8 ML/M2
DOP CALC LVOT STROKE VOLUME: 90.12
DOP CALC MV VTI: 88.3 CM
E WAVE DECELERATION TIME: 668 MS
E/A RATIO: 0.67
ERYTHROCYTE [DISTWIDTH] IN BLOOD BY AUTOMATED COUNT: 21.2 % (ref 11.6–15.1)
FRACTIONAL SHORTENING: 36 (ref 28–44)
GFR SERPL CREATININE-BSD FRML MDRD: 74 ML/MIN/1.73SQ M
GLUCOSE SERPL-MCNC: 127 MG/DL (ref 65–140)
HCT VFR BLD AUTO: 28.1 % (ref 34.8–46.1)
HGB BLD-MCNC: 8.9 G/DL (ref 11.5–15.4)
INTERVENTRICULAR SEPTUM IN DIASTOLE (PARASTERNAL SHORT AXIS VIEW): 1.3 CM
INTERVENTRICULAR SEPTUM: 1.3 CM (ref 0.6–1.1)
LA/AORTA RATIO 2D: 1.63
LAAS-AP2: 22.1 CM2
LAAS-AP4: 23.6 CM2
LEFT ATRIUM SIZE: 3.9 CM
LEFT ATRIUM VOLUME (MOD BIPLANE): 71 ML
LEFT ATRIUM VOLUME INDEX (MOD BIPLANE): 48 ML/M2
LEFT INTERNAL DIMENSION IN SYSTOLE: 2.9 CM (ref 2.1–4)
LEFT VENTRICULAR INTERNAL DIMENSION IN DIASTOLE: 4.5 CM (ref 3.5–6)
LEFT VENTRICULAR POSTERIOR WALL IN END DIASTOLE: 1.2 CM
LEFT VENTRICULAR STROKE VOLUME: 62 ML
LVSV (TEICH): 62 ML
MCH RBC QN AUTO: 41.2 PG (ref 26.8–34.3)
MCHC RBC AUTO-ENTMCNC: 31.7 G/DL (ref 31.4–37.4)
MCV RBC AUTO: 130 FL (ref 82–98)
MV E'TISSUE VEL-LAT: 5 CM/S
MV E'TISSUE VEL-SEP: 4 CM/S
MV MEAN GRADIENT: 10 MMHG
MV PEAK A VEL: 2.34 M/S
MV PEAK E VEL: 157 CM/S
MV PEAK GRADIENT: 26 MMHG
MV STENOSIS PRESSURE HALF TIME: 196 MS
MV VALVE AREA BY CONTINUITY EQUATION: 1.02 CM2
MV VALVE AREA P 1/2 METHOD: 1.12
NRBC BLD AUTO-RTO: 46 /100 WBCS
PLATELET # BLD AUTO: 43 THOUSANDS/UL (ref 149–390)
PMV BLD AUTO: 11.8 FL (ref 8.9–12.7)
POTASSIUM SERPL-SCNC: 4.1 MMOL/L (ref 3.5–5.3)
RA PRESSURE ESTIMATED: 8 MMHG
RBC # BLD AUTO: 2.16 MILLION/UL (ref 3.81–5.12)
RIGHT VENTRICLE ID DIMENSION: 4 CM
RV PSP: 45 MMHG
SL CV AV PEAK GRADIENT RETROGRADE: 48 MMHG
SL CV LV EF: 60
SL CV PED ECHO LEFT VENTRICLE DIASTOLIC VOLUME (MOD BIPLANE) 2D: 94 ML
SL CV PED ECHO LEFT VENTRICLE SYSTOLIC VOLUME (MOD BIPLANE) 2D: 32 ML
SODIUM SERPL-SCNC: 138 MMOL/L (ref 135–147)
TR MAX PG: 37 MMHG
TR PEAK VELOCITY: 3.1 M/S
TRICUSPID ANNULAR PLANE SYSTOLIC EXCURSION: 2.5 CM
TRICUSPID VALVE PEAK REGURGITATION VELOCITY: 3.06 M/S
WBC # BLD AUTO: 5.51 THOUSAND/UL (ref 4.31–10.16)

## 2024-11-21 PROCEDURE — 80048 BASIC METABOLIC PNL TOTAL CA: CPT | Performed by: FAMILY MEDICINE

## 2024-11-21 PROCEDURE — 85027 COMPLETE CBC AUTOMATED: CPT | Performed by: FAMILY MEDICINE

## 2024-11-21 PROCEDURE — 93306 TTE W/DOPPLER COMPLETE: CPT | Performed by: INTERNAL MEDICINE

## 2024-11-21 PROCEDURE — 99232 SBSQ HOSP IP/OBS MODERATE 35: CPT | Performed by: NURSE PRACTITIONER

## 2024-11-21 PROCEDURE — 93306 TTE W/DOPPLER COMPLETE: CPT

## 2024-11-21 PROCEDURE — 99233 SBSQ HOSP IP/OBS HIGH 50: CPT | Performed by: INTERNAL MEDICINE

## 2024-11-21 RX ORDER — DOCUSATE SODIUM 100 MG/1
100 CAPSULE, LIQUID FILLED ORAL 2 TIMES DAILY
Status: DISCONTINUED | OUTPATIENT
Start: 2024-11-21 | End: 2024-11-26 | Stop reason: HOSPADM

## 2024-11-21 RX ORDER — ALLOPURINOL 300 MG/1
300 TABLET ORAL DAILY
Qty: 30 TABLET | Refills: 0 | Status: SHIPPED | OUTPATIENT
Start: 2024-11-21 | End: 2024-11-26

## 2024-11-21 RX ORDER — ALLOPURINOL 300 MG/1
300 TABLET ORAL DAILY
Status: DISCONTINUED | OUTPATIENT
Start: 2024-11-21 | End: 2024-11-21 | Stop reason: SDUPTHER

## 2024-11-21 RX ORDER — SODIUM CHLORIDE 9 MG/ML
20 INJECTION, SOLUTION INTRAVENOUS ONCE AS NEEDED
Status: DISCONTINUED | OUTPATIENT
Start: 2024-11-21 | End: 2024-11-26 | Stop reason: HOSPADM

## 2024-11-21 RX ORDER — POLYETHYLENE GLYCOL 3350 17 G/17G
17 POWDER, FOR SOLUTION ORAL DAILY
Status: DISCONTINUED | OUTPATIENT
Start: 2024-11-22 | End: 2024-11-26 | Stop reason: HOSPADM

## 2024-11-21 RX ORDER — BISACODYL 5 MG/1
10 TABLET, DELAYED RELEASE ORAL DAILY PRN
Status: DISCONTINUED | OUTPATIENT
Start: 2024-11-21 | End: 2024-11-26 | Stop reason: HOSPADM

## 2024-11-21 RX ADMIN — FAMOTIDINE 20 MG: 20 TABLET, FILM COATED ORAL at 09:05

## 2024-11-21 RX ADMIN — DOCUSATE SODIUM 100 MG: 100 CAPSULE, LIQUID FILLED ORAL at 21:05

## 2024-11-21 RX ADMIN — LEVOTHYROXINE SODIUM 88 MCG: 88 TABLET ORAL at 04:47

## 2024-11-21 RX ADMIN — ASPIRIN 81 MG CHEWABLE TABLET 81 MG: 81 TABLET CHEWABLE at 09:06

## 2024-11-21 RX ADMIN — Medication 9 MG: at 21:05

## 2024-11-21 RX ADMIN — ACYCLOVIR 400 MG: 200 CAPSULE ORAL at 21:05

## 2024-11-21 RX ADMIN — ALLOPURINOL 300 MG: 300 TABLET ORAL at 09:05

## 2024-11-21 RX ADMIN — CLADRIBINE 4.7 MG: 1 INJECTION INTRAVENOUS at 19:10

## 2024-11-21 RX ADMIN — ACYCLOVIR 400 MG: 200 CAPSULE ORAL at 09:05

## 2024-11-21 RX ADMIN — ONDANSETRON 8 MG: 2 INJECTION INTRAMUSCULAR; INTRAVENOUS at 18:00

## 2024-11-21 RX ADMIN — DEXAMETHASONE SODIUM PHOSPHATE 10 MG: 10 INJECTION, SOLUTION INTRAMUSCULAR; INTRAVENOUS at 19:01

## 2024-11-21 RX ADMIN — PREDNISONE 10 MG: 10 TABLET ORAL at 09:06

## 2024-11-21 RX ADMIN — BIMATOPROST 1 DROP: 0.3 SOLUTION/ DROPS OPHTHALMIC at 22:45

## 2024-11-21 NOTE — PROGRESS NOTES
Patient visited and blessed by Fr Bowman. Pt declined anointing.   11/21/24 1200   Clinical Encounter Type   Visited With Patient   Christianity Encounters   Christianity Needs Prayer  ([place])   Sacramental Encounters   Sacrament of Sick-Anointing Patient declined anointing

## 2024-11-21 NOTE — APP STUDENT NOTE
[facility]RI STUDENT  Inpatient Progress Note for TRAINING ONLY  Not Part of Legal Medical Record     Progress Note - Helen Gaona 89 y.o. female MRN: [id number]  Unit/Bed#: [facility] 914-01 Encounter: [id number]        Hairy cell leukemia, in relapse (HCC)  Assessment & Plan  Presented as direct admit for chemotherapy d/t recurrent HCL with initial treatment 40 + years ago   Oncology following  CBC shows anemia (Hgb stable now at 8.9) and thrombocytopenia (platelets 43 on 11/21), consistent with HCL relapse       * Hypoxia  Assessment & Plan  Upon admission, O2 in 80's on RA, stabilized on 2L NC Found in 80's and now on 2 liters stable  CXR suspicious for pulmonary edema/CHF  Pt with hx of HFpEF - last TTE in 2020 with EF of 50% - repeat echo obtained today 11/21, awaiting results  Pt s/p 10 mg IV lasix x1 on 11/20 - pt incontinent of urine so unable to obtain I&Os and pt refuses abraham catheter   Wean off O2 today and re assess - likely need CTA PE if hypoxia persists       Acute on chronic diastolic congestive heart failure (HCC)  Assessment & Plan  Wt Readings from Last 3 Encounters:   11/21/24 45.4 kg (100 lb)   11/07/24 47.2 kg (104 lb)   11/06/24 46.3 kg (102 lb)     TTE 2020 ef 50 % with diastolic dysfunction, awaiting repeat echo read 11/21   CXR with mildly prominent reticular interstitial marking, suspicious interstitial edema/CHF  BNP mildly elevated 201  S/p IV 10 mg Lasix 11/20 - unable to track I&Os due to incontinence and refusal of abraham  On 2 liters - attempt to wean      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)  Assessment & Plan  Likely related to malignancy  Hgb stable at 8.9  Monitor daily CBC     Thrombocytopenia (HCC)  Assessment & Plan  Platelets increasing from 38 (11/15) to 43 (11/21)   Holding DVT Ppx for now   Rest of care per hematology/oncology team    Presence of cardiac pacemaker  Assessment & Plan  Known history  Outpatient follow-up cardiology    Hypothyroidism  Assessment & Plan  Last TSH  10/2024 - 4.7   Continue levothyroxine         VTE Pharmacologic Prophylaxis:   Pharmacologic: Pharmacologic VTE Prophylaxis contraindicated due to thrombocytopenia  Mechanical VTE Prophylaxis in Place: No    Patient Centered Rounds: I have performed bedside rounds with nursing staff today.    Discussions with Specialists or Other Care Team Provider: oncology     Education and Discussions with Family / Patient: discussed with patient bedside    Time Spent for Care: 30 minutes.  More than 50% of total time spent on counseling and coordination of care as described above.    Current Length of Stay: 3 day(s)    Current Patient Status: Inpatient   Certification Statement: The patient will continue to require additional inpatient hospital stay due to chemotherapy    Discharge Plan: Home    Code Status: Level 1 - Full Code    Subjective:   Pt poor historian - oriented to self but often disoriented to place and situation. Pt with no complaints. RN reports issues with incontinence but pt refusing abraham catheter at this time.     Objective:     Vitals:   Temp (24hrs), Av.9 °F (36.6 °C), Min:97.4 °F (36.3 °C), Max:98.3 °F (36.8 °C)    Temp:  [97.4 °F (36.3 °C)-98.3 °F (36.8 °C)] 97.4 °F (36.3 °C)  HR:  [61-73] 68  Resp:  [15-21] 20  BP: (113-139)/(58-69) 139/61  SpO2:  [89 %-96 %] 92 %  Body mass index is 16.64 kg/m².     Input and Output Summary (last 24 hours):       Intake/Output Summary (Last 24 hours) at 2024 1524  Last data filed at 2024 1313  Gross per 24 hour   Intake 1358.92 ml   Output 1000 ml   Net 358.92 ml       Physical Exam:     Physical Exam  Constitutional:       Comments: Cachexia    HENT:      Mouth/Throat:      Mouth: Mucous membranes are dry.   Cardiovascular:      Rate and Rhythm: Normal rate and regular rhythm.   Pulmonary:      Effort: Pulmonary effort is normal.      Breath sounds: Normal breath sounds.   Abdominal:      General: Abdomen is flat.      Palpations: Abdomen is soft.       Tenderness: There is no abdominal tenderness.   Skin:     General: Skin is warm and dry.      Capillary Refill: Capillary refill takes less than 2 seconds.   Neurological:      General: No focal deficit present.      Mental Status: She is alert. Mental status is at baseline.   Psychiatric:         Mood and Affect: Mood normal.         Behavior: Behavior normal.         Historical Information   Past Medical History:   Diagnosis Date    Aortic stenosis     severe    CAD (coronary artery disease)     Essential tremor     HTN (hypertension)     HTN (hypertension)     Hyperlipidemia     Hypothyroidism     Leukemia, 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HCC)     s/p splenectomy    Osteoarthritis     Osteoporosis     Urinary incontinence      Past Surgical History:   Procedure Laterality Date    COLONOSCOPY      HYSTERECTOMY      IR BIOPSY BONE MARROW  11/6/2024    HI REPLACE AORTIC VALVE OPENFEMORAL ARTERY APPROACH N/A 4/12/2016    Procedure: REPLACEMENT AORTIC VALVE TRANSCATHETER (TAVR) TRANSFEMORAL  26mm Lin 3 valve;  Surgeon: Que Thurston DO;  Location: [facility];  Service: Cardiac Surgery    SPLENECTOMY      for hx hairy cell leukemia     Social History   Social History     Substance and Sexual Activity   Alcohol Use Yes    Comment: SOCIAL     Social History     Substance and Sexual Activity   Drug Use No     Social History     Tobacco Use   Smoking Status Former    Types: Cigarettes   Smokeless Tobacco Former     Family History: Family history non-contributory    Meds/Allergies   all medications and allergies reviewed  No Known Allergies    Additional Data:     Labs:    Results from last 7 days   Lab Units 11/21/24  0452 11/20/24  0601   WBC Thousand/uL 5.51 5.85   HEMOGLOBIN g/dL 8.9* 8.7*   HEMATOCRIT % 28.1* 27.4*   PLATELETS Thousands/uL 43* 40*   BANDS PCT %  --  5   LYMPHO PCT %  --  17   MONO PCT %  --  22*   EOS PCT %  --  0     Results from last 7 days   Lab Units 11/21/24  0452 11/20/24  0601   SODIUM mmol/L 138 140    POTASSIUM mmol/L 4.1 4.3   CHLORIDE mmol/L 103 107   CO2 mmol/L 31 28   BUN mg/dL 29* 27*   CREATININE mg/dL 0.67 0.64   ANION GAP mmol/L 4 5   CALCIUM mg/dL 8.9 8.7   ALBUMIN g/dL  --  3.5   TOTAL BILIRUBIN mg/dL  --  0.72   ALK PHOS U/L  --  52   ALT U/L  --  23   AST U/L  --  28   GLUCOSE RANDOM mg/dL 127 117     Results from last 7 days   Lab Units 11/15/24  1514   INR  1.08                     * I Have Reviewed All Lab Data Listed Above.  * Additional Pertinent Lab Tests Reviewed: All Labs For Current Hospital Admission Reviewed    Imaging:    Imaging Reports Reviewed Today Include: CXR      Last 24 Hours Medication List:   Current Facility-Administered Medications   Medication Dose Route Frequency Provider Last Rate    acetaminophen  650 mg Oral Q6H PRN Hanna Heredia DO      acyclovir  400 mg Oral BID Jan Huynh MD      allopurinol  300 mg Oral Daily Jan Huynh MD      alteplase  2 mg Intracatheter Q1MIN PRN Jan Huynh MD      aspirin  81 mg Oral Daily Hanna Heredia DO      bimatoprost  1 drop Right Eye HS Hanna Heredia DO      cladribine (LEUSTATIN) 4.7 mg in sodium chloride 0.9 % 500 mL infusion  0.1 mg/kg (Treatment Plan Recorded) Intravenous Once Jan Huynh MD      dexamethasone (DECADRON) 10 mg in sodium chloride 0.9 % 50 mL IVPB  10 mg Intravenous Once Jan Huynh MD      famotidine  20 mg Oral Daily Hanna Heredia DO      levothyroxine  88 mcg Oral Early Morning Hanna Heredia DO      melatonin  9 mg Oral HS Hanna Heredia DO      ondansetron (ZOFRAN) 8 mg in sodium chloride 0.9 % 50 mL IVPB  8 mg Intravenous Once Jan Huynh MD      ondansetron  4 mg Intravenous Q6H PRN Jan Huynh MD      predniSONE  10 mg Oral Daily Hanna Heredia,       sodium chloride  20 mL/hr Intravenous Once PRN Jan Huynh MD      sulfamethoxazole-trimethoprim  1 tablet Oral Once per day on Monday Wednesday Friday Jan Huynh MD           Today, Patient Was Seen By: Samantha Alberto RN    ** Please Note: Dictation voice to text software may have been used in the creation of this document. **

## 2024-11-21 NOTE — MALNUTRITION/BMI
This medical record reflects one or more clinical indicators suggestive of underweight.    BMI Findings:  Adult BMI Classifications: Underweight < 18.5        Body mass index is 16.64 kg/m².     See Nutrition note dated 11/20/2024  for additional details.  Completed nutrition assessment is viewable in the nutrition documentation.

## 2024-11-21 NOTE — PROGRESS NOTES
"Oncology Progress Note  Helen Gaona 89 y.o. female MRN: [id number]  Unit/Bed#: [facility] 914-01 Encounter: [id number]      Oncology History   Hairy cell leukemia, in relapse (HCC)   4/12/2016 Initial Diagnosis    Leukemia, 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HCC)     11/18/2024 -  Chemotherapy    alteplase (CATHFLO), 2 mg, Intracatheter, Every 1 Minute as needed, 1 of 1 cycle  pegfilgrastim-apgf (Nyvepria), 6 mg, Subcutaneous, Once, 1 of 1 cycle  cladribine (LEUSTATIN) infusion, 0.1 mg/kg = 4.7 mg, Intravenous, Once, 1 of 1 cycle  Administration: 4.7 mg (11/18/2024), 4.7 mg (11/19/2024)         Assessment and Plan :  HCL  Recurrent HCL with initial treatment 40+ years ago  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 Patient had episode of urinary leakage on the floor overnight while Cladrabine treatment was in progress. She continues to deny abraham catheter at this time. Agreeable to wearing double diapers if needed.     Objective:      /67 (BP Location: Left arm)   Pulse 63   Temp 98.1 °F (36.7 °C) (Oral)   Resp 21   Ht 5' 5\" (1.651 m)   Wt 45.6 kg (100 lb 8.5 oz)   SpO2 93%   BMI 16.73 kg/m²   General Appearance:    Alert, oriented        Eyes:    PERRL   Ears:    Normal external ear canals, both ears   Nose:   Nares normal, septum midline   Throat:   Mucosa moist. Pharynx without injection.    Neck:   Supple       Lungs:     Clear to auscultation bilaterally   Chest Wall:    No tenderness or deformity    Heart:    Regular rate and rhythm       Abdomen:     Soft, non-tender, bowel sounds +, no organomegaly           Extremities:   Extremities no cyanosis or edema       Skin:   no " rash or icterus.    Lymph nodes:   Cervical, supraclavicular, and axillary nodes normal   Neurologic:   CNII-XII intact, normal strength, sensation and reflexes     throughout        Recent Results (from the past 48 hours)   CBC and differential    Collection Time: 11/20/24  6:01 AM   Result Value Ref Range    WBC 5.85 4.31 - 10.16 Thousand/uL    RBC 2.17 (L) 3.81 - 5.12 Million/uL    Hemoglobin 8.7 (L) 11.5 - 15.4 g/dL    Hematocrit 27.4 (L) 34.8 - 46.1 %     (H) 82 - 98 fL    MCH 40.1 (H) 26.8 - 34.3 pg    MCHC 31.8 31.4 - 37.4 g/dL    RDW 21.9 (H) 11.6 - 15.1 %    MPV 12.1 8.9 - 12.7 fL    Platelets 40 (L) 149 - 390 Thousands/uL   Comprehensive metabolic panel    Collection Time: 11/20/24  6:01 AM   Result Value Ref Range    Sodium 140 135 - 147 mmol/L    Potassium 4.3 3.5 - 5.3 mmol/L    Chloride 107 96 - 108 mmol/L    CO2 28 21 - 32 mmol/L    ANION GAP 5 4 - 13 mmol/L    BUN 27 (H) 5 - 25 mg/dL    Creatinine 0.64 0.60 - 1.30 mg/dL    Glucose 117 65 - 140 mg/dL    Calcium 8.7 8.4 - 10.2 mg/dL    AST 28 13 - 39 U/L    ALT 23 7 - 52 U/L    Alkaline Phosphatase 52 34 - 104 U/L    Total Protein 6.1 (L) 6.4 - 8.4 g/dL    Albumin 3.5 3.5 - 5.0 g/dL    Total Bilirubin 0.72 0.20 - 1.00 mg/dL    eGFR 75 ml/min/1.73sq m   Manual Differential(PHLEBS Do Not Order)    Collection Time: 11/20/24  6:01 AM   Result Value Ref Range    Segmented % 48 43 - 75 %    Bands % 5 0 - 8 %    Lymphocytes % 17 14 - 44 %    Monocytes % 22 (H) 4 - 12 %    Eosinophils % 0 0 - 6 %    Basophils % 0 0 - 1 %    Metamyelocytes % 2 (H) 0 - 1 %    Myelocytes % 2 (H) 0 - 1 %    Atypical Lymphocytes % 4 (H) <=0 %    Absolute Neutrophils 3.10 1.85 - 7.62 Thousand/uL    Absolute Lymphocytes 1.23 0.60 - 4.47 Thousand/uL    Absolute Monocytes 1.29 (H) 0.00 - 1.22 Thousand/uL    Absolute Eosinophils 0.00 0.00 - 0.40 Thousand/uL    Absolute Basophils 0.00 0.00 - 0.10 Thousand/uL    Absolute Metamyelocytes 0.12 (H) 0.00 - 0.10 Thousand/uL    Absolute  Myelocytes 0.12 (H) 0.00 - 0.10 Thousand/uL    Total Counted      nRBC 48 (H) 0 - 2 /100 WBC    RBC Morphology Present     Platelet Estimate Decreased (A) Adequate    Anisocytosis Present     [place] Cells Present     [place] Bodies Present     Macrocytes Present     Pappenheimer Bodies Present     Poikilocytes Present     Polychromasia Present     Target Cells Present    B-Type Natriuretic Peptide(BNP)    Collection Time: 11/20/24  6:01 AM   Result Value Ref Range     (H) 0 - 100 pg/mL   CBC and differential    Collection Time: 11/21/24  4:52 AM   Result Value Ref Range    WBC 5.51 4.31 - 10.16 Thousand/uL    RBC 2.16 (L) 3.81 - 5.12 Million/uL    Hemoglobin 8.9 (L) 11.5 - 15.4 g/dL    Hematocrit 28.1 (L) 34.8 - 46.1 %     (H) 82 - 98 fL    MCH 41.2 (H) 26.8 - 34.3 pg    MCHC 31.7 31.4 - 37.4 g/dL    RDW 21.2 (H) 11.6 - 15.1 %    MPV 11.8 8.9 - 12.7 fL    Platelets 43 (L) 149 - 390 Thousands/uL    nRBC 46 /100 WBCs   Basic metabolic panel    Collection Time: 11/21/24  4:52 AM   Result Value Ref Range    Sodium 138 135 - 147 mmol/L    Potassium 4.1 3.5 - 5.3 mmol/L    Chloride 103 96 - 108 mmol/L    CO2 31 21 - 32 mmol/L    ANION GAP 4 4 - 13 mmol/L    BUN 29 (H) 5 - 25 mg/dL    Creatinine 0.67 0.60 - 1.30 mg/dL    Glucose 127 65 - 140 mg/dL    Calcium 8.9 8.4 - 10.2 mg/dL    eGFR 74 ml/min/1.73sq m         XR chest portable  Result Date: 11/19/2024  Narrative: XR CHEST PORTABLE INDICATION: hypoxia. COMPARISON: February 26, 2021 FINDINGS: Right-sided PICC line, tip to the level of the cavoatrial junction. Left-sided pacer, leads intact and stable in position. Prior aortic valve repair noted. Mild prominent bronchovascular reticular markings in the mid and lower lung zones, suggesting interstitial edema. No pneumothorax or pleural effusion. Normal cardiomediastinal silhouette. Bones are unremarkable for age. Normal upper abdomen.     Impression: Right-sided PICC line with tip at the caval atrial  junction. Mildly prominent reticular interstitial markings. Correlate for interstitial edema/CHF. 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 The procedure was performed in the prone position. 1% lidocaine was used for local anesthetic and moderate sedation was administered. The right posterior inferior iliac spine was localized by fluoroscopy and the low back was prepped and draped in sterile fashion. Using fluoroscopic guidance, an 11g bone marrow needle was advanced through the cortex of the iliac spine into the marrow. Aspiration was performed and submitted to pathology for slide preparation. The needle was slightly withdrawn and redirected to obtain a core biopsy using the Trek system. The core was submitted to pathology. The core sample appeared to be small, so a second core sample was taken after positioning confirmed under fluoroscopic guidance. The puncture site was cleansed and a dressing was applied. Fluoroscopy time: 1.4 MINUTES Images: 2 Sedation (min) : 25 MINUTES     Impression: Impression: Fluoroscopy image guided bone marrow aspiration and core biopsy Signed, performed, and dictated by Lenora Nelson PA-C under supervision of Dr. Umer Wren Workstation performed: [id number]         I spent 30 minutes in chart review, face to face counseling, coordination of care and documentation.     Jose Luis Trujillo, MS3

## 2024-11-21 NOTE — ASSESSMENT & PLAN NOTE
Upon admission, O2 in 80's on RA, stabilized on 2L NC Found in 80's and now on 2 liters stable  CXR suspicious for pulmonary edema/CHF  Pt with hx of HFpEF - last TTE in 2020 with EF of 50% - repeat echo obtained today 11/21, awaiting results  Pt s/p 10 mg IV lasix x1 on 11/20 - pt incontinent of urine so unable to obtain I&Os and pt refuses abraham catheter   Wean off O2 today and re assess - likely need CTA PE if hypoxia persists

## 2024-11-21 NOTE — PROGRESS NOTES
Patient rang to use the bathroom, PCA assisted patient into bathroom. PCA notified this RN that patient spilled urine onto floor with active chemo running. RN went in and used chemo spill kit to clean spill up.

## 2024-11-21 NOTE — ASSESSMENT & PLAN NOTE
Wt Readings from Last 3 Encounters:   11/21/24 45.4 kg (100 lb)   11/07/24 47.2 kg (104 lb)   11/06/24 46.3 kg (102 lb)     TTE 2020 ef 50 % with diastolic dysfunction, awaiting repeat echo read 11/21   CXR with mildly prominent reticular interstitial marking, suspicious interstitial edema/CHF  BNP mildly elevated 201  S/p IV 10 mg Lasix 11/20 - unable to track I&Os due to incontinence and refusal of abraham  On 2 liters - attempt to wean

## 2024-11-21 NOTE — ASSESSMENT & PLAN NOTE
Platelets increasing from 38 (11/15) to 43 (11/21)   Holding DVT Ppx for now   Rest of care per hematology/oncology team

## 2024-11-21 NOTE — ASSESSMENT & PLAN NOTE
Presented as direct admit for chemotherapy d/t recurrent HCL with initial treatment 40 + years ago   Oncology following  CBC shows anemia (Hgb stable now at 8.9) and thrombocytopenia (platelets 43 on 11/21), consistent with HCL relapse

## 2024-11-21 NOTE — PROGRESS NOTES
Pastoral Care Progress Note             11/21/24 1500   Clinical Encounter Type   Visited With Patient  (Father Gracie)   Continue Visiting Yes  (Follow up visit requested)   Mu-ism Encounters   Mu-ism Needs Prayer  (Father Gracie offered the patient a blessing)

## 2024-11-21 NOTE — PROGRESS NOTES
Progress Note - Hospitalist   Name: Helen Gaona 89 y.o. female I MRN: [id number]  Unit/Bed#: [facility] 914-01 I Date of Admission: 11/18/2024   Date of Service: 11/21/2024 I Hospital Day: 3    Assessment & Plan  Hairy cell leukemia, in relapse (HCC)  Presented as direct admit for chemotherapy d/t recurrent HCL with initial treatment 40 + years ago   Oncology following  CBC shows anemia (Hgb stable now at 8.9) and thrombocytopenia (platelets 43 on 11/21), consistent with HCL relapse     Hypothyroidism  Last TSH 10/2024 - 4.7   Continue levothyroxine  Presence of cardiac pacemaker  Known history  Outpatient follow-up cardiology  Thrombocytopenia (HCC)  Platelets increasing from 38 (11/15) to 43 (11/21)   Holding DVT Ppx for now   Rest of care per hematology/oncology team  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)  Likely related to malignancy  Hgb stable at 8.9  Monitor daily CBC   Hypoxia  Upon admission, O2 in 80's on RA, stabilized on 2L NC Found in 80's and now on 2 liters stable  CXR suspicious for pulmonary edema/CHF  Pt with hx of HFpEF - last TTE in 2020 with EF of 50% - repeat echo obtained today 11/21, awaiting results  Pt s/p 10 mg IV lasix x1 on 11/20 - pt incontinent of urine so unable to obtain I&Os and pt refuses abraham catheter   Wean off O2 today and re assess - likely need CTA PE if hypoxia persists     Acute on chronic diastolic congestive heart failure (HCC)  Wt Readings from Last 3 Encounters:   11/21/24 45.4 kg (100 lb)   11/07/24 47.2 kg (104 lb)   11/06/24 46.3 kg (102 lb)     TTE 2020 ef 50 % with diastolic dysfunction, awaiting repeat echo read 11/21   CXR with mildly prominent reticular interstitial marking, suspicious interstitial edema/CHF  BNP mildly elevated 201  S/p IV 10 mg Lasix 11/20 - unable to track I&Os due to incontinence and refusal of abraham  On 2 liters - attempt to wean      VTE Pharmacologic Prophylaxis:   Low Risk (Score 0-2) - Encourage Ambulation.    Mobility:   Basic  Mobility Inpatient Raw Score: 19  JH-HLM Goal: 6: Walk 10 steps or more  JH-HLM Achieved: 6: Walk 10 steps or more  JH-HLM Goal achieved. Continue to encourage appropriate mobility.    Patient Centered Rounds: I performed bedside rounds with nursing staff today.   Discussions with Specialists or Other Care Team Provider: d/w RN     Education and Discussions with Family / Patient: Updated  (son) at bedside.    Current Length of Stay: 3 day(s)  Current Patient Status: Inpatient   Certification Statement: The patient will continue to require additional inpatient hospital stay due to IV chemo   Discharge Plan: Anticipate discharge in >72 hrs to home.    Code Status: Level 1 - Full Code    Subjective   Pt lying in bed. Denies any complaints, reports she wishes this place had beer. No reports per RN     Objective :  Temp:  [97.4 °F (36.3 °C)-98.3 °F (36.8 °C)] 97.4 °F (36.3 °C)  HR:  [61-72] 68  BP: (113-139)/(58-67) 139/61  Resp:  [15-21] 20  SpO2:  [92 %-96 %] 92 %  O2 Device: Nasal cannula  Nasal Cannula O2 Flow Rate (L/min):  [2 L/min-3 L/min] 3 L/min    Body mass index is 16.64 kg/m².     Input and Output Summary (last 24 hours):     Intake/Output Summary (Last 24 hours) at 11/21/2024 1613  Last data filed at 11/21/2024 1313  Gross per 24 hour   Intake 1358.92 ml   Output 1000 ml   Net 358.92 ml       Physical Exam  Constitutional:       General: She is not in acute distress.     Appearance: She is cachectic. She is not ill-appearing.   Cardiovascular:      Rate and Rhythm: Normal rate and regular rhythm.      Heart sounds: Murmur heard.   Pulmonary:      Effort: Pulmonary effort is normal. No respiratory distress.      Breath sounds: Normal breath sounds. No wheezing or rales.   Abdominal:      General: Bowel sounds are normal. There is no distension.      Palpations: Abdomen is soft.      Tenderness: There is no abdominal tenderness.   Musculoskeletal:         General: No swelling or tenderness.    Skin:     General: Skin is warm and dry.      Findings: No erythema or rash.   Neurological:      General: No focal deficit present.      Mental Status: She is alert. Mental status is at baseline.   Psychiatric:         Mood and Affect: Mood normal.           Lines/Drains:  Lines/Drains/Airways       Active Status       Name Placement date Placement time Site Days    PICC Line 11/18/24 Right Brachial 11/18/24  1343  Brachial  3                    Central Line:  Goal for removal: Will discontinue when meds requiring line are completed.               Lab Results: I have reviewed the following results:   Results from last 7 days   Lab Units 11/21/24  0452 11/20/24  0601   WBC Thousand/uL 5.51 5.85   HEMOGLOBIN g/dL 8.9* 8.7*   HEMATOCRIT % 28.1* 27.4*   PLATELETS Thousands/uL 43* 40*   BANDS PCT %  --  5   LYMPHO PCT %  --  17   MONO PCT %  --  22*   EOS PCT %  --  0     Results from last 7 days   Lab Units 11/21/24  0452 11/20/24  0601   SODIUM mmol/L 138 140   POTASSIUM mmol/L 4.1 4.3   CHLORIDE mmol/L 103 107   CO2 mmol/L 31 28   BUN mg/dL 29* 27*   CREATININE mg/dL 0.67 0.64   ANION GAP mmol/L 4 5   CALCIUM mg/dL 8.9 8.7   ALBUMIN g/dL  --  3.5   TOTAL BILIRUBIN mg/dL  --  0.72   ALK PHOS U/L  --  52   ALT U/L  --  23   AST U/L  --  28   GLUCOSE RANDOM mg/dL 127 117     Results from last 7 days   Lab Units 11/15/24  1514   INR  1.08                   Recent Cultures (last 7 days):         Imaging Results Review: I reviewed radiology reports from this admission including: chest xray.  Other Study Results Review: No additional pertinent studies reviewed.    Last 24 Hours Medication List:     Current Facility-Administered Medications:     acetaminophen (TYLENOL) tablet 650 mg, Q6H PRN    acyclovir (ZOVIRAX) capsule 400 mg, BID    allopurinol (ZYLOPRIM) tablet 300 mg, Daily    alteplase (CATHFLO) injection 2 mg, Q1MIN PRN    aspirin chewable tablet 81 mg, Daily    bimatoprost (LUMIGAN) 0.03 % ophthalmic drops 1 drop,  HS    cladribine (LEUSTATIN) 4.7 mg in sodium chloride 0.9 % 500 mL infusion, Once    dexamethasone (DECADRON) 10 mg in sodium chloride 0.9 % 50 mL IVPB, Once    famotidine (PEPCID) tablet 20 mg, Daily    levothyroxine tablet 88 mcg, Early Morning    melatonin tablet 9 mg, HS    ondansetron (ZOFRAN) 8 mg in sodium chloride 0.9 % 50 mL IVPB, Once    ondansetron (ZOFRAN) injection 4 mg, Q6H PRN    predniSONE tablet 10 mg, Daily    sodium chloride 0.9 % infusion, Once PRN    sulfamethoxazole-trimethoprim (BACTRIM DS) 800-160 mg per tablet 1 tablet, Once per day on Monday Wednesday Friday    Administrative Statements   Today, Patient Was Seen By: JESSICA Mccracken      **Please Note: This note may have been constructed using a voice recognition system.**

## 2024-11-21 NOTE — PLAN OF CARE
Problem: PAIN - ADULT  Goal: Verbalizes/displays adequate comfort level or baseline comfort level  Description: Interventions:  - Encourage patient to monitor pain and request assistance  - Assess pain using appropriate pain scale  - Administer analgesics based on type and severity of pain and evaluate response  - Implement non-pharmacological measures as appropriate and evaluate response  - Consider cultural and social influences on pain and pain management  - Notify physician/advanced practitioner if interventions unsuccessful or patient reports new pain  Outcome: Progressing     Problem: INFECTION - ADULT  Goal: Absence or prevention of progression during hospitalization  Description: INTERVENTIONS:  - Assess and monitor for signs and symptoms of infection  - Monitor lab/diagnostic results  - Monitor all insertion sites, i.e. indwelling lines, tubes, and drains  - Monitor endotracheal if appropriate and nasal secretions for changes in amount and color  - [place] appropriate cooling/warming therapies per order  - Administer medications as ordered  - Instruct and encourage patient and family to use good hand hygiene technique  - Identify and instruct in appropriate isolation precautions for identified infection/condition  Outcome: Progressing     Problem: SAFETY ADULT  Goal: Patient will remain free of falls  Description: INTERVENTIONS:  - Educate patient/family on patient safety including physical limitations  - Instruct patient to call for assistance with activity   - Consult OT/PT to assist with strengthening/mobility   - Keep Call bell within reach  - Keep bed low and locked with side rails adjusted as appropriate  - Keep care items and personal belongings within reach  - Initiate and maintain comfort rounds  - Make Fall Risk Sign visible to staff  - Offer Toileting every 2 Hours, in advance of need  - Initiate/Maintain alarm  - Obtain necessary fall risk management equipment:   - Apply yellow socks and  bracelet for high fall risk patients  - Consider moving patient to room near nurses station  Outcome: Progressing     Problem: DISCHARGE PLANNING  Goal: Discharge to home or other facility with appropriate resources  Description: INTERVENTIONS:  - Identify barriers to discharge w/patient and caregiver  - Arrange for needed discharge resources and transportation as appropriate  - Identify discharge learning needs (meds, wound care, etc.)  - Arrange for interpretive services to assist at discharge as needed  - Refer to Case Management Department for coordinating discharge planning if the patient needs post-hospital services based on physician/advanced practitioner order or complex needs related to functional status, cognitive ability, or social support system  Outcome: Progressing     Problem: Knowledge Deficit  Goal: Patient/family/caregiver demonstrates understanding of disease process, treatment plan, medications, and discharge instructions  Description: Complete learning assessment and assess knowledge base.  Interventions:  - Provide teaching at level of understanding  - Provide teaching via preferred learning methods  Outcome: Progressing     Problem: Prexisting or High Potential for Compromised Skin Integrity  Goal: Skin integrity is maintained or improved  Description: INTERVENTIONS:  - Identify patients at risk for skin breakdown  - Assess and monitor skin integrity  - Assess and monitor nutrition and hydration status  - Monitor labs   - Assess for incontinence   - Turn and reposition patient  - Assist with mobility/ambulation  - Relieve pressure over bony prominences  - Avoid friction and shearing  - Provide appropriate hygiene as needed including keeping skin clean and dry  - Evaluate need for skin moisturizer/barrier cream  - Collaborate with interdisciplinary team   - Patient/family teaching  - Consider wound care consult   Outcome: Progressing     Problem: Nutrition/Hydration-ADULT  Goal:  Nutrient/Hydration intake appropriate for improving, restoring or maintaining nutritional needs  Description: Monitor and assess patient's nutrition/hydration status for malnutrition. Collaborate with interdisciplinary team and initiate plan and interventions as ordered.  Monitor patient's weight and dietary intake as ordered or per policy. Utilize nutrition screening tool and intervene as necessary. Determine patient's food preferences and provide high-protein, high-caloric foods as appropriate.     INTERVENTIONS:  - Monitor oral intake, urinary output, labs, and treatment plans  - Assess nutrition and hydration status and recommend course of action  - Evaluate amount of meals eaten  - Assist patient with eating if necessary   - Allow adequate time for meals  - Recommend/ encourage appropriate diets, oral nutritional supplements, and vitamin/mineral supplements  - Order, calculate, and assess calorie counts as needed  - Recommend, monitor, and adjust tube feedings and TPN/PPN based on assessed needs  - Assess need for intravenous fluids  - Provide specific nutrition/hydration education as appropriate  - Include patient/family/caregiver in decisions related to nutrition  Outcome: Progressing     Problem: HEMATOLOGIC - ADULT  Goal: Maintains hematologic stability  Description: INTERVENTIONS  - Assess for signs and symptoms of bleeding or hemorrhage  - Monitor labs  - Administer supportive blood products/factors as ordered and appropriate  Outcome: Progressing

## 2024-11-22 PROBLEM — K59.00 CONSTIPATION: Status: ACTIVE | Noted: 2024-11-22

## 2024-11-22 LAB
ALBUMIN SERPL BCG-MCNC: 3.1 G/DL (ref 3.5–5)
ALP SERPL-CCNC: 47 U/L (ref 34–104)
ALT SERPL W P-5'-P-CCNC: 20 U/L (ref 7–52)
ANION GAP SERPL CALCULATED.3IONS-SCNC: 5 MMOL/L (ref 4–13)
AST SERPL W P-5'-P-CCNC: 24 U/L (ref 13–39)
BILIRUB SERPL-MCNC: 0.56 MG/DL (ref 0.2–1)
BUN SERPL-MCNC: 28 MG/DL (ref 5–25)
CALCIUM ALBUM COR SERPL-MCNC: 8.9 MG/DL (ref 8.3–10.1)
CALCIUM SERPL-MCNC: 8.2 MG/DL (ref 8.4–10.2)
CHLORIDE SERPL-SCNC: 102 MMOL/L (ref 96–108)
CO2 SERPL-SCNC: 32 MMOL/L (ref 21–32)
CREAT SERPL-MCNC: 0.63 MG/DL (ref 0.6–1.3)
ERYTHROCYTE [DISTWIDTH] IN BLOOD BY AUTOMATED COUNT: 21.2 % (ref 11.6–15.1)
GFR SERPL CREATININE-BSD FRML MDRD: 75 ML/MIN/1.73SQ M
GLUCOSE SERPL-MCNC: 123 MG/DL (ref 65–140)
HCT VFR BLD AUTO: 26.5 % (ref 34.8–46.1)
HGB BLD-MCNC: 8.5 G/DL (ref 11.5–15.4)
MCH RBC QN AUTO: 40.3 PG (ref 26.8–34.3)
MCHC RBC AUTO-ENTMCNC: 32.1 G/DL (ref 31.4–37.4)
MCV RBC AUTO: 126 FL (ref 82–98)
PLATELET # BLD AUTO: 43 THOUSANDS/UL (ref 149–390)
PMV BLD AUTO: 12.8 FL (ref 8.9–12.7)
POTASSIUM SERPL-SCNC: 4.2 MMOL/L (ref 3.5–5.3)
PROT SERPL-MCNC: 5.3 G/DL (ref 6.4–8.4)
RBC # BLD AUTO: 2.11 MILLION/UL (ref 3.81–5.12)
SODIUM SERPL-SCNC: 139 MMOL/L (ref 135–147)
WBC # BLD AUTO: 4.3 THOUSAND/UL (ref 4.31–10.16)

## 2024-11-22 PROCEDURE — 85027 COMPLETE CBC AUTOMATED: CPT | Performed by: NURSE PRACTITIONER

## 2024-11-22 PROCEDURE — 99233 SBSQ HOSP IP/OBS HIGH 50: CPT | Performed by: INTERNAL MEDICINE

## 2024-11-22 PROCEDURE — 80053 COMPREHEN METABOLIC PANEL: CPT | Performed by: NURSE PRACTITIONER

## 2024-11-22 PROCEDURE — 99232 SBSQ HOSP IP/OBS MODERATE 35: CPT | Performed by: NURSE PRACTITIONER

## 2024-11-22 RX ORDER — SODIUM CHLORIDE 9 MG/ML
20 INJECTION, SOLUTION INTRAVENOUS ONCE AS NEEDED
Status: DISCONTINUED | OUTPATIENT
Start: 2024-11-22 | End: 2024-11-26 | Stop reason: HOSPADM

## 2024-11-22 RX ORDER — ALLOPURINOL 300 MG/1
300 TABLET ORAL DAILY
Status: DISCONTINUED | OUTPATIENT
Start: 2024-11-22 | End: 2024-11-25

## 2024-11-22 RX ORDER — ALLOPURINOL 300 MG/1
300 TABLET ORAL DAILY
Qty: 30 TABLET | Refills: 0 | Status: SHIPPED | OUTPATIENT
Start: 2024-11-22 | End: 2024-11-26

## 2024-11-22 RX ADMIN — ALLOPURINOL 300 MG: 300 TABLET ORAL at 09:49

## 2024-11-22 RX ADMIN — DEXAMETHASONE SODIUM PHOSPHATE 10 MG: 10 INJECTION, SOLUTION INTRAMUSCULAR; INTRAVENOUS at 18:35

## 2024-11-22 RX ADMIN — CLADRIBINE 4.7 MG: 1 INJECTION INTRAVENOUS at 18:38

## 2024-11-22 RX ADMIN — DOCUSATE SODIUM 100 MG: 100 CAPSULE, LIQUID FILLED ORAL at 09:49

## 2024-11-22 RX ADMIN — POLYETHYLENE GLYCOL 3350 17 G: 17 POWDER, FOR SOLUTION ORAL at 09:49

## 2024-11-22 RX ADMIN — BIMATOPROST 1 DROP: 0.3 SOLUTION/ DROPS OPHTHALMIC at 21:13

## 2024-11-22 RX ADMIN — ACYCLOVIR 400 MG: 200 CAPSULE ORAL at 09:49

## 2024-11-22 RX ADMIN — SULFAMETHOXAZOLE AND TRIMETHOPRIM 1 TABLET: 800; 160 TABLET ORAL at 09:49

## 2024-11-22 RX ADMIN — ASPIRIN 81 MG CHEWABLE TABLET 81 MG: 81 TABLET CHEWABLE at 09:49

## 2024-11-22 RX ADMIN — ONDANSETRON 8 MG: 2 INJECTION INTRAMUSCULAR; INTRAVENOUS at 17:48

## 2024-11-22 RX ADMIN — Medication 9 MG: at 21:14

## 2024-11-22 RX ADMIN — LEVOTHYROXINE SODIUM 88 MCG: 88 TABLET ORAL at 06:31

## 2024-11-22 RX ADMIN — FAMOTIDINE 20 MG: 20 TABLET, FILM COATED ORAL at 09:49

## 2024-11-22 RX ADMIN — PREDNISONE 10 MG: 10 TABLET ORAL at 09:49

## 2024-11-22 RX ADMIN — ACYCLOVIR 400 MG: 200 CAPSULE ORAL at 17:48

## 2024-11-22 RX ADMIN — DOCUSATE SODIUM 100 MG: 100 CAPSULE, LIQUID FILLED ORAL at 17:48

## 2024-11-22 NOTE — ASSESSMENT & PLAN NOTE
Wt Readings from Last 3 Encounters:   11/21/24 45.4 kg (100 lb)   11/07/24 47.2 kg (104 lb)   11/06/24 46.3 kg (102 lb)     TTE 2020 ef 50 % with diastolic dysfunction  repeat echo 11/21/24 with EF of 60%, normal systolic function, with severe annular calcification of MV, mild MR, moderate MV stenosis, moderate TV regurgitation with mildly elevated right ventricular systolic pressure  CXR with mildly prominent reticular interstitial marking, suspicious interstitial edema/CHF  BNP mildly elevated 201  S/p IV 10 mg Lasix 11/20 - unable to track I&Os due to incontinence and refusal of abraham  Weaned this afternoon to room air

## 2024-11-22 NOTE — ASSESSMENT & PLAN NOTE
Presented as direct admit for chemotherapy d/t recurrent HCL with initial treatment 40 + years ago   Oncology following  CBC shows anemia (Hgb stable during admission, between 8.4-8.9) and thrombocytopenia (platelets between 38-43), consistent with HCL relapse

## 2024-11-22 NOTE — ASSESSMENT & PLAN NOTE
Platelets ranging between 38-43, I/s/o HCL relapse  Holding DVT Ppx for now   Rest of care per hematology/oncology team

## 2024-11-22 NOTE — PLAN OF CARE
Problem: PAIN - ADULT  Goal: Verbalizes/displays adequate comfort level or baseline comfort level  Description: Interventions:  - Encourage patient to monitor pain and request assistance  - Assess pain using appropriate pain scale  - Administer analgesics based on type and severity of pain and evaluate response  - Implement non-pharmacological measures as appropriate and evaluate response  - Consider cultural and social influences on pain and pain management  - Notify physician/advanced practitioner if interventions unsuccessful or patient reports new pain  Outcome: Progressing     Problem: INFECTION - ADULT  Goal: Absence or prevention of progression during hospitalization  Description: INTERVENTIONS:  - Assess and monitor for signs and symptoms of infection  - Monitor lab/diagnostic results  - Monitor all insertion sites, i.e. indwelling lines, tubes, and drains  - Monitor endotracheal if appropriate and nasal secretions for changes in amount and color  - [place] appropriate cooling/warming therapies per order  - Administer medications as ordered  - Instruct and encourage patient and family to use good hand hygiene technique  - Identify and instruct in appropriate isolation precautions for identified infection/condition  Outcome: Progressing     Problem: DISCHARGE PLANNING  Goal: Discharge to home or other facility with appropriate resources  Description: INTERVENTIONS:  - Identify barriers to discharge w/patient and caregiver  - Arrange for needed discharge resources and transportation as appropriate  - Identify discharge learning needs (meds, wound care, etc.)  - Arrange for interpretive services to assist at discharge as needed  - Refer to Case Management Department for coordinating discharge planning if the patient needs post-hospital services based on physician/advanced practitioner order or complex needs related to functional status, cognitive ability, or social support system  Outcome: Progressing      Pt resting in her room, moving with standby assistance and a walker.  Pt has son at the bedside throughout the day.  Pt reports no pain/n/v/d/constipation.  Pt did have a bowel movement today, small formed.   Pt has cladrabine infusing through right dl picc line.  Plan for chemotherapy to have bag change later today.  Pt ambulated the halls x 2 with the walker and standby assistance.

## 2024-11-22 NOTE — PROGRESS NOTES
"Oncology Progress Note  Helen Gaona 89 y.o. female MRN: [id number]  Unit/Bed#: [facility] 914-01 Encounter: [id number]      Oncology History   Hairy cell leukemia, in relapse (HCC)   4/12/2016 Initial Diagnosis    Leukemia, 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HCC)     11/18/2024 -  Chemotherapy    alteplase (CATHFLO), 2 mg, Intracatheter, Every 1 Minute as needed, 1 of 1 cycle  pegfilgrastim-apgf ([employer]ve[employer]a), 6 mg, Subcutaneous, Once, 1 of 1 cycle  cladribine (LEUSTATIN) infusion, 0.1 mg/kg = 4.7 mg, Intravenous, Once, 1 of 1 cycle  Administration: 4.7 mg (11/18/2024), 4.7 mg (11/19/2024), 4.7 mg (11/20/2024)         Assessment and Plan :  HCL  Recurrent HCL with initial treatment 40+ years ago  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 Patient has no concerns at this time and was encouraged to continue drinking fluids and eating as much as possible. No episodes of urinary incontinence per nursing staff last night.     Objective:      /71   Pulse 64   Temp 97.5 °F (36.4 °C)   Resp 16   Ht 5' 5\" (1.651 m)   Wt 45.4 kg (100 lb)   SpO2 96%   BMI 16.64 kg/m²   General Appearance:    Alert, oriented        Eyes:    PERRL   Ears:    Normal external ear canals, both ears   Nose:   Nares normal, septum midline   Throat:   Mucosa moist. Pharynx without injection.    Neck:   Supple       Lungs:     Left lower lobe crackles appreciated on exam   Chest Wall:    No tenderness or deformity    Heart:    Regular rate and rhythm       Abdomen:     Soft, non-tender, bowel sounds +, no organomegaly         "   Extremities:   Extremities no cyanosis or edema       Skin:   no rash or icterus.    Lymph nodes:   Cervical, supraclavicular, and axillary nodes normal   Neurologic:   CNII-XII intact, normal strength, sensation and reflexes     throughout        Recent Results (from the past 48 hours)   CBC and differential    Collection Time: 11/21/24  4:52 AM   Result Value Ref Range    WBC 5.51 4.31 - 10.16 Thousand/uL    RBC 2.16 (L) 3.81 - 5.12 Million/uL    Hemoglobin 8.9 (L) 11.5 - 15.4 g/dL    Hematocrit 28.1 (L) 34.8 - 46.1 %     (H) 82 - 98 fL    MCH 41.2 (H) 26.8 - 34.3 pg    MCHC 31.7 31.4 - 37.4 g/dL    RDW 21.2 (H) 11.6 - 15.1 %    MPV 11.8 8.9 - 12.7 fL    Platelets 43 (L) 149 - 390 Thousands/uL    nRBC 46 /100 WBCs   Basic metabolic panel    Collection Time: 11/21/24  4:52 AM   Result Value Ref Range    Sodium 138 135 - 147 mmol/L    Potassium 4.1 3.5 - 5.3 mmol/L    Chloride 103 96 - 108 mmol/L    CO2 31 21 - 32 mmol/L    ANION GAP 4 4 - 13 mmol/L    BUN 29 (H) 5 - 25 mg/dL    Creatinine 0.67 0.60 - 1.30 mg/dL    Glucose 127 65 - 140 mg/dL    Calcium 8.9 8.4 - 10.2 mg/dL    eGFR 74 ml/min/1.73sq m   Echo complete w/ contrast if indicated    Collection Time: 11/21/24  9:00 AM   Result Value Ref Range    AV peak gradient 48 mmHg    LA/Ao Ratio 2D 1.63     Triscuspid Valve Regurgitation Peak Gradient 37.0 mmHg    LA Volume Index (BP) 48.0 mL/m2    MV Peak A Edson 2.34 m/s    MV stenosis pressure 1/2 time 196 ms    MV VTI 88.3 cm    MV Peak E Edson 157 cm/s    MV peak gradient antegrade 26 mmHg    AV peak gradient 20 mmHg    LVOT stroke volume 90.12     Ao VTI 46.9 cm    LVOT peak VTI 28.7 cm    LVOT peak edson 1.26 m/s    LVOT area 3.14     LVOT diameter 2.0 cm    E wave deceleration time 668 ms    E/A ratio 0.67     MV valve area p 1/2 method 1.12     MV mean gradient antegrade 10 mmHg    AV LVOT peak gradient 6 mmHg    AV mean gradient 10 mmHg    AV regurgitation pressure 1/2 time 445 ms    TR Peak Edson 3.1 m/s     AV area peak britany 1.8 cm2    AV area by cont VTI 1.9 cm2    LVOT mn grad 3.0 mmHg    RVID d 4.0 cm    A4C EF 67 %    Aortic valve mean velocity 14.30 m/s    Tricuspid valve peak regurgitation velocity 3.06 m/s    Left ventricular stroke volume (2D) 62.00 mL    IVSd 1.30 cm    Tricuspid annular plane systolic excursion 2.50 cm    Ao root 2.40 cm    LVPWd 1.20 cm    LA size 3.9 cm    Asc Ao 3.5 cm    LA volume (BP) 71 mL    FS 36 28 - 44    LVIDS 2.90 cm    IVS 1.3 cm    LVIDd 4.50 cm    LEFT VENTRICLE SYSTOLIC VOLUME (MOD BIPLANE) 2D 32 mL    LV DIASTOLIC VOLUME (MOD BIPLANE) 2D 94 mL    LVOT stroke volume index 60.80 ml/m2    Left Atrium Area-systolic Four Chamber 23.6 cm2    Left Atrium Area-systolic Apical Two Chamber 22.1 cm2    MV E' Tissue Velocity Lateral 5 cm/s    MV E' Tissue Velocity Septal 4 cm/s    LVSV, 2D 62 mL    BSA 1.47 m2    AV valve area 1.92 cm2    MV valve area by continuity eq 1.02 cm2    LV EF 60     Est. RA pres 8.0 mmHg    Right Ventricular Peak Systolic Pressure 45.00 mmHg   CBC    Collection Time: 11/22/24  6:16 AM   Result Value Ref Range    WBC 4.30 (L) 4.31 - 10.16 Thousand/uL    RBC 2.11 (L) 3.81 - 5.12 Million/uL    Hemoglobin 8.5 (L) 11.5 - 15.4 g/dL    Hematocrit 26.5 (L) 34.8 - 46.1 %     (H) 82 - 98 fL    MCH 40.3 (H) 26.8 - 34.3 pg    MCHC 32.1 31.4 - 37.4 g/dL    RDW 21.2 (H) 11.6 - 15.1 %    Platelets 43 (L) 149 - 390 Thousands/uL    MPV 12.8 (H) 8.9 - 12.7 fL   Comprehensive metabolic panel    Collection Time: 11/22/24  6:16 AM   Result Value Ref Range    Sodium 139 135 - 147 mmol/L    Potassium 4.2 3.5 - 5.3 mmol/L    Chloride 102 96 - 108 mmol/L    CO2 32 21 - 32 mmol/L    ANION GAP 5 4 - 13 mmol/L    BUN 28 (H) 5 - 25 mg/dL    Creatinine 0.63 0.60 - 1.30 mg/dL    Glucose 123 65 - 140 mg/dL    Calcium 8.2 (L) 8.4 - 10.2 mg/dL    Corrected Calcium 8.9 8.3 - 10.1 mg/dL    AST 24 13 - 39 U/L    ALT 20 7 - 52 U/L    Alkaline Phosphatase 47 34 - 104 U/L    Total Protein  5.3 (L) 6.4 - 8.4 g/dL    Albumin 3.1 (L) 3.5 - 5.0 g/dL    Total Bilirubin 0.56 0.20 - 1.00 mg/dL    eGFR 75 ml/min/1.73sq m         XR chest portable  Result Date: 11/19/2024  Narrative: XR CHEST PORTABLE INDICATION: hypoxia. COMPARISON: February 26, 2021 FINDINGS: Right-sided PICC line, tip to the level of the cavoatrial junction. Left-sided pacer, leads intact and stable in position. Prior aortic valve repair noted. Mild prominent bronchovascular reticular markings in the mid and lower lung zones, suggesting interstitial edema. No pneumothorax or pleural effusion. Normal cardiomediastinal silhouette. Bones are unremarkable for age. Normal upper abdomen.     Impression: Right-sided PICC line with tip at the caval atrial junction. Mildly prominent reticular interstitial markings. Correlate for interstitial edema/CHF. 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 The procedure was performed in the prone position. 1% lidocaine was used for local anesthetic and moderate sedation was administered. The right posterior inferior iliac spine was localized by fluoroscopy and the low back was prepped and draped in sterile fashion. Using fluoroscopic guidance, an 11g bone marrow needle was advanced through the cortex of the iliac spine into the marrow. Aspiration was performed and submitted to pathology for slide preparation. The needle was slightly withdrawn and redirected to obtain a core biopsy using the [employer] system. The core was submitted to pathology. The core sample appeared to be small, so a second core sample was taken after positioning confirmed under fluoroscopic guidance. The puncture site  was cleansed and a dressing was applied. Fluoroscopy time: 1.4 MINUTES Images: 2 Sedation (min) : 25 MINUTES     Impression: Impression: Fluoroscopy image guided bone marrow aspiration and core biopsy Signed, performed, and dictated by Lenora Nelson PA-C under supervision of Dr. Umer Wren Workstation performed: [id number]         I spent 30 minutes in chart review, face to face counseling, coordination of care and documentation.     Jose Luis Trujillo, MS3

## 2024-11-22 NOTE — PROGRESS NOTES
Progress Note - Hospitalist   Name: Helen Gaona 89 y.o. female I MRN: [id number]  [facility] I Date of Admission: 11/18/2024   Date of Service: 11/22/2024 I Hospital Day: 4    Assessment & Plan  Hairy cell leukemia, in relapse (HCC)  Presented as direct admit for chemotherapy d/t recurrent HCL with initial treatment 40 + years ago   Oncology following  CBC shows anemia (Hgb stable during admission, between 8.4-8.9) and thrombocytopenia (platelets between 38-43), consistent with HCL relapse     Hypothyroidism  Last TSH 10/2024 - 4.7   Continue levothyroxine  Presence of cardiac pacemaker  Known history  Outpatient follow-up cardiology  Thrombocytopenia (HCC)  Platelets ranging between 38-43, I/s/o HCL relapse  Holding DVT Ppx for now   Rest of care per hematology/oncology team  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)  Likely related to malignancy  Hgb stable between 8.3-8.9  Monitor daily CBC   Hypoxia  Upon admission, O2 in 80's on RA, stabilized on 2L NC Found in 80's and now on 2 liters stable  CXR suspicious for pulmonary edema/CHF  Pt with hx of HFpEF - last TTE in 2020 with EF of 50%   repeat echo 11/21/24 with EF of 60%, normal systolic function, with severe annular calcification of MV, mild MR, moderate MV stenosis, moderate TV regurgitation with mildly elevated right ventricular systolic pressure  Pt s/p 10 mg IV lasix x1 on 11/20 - pt incontinent of urine so unable to obtain I&Os and pt refuses abraham catheter   Wean off O2 today and re assess - likely need CTA PE if hypoxia persists     Acute on chronic diastolic congestive heart failure (HCC)  Wt Readings from Last 3 Encounters:   11/21/24 45.4 kg (100 lb)   11/07/24 47.2 kg (104 lb)   11/06/24 46.3 kg (102 lb)     TTE 2020 ef 50 % with diastolic dysfunction  repeat echo 11/21/24 with EF of 60%, normal systolic function, with severe annular calcification of MV, mild MR, moderate MV stenosis, moderate TV regurgitation with mildly elevated  right ventricular systolic pressure  CXR with mildly prominent reticular interstitial marking, suspicious interstitial edema/CHF  BNP mildly elevated 201  S/p IV 10 mg Lasix 11/20 - unable to track I&Os due to incontinence and refusal of abraham  Weaned this afternoon to room air   Constipation  Add bowel regimen     VTE Pharmacologic Prophylaxis:   Moderate Risk (Score 3-4) - Pharmacological DVT Prophylaxis Contraindicated. Sequential Compression Devices Ordered.    Mobility:   Basic Mobility Inpatient Raw Score: 19  JH-HLM Goal: 6: Walk 10 steps or more  JH-HLM Achieved: 6: Walk 10 steps or more  JH-HLM Goal achieved. Continue to encourage appropriate mobility.    Patient Centered Rounds: I performed bedside rounds with nursing staff today.   Discussions with Specialists or Other Care Team Provider: d/w RN and oncology     Education and Discussions with Family / Patient: Updated  (son) at bedside.    Current Length of Stay: 4 day(s)  Current Patient Status: Inpatient   Certification Statement: The patient will continue to require additional inpatient hospital stay due to chemo   Discharge Plan: Anticipate discharge in >72 hrs to home.    Code Status: Level 1 - Full Code    Subjective   Pt lying in bed. Reports no BM for a few days. Denies any other complaints. Denies sob/cough     Objective :  Temp:  [97.5 °F (36.4 °C)-98.1 °F (36.7 °C)] 97.6 °F (36.4 °C)  HR:  [64-81] 81  BP: (101-141)/(54-71) 101/54  Resp:  [14-20] 16  SpO2:  [87 %-99 %] 87 %  O2 Device: Nasal cannula  Nasal Cannula O2 Flow Rate (L/min):  [3 L/min] 3 L/min    Body mass index is 16.64 kg/m².     Input and Output Summary (last 24 hours):     Intake/Output Summary (Last 24 hours) at 11/22/2024 1543  Last data filed at 11/22/2024 0900  Gross per 24 hour   Intake 1761.57 ml   Output 250 ml   Net 1511.57 ml       Physical Exam  Constitutional:       General: She is not in acute distress.     Appearance: She is cachectic. She is not  ill-appearing.   Eyes:      General: No scleral icterus.     Conjunctiva/sclera: Conjunctivae normal.      Pupils: Pupils are equal, round, and reactive to light.   Cardiovascular:      Rate and Rhythm: Normal rate and regular rhythm.      Heart sounds: Murmur heard.   Pulmonary:      Effort: Pulmonary effort is normal. No respiratory distress.      Breath sounds: Normal breath sounds. No wheezing or rales.      Comments: Room air   Abdominal:      General: Bowel sounds are normal. There is no distension.      Palpations: Abdomen is soft.      Tenderness: There is no abdominal tenderness.   Musculoskeletal:         General: No swelling or tenderness.   Skin:     General: Skin is warm and dry.      Findings: No erythema or rash.   Neurological:      Mental Status: She is alert and oriented to person, place, and time. Mental status is at baseline.           Lines/Drains:  Lines/Drains/Airways       Active Status       Name Placement date Placement time Site Days    PICC Line 11/18/24 Right Brachial 11/18/24  1343  Brachial  4                    Central Line:  Goal for removal: Will discontinue when meds requiring line are completed.               Lab Results: I have reviewed the following results:   Results from last 7 days   Lab Units 11/22/24  0616 11/21/24  0452 11/20/24  0601   WBC Thousand/uL 4.30*   < > 5.85   HEMOGLOBIN g/dL 8.5*   < > 8.7*   HEMATOCRIT % 26.5*   < > 27.4*   PLATELETS Thousands/uL 43*   < > 40*   BANDS PCT %  --   --  5   LYMPHO PCT %  --   --  17   MONO PCT %  --   --  22*   EOS PCT %  --   --  0    < > = values in this interval not displayed.     Results from last 7 days   Lab Units 11/22/24  0616   SODIUM mmol/L 139   POTASSIUM mmol/L 4.2   CHLORIDE mmol/L 102   CO2 mmol/L 32   BUN mg/dL 28*   CREATININE mg/dL 0.63   ANION GAP mmol/L 5   CALCIUM mg/dL 8.2*   ALBUMIN g/dL 3.1*   TOTAL BILIRUBIN mg/dL 0.56   ALK PHOS U/L 47   ALT U/L 20   AST U/L 24   GLUCOSE RANDOM mg/dL 123                        Recent Cultures (last 7 days):         Imaging Results Review: I reviewed radiology reports from this admission including: chest xray.  Other Study Results Review: No additional pertinent studies reviewed.    Last 24 Hours Medication List:     Current Facility-Administered Medications:     acetaminophen (TYLENOL) tablet 650 mg, Q6H PRN    acyclovir (ZOVIRAX) capsule 400 mg, BID    allopurinol (ZYLOPRIM) tablet 300 mg, Daily    allopurinol (ZYLOPRIM) tablet 300 mg, Daily    alteplase (CATHFLO) injection 2 mg, Q1MIN PRN    alteplase (CATHFLO) injection 2 mg, Q1MIN PRN    aspirin chewable tablet 81 mg, Daily    bimatoprost (LUMIGAN) 0.03 % ophthalmic drops 1 drop, HS    bisacodyl (DULCOLAX) EC tablet 10 mg, Daily PRN    cladribine (LEUSTATIN) 4.7 mg in sodium chloride 0.9 % 500 mL infusion, Once, Last Rate: 4.7 mg (11/21/24 1910)    cladribine (LEUSTATIN) 4.7 mg in sodium chloride 0.9 % 500 mL infusion, Once    dexamethasone (DECADRON) 10 mg in sodium chloride 0.9 % 50 mL IVPB, Once    docusate sodium (COLACE) capsule 100 mg, BID    famotidine (PEPCID) tablet 20 mg, Daily    levothyroxine tablet 88 mcg, Early Morning    melatonin tablet 9 mg, HS    ondansetron (ZOFRAN) 8 mg in sodium chloride 0.9 % 50 mL IVPB, Once    ondansetron (ZOFRAN) injection 4 mg, Q6H PRN    polyethylene glycol (MIRALAX) packet 17 g, Daily    predniSONE tablet 10 mg, Daily    sodium chloride 0.9 % infusion, Once PRN    sodium chloride 0.9 % infusion, Once PRN    sulfamethoxazole-trimethoprim (BACTRIM DS) 800-160 mg per tablet 1 tablet, Once per day on Monday Wednesday Friday    Administrative Statements   Today, Patient Was Seen By: JESSICA Mccracken      **Please Note: This note may have been constructed using a voice recognition system.**

## 2024-11-22 NOTE — PROGRESS NOTES
11/22/24 1600   Clinical Encounter Type   Visited With Patient     Fr. Bowman check-in; communion and prayer given by her home .

## 2024-11-22 NOTE — ASSESSMENT & PLAN NOTE
Upon admission, O2 in 80's on RA, stabilized on 2L NC Found in 80's and now on 2 liters stable  CXR suspicious for pulmonary edema/CHF  Pt with hx of HFpEF - last TTE in 2020 with EF of 50%   repeat echo 11/21/24 with EF of 60%, normal systolic function, with severe annular calcification of MV, mild MR, moderate MV stenosis, moderate TV regurgitation with mildly elevated right ventricular systolic pressure  Pt s/p 10 mg IV lasix x1 on 11/20 - pt incontinent of urine so unable to obtain I&Os and pt refuses abraham catheter   Wean off O2 today and re assess - likely need CTA PE if hypoxia persists

## 2024-11-22 NOTE — PLAN OF CARE
Problem: PAIN - ADULT  Goal: Verbalizes/displays adequate comfort level or baseline comfort level  Description: Interventions:  - Encourage patient to monitor pain and request assistance  - Assess pain using appropriate pain scale  - Administer analgesics based on type and severity of pain and evaluate response  - Implement non-pharmacological measures as appropriate and evaluate response  - Consider cultural and social influences on pain and pain management  - Notify physician/advanced practitioner if interventions unsuccessful or patient reports new pain  Outcome: Progressing     Problem: INFECTION - ADULT  Goal: Absence or prevention of progression during hospitalization  Description: INTERVENTIONS:  - Assess and monitor for signs and symptoms of infection  - Monitor lab/diagnostic results  - Monitor all insertion sites, i.e. indwelling lines, tubes, and drains  - Monitor endotracheal if appropriate and nasal secretions for changes in amount and color  - [place] appropriate cooling/warming therapies per order  - Administer medications as ordered  - Instruct and encourage patient and family to use good hand hygiene technique  - Identify and instruct in appropriate isolation precautions for identified infection/condition  Outcome: Progressing     Problem: SAFETY ADULT  Goal: Patient will remain free of falls  Description: INTERVENTIONS:  - Educate patient/family on patient safety including physical limitations  - Instruct patient to call for assistance with activity   - Consult OT/PT to assist with strengthening/mobility   - Keep Call bell within reach  - Keep bed low and locked with side rails adjusted as appropriate  - Keep care items and personal belongings within reach  - Initiate and maintain comfort rounds  - Make Fall Risk Sign visible to staff  - Offer Toileting every 2 Hours, in advance of need  - Initiate/Maintain alarm  - Obtain necessary fall risk management equipment:   - Apply yellow socks and  bracelet for high fall risk patients  - Consider moving patient to room near nurses station  Outcome: Progressing     Problem: DISCHARGE PLANNING  Goal: Discharge to home or other facility with appropriate resources  Description: INTERVENTIONS:  - Identify barriers to discharge w/patient and caregiver  - Arrange for needed discharge resources and transportation as appropriate  - Identify discharge learning needs (meds, wound care, etc.)  - Arrange for interpretive services to assist at discharge as needed  - Refer to Case Management Department for coordinating discharge planning if the patient needs post-hospital services based on physician/advanced practitioner order or complex needs related to functional status, cognitive ability, or social support system  Outcome: Progressing     Problem: Knowledge Deficit  Goal: Patient/family/caregiver demonstrates understanding of disease process, treatment plan, medications, and discharge instructions  Description: Complete learning assessment and assess knowledge base.  Interventions:  - Provide teaching at level of understanding  - Provide teaching via preferred learning methods  Outcome: Progressing     Problem: Prexisting or High Potential for Compromised Skin Integrity  Goal: Skin integrity is maintained or improved  Description: INTERVENTIONS:  - Identify patients at risk for skin breakdown  - Assess and monitor skin integrity  - Assess and monitor nutrition and hydration status  - Monitor labs   - Assess for incontinence   - Turn and reposition patient  - Assist with mobility/ambulation  - Relieve pressure over bony prominences  - Avoid friction and shearing  - Provide appropriate hygiene as needed including keeping skin clean and dry  - Evaluate need for skin moisturizer/barrier cream  - Collaborate with interdisciplinary team   - Patient/family teaching  - Consider wound care consult   Outcome: Progressing     Problem: Nutrition/Hydration-ADULT  Goal:  Nutrient/Hydration intake appropriate for improving, restoring or maintaining nutritional needs  Description: Monitor and assess patient's nutrition/hydration status for malnutrition. Collaborate with interdisciplinary team and initiate plan and interventions as ordered.  Monitor patient's weight and dietary intake as ordered or per policy. Utilize nutrition screening tool and intervene as necessary. Determine patient's food preferences and provide high-protein, high-caloric foods as appropriate.     INTERVENTIONS:  - Monitor oral intake, urinary output, labs, and treatment plans  - Assess nutrition and hydration status and recommend course of action  - Evaluate amount of meals eaten  - Assist patient with eating if necessary   - Allow adequate time for meals  - Recommend/ encourage appropriate diets, oral nutritional supplements, and vitamin/mineral supplements  - Order, calculate, and assess calorie counts as needed  - Recommend, monitor, and adjust tube feedings and TPN/PPN based on assessed needs  - Assess need for intravenous fluids  - Provide specific nutrition/hydration education as appropriate  - Include patient/family/caregiver in decisions related to nutrition  Outcome: Progressing     Problem: HEMATOLOGIC - ADULT  Goal: Maintains hematologic stability  Description: INTERVENTIONS  - Assess for signs and symptoms of bleeding or hemorrhage  - Monitor labs  - Administer supportive blood products/factors as ordered and appropriate  Outcome: Progressing

## 2024-11-23 PROBLEM — J96.01 ACUTE HYPOXIC RESPIRATORY FAILURE (HCC): Status: ACTIVE | Noted: 2024-11-18

## 2024-11-23 LAB
ALBUMIN SERPL BCG-MCNC: 3.2 G/DL (ref 3.5–5)
ALP SERPL-CCNC: 46 U/L (ref 34–104)
ALT SERPL W P-5'-P-CCNC: 20 U/L (ref 7–52)
ANION GAP SERPL CALCULATED.3IONS-SCNC: 3 MMOL/L (ref 4–13)
ANISOCYTOSIS BLD QL SMEAR: PRESENT
AST SERPL W P-5'-P-CCNC: 22 U/L (ref 13–39)
BASOPHILS # BLD MANUAL: 0.03 THOUSAND/UL (ref 0–0.1)
BASOPHILS NFR MAR MANUAL: 1 % (ref 0–1)
BILIRUB SERPL-MCNC: 0.58 MG/DL (ref 0.2–1)
BUN SERPL-MCNC: 29 MG/DL (ref 5–25)
CALCIUM ALBUM COR SERPL-MCNC: 9.1 MG/DL (ref 8.3–10.1)
CALCIUM SERPL-MCNC: 8.5 MG/DL (ref 8.4–10.2)
CHLORIDE SERPL-SCNC: 103 MMOL/L (ref 96–108)
CO2 SERPL-SCNC: 32 MMOL/L (ref 21–32)
CREAT SERPL-MCNC: 0.64 MG/DL (ref 0.6–1.3)
DACRYOCYTES BLD QL SMEAR: PRESENT
EOSINOPHIL # BLD MANUAL: 0 THOUSAND/UL (ref 0–0.4)
EOSINOPHIL NFR BLD MANUAL: 0 % (ref 0–6)
ERYTHROCYTE [DISTWIDTH] IN BLOOD BY AUTOMATED COUNT: 21.4 % (ref 11.6–15.1)
GFR SERPL CREATININE-BSD FRML MDRD: 75 ML/MIN/1.73SQ M
GLUCOSE SERPL-MCNC: 109 MG/DL (ref 65–140)
HCT VFR BLD AUTO: 27.7 % (ref 34.8–46.1)
HGB BLD-MCNC: 9 G/DL (ref 11.5–15.4)
LYMPHOCYTES # BLD AUTO: 1.01 THOUSAND/UL (ref 0.6–4.47)
LYMPHOCYTES # BLD AUTO: 2 % (ref 14–44)
MACROCYTES BLD QL AUTO: PRESENT
MCH RBC QN AUTO: 40.2 PG (ref 26.8–34.3)
MCHC RBC AUTO-ENTMCNC: 32.5 G/DL (ref 31.4–37.4)
MCV RBC AUTO: 124 FL (ref 82–98)
MONOCYTES # BLD AUTO: 0.17 THOUSAND/UL (ref 0–1.22)
MONOCYTES NFR BLD: 5 % (ref 4–12)
NEUTROPHILS # BLD MANUAL: 2.27 THOUSAND/UL (ref 1.85–7.62)
NEUTS BAND NFR BLD MANUAL: 6 % (ref 0–8)
NEUTS SEG NFR BLD AUTO: 59 % (ref 43–75)
PLATELET # BLD AUTO: 46 THOUSANDS/UL (ref 149–390)
PLATELET BLD QL SMEAR: ABNORMAL
PMV BLD AUTO: 12 FL (ref 8.9–12.7)
POIKILOCYTOSIS BLD QL SMEAR: PRESENT
POLYCHROMASIA BLD QL SMEAR: PRESENT
POTASSIUM SERPL-SCNC: 4.3 MMOL/L (ref 3.5–5.3)
PROT SERPL-MCNC: 5.7 G/DL (ref 6.4–8.4)
RBC # BLD AUTO: 2.24 MILLION/UL (ref 3.81–5.12)
RBC MORPH BLD: PRESENT
SODIUM SERPL-SCNC: 138 MMOL/L (ref 135–147)
TARGETS BLD QL SMEAR: PRESENT
URATE SERPL-MCNC: 2.7 MG/DL (ref 2–7.5)
VARIANT LYMPHS # BLD AUTO: 27 %
WBC # BLD AUTO: 3.49 THOUSAND/UL (ref 4.31–10.16)

## 2024-11-23 PROCEDURE — 84550 ASSAY OF BLOOD/URIC ACID: CPT | Performed by: INTERNAL MEDICINE

## 2024-11-23 PROCEDURE — 85027 COMPLETE CBC AUTOMATED: CPT | Performed by: NURSE PRACTITIONER

## 2024-11-23 PROCEDURE — 99232 SBSQ HOSP IP/OBS MODERATE 35: CPT

## 2024-11-23 PROCEDURE — 85007 BL SMEAR W/DIFF WBC COUNT: CPT | Performed by: NURSE PRACTITIONER

## 2024-11-23 PROCEDURE — 99233 SBSQ HOSP IP/OBS HIGH 50: CPT | Performed by: INTERNAL MEDICINE

## 2024-11-23 PROCEDURE — 80053 COMPREHEN METABOLIC PANEL: CPT | Performed by: NURSE PRACTITIONER

## 2024-11-23 RX ORDER — ALLOPURINOL 300 MG/1
300 TABLET ORAL DAILY
Status: DISCONTINUED | OUTPATIENT
Start: 2024-11-23 | End: 2024-11-25

## 2024-11-23 RX ORDER — ALLOPURINOL 300 MG/1
300 TABLET ORAL DAILY
Qty: 30 TABLET | Refills: 0 | Status: SHIPPED | OUTPATIENT
Start: 2024-11-23 | End: 2024-11-26

## 2024-11-23 RX ORDER — SODIUM CHLORIDE 9 MG/ML
20 INJECTION, SOLUTION INTRAVENOUS ONCE AS NEEDED
Status: DISCONTINUED | OUTPATIENT
Start: 2024-11-23 | End: 2024-11-26 | Stop reason: HOSPADM

## 2024-11-23 RX ORDER — FUROSEMIDE 10 MG/ML
20 INJECTION INTRAMUSCULAR; INTRAVENOUS ONCE
Status: COMPLETED | OUTPATIENT
Start: 2024-11-24 | End: 2024-11-24

## 2024-11-23 RX ADMIN — ACYCLOVIR 400 MG: 200 CAPSULE ORAL at 17:47

## 2024-11-23 RX ADMIN — PREDNISONE 10 MG: 10 TABLET ORAL at 08:58

## 2024-11-23 RX ADMIN — BIMATOPROST 1 DROP: 0.3 SOLUTION/ DROPS OPHTHALMIC at 22:32

## 2024-11-23 RX ADMIN — ALLOPURINOL 300 MG: 300 TABLET ORAL at 17:56

## 2024-11-23 RX ADMIN — LEVOTHYROXINE SODIUM 88 MCG: 88 TABLET ORAL at 06:19

## 2024-11-23 RX ADMIN — DEXAMETHASONE SODIUM PHOSPHATE 10 MG: 10 INJECTION, SOLUTION INTRAMUSCULAR; INTRAVENOUS at 18:33

## 2024-11-23 RX ADMIN — ONDANSETRON 8 MG: 2 INJECTION INTRAMUSCULAR; INTRAVENOUS at 18:11

## 2024-11-23 RX ADMIN — ASPIRIN 81 MG CHEWABLE TABLET 81 MG: 81 TABLET CHEWABLE at 08:57

## 2024-11-23 RX ADMIN — FAMOTIDINE 20 MG: 20 TABLET, FILM COATED ORAL at 08:57

## 2024-11-23 RX ADMIN — DOCUSATE SODIUM 100 MG: 100 CAPSULE, LIQUID FILLED ORAL at 08:57

## 2024-11-23 RX ADMIN — Medication 9 MG: at 22:32

## 2024-11-23 RX ADMIN — CLADRIBINE 4.7 MG: 1 INJECTION INTRAVENOUS at 19:32

## 2024-11-23 RX ADMIN — ALLOPURINOL 300 MG: 300 TABLET ORAL at 08:58

## 2024-11-23 RX ADMIN — POLYETHYLENE GLYCOL 3350 17 G: 17 POWDER, FOR SOLUTION ORAL at 08:57

## 2024-11-23 RX ADMIN — ACYCLOVIR 400 MG: 200 CAPSULE ORAL at 08:57

## 2024-11-23 RX ADMIN — DOCUSATE SODIUM 100 MG: 100 CAPSULE, LIQUID FILLED ORAL at 17:47

## 2024-11-23 NOTE — PROGRESS NOTES
Tolerating chemotherapy treatment without complaints. Good appetite and no c/o pain.  Ambulated pt on 2 liters of oxygen in hallway walking around the unit twice with family and RN. Oxygen saturation levels dipped to low 80%. Increased oxygen level to 4 liters and pt maintained oxygen level of 92% and completed her walk. No other complaints or issues noted.

## 2024-11-23 NOTE — PLAN OF CARE
Problem: PAIN - ADULT  Goal: Verbalizes/displays adequate comfort level or baseline comfort level  Description: Interventions:  - Encourage patient to monitor pain and request assistance  - Assess pain using appropriate pain scale  - Administer analgesics based on type and severity of pain and evaluate response  - Implement non-pharmacological measures as appropriate and evaluate response  - Consider cultural and social influences on pain and pain management  - Notify physician/advanced practitioner if interventions unsuccessful or patient reports new pain  Outcome: Progressing     Problem: INFECTION - ADULT  Goal: Absence or prevention of progression during hospitalization  Description: INTERVENTIONS:  - Assess and monitor for signs and symptoms of infection  - Monitor lab/diagnostic results  - Monitor all insertion sites, i.e. indwelling lines, tubes, and drains  - Monitor endotracheal if appropriate and nasal secretions for changes in amount and color  - [place] appropriate cooling/warming therapies per order  - Administer medications as ordered  - Instruct and encourage patient and family to use good hand hygiene technique  - Identify and instruct in appropriate isolation precautions for identified infection/condition  Outcome: Progressing     Problem: SAFETY ADULT  Goal: Patient will remain free of falls  Description: INTERVENTIONS:  - Educate patient/family on patient safety including physical limitations  - Instruct patient to call for assistance with activity   - Consult OT/PT to assist with strengthening/mobility   - Keep Call bell within reach  - Keep bed low and locked with side rails adjusted as appropriate  - Keep care items and personal belongings within reach  - Initiate and maintain comfort rounds  - Make Fall Risk Sign visible to staff  - Offer Toileting every 2 Hours, in advance of need  - Initiate/Maintain alarm  - Obtain necessary fall risk management equipment:   - Apply yellow socks and  bracelet for high fall risk patients  - Consider moving patient to room near nurses station  Outcome: Progressing

## 2024-11-23 NOTE — PLAN OF CARE
Problem: PAIN - ADULT  Goal: Verbalizes/displays adequate comfort level or baseline comfort level  Description: Interventions:  - Encourage patient to monitor pain and request assistance  - Assess pain using appropriate pain scale  - Administer analgesics based on type and severity of pain and evaluate response  - Implement non-pharmacological measures as appropriate and evaluate response  - Consider cultural and social influences on pain and pain management  - Notify physician/advanced practitioner if interventions unsuccessful or patient reports new pain  Outcome: Progressing     Problem: INFECTION - ADULT  Goal: Absence or prevention of progression during hospitalization  Description: INTERVENTIONS:  - Assess and monitor for signs and symptoms of infection  - Monitor lab/diagnostic results  - Monitor all insertion sites, i.e. indwelling lines, tubes, and drains  - Monitor endotracheal if appropriate and nasal secretions for changes in amount and color  - [place] appropriate cooling/warming therapies per order  - Administer medications as ordered  - Instruct and encourage patient and family to use good hand hygiene technique  - Identify and instruct in appropriate isolation precautions for identified infection/condition  Outcome: Progressing     Problem: SAFETY ADULT  Goal: Patient will remain free of falls  Description: INTERVENTIONS:  - Educate patient/family on patient safety including physical limitations  - Instruct patient to call for assistance with activity   - Consult OT/PT to assist with strengthening/mobility   - Keep Call bell within reach  - Keep bed low and locked with side rails adjusted as appropriate  - Keep care items and personal belongings within reach  - Initiate and maintain comfort rounds  - Make Fall Risk Sign visible to staff  - Offer Toileting every 2 Hours, in advance of need  - Initiate/Maintain alarm  - Obtain necessary fall risk management equipment:   - Apply yellow socks and  bracelet for high fall risk patients  - Consider moving patient to room near nurses station  Outcome: Progressing     Problem: DISCHARGE PLANNING  Goal: Discharge to home or other facility with appropriate resources  Description: INTERVENTIONS:  - Identify barriers to discharge w/patient and caregiver  - Arrange for needed discharge resources and transportation as appropriate  - Identify discharge learning needs (meds, wound care, etc.)  - Arrange for interpretive services to assist at discharge as needed  - Refer to Case Management Department for coordinating discharge planning if the patient needs post-hospital services based on physician/advanced practitioner order or complex needs related to functional status, cognitive ability, or social support system  Outcome: Progressing     Problem: Knowledge Deficit  Goal: Patient/family/caregiver demonstrates understanding of disease process, treatment plan, medications, and discharge instructions  Description: Complete learning assessment and assess knowledge base.  Interventions:  - Provide teaching at level of understanding  - Provide teaching via preferred learning methods  Outcome: Progressing     Problem: Prexisting or High Potential for Compromised Skin Integrity  Goal: Skin integrity is maintained or improved  Description: INTERVENTIONS:  - Identify patients at risk for skin breakdown  - Assess and monitor skin integrity  - Assess and monitor nutrition and hydration status  - Monitor labs   - Assess for incontinence   - Turn and reposition patient  - Assist with mobility/ambulation  - Relieve pressure over bony prominences  - Avoid friction and shearing  - Provide appropriate hygiene as needed including keeping skin clean and dry  - Evaluate need for skin moisturizer/barrier cream  - Collaborate with interdisciplinary team   - Patient/family teaching  - Consider wound care consult   Outcome: Progressing     Problem: Nutrition/Hydration-ADULT  Goal:  Nutrient/Hydration intake appropriate for improving, restoring or maintaining nutritional needs  Description: Monitor and assess patient's nutrition/hydration status for malnutrition. Collaborate with interdisciplinary team and initiate plan and interventions as ordered.  Monitor patient's weight and dietary intake as ordered or per policy. Utilize nutrition screening tool and intervene as necessary. Determine patient's food preferences and provide high-protein, high-caloric foods as appropriate.     INTERVENTIONS:  - Monitor oral intake, urinary output, labs, and treatment plans  - Assess nutrition and hydration status and recommend course of action  - Evaluate amount of meals eaten  - Assist patient with eating if necessary   - Allow adequate time for meals  - Recommend/ encourage appropriate diets, oral nutritional supplements, and vitamin/mineral supplements  - Order, calculate, and assess calorie counts as needed  - Recommend, monitor, and adjust tube feedings and TPN/PPN based on assessed needs  - Assess need for intravenous fluids  - Provide specific nutrition/hydration education as appropriate  - Include patient/family/caregiver in decisions related to nutrition  Outcome: Progressing     Problem: HEMATOLOGIC - ADULT  Goal: Maintains hematologic stability  Description: INTERVENTIONS  - Assess for signs and symptoms of bleeding or hemorrhage  - Monitor labs  - Administer supportive blood products/factors as ordered and appropriate  Outcome: Progressing

## 2024-11-23 NOTE — PROGRESS NOTES
"  [facility]Oncology Specialists  Progress Note  Encounter: [id number]     PATIENT INFO     Name: Helen Gaona  YOB: 1928   Age: 89 y.o.   Sex: female   MRN: [id number]  Unit/Bed#: PPHP 914-01     ASSESSMENT & PLAN     Helen Gaona is a 89 y.o. female with history of recurrent hairy cell leukemia was admitted on account of cladribine administration for 7 days.    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  More energy compared to yesterday.  Denies any fever, chills, nausea, vomiting, headache, dizziness.  Tolerating diet well.  Has been working with physical therapy for ambulation.  Patient not on oxygen when examined today.  Does not complain of shortness of breath today.  Was given 1 dose of Lasix on admission.  No volume overload indication on exam        OBJECTIVE     Objective   Blood pressure 127/59, pulse 62, temperature 97.5 °F (36.4 °C), resp. rate 16, height 5' 5\" (1.651 m), weight 45.4 kg (100 lb), SpO2 95%. Body mass index is 16.64 kg/m².    Intake/Output Summary (Last 24 hours) at 11/23/2024 0908  Last data filed at 11/23/2024 0101  Gross per 24 hour   Intake --   Output 500 ml   Net -500 ml     Medication Administration - last 24 hours from 11/22/2024 0908 to 11/23/2024 0908         Date/Time Order Dose Route Action Action by     11/23/2024 0857 EST aspirin chewable tablet 81 mg 81 mg Oral Given Leanne Thomas RN     " 11/22/2024 0949 EST aspirin chewable tablet 81 mg 81 mg Oral Given Kaelyn Hutton RN     11/23/2024 0857 EST famotidine (PEPCID) tablet 20 mg 20 mg Oral Given Leanne Thomas RN     11/22/2024 0949 EST famotidine (PEPCID) tablet 20 mg 20 mg Oral Given Kaelyn Hutton RN     11/23/2024 0619 EST levothyroxine tablet 88 mcg 88 mcg Oral Given Adia Conway, HOA     11/22/2024 2113 EST bimatoprost (LUMIGAN) 0.03 % ophthalmic drops 1 drop 1 drop Right Eye Given Adia Conway, HOA     11/22/2024 2114 EST melatonin tablet 9 mg 9 mg Oral Given Adia Conway, HOA     11/23/2024 0858 EST predniSONE tablet 10 mg 10 mg Oral Given Leanne Thomas RN     11/22/2024 0949 EST predniSONE tablet 10 mg 10 mg Oral Given Kaelyn Hutton RN     11/23/2024 0858 EST allopurinol (ZYLOPRIM) tablet 300 mg 0 mg Oral Hold Leanne Thomas RN     11/22/2024 0949 EST allopurinol (ZYLOPRIM) tablet 300 mg 300 mg Oral Given Kaelyn Hutton RN     11/22/2024 0949 EST sulfamethoxazole-trimethoprim (BACTRIM DS) 800-160 mg per tablet 1 tablet 1 tablet Oral Given Kaelyn Hutton RN     11/23/2024 0857 EST acyclovir (ZOVIRAX) capsule 400 mg 400 mg Oral Given Leanne Thomas RN     11/22/2024 1748 EST acyclovir (ZOVIRAX) capsule 400 mg 400 mg Oral Given Kaelyn Hutton RN     11/22/2024 0949 EST acyclovir (ZOVIRAX) capsule 400 mg 400 mg Oral Given Kaelyn Hutton RN     11/23/2024 0858 EST allopurinol (ZYLOPRIM) tablet 300 mg 300 mg Oral Given Leanne Thomas RN     11/22/2024 1430 EST allopurinol (ZYLOPRIM) tablet 300 mg 0 mg Oral Hold Kaelyn Hutton RN     11/23/2024 0857 EST docusate sodium (COLACE) capsule 100 mg 100 mg Oral Given Leanne Thomas RN     11/22/2024 1748 EST docusate sodium (COLACE) capsule 100 mg 100 mg Oral Given Kaelyn Hutton RN     11/22/2024 0949 EST docusate sodium (COLACE) capsule 100 mg 100 mg Oral Given Kaelyn Hutton RN     11/23/2024 0857 EST polyethylene glycol (MIRALAX) packet 17 g 17 g Oral Given Leanne Thomas,  RN     11/22/2024 0949 EST polyethylene glycol (MIRALAX) packet 17 g 17 g Oral Given Kaelyn Hutton RN     11/22/2024 1748 EST ondansetron (ZOFRAN) 8 mg in sodium chloride 0.9 % 50 mL IVPB 8 mg Intravenous New Bag Kaelyn Hutton RN     11/22/2024 1835 EST dexamethasone (DECADRON) 10 mg in sodium chloride 0.9 % 50 mL IVPB 10 mg Intravenous New Bag Kaelyn Hutton RN     11/22/2024 1838 EST cladribine (LEUSTATIN) 4.7 mg in sodium chloride 0.9 % 500 mL infusion 4.7 mg Intravenous New Bag Kaelyn Hutton RN            General Appearance:   Alert, cooperative, no distress, appears frail, some degree of dementia   HEENT:   Normocephalic, atraumatic, anicteric     Lungs:   Equal chest rise, respirations unlabored    Heart:   Regular rate and rhythm   Abdomen:   Soft, non-tender, non-distended; normal bowel sounds; no masses, no organomegaly        Extremities:   No cyanosis, clubbing or edema    Neuro:   Moves all 4 extremities    Skin:   No jaundice, rashes, or lesions      Invasive Devices       Peripherally Inserted Central Catheter Line  Duration             PICC Line 11/18/24 Right Brachial 4 days              Airway  Duration             Non-Surgical Airway Oral pharyngeal airway 1365 days                      ONC HISTORY     Oncology History   Hairy cell leukemia, in relapse (HCC)   4/12/2016 Initial Diagnosis    Leukemia, 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HCC)     11/18/2024 -  Chemotherapy    alteplase (CATHFLO), 2 mg, Intracatheter, Every 1 Minute as needed, 1 of 1 cycle  pegfilgrastim-apgf (Nyvepria), 6 mg, Subcutaneous, Once, 1 of 1 cycle  cladribine (LEUSTATIN) infusion, 0.1 mg/kg = 4.7 mg, Intravenous, Once, 1 of 1 cycle  Administration: 4.7 mg (11/18/2024), 4.7 mg (11/19/2024), 4.7 mg (11/20/2024), 4.7 mg (11/21/2024), 4.7 mg (11/22/2024)          LABORATORY RESULTS     No results displayed because visit has over 200 results.           IMAGING RESULTS     Echo complete w/ contrast if indicated  Result Date:  11/21/2024  Narrative:   Left Ventricle: Left ventricular cavity size is normal. Wall thickness is moderately increased. The left ventricular ejection fraction is 60%. Systolic function is normal. Wall motion is normal.   Left Atrium: The atrium is moderately dilated.   Right Atrium: The atrium is moderately dilated.   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 There is moderate paravalvular regurgitation. The gradient recorded across the prosthetic aortic valve is within the expected range. The aortic valve mean gradient is 10 mmHg. The aortic valve area is 1.92 cm2.   Mitral Valve: There is moderate diffuse thickening of the anterior leaflet and posterior leaflet. There is moderate calcification with moderate chordal involvement. There is severe annular calcification. There is mild regurgitation. There is moderate stenosis. The mitral valve mean gradient is 10mmHg.   Tricuspid Valve: There is moderate regurgitation. The right ventricular systolic pressure is mildly elevated. The estimated right ventricular systolic pressure is 45.00 mmHg.     XR chest portable  Result Date: 11/19/2024  Narrative: XR CHEST PORTABLE INDICATION: hypoxia. COMPARISON: February 26, 2021 FINDINGS: Right-sided PICC line, tip to the level of the cavoatrial junction. Left-sided pacer, leads intact and stable in position. Prior aortic valve repair noted. Mild prominent bronchovascular reticular markings in the mid and lower lung zones, suggesting interstitial edema. No pneumothorax or pleural effusion. Normal cardiomediastinal silhouette. Bones are unremarkable for age. Normal upper abdomen.     Impression: Right-sided PICC line with tip at the caval atrial junction. Mildly prominent reticular interstitial markings. Correlate for interstitial edema/CHF. 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 The procedure was performed in the prone position. 1% lidocaine was used for local anesthetic and moderate sedation was administered. The right posterior inferior iliac spine was localized by fluoroscopy and the low back was prepped and draped in sterile fashion. Using fluoroscopic guidance, an 11g bone marrow needle was advanced through the cortex of the iliac spine into the marrow. Aspiration was performed and submitted to pathology for slide preparation. The needle was slightly withdrawn and redirected to obtain a core biopsy using the DSTLDk system. The core was submitted to pathology. The core sample appeared to be small, so a second core sample was taken after positioning confirmed under fluoroscopic guidance. The puncture site was cleansed and a dressing was applied. Fluoroscopy time: 1.4 MINUTES Images: 2 Sedation (min) : 25 MINUTES     Impression: Impression: Fluoroscopy image guided bone marrow aspiration and core biopsy Signed, performed, and dictated by Lenora Nelson PA-C under supervision of Dr. Umer Wren Workstation performed: [id number]     I have personally reviewed pertinent imaging study reports.      Ismael Owens MD  [facility]  Division of hematology & oncology  Available on TigerText  [email or web address]    ** Please Note: This note is constructed using a voice recognition dictation system. **

## 2024-11-24 LAB
ANION GAP SERPL CALCULATED.3IONS-SCNC: 3 MMOL/L (ref 4–13)
BUN SERPL-MCNC: 24 MG/DL (ref 5–25)
CALCIUM SERPL-MCNC: 8.6 MG/DL (ref 8.4–10.2)
CHLORIDE SERPL-SCNC: 103 MMOL/L (ref 96–108)
CO2 SERPL-SCNC: 31 MMOL/L (ref 21–32)
CREAT SERPL-MCNC: 0.58 MG/DL (ref 0.6–1.3)
ERYTHROCYTE [DISTWIDTH] IN BLOOD BY AUTOMATED COUNT: 21.6 % (ref 11.6–15.1)
GFR SERPL CREATININE-BSD FRML MDRD: 78 ML/MIN/1.73SQ M
GLUCOSE SERPL-MCNC: 99 MG/DL (ref 65–140)
HCT VFR BLD AUTO: 26.2 % (ref 34.8–46.1)
HGB BLD-MCNC: 8.5 G/DL (ref 11.5–15.4)
MCH RBC QN AUTO: 40.7 PG (ref 26.8–34.3)
MCHC RBC AUTO-ENTMCNC: 32.4 G/DL (ref 31.4–37.4)
MCV RBC AUTO: 125 FL (ref 82–98)
NRBC BLD AUTO-RTO: 208 /100 WBCS
PLATELET # BLD AUTO: 45 THOUSANDS/UL (ref 149–390)
PMV BLD AUTO: 12.5 FL (ref 8.9–12.7)
POTASSIUM SERPL-SCNC: 4.2 MMOL/L (ref 3.5–5.3)
RBC # BLD AUTO: 2.09 MILLION/UL (ref 3.81–5.12)
SODIUM SERPL-SCNC: 137 MMOL/L (ref 135–147)
WBC # BLD AUTO: 1.89 THOUSAND/UL (ref 4.31–10.16)

## 2024-11-24 PROCEDURE — 99232 SBSQ HOSP IP/OBS MODERATE 35: CPT | Performed by: INTERNAL MEDICINE

## 2024-11-24 PROCEDURE — 80048 BASIC METABOLIC PNL TOTAL CA: CPT

## 2024-11-24 PROCEDURE — 99232 SBSQ HOSP IP/OBS MODERATE 35: CPT

## 2024-11-24 PROCEDURE — 97167 OT EVAL HIGH COMPLEX 60 MIN: CPT

## 2024-11-24 PROCEDURE — 97163 PT EVAL HIGH COMPLEX 45 MIN: CPT

## 2024-11-24 PROCEDURE — 85025 COMPLETE CBC W/AUTO DIFF WBC: CPT

## 2024-11-24 RX ORDER — SODIUM CHLORIDE 9 MG/ML
20 INJECTION, SOLUTION INTRAVENOUS ONCE AS NEEDED
Status: DISCONTINUED | OUTPATIENT
Start: 2024-11-24 | End: 2024-11-26 | Stop reason: HOSPADM

## 2024-11-24 RX ADMIN — Medication 9 MG: at 21:48

## 2024-11-24 RX ADMIN — DEXAMETHASONE SODIUM PHOSPHATE 10 MG: 10 INJECTION, SOLUTION INTRAMUSCULAR; INTRAVENOUS at 21:45

## 2024-11-24 RX ADMIN — DOCUSATE SODIUM 100 MG: 100 CAPSULE, LIQUID FILLED ORAL at 10:05

## 2024-11-24 RX ADMIN — FUROSEMIDE 20 MG: 10 INJECTION, SOLUTION INTRAMUSCULAR; INTRAVENOUS at 05:43

## 2024-11-24 RX ADMIN — FAMOTIDINE 20 MG: 20 TABLET, FILM COATED ORAL at 10:06

## 2024-11-24 RX ADMIN — LEVOTHYROXINE SODIUM 88 MCG: 88 TABLET ORAL at 05:42

## 2024-11-24 RX ADMIN — ACYCLOVIR 400 MG: 200 CAPSULE ORAL at 18:15

## 2024-11-24 RX ADMIN — BIMATOPROST 1 DROP: 0.3 SOLUTION/ DROPS OPHTHALMIC at 21:48

## 2024-11-24 RX ADMIN — ACYCLOVIR 400 MG: 200 CAPSULE ORAL at 10:04

## 2024-11-24 RX ADMIN — PREDNISONE 10 MG: 10 TABLET ORAL at 10:05

## 2024-11-24 RX ADMIN — ASPIRIN 81 MG CHEWABLE TABLET 81 MG: 81 TABLET CHEWABLE at 10:04

## 2024-11-24 RX ADMIN — ONDANSETRON 8 MG: 2 INJECTION INTRAMUSCULAR; INTRAVENOUS at 21:25

## 2024-11-24 RX ADMIN — CLADRIBINE 4.7 MG: 1 INJECTION INTRAVENOUS at 22:29

## 2024-11-24 RX ADMIN — POLYETHYLENE GLYCOL 3350 17 G: 17 POWDER, FOR SOLUTION ORAL at 10:05

## 2024-11-24 RX ADMIN — ALLOPURINOL 300 MG: 300 TABLET ORAL at 10:04

## 2024-11-24 NOTE — PLAN OF CARE
Problem: PAIN - ADULT  Goal: Verbalizes/displays adequate comfort level or baseline comfort level  Description: Interventions:  - Encourage patient to monitor pain and request assistance  - Assess pain using appropriate pain scale  - Administer analgesics based on type and severity of pain and evaluate response  - Implement non-pharmacological measures as appropriate and evaluate response  - Consider cultural and social influences on pain and pain management  - Notify physician/advanced practitioner if interventions unsuccessful or patient reports new pain  Outcome: Progressing     Problem: INFECTION - ADULT  Goal: Absence or prevention of progression during hospitalization  Description: INTERVENTIONS:  - Assess and monitor for signs and symptoms of infection  - Monitor lab/diagnostic results  - Monitor all insertion sites, i.e. indwelling lines, tubes, and drains  - Monitor endotracheal if appropriate and nasal secretions for changes in amount and color  - [place] appropriate cooling/warming therapies per order  - Administer medications as ordered  - Instruct and encourage patient and family to use good hand hygiene technique  - Identify and instruct in appropriate isolation precautions for identified infection/condition  Outcome: Progressing     Problem: SAFETY ADULT  Goal: Patient will remain free of falls  Description: INTERVENTIONS:  - Educate patient/family on patient safety including physical limitations  - Instruct patient to call for assistance with activity   - Consult OT/PT to assist with strengthening/mobility   - Keep Call bell within reach  - Keep bed low and locked with side rails adjusted as appropriate  - Keep care items and personal belongings within reach  - Initiate and maintain comfort rounds  - Make Fall Risk Sign visible to staff  - Offer Toileting every 2 Hours, in advance of need  - Initiate/Maintain alarm  - Obtain necessary fall risk management equipment:   - Apply yellow socks and  bracelet for high fall risk patients  - Consider moving patient to room near nurses station  Outcome: Progressing     Problem: DISCHARGE PLANNING  Goal: Discharge to home or other facility with appropriate resources  Description: INTERVENTIONS:  - Identify barriers to discharge w/patient and caregiver  - Arrange for needed discharge resources and transportation as appropriate  - Identify discharge learning needs (meds, wound care, etc.)  - Arrange for interpretive services to assist at discharge as needed  - Refer to Case Management Department for coordinating discharge planning if the patient needs post-hospital services based on physician/advanced practitioner order or complex needs related to functional status, cognitive ability, or social support system  Outcome: Progressing     Problem: Knowledge Deficit  Goal: Patient/family/caregiver demonstrates understanding of disease process, treatment plan, medications, and discharge instructions  Description: Complete learning assessment and assess knowledge base.  Interventions:  - Provide teaching at level of understanding  - Provide teaching via preferred learning methods  Outcome: Progressing     Problem: Prexisting or High Potential for Compromised Skin Integrity  Goal: Skin integrity is maintained or improved  Description: INTERVENTIONS:  - Identify patients at risk for skin breakdown  - Assess and monitor skin integrity  - Assess and monitor nutrition and hydration status  - Monitor labs   - Assess for incontinence   - Turn and reposition patient  - Assist with mobility/ambulation  - Relieve pressure over bony prominences  - Avoid friction and shearing  - Provide appropriate hygiene as needed including keeping skin clean and dry  - Evaluate need for skin moisturizer/barrier cream  - Collaborate with interdisciplinary team   - Patient/family teaching  - Consider wound care consult   Outcome: Progressing     Problem: Nutrition/Hydration-ADULT  Goal:  Nutrient/Hydration intake appropriate for improving, restoring or maintaining nutritional needs  Description: Monitor and assess patient's nutrition/hydration status for malnutrition. Collaborate with interdisciplinary team and initiate plan and interventions as ordered.  Monitor patient's weight and dietary intake as ordered or per policy. Utilize nutrition screening tool and intervene as necessary. Determine patient's food preferences and provide high-protein, high-caloric foods as appropriate.     INTERVENTIONS:  - Monitor oral intake, urinary output, labs, and treatment plans  - Assess nutrition and hydration status and recommend course of action  - Evaluate amount of meals eaten  - Assist patient with eating if necessary   - Allow adequate time for meals  - Recommend/ encourage appropriate diets, oral nutritional supplements, and vitamin/mineral supplements  - Order, calculate, and assess calorie counts as needed  - Recommend, monitor, and adjust tube feedings and TPN/PPN based on assessed needs  - Assess need for intravenous fluids  - Provide specific nutrition/hydration education as appropriate  - Include patient/family/caregiver in decisions related to nutrition  Outcome: Progressing     Problem: HEMATOLOGIC - ADULT  Goal: Maintains hematologic stability  Description: INTERVENTIONS  - Assess for signs and symptoms of bleeding or hemorrhage  - Monitor labs  - Administer supportive blood products/factors as ordered and appropriate  Outcome: Progressing

## 2024-11-24 NOTE — PLAN OF CARE
Problem: OCCUPATIONAL THERAPY ADULT  Goal: Performs self-care activities at highest level of function for planned discharge setting.  See evaluation for individualized goals.  Description: Treatment Interventions: ADL retraining, Functional transfer training, UE strengthening/ROM, Endurance training, Cognitive reorientation, Equipment evaluation/education, Patient/family training, Compensatory technique education, Activityengagement, Energy conservation          See flowsheet documentation for full assessment, interventions and recommendations.   Note: Limitation: Decreased ADL status, Decreased UE strength, Decreased Safe judgement during ADL, Decreased endurance, Decreased high-level ADLs  Prognosis: Fair  Assessment: Pt is a 89 yo female seen for OT eval s/p [facility] on 11/18/24 dx'd w/ acute hypoxic respiratory failure. Pt  has a past medical history of Aortic stenosis, CAD (coronary artery disease), Essential tremor, HTN (hypertension), HTN (hypertension), Hyperlipidemia, Hypothyroidism, Leukemia, 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HCC), Osteoarthritis, Osteoporosis, and Urinary incontinence. Pt with active OT orders and activity as tolerated orders. Per EMR, Pt resides at [facility]. Pt was I w/  ADLS and required A w/ IADLS, does not drive, & required use of DME PTA including w/ RW. Pt is currently demonstrating the following occupational deficits: Min A UB self care, Mod A LB self care and toileting, Min A transfers and mobility w/ RW. These deficits that are impacting pt's baseline areas of occupation are a result of the following impairments: pain, endurance, activity tolerance, functional mobility, forward functional reach, balance, functional standing tolerance, unsupportive home environment, decreased I w/ ADLS/IADLS, strength, cognitive impairments, decreased safety awareness, decreased insight into deficits, and coordination deficits. The following Occupational Performance Areas to address include: grooming, bathing/shower,  toilet hygiene, dressing, health maintenance, functional mobility, and clothing management.  Based on the aforementioned OT evaluation, functional performance deficits, and assessments, pt has been identified as a high complexity evaluation. Recommend  level II Moderate Resource Intensity  upon D/C. The patient's raw score on the AM-PAC Daily Activity Inpatient Short Form is 16. A raw score of less than 19 suggests the patient may benefit from discharge to post-acute rehabilitation services. Please refer to the recommendation of the Occupational Therapist for safe discharge planning. Pt to continue to benefit from acute immediate OT services to address the following goals 2-3x/week to  w/in 10-14 days:     Rehab Resource Intensity Level, OT: II (Moderate Resource Intensity) (rehab)

## 2024-11-24 NOTE — ASSESSMENT & PLAN NOTE
Presented as direct admit for chemotherapy d/t recurrent HCL with initial treatment 40 + years ago   BRAF V600E (+)  Oncology following  CBC shows anemia (Hgb stable during admission, between 8.4-8.9) and thrombocytopenia (platelets between 38-43), consistent with HCL relapse   Ongoing chemo infusions w heme onc inpatient, on 2-CdA(cladrabine).     Plan:  Continue infusions through 11/24  Discharge likely on 11/25 when confirmed counts stable  Continue TLS labs  Outpatient will take Neulasta and Rituximab  CBC 2x/week   Transfuse for Hb <7.0 & plt <10k or active bleeding <20k

## 2024-11-24 NOTE — PROGRESS NOTES
While rounding, found chemotherapy pump on standby. Chemotherapy infusion interrupted by 3.5 hours. Dr. Eva James from [facility] and Dr. Jan Huynh from Oncology notified. Pump restarted and locked.    Yes

## 2024-11-24 NOTE — ASSESSMENT & PLAN NOTE
Wt Readings from Last 3 Encounters:   11/21/24 45.4 kg (100 lb)   11/07/24 47.2 kg (104 lb)   11/06/24 46.3 kg (102 lb)     TTE 2020 ef 50 % with diastolic dysfunction  repeat echo 11/21/24 with EF of 60%, normal systolic function, with severe annular calcification of MV, mild MR, moderate MV stenosis, moderate TV regurgitation with mildly elevated right ventricular systolic pressure    Left Ventricle: Left ventricular cavity size is normal. Wall thickness is moderately increased. The left ventricular ejection fraction is 60%. Systolic function is normal. Wall motion is normal.    Left Atrium: The atrium is moderately dilated.    Right Atrium: The atrium is moderately dilated.    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 There is moderate paravalvular regurgitation. The gradient recorded across the prosthetic aortic valve is within the expected range. The aortic valve mean gradient is 10 mmHg. The aortic valve area is 1.92 cm2.    Mitral Valve: There is moderate diffuse thickening of the anterior leaflet and posterior leaflet. There is moderate calcification with moderate chordal involvement. There is severe annular calcification. There is mild regurgitation. There is moderate stenosis. The mitral valve mean gradient is 10mmHg.    Tricuspid Valve: There is moderate regurgitation. The right ventricular systolic pressure is mildly elevated. The estimated right ventricular systolic pressure is 45.00 mmHg.    CXR with mildly prominent reticular interstitial marking, suspicious interstitial edema/CHF  BNP mildly elevated 201  S/p IV 10 mg Lasix 11/20 - unable to track I&Os due to incontinence and refusal of abraham  Previously unable to diurese due to patient refusal  Daily weights  I/O  Salt restriction 2g, fluid restriction 2L  See plan for acute hypoxic resp failure

## 2024-11-24 NOTE — PROGRESS NOTES
Progress Note - Hospitalist   Name: Helen Gaona 89 y.o. female I MRN: [id number]  Unit/Bed#: [facility] 914-01 I Date of Admission: 11/18/2024   Date of Service: 11/24/2024 I Hospital Day: 6    Assessment & Plan  Hairy cell leukemia, in relapse (HCC)  Presented as direct admit for chemotherapy d/t recurrent HCL with initial treatment 40 + years ago   BRAF V600E (+)  Oncology following  CBC shows anemia (Hgb stable during admission, between 8.4-8.9) and thrombocytopenia (platelets between 38-43), consistent with HCL relapse   Ongoing chemo infusions w heme onc inpatient, on 2-CdA(cladrabine).     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 Mildly prominent reticular interstitial markings. Correlate for interstitial edema/CHF.   BNP of 201  Pt with hx of HFpEF - last TTE in 2020 with EF of 50%   repeat echo 11/21/24 with EF of 60%, normal systolic function, with severe annular calcification of MV, mild MR, moderate MV stenosis, moderate TV regurgitation with mildly elevated right ventricular systolic pressure  Pt s/p 10 mg IV lasix x1 on 11/20 - pt incontinent of urine so unable to obtain  I&Os and pt refuses abraham catheter     Plan:  Will diurese again in AM of 11/24 w 20 mg IV lasix x 1 (due to low body weight) & lang  Added salt and 2L fluid restrictions to diet  AM amb pulse ox      Acute on chronic diastolic congestive heart failure (HCC)  Wt Readings from Last 3 Encounters:   11/21/24 45.4 kg (100 lb)   11/07/24 47.2 kg (104 lb)   11/06/24 46.3 kg (102 lb)     TTE 2020 ef 50 % with diastolic dysfunction  repeat echo 11/21/24 with EF of 60%, normal systolic function, with severe annular calcification of MV, mild MR, moderate MV stenosis, moderate TV regurgitation with mildly elevated right ventricular systolic pressure    Left Ventricle: Left ventricular cavity size is normal. Wall thickness is moderately increased. The left ventricular ejection fraction is 60%. Systolic function is normal. Wall motion is normal.  •  Left Atrium: The atrium is moderately dilated.  •  Right Atrium: The atrium is moderately dilated.  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 There is moderate paravalvular regurgitation. The gradient recorded across the prosthetic aortic valve is within the expected range. The aortic valve mean gradient is 10 mmHg. The aortic valve area is 1.92 cm2.  •  Mitral Valve: There is moderate diffuse thickening of the anterior leaflet and posterior leaflet. There is moderate calcification with moderate chordal involvement. There is severe annular calcification. There is mild regurgitation. There is moderate stenosis. The mitral valve mean gradient is 10mmHg.  •  Tricuspid Valve: There is moderate regurgitation. The right ventricular systolic pressure is mildly elevated. The estimated right ventricular systolic pressure is 45.00 mmHg.    CXR with mildly prominent reticular interstitial marking, suspicious interstitial edema/CHF  BNP mildly elevated 201  S/p IV 10 mg Lasix 11/20 - unable to track I&Os due to incontinence and refusal of abraham  Previously  unable to diurese due to patient refusal  Daily weights  I/O  Salt restriction 2g, fluid restriction 2L  See plan for acute hypoxic resp failure  Constipation  Add bowel regimen     VTE Pharmacologic Prophylaxis:   Moderate Risk (Score 3-4) - Pharmacological DVT Prophylaxis Contraindicated. Sequential Compression Devices Ordered.    Mobility:   Basic Mobility Inpatient Raw Score: 19  JH-HLM Goal: 6: Walk 10 steps or more  JH-HLM Achieved: 7: Walk 25 feet or more  JH-HLM Goal achieved. Continue to encourage appropriate mobility.    Patient Centered Rounds: I performed bedside rounds with nursing staff today.   Discussions with Specialists or Other Care Team Provider: [facility]    Education and Discussions with Family / Patient: Updated  (son) at bedside.    Current Length of Stay: 6 day(s)  Current Patient Status: Inpatient   Certification Statement: The patient will continue to require additional inpatient hospital stay due to ongoing chemotherapy thru 11/24 with extra day after to monitor blood counts  Discharge Plan: Anticipate discharge tomorrow to home vs rehab pending PTOT    Code Status: Level 1 - Full Code    Subjective   Patient has no complaints. She denies fevers/chills, chest pain, shortness of breath, heart palpitations, nausea, vomiting, abdominal pain, weakness, or numbness. Diuresing today for hypoxia +/- home O2 eval for d/c tomorrow 11/25 if no complications from chemo      Objective :  Temp:  [97.4 °F (36.3 °C)-97.7 °F (36.5 °C)] 97.7 °F (36.5 °C)  HR:  [60-84] 60  BP: (115-129)/(54-67) 118/54  Resp:  [13-18] 18  SpO2:  [90 %-96 %] 95 %  O2 Device: Nasal cannula  Nasal Cannula O2 Flow Rate (L/min):  [2 L/min] 2 L/min    Body mass index is 16.64 kg/m².     Input and Output Summary (last 24 hours):     Intake/Output Summary (Last 24 hours) at 11/24/2024 0823  Last data filed at 11/24/2024 0641  Gross per 24 hour   Intake 240 ml   Output 675 ml   Net -435 ml       Physical Exam  Vitals  reviewed.   Constitutional:       General: She is not in acute distress.     Comments: Appears thin, frail.   HENT:      Head: Normocephalic and atraumatic.      Mouth/Throat:      Mouth: Mucous membranes are moist.      Pharynx: Oropharynx is clear.   Eyes:      General: No scleral icterus.     Extraocular Movements: Extraocular movements intact.   Cardiovascular:      Rate and Rhythm: Normal rate and regular rhythm.      Heart sounds: Murmur (2nd IC RUSB) heard.      No friction rub. No gallop.   Pulmonary:      Effort: Pulmonary effort is normal. No respiratory distress.      Breath sounds: No stridor. No wheezing or rales.   Abdominal:      General: There is no distension.      Palpations: There is no mass.      Tenderness: There is no abdominal tenderness. There is no guarding.   Musculoskeletal:         General: Normal range of motion.   Skin:     General: Skin is warm and dry.      Findings: No rash.   Neurological:      Mental Status: She is alert.      Sensory: No sensory deficit.   Psychiatric:         Mood and Affect: Mood normal.         Behavior: Behavior normal.         Thought Content: Thought content normal.         Lines/Drains:  Lines/Drains/Airways       Active Status       Name Placement date Placement time Site Days    PICC Line 11/18/24 Right Brachial 11/18/24  1343  Brachial  5                    Central Line:  Goal for removal: N/A - Chronic PICC               Lab Results: I have reviewed the following results:   Results from last 7 days   Lab Units 11/24/24  0549 11/23/24  0623   WBC Thousand/uL 1.89* 3.49*   HEMOGLOBIN g/dL 8.5* 9.0*   HEMATOCRIT % 26.2* 27.7*   PLATELETS Thousands/uL 45* 46*   BANDS PCT %  --  6   LYMPHO PCT %  --  2*   MONO PCT %  --  5   EOS PCT %  --  0     Results from last 7 days   Lab Units 11/24/24  0549 11/23/24  0623   SODIUM mmol/L 137 138   POTASSIUM mmol/L 4.2 4.3   CHLORIDE mmol/L 103 103   CO2 mmol/L 31 32   BUN mg/dL 24 29*   CREATININE mg/dL 0.58* 0.64    ANION GAP mmol/L 3* 3*   CALCIUM mg/dL 8.6 8.5   ALBUMIN g/dL  --  3.2*   TOTAL BILIRUBIN mg/dL  --  0.58   ALK PHOS U/L  --  46   ALT U/L  --  20   AST U/L  --  22   GLUCOSE RANDOM mg/dL 99 109                       Recent Cultures (last 7 days):         Imaging Results Review: I reviewed radiology reports from this admission including: chest xray.  Other Study Results Review: EKG was reviewed.     Last 24 Hours Medication List:     Current Facility-Administered Medications:   •  acetaminophen (TYLENOL) tablet 650 mg, Q6H PRN  •  acyclovir (ZOVIRAX) capsule 400 mg, BID  •  allopurinol (ZYLOPRIM) tablet 300 mg, Daily  •  allopurinol (ZYLOPRIM) tablet 300 mg, Daily  •  allopurinol (ZYLOPRIM) tablet 300 mg, Daily  •  alteplase (CATHFLO) injection 2 mg, Q1MIN PRN  •  alteplase (CATHFLO) injection 2 mg, Q1MIN PRN  •  alteplase (CATHFLO) injection 2 mg, Q1MIN PRN  •  aspirin chewable tablet 81 mg, Daily  •  bimatoprost (LUMIGAN) 0.03 % ophthalmic drops 1 drop, HS  •  bisacodyl (DULCOLAX) EC tablet 10 mg, Daily PRN  •  cladribine (LEUSTATIN) 4.7 mg in sodium chloride 0.9 % 500 mL infusion, Once, Last Rate: 4.7 mg (11/23/24 1932)  •  cladribine (LEUSTATIN) 4.7 mg in sodium chloride 0.9 % 500 mL infusion, Once  •  dexamethasone (DECADRON) 10 mg in sodium chloride 0.9 % 50 mL IVPB, Once  •  docusate sodium (COLACE) capsule 100 mg, BID  •  famotidine (PEPCID) tablet 20 mg, Daily  •  levothyroxine tablet 88 mcg, Early Morning  •  melatonin tablet 9 mg, HS  •  ondansetron (ZOFRAN) 8 mg in sodium chloride 0.9 % 50 mL IVPB, Once  •  ondansetron (ZOFRAN) injection 4 mg, Q6H PRN  •  polyethylene glycol (MIRALAX) packet 17 g, Daily  •  predniSONE tablet 10 mg, Daily  •  sodium chloride 0.9 % infusion, Once PRN  •  sodium chloride 0.9 % infusion, Once PRN  •  sodium chloride 0.9 % infusion, Once PRN  •  sodium chloride 0.9 % infusion, Once PRN  •  sulfamethoxazole-trimethoprim (BACTRIM DS) 800-160 mg per tablet 1 tablet, Once per  day on Monday Wednesday Friday    Administrative Statements   Today, Patient Was Seen By: Eva James  I have spent a total time of 35 minutes in caring for this patient on the day of the visit/encounter including Diagnostic results, Impressions, Documenting in the medical record, Reviewing / ordering tests, medicine, procedures  , Obtaining or reviewing history  , and Communicating with other healthcare professionals .    **Please Note: This note may have been constructed using a voice recognition system.**

## 2024-11-24 NOTE — ASSESSMENT & PLAN NOTE
Upon admission, O2 in 80's on RA, stabilized on 2L NC Found in 80's and now on 2 liters stable  Admission CXR:   Right-sided PICC line with tip at the caval atrial junction. Mildly prominent reticular interstitial markings. Correlate for interstitial edema/CHF.   BNP of 201  Pt with hx of HFpEF - last TTE in 2020 with EF of 50%   repeat echo 11/21/24 with EF of 60%, normal systolic function, with severe annular calcification of MV, mild MR, moderate MV stenosis, moderate TV regurgitation with mildly elevated right ventricular systolic pressure  Pt s/p 10 mg IV lasix x1 on 11/20 - pt incontinent of urine so unable to obtain I&Os and pt refuses abraham catheter     Plan:  Will diurese again in AM of 11/24 w 20 mg IV lasix x 1 (due to low body weight) & lang  Added salt and 2L fluid restrictions to diet  AM amb pulse ox

## 2024-11-24 NOTE — ASSESSMENT & PLAN NOTE
Followed by PCP and cardiologist.  No Rales or rhonchi and no edema of the legs.      Wt Readings from Last 3 Encounters:   11/21/24 45.4 kg (100 lb)   11/07/24 47.2 kg (104 lb)   11/06/24 46.3 kg (102 lb)     HPI and review of systems: Patient has tiredness.  She is more awake today and is talking and is oriented to place and person.  Answering question appropriately.  No cardiac and pulmonary symptoms at rest at this time.  Chronic constipation.  Urinary incontinence.  No other neurological, cardiac, pulmonary, GI and  symptoms.  No fevers, chills, bleeding, bone pains, skin rash, night sweats and no swelling of the ankles and no swollen glands.  Physical examination:  Awake and oriented to place and person.  Not in distress.  No icterus.  No oral thrush.  No palpable neck mass.  Clear lung fields.  Regular heart rate.  No palp abdominal mass.  Soft and nontender abdomen.  No ascites.  No edema of the ankles.  No calf tenderness.  Ambulatory with assistance.  No skin rash.  No palpable lymphadenopathy.  Lab:   Latest Reference Range & Units 11/22/24 06:16 11/23/24 06:23 11/24/24 05:49   Sodium 135 - 147 mmol/L 139 138 137   Potassium 3.5 - 5.3 mmol/L 4.2 4.3 4.2   Chloride 96 - 108 mmol/L 102 103 103   Carbon Dioxide 21 - 32 mmol/L 32 32 31   ANION GAP 4 - 13 mmol/L 5 3 (L) 3 (L)   BUN 5 - 25 mg/dL 28 (H) 29 (H) 24   Creatinine 0.60 - 1.30 mg/dL 0.63 0.64 0.58 (L)   GLUCOSE 65 - 140 mg/dL 123 109 99   Calcium 8.4 - 10.2 mg/dL 8.2 (L) 8.5 8.6   CORRECTED CALCIUM 8.3 - 10.1 mg/dL 8.9 9.1    AST 13 - 39 U/L 24 22    ALT 7 - 52 U/L 20 20    ALK PHOS 34 - 104 U/L 47 46    Total Protein 6.4 - 8.4 g/dL 5.3 (L) 5.7 (L)    Albumin 3.5 - 5.0 g/dL 3.1 (L) 3.2 (L)    Total Bilirubin 0.20 - 1.00 mg/dL 0.56 0.58    GFR, Calculated ml/min/1.73sq m 75 75 78   WBC 4.31 - 10.16 Thousand/uL 4.30 (L) 3.49 (L) 1.89 (L)   RBC 3.81 - 5.12 Million/uL 2.11 (L) 2.24 (L) 2.09 (L)   Hemoglobin 11.5 - 15.4 g/dL 8.5 (L) 9.0 (L) 8.5 (L)    Hematocrit 34.8 - 46.1 % 26.5 (L) 27.7 (L) 26.2 (L)   MCV 82 - 98 fL 126 (H) 124 (H) 125 (H)   MCH 26.8 - 34.3 pg 40.3 (H) 40.2 (H) 40.7 (H)   MCHC 31.4 - 37.4 g/dL 32.1 32.5 32.4   RDW 11.6 - 15.1 % 21.2 (H) 21.4 (H) 21.6 (H)   Platelet Count 149 - 390 Thousands/uL 43 (L) 46 (L) 45 (L)   MPV 8.9 - 12.7 fL 12.8 (H) 12.0 12.5   Platelet Estimate Adequate   Decreased !    nRBC /100 WBCs   208   Segmented % 43 - 75 %  59    Bands % 0 - 8 %  6    Lymphocytes % 14 - 44 %  2 (L)    Monocytes % 4 - 12 %  5    Eosinophils % 0 - 6 %  0    Basophils % 0 - 1 %  1    Atypical Lymphocytes % <=0 %  27 (H)    Absolute Neutrophils 1.85 - 7.62 Thousand/uL  2.27    Absolute Lymphocytes 0.60 - 4.47 Thousand/uL  1.01    Absolute Monocytes 0.00 - 1.22 Thousand/uL  0.17    Absolute Eosinophils 0.00 - 0.40 Thousand/uL  0.00    Absolute Basophils 0.00 - 0.10 Thousand/uL  0.03    (L): Data is abnormally low  (H): Data is abnormally high  !: Data is abnormal

## 2024-11-24 NOTE — PROGRESS NOTES
Progress Note - Hospitalist   Name: Helen Gaona 89 y.o. female I MRN: [id number]  [facility] Date of Admission: 11/18/2024   Date of Service: 11/23/2024 I Hospital Day: 5    Assessment & Plan  Hairy cell leukemia, in relapse (HCC)  Presented as direct admit for chemotherapy d/t recurrent HCL with initial treatment 40 + years ago   BRAF V600E (+)  Oncology following  CBC shows anemia (Hgb stable during admission, between 8.4-8.9) and thrombocytopenia (platelets between 38-43), consistent with HCL relapse   Ongoing chemo infusions w heme onc inpatient, on 2-CdA(cladrabine).     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 Mildly prominent reticular interstitial markings. Correlate for interstitial edema/CHF.   BNP of 201  Pt with hx of HFpEF - last TTE in 2020 with EF of 50%   repeat echo 11/21/24 with EF of 60%, normal systolic function, with severe annular calcification of MV, mild MR, moderate MV stenosis, moderate TV regurgitation with mildly elevated right ventricular systolic pressure  Pt s/p 10 mg IV lasix x1 on 11/20 - pt incontinent of urine so unable to obtain I&Os and pt refuses abraham  catheter     Plan:  Will diurese again in AM of 11/24 w 20 mg IV lasix x 1 (due to low body weight) & lang  Added salt and 2L fluid restrictions to diet  AM amb pulse ox      Acute on chronic diastolic congestive heart failure (HCC)  Wt Readings from Last 3 Encounters:   11/21/24 45.4 kg (100 lb)   11/07/24 47.2 kg (104 lb)   11/06/24 46.3 kg (102 lb)     TTE 2020 ef 50 % with diastolic dysfunction  repeat echo 11/21/24 with EF of 60%, normal systolic function, with severe annular calcification of MV, mild MR, moderate MV stenosis, moderate TV regurgitation with mildly elevated right ventricular systolic pressure    Left Ventricle: Left ventricular cavity size is normal. Wall thickness is moderately increased. The left ventricular ejection fraction is 60%. Systolic function is normal. Wall motion is normal.    Left Atrium: The atrium is moderately dilated.    Right Atrium: The atrium is moderately dilated.    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 There is moderate paravalvular regurgitation. The gradient recorded across the prosthetic aortic valve is within the expected range. The aortic valve mean gradient is 10 mmHg. The aortic valve area is 1.92 cm2.    Mitral Valve: There is moderate diffuse thickening of the anterior leaflet and posterior leaflet. There is moderate calcification with moderate chordal involvement. There is severe annular calcification. There is mild regurgitation. There is moderate stenosis. The mitral valve mean gradient is 10mmHg.    Tricuspid Valve: There is moderate regurgitation. The right ventricular systolic pressure is mildly elevated. The estimated right ventricular systolic pressure is 45.00 mmHg.    CXR with mildly prominent reticular interstitial marking, suspicious interstitial edema/CHF  BNP mildly elevated 201  S/p IV 10 mg Lasix 11/20 - unable to track I&Os due to incontinence and refusal of abraham  Previously unable to diurese due to  patient refusal  Daily weights  I/O  Salt restriction 2g, fluid restriction 2L  See plan for acute hypoxic resp failure  Constipation  Add bowel regimen     VTE Pharmacologic Prophylaxis:   Moderate Risk (Score 3-4) - Pharmacological DVT Prophylaxis Contraindicated. Sequential Compression Devices Ordered.    Mobility:   Basic Mobility Inpatient Raw Score: 19  JH-HLM Goal: 6: Walk 10 steps or more  JH-HLM Achieved: 7: Walk 25 feet or more  JH-HLM Goal achieved. Continue to encourage appropriate mobility.    Patient Centered Rounds: I performed bedside rounds with nursing staff today.   Discussions with Specialists or Other Care Team Provider: [facility]    Education and Discussions with Family / Patient: Updated  (son) at bedside.    Current Length of Stay: 5 day(s)  Current Patient Status: Inpatient   Certification Statement: The patient will continue to require additional inpatient hospital stay due to ongoing chemotherapy thru 11/24 with extra day after to monitor blood counts  Discharge Plan: Anticipate discharge in 48 hrs to TBD pending PT OT recs    Code Status: Level 1 - Full Code    Subjective   Patient has no complaints. She denies fevers/chills, chest pain, shortness of breath, heart palpitations, nausea, vomiting, abdominal pain, weakness, or numbness.      Objective :  Temp:  [97.4 °F (36.3 °C)-98 °F (36.7 °C)] 97.5 °F (36.4 °C)  HR:  [62-84] 79  BP: (115-136)/(59-69) 124/65  Resp:  [13-18] 18  SpO2:  [90 %-96 %] 90 %  O2 Device: Nasal cannula  Nasal Cannula O2 Flow Rate (L/min):  [2 L/min] 2 L/min    Body mass index is 16.64 kg/m².     Input and Output Summary (last 24 hours):     Intake/Output Summary (Last 24 hours) at 11/23/2024 2233  Last data filed at 11/23/2024 1300  Gross per 24 hour   Intake 240 ml   Output 500 ml   Net -260 ml       Physical Exam  Vitals reviewed.   Constitutional:       General: She is not in acute distress.     Comments: Appears thin, frail.   HENT:      Head:  Normocephalic and atraumatic.      Mouth/Throat:      Mouth: Mucous membranes are moist.      Pharynx: Oropharynx is clear.   Eyes:      General: No scleral icterus.     Extraocular Movements: Extraocular movements intact.   Cardiovascular:      Rate and Rhythm: Normal rate and regular rhythm.      Heart sounds: Murmur (2nd IC RUSB) heard.      No friction rub. No gallop.   Pulmonary:      Effort: Pulmonary effort is normal. No respiratory distress.      Breath sounds: No stridor. No wheezing or rales.   Abdominal:      General: There is no distension.      Palpations: There is no mass.      Tenderness: There is no abdominal tenderness. There is no guarding.   Musculoskeletal:         General: Normal range of motion.   Skin:     General: Skin is warm and dry.      Findings: No rash.   Neurological:      Mental Status: She is alert.      Sensory: No sensory deficit.   Psychiatric:         Mood and Affect: Mood normal.         Behavior: Behavior normal.         Thought Content: Thought content normal.           Lines/Drains:  Lines/Drains/Airways       Active Status       Name Placement date Placement time Site Days    PICC Line 11/18/24 Right Brachial 11/18/24  1343  Brachial  5                    Central Line:  Goal for removal: N/A - Chronic PICC               Lab Results: I have reviewed the following results:   Results from last 7 days   Lab Units 11/23/24  0623   WBC Thousand/uL 3.49*   HEMOGLOBIN g/dL 9.0*   HEMATOCRIT % 27.7*   PLATELETS Thousands/uL 46*   BANDS PCT % 6   LYMPHO PCT % 2*   MONO PCT % 5   EOS PCT % 0     Results from last 7 days   Lab Units 11/23/24  0623   SODIUM mmol/L 138   POTASSIUM mmol/L 4.3   CHLORIDE mmol/L 103   CO2 mmol/L 32   BUN mg/dL 29*   CREATININE mg/dL 0.64   ANION GAP mmol/L 3*   CALCIUM mg/dL 8.5   ALBUMIN g/dL 3.2*   TOTAL BILIRUBIN mg/dL 0.58   ALK PHOS U/L 46   ALT U/L 20   AST U/L 22   GLUCOSE RANDOM mg/dL 109                       Recent Cultures (last 7 days):          Imaging Results Review: I reviewed radiology reports from this admission including: chest xray.  Other Study Results Review: EKG was reviewed.     Last 24 Hours Medication List:     Current Facility-Administered Medications:     acetaminophen (TYLENOL) tablet 650 mg, Q6H PRN    acyclovir (ZOVIRAX) capsule 400 mg, BID    allopurinol (ZYLOPRIM) tablet 300 mg, Daily    allopurinol (ZYLOPRIM) tablet 300 mg, Daily    allopurinol (ZYLOPRIM) tablet 300 mg, Daily    alteplase (CATHFLO) injection 2 mg, Q1MIN PRN    alteplase (CATHFLO) injection 2 mg, Q1MIN PRN    alteplase (CATHFLO) injection 2 mg, Q1MIN PRN    aspirin chewable tablet 81 mg, Daily    bimatoprost (LUMIGAN) 0.03 % ophthalmic drops 1 drop, HS    bisacodyl (DULCOLAX) EC tablet 10 mg, Daily PRN    cladribine (LEUSTATIN) 4.7 mg in sodium chloride 0.9 % 500 mL infusion, Once, Last Rate: 4.7 mg (11/23/24 1932)    docusate sodium (COLACE) capsule 100 mg, BID    famotidine (PEPCID) tablet 20 mg, Daily    levothyroxine tablet 88 mcg, Early Morning    melatonin tablet 9 mg, HS    ondansetron (ZOFRAN) injection 4 mg, Q6H PRN    polyethylene glycol (MIRALAX) packet 17 g, Daily    predniSONE tablet 10 mg, Daily    sodium chloride 0.9 % infusion, Once PRN    sodium chloride 0.9 % infusion, Once PRN    sodium chloride 0.9 % infusion, Once PRN    sulfamethoxazole-trimethoprim (BACTRIM DS) 800-160 mg per tablet 1 tablet, Once per day on Monday Wednesday Friday    Administrative Statements   Today, Patient Was Seen By: Eva James  I have spent a total time of 35 minutes in caring for this patient on the day of the visit/encounter including Diagnostic results, Impressions, Documenting in the medical record, Reviewing / ordering tests, medicine, procedures  , Obtaining or reviewing history  , and Communicating with other healthcare professionals .    **Please Note: This note may have been constructed using a voice recognition system.**

## 2024-11-24 NOTE — CASE MANAGEMENT
Case Management Discharge Planning Note    Patient name Helen Gaona  Location [facility] 914/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) 2024     Chemotherapy induced neutropenia (HCC) 2024     Bone marrow disease 10/06/2024     Chronic fatigue 2021     Presence of cardiac pacemaker 2021     Ambulatory dysfunction 2021     Tachy-nataliia syndrome (HCC) 2021     Polypharmacy 2021     Fall 2020     Macrocytosis 2020     Abnormal EKG 2020     Aortic insufficiency 2020     Lightheadedness 2020     Nonrheumatic mitral valve regurgitation 2020     Difficulty reading due to visual problem 2020     Chronic right-sided low back pain without sciatica 2019     Peripheral vertigo 2019     Left bundle branch block (LBBB) 2018     Left ventricular hypertrophy 2018     S/P TAVR (transcatheter aortic valve replacement) 2018     Insomnia 2016     Urinary incontinence      Osteoporosis      Osteoarthritis      Hairy cell leukemia, in relapse (HCC)      Hypothyroidism      Benign essential hypertension      Essential tremor      History of severe aortic stenosis 2016       LOS (days): 6  Geometric Mean LOS (GMLOS) (days): 6  Days to GMLOS:-0.2     OBJECTIVE:  Risk of Unplanned Readmission Score: 13.58         Current admission status: Inpatient   Preferred Pharmacy:   [id number] - [place], [place] - [address]  [address]  [place]  [place] [address]  Phone: [phone] Fax: [phone]    [facility] - [place]d [address]  [address] [place] [address]  [place] [address]  Phone: [phone] Fax: [phone]    Primary Care Provider: JESSICA Pal    Primary Insurance: MEDICARE  Secondary Insurance: AARP    DISCHARGE DETAILS:    Discharge planning discussed with:: Patient  Freedom of Choice: Yes  Comments - Freedom of Choice: Discussed FOC  CM contacted family/caregiver?: Yes  Were Treatment Team discharge recommendations reviewed with patient/caregiver?: Yes     Were patient/caregiver advised of the risks associated with not following Treatment Team discharge recommendations?: Yes    Contacts  Patient Contacts: Pt's nica Juarez & Luis Alberto at bedside  Contact Method: In Person  Reason/Outcome: Discharge Planning, Emergency Contact, Referral              Other Referral/Resources/Interventions Provided:  Referral Comments: Per chart review: Therapy recommending STR.  CM met w/pt & pt's son at bedside. Per son Larry - he spoke w/[facility] yesterday and will have a room for pt in the STR if recommended.  CM notified pt & sons' therapy recommeding STR prior to returning to [place] - pt & sons agreeable to referral to [facility]. Referral sent in Aidin.            Discharge Destination Plan:: Short Term Rehab

## 2024-11-24 NOTE — OCCUPATIONAL THERAPY NOTE
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)    Acute on chronic diastolic congestive heart failure (HCC)    Constipation      Past Medical History:   Diagnosis Date    Aortic stenosis     severe    CAD (coronary artery disease)     Essential tremor     HTN (hypertension)     HTN (hypertension)     Hyperlipidemia     Hypothyroidism     Leukemia, 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HCC)     s/p splenectomy    Osteoarthritis     Osteoporosis     Urinary incontinence        Past Surgical History:   Procedure Laterality Date    COLONOSCOPY      HYSTERECTOMY      IR BIOPSY BONE MARROW  11/6/2024    MO REPLACE AORTIC VALVE OPENFEMORAL ARTERY APPROACH N/A 4/12/2016    Procedure: REPLACEMENT AORTIC VALVE TRANSCATHETER (TAVR) TRANSFEMORAL  26mm Lin 3 valve;  Surgeon: Que Thurston DO;  [facility]: [facility];  Service: Cardiac Surgery    SPLENECTOMY      for hx hairy cell leukemia        11/24/24 1025   OT Last Visit   OT Visit Date 11/24/24   Note Type   Note type Evaluation   Pain Assessment   Pain Assessment Tool 0-10   Pain Score No Pain   Restrictions/Precautions   Weight Bearing Precautions Per Order No   Other Precautions Cognitive;Chair Alarm;Bed Alarm;Multiple lines;Telemetry;Fall Risk;O2;Hard of hearing;Visual impairment  (blind in R eye; 2L NC O2 at rest & 4L w/ ambulation; chemo precautions)   Home Living   Type of Home Other (Comment)  ([facility])   Home Layout One level   Bathroom Shower/Tub Tub/shower unit   Bathroom Equipment Grab bars in shower;Tub transfer bench   Bathroom Accessibility Accessible   Home Equipment Walker   Prior Function   Level of [place] Independent with ADLs;Independent with functional mobility;Needs assistance with IADLS    Lives With (S)  Alone   Receives Help From Family   IADLs Family/Friend/Other provides transportation;Family/Friend/Other provides meals;Family/Friend/Other provides medication management   Falls in the last 6 months 0   Vocational Retired   Lifestyle   Autonomy Pt is a poor historian and unable to fully recall PLOF and ne set up information. Per EMR, Pt is a resident at [facility] where she is Mod I w/ RW use for ambulation and IND w/ self care in her room; staff A w/ IADLS; (-) drives   Reciprocal Relationships Pt reports having supportive family. Pt unable to recall if facility staff are able to A PRN   Service to Others Pt is retired   Intrinsic Gratification None stated   General   Family/Caregiver Present Yes  (son)   ADL   Where Assessed Chair   Eating Assistance 5  Supervision/Setup   Grooming Assistance 5  Supervision/Setup   UB Bathing Assistance 4  Minimal Assistance   LB Bathing Assistance 3  Moderate Assistance   UB Dressing Assistance 4  Minimal Assistance   LB Dressing Assistance 3  Moderate Assistance   Toileting Assistance  3  Moderate Assistance   Bed Mobility   Supine to Sit 5  Supervision   Additional items Assist x 1;Increased time required;LE management;Verbal cues;Bedrails;HOB elevated   Sit to Supine Unable to assess   Additional Comments Pt laying supine in bed upon OT arrival. Pt seated OOB in chair w/ chair alarm activated and all needs in reach s/p OT session   Transfers   Sit to Stand 4  Minimal assistance   Additional items Assist x 1;Increased time required;Verbal cues;Armrests   Stand to Sit 4  Minimal assistance   Additional items Assist x 1;Increased time required;Verbal cues;Armrests   Toilet transfer 4  Minimal assistance   Additional items Assist x 1;Increased time required;Standard toilet;Verbal cues;Armrests   Additional Comments transfers w/ RW   Functional Mobility   Functional Mobility 4  Minimal assistance   Additional Comments Pt demonstrated short distance household  mobility in room and hallway w/ RW at Min A level. Pt on 4L NC O2.   Additional items Rolling walker   Balance   Static Sitting Fair +   Dynamic Sitting Fair   Static Standing Fair -   Dynamic Standing Poor +   Ambulatory Poor +   Activity Tolerance   Activity Tolerance Patient limited by fatigue;Treatment limited secondary to medical complications (Comment)   Medical Staff Made Aware PT Shannan; Pt seen as a co-session due to the patient's co-morbidities, clinically unstable presentation, and present impairments which are a regression from the patient's baseline.   Nurse Made Aware RN cleared Pt for OT session   RUE Assessment   RUE Assessment WFL   LUE Assessment   LUE Assessment WFL   Hand Function   Gross Motor Coordination Functional   Vision-Basic Assessment   Current Vision Wears glasses all the time   Visual History Other (Comment)  (R eye blind - glasses on R lens are shaded)   Psychosocial   Psychosocial (WDL) WDL   Cognition   Overall Cognitive Status Impaired   Arousal/Participation Alert;Cooperative   Attention Attends with cues to redirect   Orientation Level Oriented to person;Oriented to place;Disoriented to time;Disoriented to situation   Memory Decreased recall of precautions;Decreased recall of recent events;Decreased short term memory   Following Commands Follows one step commands with increased time or repetition   Comments Pt is pleasant, cooperative, and motivated for therapy today. Pt w/ decreases insight into deficits and decreased safety awareness. Pt at times required frequent repeat of commands for increased follow through.   Assessment   Limitation Decreased ADL status;Decreased UE strength;Decreased Safe judgement during ADL;Decreased endurance;Decreased high-level ADLs   Prognosis Fair   Assessment Pt is a 89 yo female seen for OT eval s/p adm to [facility] on 11/18/24 dx'd w/ acute hypoxic respiratory failure. Pt  has a past medical history of Aortic stenosis, CAD (coronary artery disease),  Essential tremor, HTN (hypertension), HTN (hypertension), Hyperlipidemia, Hypothyroidism, Leukemia, 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HCC), Osteoarthritis, Osteoporosis, and Urinary incontinence. Pt with active OT orders and activity as tolerated orders. Per EMR, Pt resides at [facility]. Pt was I w/  ADLS and required A w/ IADLS, does not drive, & required use of DME PTA including w/ RW. Pt is currently demonstrating the following occupational deficits: Min A UB self care, Mod A LB self care and toileting, Min A transfers and mobility w/ RW. These deficits that are impacting pt's baseline areas of occupation are a result of the following impairments: pain, endurance, activity tolerance, functional mobility, forward functional reach, balance, functional standing tolerance, unsupportive home environment, decreased I w/ ADLS/IADLS, strength, cognitive impairments, decreased safety awareness, decreased insight into deficits, and coordination deficits. The following Occupational Performance Areas to address include: grooming, bathing/shower, toilet hygiene, dressing, health maintenance, functional mobility, and clothing management.  Based on the aforementioned OT evaluation, functional performance deficits, and assessments, pt has been identified as a high complexity evaluation. Recommend  level II Moderate Resource Intensity  upon D/C. The patient's raw score on the AM-PAC Daily Activity Inpatient Short Form is 16. A raw score of less than 19 suggests the patient may benefit from discharge to post-acute rehabilitation services. Please refer to the recommendation of the Occupational Therapist for safe discharge planning. Pt to continue to benefit from acute immediate OT services to address the following goals 2-3x/week to  w/in 10-14 days:   Goals   Patient Goals To feel better   LTG Time Frame 10-   Long Term Goal #1 Refer to goals below   Plan   Treatment Interventions ADL retraining;Functional transfer training;UE  strengthening/ROM;Endurance training;Cognitive reorientation;Equipment evaluation/education;Patient/family training;Compensatory technique education;Activityengagement;Energy conservation   Goal Expiration Date 12/08/24   OT Frequency 2-3x/wk   Discharge Recommendation   Rehab Resource Intensity Level, OT II (Moderate Resource Intensity)  (rehab)   AM-PAC Daily Activity Inpatient   Lower Body Dressing 2   Bathing 2   Toileting 2   Upper Body Dressing 3   Grooming 3   Eating 4   Daily Activity Raw Score 16   Daily Activity Standardized Score (Calc for Raw Score >=11) 35.96   AM-PAC Applied Cognition Inpatient   Following a Speech/Presentation 3   Understanding Ordinary Conversation 4   Taking Medications 2   Remembering Where Things Are Placed or Put Away 2   Remembering List of 4-5 Errands 2   Taking Care of Complicated Tasks 2   Applied Cognition Raw Score 15   Applied Cognition Standardized Score 33.54         GOALS    1) Pt will improve activity tolerance to G for min 30 min txment sessions for increase engagement in functional tasks    2) Pt will complete UB/LB dressing/self care w/ mod I using adaptive device and DME as needed    3) Pt will complete bathing w/ Mod I w/ use of AE and DME as needed    4) Pt will complete toileting w/ mod I w/ G hygiene/thoroughness using DME as needed    5) Pt will improve functional transfers to Mod I on/off all surfaces using DME as needed w/ G balance/safety     6) Pt will improve functional mobility during ADL/IADL/leisure tasks to Mod I using DME as needed w/ G balance/safety     7) Pt will participate in simulated IADL management task to increase independence to Mod I w/ G safety and endurance    8) Pt will be attentive 100% of the time during ongoing cognitive assessment w/ G participation to assist w/ safe d/c planning/recommendations    9) Pt will demonstrate G carryover of pt/caregiver education and training as appropriate w/o cues w/ good tolerance to increase safety  during functional tasks    10) Pt will demonstrate 100% carryover of energy conservation techniques t/o functional I/ADL/leisure tasks w/o cues s/p skilled education to increase endurance during functional tasks         Bozena De La Paz MS, OTR/L

## 2024-11-24 NOTE — PROGRESS NOTES
Progress Note - Oncology-Medical   Name: Helen Gaona 89 y.o. female I MRN: [id number]  Unit/Bed#: [facility] 914-01 I Date of Admission: 11/18/2024   Date of Service: 11/24/2024 I Hospital Day: 6     Assessment & Plan  Hairy cell leukemia, in relapse (HCC)  Patient's son Larry was with her when I saw the patient this morning.  Several years ago patient had hairy cell leukemia and she had interferon and splenectomy.  Now she has relapsed hairy cell leukemia with anemia and thrombocytopenia and is on 2-CdA and has also developed leukopenia as expected.  Platelet count staying the same around 45,000.  There is some drop in hemoglobin to 8.5.  WBC count continues to drop.  We discussed that she will have Neulasta post completion of 7-day of 2-CdA.  She will have counts checked on outpatient basis every Monday and Thursday in case she would need transfusions.  They have instructions about febrile neutropenia and bleeding risk and to report to emergency room for these and other medical emergencies.  She will be feeling more tired because she could become more anemic until recovery happens.  Hypothyroidism  Patient is on Synthroid.  Being managed by PCP  Presence of cardiac pacemaker  I did not address that.  Follows with PCP and cardiologist  Thrombocytopenia (HCC)  Secondary to hairy cell leukemia but staying stable on 2-CdA.  Expected to drop and will be monitored and transfusion as needed clinically.  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  Will be checked hopefully twice a week in the next few weeks and transfusions as needed clinically    Chemotherapy-induced neutropenia  This was expected and patient and her son have instructions about febrile neutropenia.  Patient will have Neulasta post completion of 2-CdA.    Acute on chronic diastolic congestive heart failure (HCC)  Followed by PCP and cardiologist.  No Rales or rhonchi and no  edema of the legs.      Wt Readings from Last 3 Encounters:   11/21/24 45.4 kg (100 lb)   11/07/24 47.2 kg (104 lb)   11/06/24 46.3 kg (102 lb)     HPI and review of systems: Patient has tiredness.  She is more awake today and is talking and is oriented to place and person.  Answering question appropriately.  No cardiac and pulmonary symptoms at rest at this time.  Chronic constipation.  Urinary incontinence.  No other neurological, cardiac, pulmonary, GI and  symptoms.  No fevers, chills, bleeding, bone pains, skin rash, night sweats and no swelling of the ankles and no swollen glands.  Physical examination:  Awake and oriented to place and person.  Not in distress.  No icterus.  No oral thrush.  No palpable neck mass.  Clear lung fields.  Regular heart rate.  No palp abdominal mass.  Soft and nontender abdomen.  No ascites.  No edema of the ankles.  No calf tenderness.  Ambulatory with assistance.  No skin rash.  No palpable lymphadenopathy.  Lab:   Latest Reference Range & Units 11/22/24 06:16 11/23/24 06:23 11/24/24 05:49   Sodium 135 - 147 mmol/L 139 138 137   Potassium 3.5 - 5.3 mmol/L 4.2 4.3 4.2   Chloride 96 - 108 mmol/L 102 103 103   Carbon Dioxide 21 - 32 mmol/L 32 32 31   ANION GAP 4 - 13 mmol/L 5 3 (L) 3 (L)   BUN 5 - 25 mg/dL 28 (H) 29 (H) 24   Creatinine 0.60 - 1.30 mg/dL 0.63 0.64 0.58 (L)   GLUCOSE 65 - 140 mg/dL 123 109 99   Calcium 8.4 - 10.2 mg/dL 8.2 (L) 8.5 8.6   CORRECTED CALCIUM 8.3 - 10.1 mg/dL 8.9 9.1    AST 13 - 39 U/L 24 22    ALT 7 - 52 U/L 20 20    ALK PHOS 34 - 104 U/L 47 46    Total Protein 6.4 - 8.4 g/dL 5.3 (L) 5.7 (L)    Albumin 3.5 - 5.0 g/dL 3.1 (L) 3.2 (L)    Total Bilirubin 0.20 - 1.00 mg/dL 0.56 0.58    GFR, Calculated ml/min/1.73sq m 75 75 78   WBC 4.31 - 10.16 Thousand/uL 4.30 (L) 3.49 (L) 1.89 (L)   RBC 3.81 - 5.12 Million/uL 2.11 (L) 2.24 (L) 2.09 (L)   Hemoglobin 11.5 - 15.4 g/dL 8.5 (L) 9.0 (L) 8.5 (L)   Hematocrit 34.8 - 46.1 % 26.5 (L) 27.7 (L) 26.2 (L)   MCV 82 - 98  fL 126 (H) 124 (H) 125 (H)   MCH 26.8 - 34.3 pg 40.3 (H) 40.2 (H) 40.7 (H)   MCHC 31.4 - 37.4 g/dL 32.1 32.5 32.4   RDW 11.6 - 15.1 % 21.2 (H) 21.4 (H) 21.6 (H)   Platelet Count 149 - 390 Thousands/uL 43 (L) 46 (L) 45 (L)   MPV 8.9 - 12.7 fL 12.8 (H) 12.0 12.5   Platelet Estimate Adequate   Decreased !    nRBC /100 WBCs   208   Segmented % 43 - 75 %  59    Bands % 0 - 8 %  6    Lymphocytes % 14 - 44 %  2 (L)    Monocytes % 4 - 12 %  5    Eosinophils % 0 - 6 %  0    Basophils % 0 - 1 %  1    Atypical Lymphocytes % <=0 %  27 (H)    Absolute Neutrophils 1.85 - 7.62 Thousand/uL  2.27    Absolute Lymphocytes 0.60 - 4.47 Thousand/uL  1.01    Absolute Monocytes 0.00 - 1.22 Thousand/uL  0.17    Absolute Eosinophils 0.00 - 0.40 Thousand/uL  0.00    Absolute Basophils 0.00 - 0.10 Thousand/uL  0.03    (L): Data is abnormally low  (H): Data is abnormally high  !: Data is abnormal

## 2024-11-24 NOTE — PLAN OF CARE
Problem: PHYSICAL THERAPY ADULT  Goal: Performs mobility at highest level of function for planned discharge setting.  See evaluation for individualized goals.  Description: Treatment/Interventions: ADL retraining, Functional transfer training, LE strengthening/ROM, Elevations, Therapeutic exercise, Endurance training, Cognitive reorientation, Patient/family training, Equipment eval/education, Bed mobility, Gait training, Compensatory technique education, Spoke to nursing, OT, Family  Equipment Recommended: Walker       See flowsheet documentation for full assessment, interventions and recommendations.  Note: Prognosis: Good  Problem List: Decreased strength, Decreased range of motion, Decreased endurance, Impaired balance, Decreased mobility, Decreased cognition, Impaired judgement, Decreased safety awareness, Impaired vision, Impaired hearing  Assessment: Pt is 89 y.o. female seen for PT evaluation s/p admit to [facility] on 11/18/2024 as direct admit for chemotherapy d/t recurrent HCL (hairy cell lukemia) with initial treatment 40 + years ago. Dx also including; hypothyroidism; respiratory failure- now requiring o2- 4L w/ ambulation/ activity; anemia. PT consulted for eval/ treat/ activity as tolerated orders.  Pt  has a past medical history of Aortic stenosis, CAD (coronary artery disease), Essential tremor, HTN (hypertension), HTN (hypertension), Hyperlipidemia, Hypothyroidism, Leukemia, 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HCC), Osteoarthritis, Osteoporosis, and Urinary incontinence.   At baseline, pt resides in [facility]; is MI w/ use of RW in facility; is indep w/ ADLs and mobility in her private room/ bathroom. 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 PLan for pt to go to  rehab unit prior to returning to her private [facility] apt per pt and son. Currently,  pt  is requiring 2L o2 at rest and increased to 4Lo2 w/ activity and is close S for bed skills; [place]/ CGA for functional transfers and [place]  for ambulation w/ RW on 4Lo2 w/ drop in Spo2 to 87% w/ activity- increasing to 93% w/ seated rest in 2-3 mins.    Pt presents functioning below baseline and currently w/ overall mobility deficits 2* to: decreased LE strength/AROM;  generalized weakness/ deconditioning; decreased endurance; decreased activity tolerance; decreased coordination; impaired balance; gait deviations; decreased safety awareness; SOB/CARNES; fatigue; impaired safety and judgement; limited insight into current deficits; bed/ chair alarms; multiple lines; hearing impairment/ visual impairments; new O2 requirements.   Pt currently at risk for falls.  (Please find additional objective findings from PT assessment regarding body systems outlined above.) Pt will continue to benefit from skilled PT interventions to address stated impairments; to maximize functional potential; for ongoing pt/ family training; and DME needs.  PT is recommending PT Resource Intensity Level  2 (rehab) on d/c. Formal O2 eval by respiratory prior to d/c.      PT will follow for STG as outlined on eval. Pt/ family agreeable to PT POC and goals as stated.        Rehab Resource Intensity Level, PT: II (Moderate Resource Intensity)    See flowsheet documentation for full assessment.

## 2024-11-24 NOTE — RESPIRATORY THERAPY NOTE
RT Protocol Note  Helen Gaona 89 y.o. 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)    Acute on chronic diastolic congestive heart failure (HCC)    Constipation      Home Pulmonary Medications:  N/a       Past Medical History:   Diagnosis Date    Aortic stenosis     severe    CAD (coronary artery disease)     Essential tremor     HTN (hypertension)     HTN (hypertension)     Hyperlipidemia     Hypothyroidism     Leukemia, 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HCC)     s/p splenectomy    Osteoarthritis     Osteoporosis     Urinary incontinence      Social History     Socioeconomic History    Marital status:      Spouse name: Not on file    Number of children: Not on file    Years of education: Not on file    Highest education level: Not on file   Occupational History    Not on file   Tobacco Use    Smoking status: Former     Types: Cigarettes    Smokeless tobacco: Former   Vaping Use    Vaping status: Never Used   Substance and Sexual Activity    Alcohol use: Yes     Comment: SOCIAL    Drug use: No    Sexual activity: Not on file   Other Topics Concern    Not on file   Social History Narrative    Not on file     Social Drivers of Health     Financial Resource Strain: Not on file   Food Insecurity: No Food Insecurity (11/18/2024)    Nursing - Inadequate Food Risk Classification     Worried About Running Out of Food in the Last Year: Not on file     Ran Out of Food in the Last Year: Not on file     Ran Out of Food in the Last Year: 1   Transportation Needs: No Transportation Needs (11/18/2024)    Nursing - Transportation Risk Classification     Lack of Transportation: Not on file     Lack of Transportation: 2   Physical Activity: Not on file   Stress: Not  "on file   Social Connections: Not on file   Intimate Partner Violence: Unknown (2024)    Nursing IPS     Feels Physically and Emotionally Safe: Not on file     Physically Hurt by Someone: Not on file     Humiliated or Emotionally Abused by Someone: Not on file     Physically Hurt by Someone: 2     Hurt or Threatened by Someone: 2   Housing Stability: Unknown (2024)    Nursing: Inadequate Housing Risk Classification     Has Housing: Not on file     Worried About Losing Housing: Not on file     Unable to Get Utilities: Not on file     Unable to Pay for Housing in the Last Year: 2     Has Housin       Subjective         Objective    Physical Exam:   Assessment Type: (P) Assess only  General Appearance: (P) Awake  Respiratory Pattern: (P) Normal  Chest Assessment: (P) Chest expansion symmetrical  Bilateral Breath Sounds: (P) Diminished, Clear  O2 Device: (P) NC    Vitals:  Blood pressure 124/65, pulse 79, temperature 97.5 °F (36.4 °C), resp. rate 18, height 5' 5\" (1.651 m), weight 45.4 kg (100 lb), SpO2 90%.          Imaging and other studies:     O2 Device: (P) NC     Plan    Respiratory Plan: (P) Discontinue Protocol        Resp Comments: (P) Pt ordered on Resp Protocol at this time so Protocol done at bedside. PT is here for chemo and has no resp hx. PT takes no resp meds at home. PT jesus has dimished b/s. PT sating 94% on 1L NC. PT stated she feels fine with her breating and is in no distress. No need for txs at this time. WIll d/c protocol   "

## 2024-11-24 NOTE — ASSESSMENT & PLAN NOTE
Patient's son Larry was with her when I saw the patient this morning.  Several years ago patient had hairy cell leukemia and she had interferon and splenectomy.  Now she has relapsed hairy cell leukemia with anemia and thrombocytopenia and is on 2-CdA and has also developed leukopenia as expected.  Platelet count staying the same around 45,000.  There is some drop in hemoglobin to 8.5.  WBC count continues to drop.  We discussed that she will have Neulasta post completion of 7-day of 2-CdA.  She will have counts checked on outpatient basis every Monday and Thursday in case she would need transfusions.  They have instructions about febrile neutropenia and bleeding risk and to report to emergency room for these and other medical emergencies.  She will be feeling more tired because she could become more anemic until recovery happens.

## 2024-11-24 NOTE — ASSESSMENT & PLAN NOTE
Secondary to hairy cell leukemia and 2-CdA.  Will be checked hopefully twice a week in the next few weeks and transfusions as needed clinically    Chemotherapy-induced neutropenia  This was expected and patient and her son have instructions about febrile neutropenia.  Patient will have Neulasta post completion of 2-CdA.

## 2024-11-24 NOTE — ASSESSMENT & PLAN NOTE
Presented as direct admit for chemotherapy d/t recurrent HCL with initial treatment 40 + years ago   BRAF V600E (+)  Oncology following  CBC shows anemia (Hgb stable during admission, between 8.4-8.9) and thrombocytopenia (platelets between 38-43), consistent with HCL relapse   Ongoing chemo infusions w heme onc inpatient, on 2-CdA(cladrabine).     Plan:  Continue infusions through 11/24  Discharge likely on 11/25 when confirmed counts stable  Continue TLS labs  Outpatient will take Neulasta and Rituximab  CBC 2x/week   Place precautions if ANC <500   Transfuse for Hb <7.0 & plt <10k or active bleeding <20k

## 2024-11-24 NOTE — PLAN OF CARE
Problem: PAIN - ADULT  Goal: Verbalizes/displays adequate comfort level or baseline comfort level  Description: Interventions:  - Encourage patient to monitor pain and request assistance  - Assess pain using appropriate pain scale  - Administer analgesics based on type and severity of pain and evaluate response  - Implement non-pharmacological measures as appropriate and evaluate response  - Consider cultural and social influences on pain and pain management  - Notify physician/advanced practitioner if interventions unsuccessful or patient reports new pain  Outcome: Progressing     Problem: INFECTION - ADULT  Goal: Absence or prevention of progression during hospitalization  Description: INTERVENTIONS:  - Assess and monitor for signs and symptoms of infection  - Monitor lab/diagnostic results  - Monitor all insertion sites, i.e. indwelling lines, tubes, and drains  - Monitor endotracheal if appropriate and nasal secretions for changes in amount and color  - [place] appropriate cooling/warming therapies per order  - Administer medications as ordered  - Instruct and encourage patient and family to use good hand hygiene technique  - Identify and instruct in appropriate isolation precautions for identified infection/condition  Outcome: Progressing     Problem: Prexisting or High Potential for Compromised Skin Integrity  Goal: Skin integrity is maintained or improved  Description: INTERVENTIONS:  - Identify patients at risk for skin breakdown  - Assess and monitor skin integrity  - Assess and monitor nutrition and hydration status  - Monitor labs   - Assess for incontinence   - Turn and reposition patient  - Assist with mobility/ambulation  - Relieve pressure over bony prominences  - Avoid friction and shearing  - Provide appropriate hygiene as needed including keeping skin clean and dry  - Evaluate need for skin moisturizer/barrier cream  - Collaborate with interdisciplinary team   - Patient/family teaching  -  Consider wound care consult   Outcome: Progressing     Problem: Nutrition/Hydration-ADULT  Goal: Nutrient/Hydration intake appropriate for improving, restoring or maintaining nutritional needs  Description: Monitor and assess patient's nutrition/hydration status for malnutrition. Collaborate with interdisciplinary team and initiate plan and interventions as ordered.  Monitor patient's weight and dietary intake as ordered or per policy. Utilize nutrition screening tool and intervene as necessary. Determine patient's food preferences and provide high-protein, high-caloric foods as appropriate.     INTERVENTIONS:  - Monitor oral intake, urinary output, labs, and treatment plans  - Assess nutrition and hydration status and recommend course of action  - Evaluate amount of meals eaten  - Assist patient with eating if necessary   - Allow adequate time for meals  - Recommend/ encourage appropriate diets, oral nutritional supplements, and vitamin/mineral supplements  - Order, calculate, and assess calorie counts as needed  - Recommend, monitor, and adjust tube feedings and TPN/PPN based on assessed needs  - Assess need for intravenous fluids  - Provide specific nutrition/hydration education as appropriate  - Include patient/family/caregiver in decisions related to nutrition  Outcome: Progressing     Problem: HEMATOLOGIC - ADULT  Goal: Maintains hematologic stability  Description: INTERVENTIONS  - Assess for signs and symptoms of bleeding or hemorrhage  - Monitor labs  - Administer supportive blood products/factors as ordered and appropriate  Outcome: Progressing

## 2024-11-24 NOTE — PHYSICAL THERAPY NOTE
PHYSICAL THERAPY EVALUATION  NAME:  Helen Gaona  DATE: 11/24/24    AGE:   89 y.o.  Mrn:   [id number]  ADMIT DX:  Hairy cell leukemia (HCC) [C91.40]    Past Medical History:   Diagnosis Date    Aortic stenosis     severe    CAD (coronary artery disease)     Essential tremor     HTN (hypertension)     HTN (hypertension)     Hyperlipidemia     Hypothyroidism     Leukemia, 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HCC)     s/p splenectomy    Osteoarthritis     Osteoporosis     Urinary incontinence      Past Surgical History:   Procedure Laterality Date    COLONOSCOPY      HYSTERECTOMY      IR BIOPSY BONE MARROW  11/6/2024    MA REPLACE AORTIC VALVE OPENFEMORAL ARTERY APPROACH N/A 4/12/2016    Procedure: REPLACEMENT AORTIC VALVE TRANSCATHETER (TAVR) TRANSFEMORAL  26mm Lin 3 valve;  Surgeon: Que Thurston DO;  Location: [facility];  Service: Cardiac Surgery    SPLENECTOMY      for hx hairy cell leukemia       Length Of Stay: 6  PHYSICAL THERAPY EVALUATION :    11/24/24 1024   PT Last Visit   PT Visit Date 11/24/24   Note Type   Note type Evaluation   End of Consult   Patient Position at End of Consult Seated edge of bed;All needs within reach;Bed/Chair alarm activated   Pain Assessment   Pain Assessment Tool 0-10   Pain Score No Pain   Restrictions/Precautions   Weight Bearing Precautions Per Order No   Braces or Orthoses   (none)   Other Precautions Cognitive;Chair Alarm;Bed Alarm;Multiple lines;O2;Fall Risk;Pain;Hard of hearing;Visual impairment  (blind R eye; O2 (2L at rest and 4L w/ ambualtion) CHEMO PRECAUTIONS)   Home Living   Type of Home Other (Comment)   Home Layout One level   Bathroom Shower/Tub Tub/shower unit   Bathroom Equipment Grab bars in shower;Tub transfer bench   Home Equipment Walker  (was not on O2 PTA)   Additional Comments At baseline, pt resides in [facility] indep [facility]; is MI w/ use of RW in facility; is indep w/ ADLs and mobility in her private room/ bathroom. 0 falls; 0 drive; is blind in R eye at  "baseline. PLan for pt to go to  rehab unit prior to returning to her private indep apt per pt and son.   Prior Function   Level of [place] Independent with ADLs;Independent with functional mobility;Needs assistance with IADLS   Lives With (S)  Alone  (indep living apt at [facility])   Receives Help From Family   IADLs Family/Friend/Other provides transportation;Independent with meal prep;Family/Friend/Other provides medication management   Falls in the last 6 months 0   Vocational Retired   Comments see above   General   Family/Caregiver Present Yes  (son was present for portion of eval)   Cognition   Arousal/Participation Alert   Orientation Level Oriented to person;Oriented to place;Disoriented to time;Disoriented to situation   Memory Decreased recall of precautions;Decreased recall of recent events;Decreased short term memory   Following Commands Follows one step commands with increased time or repetition   Comments pt pleasant and cooperative throughout; fair safety; required cues for attention to lines. limited insight into deficits/ pleasant and motivated   Subjective   Subjective \"Its the year after the last one\"- pt using humor throughout- mildy confused at times- pleasant and agreeable to work w/ PT   RUE Assessment   RUE Assessment WFL   LUE Assessment   LUE Assessment WFL   RLE Assessment   RLE Assessment WFL   LLE Assessment   LLE Assessment WFL   Vision-Basic Assessment   Current Vision Wears glasses all the time   Visual History   (blind in R eye - shaded glasses (R))   Coordination   Movements are Fluid and Coordinated 1   Sensation WFL   Light Touch   RLE Light Touch Grossly intact   LLE Light Touch Grossly intact   Sharp/Dull   RLE Sharp/Dull Grossly intact   LLE Sharp/Dull Grossly intact   Proprioception   RLE Proprioception Grossly intact   LLE Proprioception Grossly Intact   Bed Mobility   Supine to Sit 5  Supervision   Additional items Increased time required;Verbal cues "   Additional Comments pt sits EOB w/ F balance and w/o dizziness   Transfers   Sit to Stand 4  Minimal assistance   Additional items Assist x 1;Increased time required;Verbal cues   Stand to Sit 4  Minimal assistance   Additional items Assist x 1;Increased time required;Verbal cues   Toilet transfer 4  Minimal assistance   Additional items Assist x 1;Increased time required;Verbal cues   Additional Comments w/ RW on 4Lo2   Ambulation/Elevation   Gait pattern Excessively slow;Inconsistent ketty;Decreased foot clearance   Gait Assistance 4  Minimal assist  (CGA + cues for safety + forward gaze/ safe distance from RW)   Assistive Device Rolling walker  (4Lo2)   Distance 40+30+20 (standing rest breaks) on 4Lo2   Ambulation/Elevation Additional Comments (S)  drop in Spo2 to 87% w/ activity- increasing to 93% w/ seated rest in 2-3 mins.   Balance   Static Sitting Fair +   Dynamic Sitting Fair   Static Standing Fair -   Dynamic Standing Poor +   Ambulatory Poor  (rw)   Endurance Deficit   Endurance Deficit Yes   Endurance Deficit Description requiring cues for rest breaks/ breathing techniques on 4Lo2-drop in Spo2 w/ activity on 4L   Activity Tolerance   Activity Tolerance Patient limited by fatigue;Treatment limited secondary to medical complications (Comment)   Medical Staff Made Aware yes- OT and RN  (Pt was seen in conjunction w/ OT 2* unstable presentation; medical complexity; ; new precautions/ limitations + limited activity tolerance and regression from baseline functional mobility.)   Nurse Made Aware yes   Assessment   Prognosis Good   Problem List Decreased strength;Decreased range of motion;Decreased endurance;Impaired balance;Decreased mobility;Decreased cognition;Impaired judgement;Decreased safety awareness;Impaired vision;Impaired hearing   Assessment Pt is 89 y.o. female seen for PT evaluation s/p admit to [facility] on 11/18/2024 as direct admit for chemotherapy d/t recurrent HCL (hairy cell  lukemia) with initial treatment 40 + years ago. Dx also including; hypothyroidism; respiratory failure- now requiring o2- 4L w/ ambulation/ activity; anemia. PT consulted for eval/ treat/ activity as tolerated orders.      Pt  has a past medical history of Aortic stenosis, CAD (coronary artery disease), Essential tremor, HTN (hypertension), HTN (hypertension), Hyperlipidemia, Hypothyroidism, Leukemia, 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HCC), Osteoarthritis, Osteoporosis, and Urinary incontinence.       At baseline, pt resides in [facility]; is MI w/ use of RW in facility; is indep w/ ADLs and mobility in her private room/ bathroom. 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 PLan for pt to go to  rehab unit prior to returning to her private [facility] apt per pt and son.     Currently,  pt  is requiring 2L o2 at rest and increased to 4Lo2 w/ activity and is close S for bed skills; [place]/ CGA for functional transfers and [place] for ambulation w/ RW on 4Lo2 w/ drop in Spo2 to 87% w/ activity- increasing to 93% w/ seated rest in 2-3 mins.        Pt presents functioning below baseline and currently w/ overall mobility deficits 2* to: decreased LE strength/AROM;  generalized weakness/ deconditioning; decreased endurance; decreased activity tolerance; decreased coordination; impaired balance; gait deviations; decreased safety awareness; SOB/CARNES; fatigue; impaired safety and judgement; limited insight into current deficits; bed/ chair alarms; multiple lines; hearing impairment/ visual impairments; new O2 requirements.   Pt currently at risk for falls.  (Please find additional objective findings from PT assessment regarding body systems outlined above.)     Pt will continue to benefit from skilled PT interventions to address stated impairments; to maximize functional potential; for ongoing pt/ family training; and DME needs.  PT is recommending PT Resource Intensity Level  2 (rehab) on d/c. Formal O2 eval by respiratory prior to  d/c.      PT will follow for STG as outlined on eval. Pt/ family agreeable to PT POC and goals as stated.   Goals   Patient Goals get better   STG Expiration Date 12/08/24   Short Term Goal #1 In 14 days pt will:  Complete bed mobility skills with MI to facilitate safe return to previous living environment and decrease burden on caregivers.  Perform all functional transfers with MI  to facilitate safe return to previous living environment.    Ambulate  with least restrictive AD MI> 250'' without LOB and stable vitals for safe a Improve balance by 1 grade in order to decrease fall risk.    Improve LE strength grades by 1 to increase independence w/ all functional mobility, transfers and gait.  Actively participate w/ PT for ongoing pt and family education; DME needs and D/C planning to promote highest level of function in least restrictive environment.   PT Treatment Day 0   Plan   Treatment/Interventions ADL retraining;Functional transfer training;LE strengthening/ROM;Elevations;Therapeutic exercise;Endurance training;Cognitive reorientation;Patient/family training;Equipment eval/education;Bed mobility;Gait training;Compensatory technique education;Spoke to nursing;OT;Family   PT Frequency 3-5x/wk   Discharge Recommendation   Rehab Resource Intensity Level, PT II (Moderate Resource Intensity)   Equipment Recommended Walker   Walker Package Recommended Wheeled walker  (has for d/c)   AM-PAC Basic Mobility Inpatient   Turning in Flat Bed Without Bedrails 4   Lying on Back to Sitting on Edge of Flat Bed Without Bedrails 3   Moving Bed to Chair 3   Standing Up From Chair Using Arms 3   Walk in Room 3   Climb 3-5 Stairs With Railing 2   Basic Mobility Inpatient Raw Score 18   Basic Mobility Standardized Score 41.05   [facility] Highest Level Of Mobility   -HLM Goal 6: Walk 10 steps or more   -HLM Achieved 7: Walk 25 feet or more   End of Consult   Patient Position at End of Consult Bedside chair;Bed/Chair alarm  activated;All needs within reach     The patient's AM-PAC Basic Mobility Inpatient Short Form Raw Score is 18. A Raw score of greater than 16 suggests the patient may benefit from discharge to home. Please also refer to the recommendation of the Physical Therapist for safe discharge planning.

## 2024-11-24 NOTE — ASSESSMENT & PLAN NOTE
Secondary to hairy cell leukemia but staying stable on 2-CdA.  Expected to drop and will be monitored and transfusion as needed clinically.  Have instructions about bleeding risk

## 2024-11-25 ENCOUNTER — TELEPHONE (OUTPATIENT)
Dept: INFUSION CENTER | Facility: HOSPITAL | Age: 89
End: 2024-11-25

## 2024-11-25 ENCOUNTER — DOCUMENTATION (OUTPATIENT)
Dept: HEMATOLOGY ONCOLOGY | Facility: CLINIC | Age: 89
End: 2024-11-25

## 2024-11-25 PROBLEM — J96.01 ACUTE HYPOXIC RESPIRATORY FAILURE (HCC): Status: RESOLVED | Noted: 2024-11-18 | Resolved: 2024-11-25

## 2024-11-25 LAB
ANION GAP SERPL CALCULATED.3IONS-SCNC: 4 MMOL/L (ref 4–13)
BUN SERPL-MCNC: 31 MG/DL (ref 5–25)
CALCIUM SERPL-MCNC: 8.7 MG/DL (ref 8.4–10.2)
CHLORIDE SERPL-SCNC: 102 MMOL/L (ref 96–108)
CO2 SERPL-SCNC: 32 MMOL/L (ref 21–32)
CREAT SERPL-MCNC: 0.72 MG/DL (ref 0.6–1.3)
ERYTHROCYTE [DISTWIDTH] IN BLOOD BY AUTOMATED COUNT: 21.6 % (ref 11.6–15.1)
GFR SERPL CREATININE-BSD FRML MDRD: 70 ML/MIN/1.73SQ M
GLUCOSE SERPL-MCNC: 135 MG/DL (ref 65–140)
HCT VFR BLD AUTO: 27.3 % (ref 34.8–46.1)
HGB BLD-MCNC: 9 G/DL (ref 11.5–15.4)
MCH RBC QN AUTO: 41.5 PG (ref 26.8–34.3)
MCHC RBC AUTO-ENTMCNC: 33 G/DL (ref 31.4–37.4)
MCV RBC AUTO: 126 FL (ref 82–98)
NRBC BLD AUTO-RTO: 222 /100 WBCS
PLATELET # BLD AUTO: 45 THOUSANDS/UL (ref 149–390)
PMV BLD AUTO: 11.8 FL (ref 8.9–12.7)
POTASSIUM SERPL-SCNC: 4.3 MMOL/L (ref 3.5–5.3)
RBC # BLD AUTO: 2.17 MILLION/UL (ref 3.81–5.12)
SODIUM SERPL-SCNC: 138 MMOL/L (ref 135–147)
WBC # BLD AUTO: 1.44 THOUSAND/UL (ref 4.31–10.16)

## 2024-11-25 PROCEDURE — 85027 COMPLETE CBC AUTOMATED: CPT

## 2024-11-25 PROCEDURE — 99232 SBSQ HOSP IP/OBS MODERATE 35: CPT

## 2024-11-25 PROCEDURE — 80048 BASIC METABOLIC PNL TOTAL CA: CPT

## 2024-11-25 PROCEDURE — 99233 SBSQ HOSP IP/OBS HIGH 50: CPT | Performed by: INTERNAL MEDICINE

## 2024-11-25 RX ADMIN — ACYCLOVIR 400 MG: 200 CAPSULE ORAL at 17:21

## 2024-11-25 RX ADMIN — PREDNISONE 10 MG: 10 TABLET ORAL at 09:14

## 2024-11-25 RX ADMIN — POLYETHYLENE GLYCOL 3350 17 G: 17 POWDER, FOR SOLUTION ORAL at 09:10

## 2024-11-25 RX ADMIN — LEVOTHYROXINE SODIUM 88 MCG: 88 TABLET ORAL at 05:15

## 2024-11-25 RX ADMIN — FAMOTIDINE 20 MG: 20 TABLET, FILM COATED ORAL at 09:14

## 2024-11-25 RX ADMIN — ALLOPURINOL 300 MG: 300 TABLET ORAL at 09:14

## 2024-11-25 RX ADMIN — ACYCLOVIR 400 MG: 200 CAPSULE ORAL at 09:12

## 2024-11-25 RX ADMIN — ASPIRIN 81 MG CHEWABLE TABLET 81 MG: 81 TABLET CHEWABLE at 09:14

## 2024-11-25 RX ADMIN — BIMATOPROST 1 DROP: 0.3 SOLUTION/ DROPS OPHTHALMIC at 22:33

## 2024-11-25 RX ADMIN — Medication 9 MG: at 22:33

## 2024-11-25 RX ADMIN — DOCUSATE SODIUM 100 MG: 100 CAPSULE, LIQUID FILLED ORAL at 09:10

## 2024-11-25 RX ADMIN — SULFAMETHOXAZOLE AND TRIMETHOPRIM 1 TABLET: 800; 160 TABLET ORAL at 09:12

## 2024-11-25 NOTE — ASSESSMENT & PLAN NOTE
Upon admission, O2 in 80's on RA, stabilized on 2L NC Found in 80's and now on 2 liters stable  Admission CXR:   Right-sided PICC line with tip at the caval atrial junction. Mildly prominent reticular interstitial markings. Correlate for interstitial edema/CHF.   BNP of 201  Pt with hx of HFpEF - last TTE in 2020 with EF of 50%   repeat echo 11/21/24 with EF of 60%, normal systolic function, with severe annular calcification of MV, mild MR, moderate MV stenosis, moderate TV regurgitation with mildly elevated right ventricular systolic pressure  Pt s/p 10 mg IV lasix x1 on 11/20  S/p 20 mg IV lasix x1 on 11/24 w resolution of hypoxia    Plan:  Continue salt and 2L fluid restrictions to diet  AM amb pulse ox prior to d/c if hypoxia recurs

## 2024-11-25 NOTE — RESTORATIVE TECHNICIAN NOTE
Restorative Technician Note      Patient Name: Helen Gaona     Restorative Tech Visit Date: 11/25/24  Note Type: Mobility  Patient Position Upon Consult: Standing (With PCA)  Activity Performed: Ambulated  Assistive Device: Roller walker  Patient Position at End of Consult: Supine; All needs within reach; Bed/Chair alarm activated  Nurse Communication: Nurse aware of consult, application of brace    Delio Smiley, Restorative Technician

## 2024-11-25 NOTE — PROGRESS NOTES
Patient:    MRN:  [id number]    Brisa Request ID:  [id number]    Level of care reserved:  Skilled Nursing Facility    Partner Reserved:  [place], [place] [address] [phone]    Clinical needs requested:    Geography searched:  10 miles around [id number]    Start of Service:    Request sent:  2:27pm EST on 11/24/2024 by Hallie Blackman    Partner reserved:  9:27am EST on 11/25/2024 by Krystal Burkett    Choice list shared:  9:27am EST on 11/25/2024 by Krystal Burkett

## 2024-11-25 NOTE — PROGRESS NOTES
"Oncology Progress Note  Helen Gaona 89 y.o. female MRN: [id number]  [facility] Encounter: [id number]      Oncology History   Hairy cell leukemia, in relapse (HCC)   4/12/2016 Initial Diagnosis    Leukemia, 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HCC)     11/18/2024 -  Chemotherapy    alteplase (CATHFLO), 2 mg, Intracatheter, Every 1 Minute as needed, 1 of 1 cycle  pegfilgrastim-apgf (Nyvepria), 6 mg, Subcutaneous, Once, 1 of 1 cycle  cladribine (LEUSTATIN) infusion, 0.1 mg/kg = 4.7 mg, Intravenous, Once, 1 of 1 cycle  Administration: 4.7 mg (11/18/2024), 4.7 mg (11/19/2024), 4.7 mg (11/20/2024), 4.7 mg (11/21/2024), 4.7 mg (11/22/2024), 4.7 mg (11/23/2024)         Assessment and Plan :  HCL  Recurrent HCL with initial treatment 40+ years ago  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 Currently working with case management to arrange Neulasta post completion of Cladribine and outpatient rehabilitation      Plan:  Cladrabine x 7 days - treatment complete  Rituxan as OP  Labs stable at this time, no concerns  Will require Neulasta to stimulate WBC post Cladribine therapy  Monitor for symptoms of febrile neutropenia  Will require outpatient therapy    Subjective:    Patient is doing well, feeling okay after completion of chemotherapy yesterday. Patient accompanied by two sons on examination today. Patient maintaining adequate PO intake, sleeping well, and has no complaints at this time. Patient denies nausea and vomiting. No signs of febrile neutropenia per children.    Objective:      /57   Pulse 64   Temp (!) 97.3 °F (36.3 °C)   Resp 16   Ht 5' 5\" (1.651 m)   Wt 45.4 kg (100 lb)   SpO2 90%   BMI 16.64 kg/m²   General Appearance:    Alert, oriented        Eyes:    PERRL   Ears:    Normal external ear canals, both ears   Nose:   Nares normal, septum midline "   Throat:   Mucosa moist. Pharynx without injection.    Neck:   Supple       Lungs:     Left lower lobe crackles appreciated on exam   Chest Wall:    No tenderness or deformity    Heart:    Regular rate and rhythm       Abdomen:     Soft, non-tender, bowel sounds +, no organomegaly           Extremities:   Extremities no cyanosis or edema       Skin:   no rash or icterus.    Lymph nodes:   Cervical, supraclavicular, and axillary nodes normal   Neurologic:   CNII-XII intact, normal strength, sensation and reflexes     throughout        Recent Results (from the past 48 hours)   Basic metabolic panel    Collection Time: 11/24/24  5:49 AM   Result Value Ref Range    Sodium 137 135 - 147 mmol/L    Potassium 4.2 3.5 - 5.3 mmol/L    Chloride 103 96 - 108 mmol/L    CO2 31 21 - 32 mmol/L    ANION GAP 3 (L) 4 - 13 mmol/L    BUN 24 5 - 25 mg/dL    Creatinine 0.58 (L) 0.60 - 1.30 mg/dL    Glucose 99 65 - 140 mg/dL    Calcium 8.6 8.4 - 10.2 mg/dL    eGFR 78 ml/min/1.73sq m   CBC and differential    Collection Time: 11/24/24  5:49 AM   Result Value Ref Range    WBC 1.89 (L) 4.31 - 10.16 Thousand/uL    RBC 2.09 (L) 3.81 - 5.12 Million/uL    Hemoglobin 8.5 (L) 11.5 - 15.4 g/dL    Hematocrit 26.2 (L) 34.8 - 46.1 %     (H) 82 - 98 fL    MCH 40.7 (H) 26.8 - 34.3 pg    MCHC 32.4 31.4 - 37.4 g/dL    RDW 21.6 (H) 11.6 - 15.1 %    MPV 12.5 8.9 - 12.7 fL    Platelets 45 (L) 149 - 390 Thousands/uL    nRBC 208 /100 WBCs   Basic metabolic panel    Collection Time: 11/25/24  4:49 AM   Result Value Ref Range    Sodium 138 135 - 147 mmol/L    Potassium 4.3 3.5 - 5.3 mmol/L    Chloride 102 96 - 108 mmol/L    CO2 32 21 - 32 mmol/L    ANION GAP 4 4 - 13 mmol/L    BUN 31 (H) 5 - 25 mg/dL    Creatinine 0.72 0.60 - 1.30 mg/dL    Glucose 135 65 - 140 mg/dL    Calcium 8.7 8.4 - 10.2 mg/dL    eGFR 70 ml/min/1.73sq m   CBC and differential    Collection Time: 11/25/24  4:49 AM   Result Value Ref Range    WBC 1.44 (L) 4.31 - 10.16 Thousand/uL    RBC  2.17 (L) 3.81 - 5.12 Million/uL    Hemoglobin 9.0 (L) 11.5 - 15.4 g/dL    Hematocrit 27.3 (L) 34.8 - 46.1 %     (H) 82 - 98 fL    MCH 41.5 (H) 26.8 - 34.3 pg    MCHC 33.0 31.4 - 37.4 g/dL    RDW 21.6 (H) 11.6 - 15.1 %    MPV 11.8 8.9 - 12.7 fL    Platelets 45 (L) 149 - 390 Thousands/uL    nRBC 222 /100 WBCs         XR chest portable  Result Date: 11/19/2024  Narrative: XR CHEST PORTABLE INDICATION: hypoxia. COMPARISON: February 26, 2021 FINDINGS: Right-sided PICC line, tip to the level of the cavoatrial junction. Left-sided pacer, leads intact and stable in position. Prior aortic valve repair noted. Mild prominent bronchovascular reticular markings in the mid and lower lung zones, suggesting interstitial edema. No pneumothorax or pleural effusion. Normal cardiomediastinal silhouette. Bones are unremarkable for age. Normal upper abdomen.     Impression: Right-sided PICC line with tip at the caval atrial junction. Mildly prominent reticular interstitial markings. Correlate for interstitial edema/CHF. 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 The procedure was performed in the prone position. 1% lidocaine was used for local anesthetic and moderate sedation was administered. The right posterior inferior iliac spine was localized by fluoroscopy and the low back was prepped and draped in sterile fashion. Using fluoroscopic guidance, an 11g bone marrow needle was advanced through the cortex of the iliac spine into the marrow. Aspiration was performed and submitted to pathology for slide preparation. The needle was slightly withdrawn and redirected to obtain a core biopsy using the Trek  system. The core was submitted to pathology. The core sample appeared to be small, so a second core sample was taken after positioning confirmed under fluoroscopic guidance. The puncture site was cleansed and a dressing was applied. Fluoroscopy time: 1.4 MINUTES Images: 2 Sedation (min) : 25 MINUTES     Impression: Impression: Fluoroscopy image guided bone marrow aspiration and core biopsy Signed, performed, and dictated by Lenora Nelson PA-C under supervision of Dr. Umer Wren Workstation performed: [id number]         I spent 30 minutes in chart review, face to face counseling, coordination of care and documentation.     Jose Luis Trujillo, MS3

## 2024-11-25 NOTE — PROGRESS NOTES
Progress Note - Hospitalist   Name: Helen Gaona 89 y.o. female I MRN: [id number]  Unit/Bed#: [facility] 914-01 I Date of Admission: 11/18/2024   Date of Service: 11/25/2024 I Hospital Day: 7    Assessment & Plan  Hairy cell leukemia, in relapse (HCC)  Presented as direct admit for chemotherapy d/t recurrent HCL with initial treatment 40 + years ago   BRAF V600E (+)  Oncology following  CBC shows anemia (Hgb stable during admission, between 8.4-8.9) and thrombocytopenia (platelets between 38-43), consistent with HCL relapse   Ongoing chemo infusions w heme onc inpatient, on 2-CdA(cladrabine).     Plan:  Continue infusions through 11/25 (was delayed due to drip holds)  Discharge likely on 11/26-27 when confirmed counts stable on post-chemo CBC  Continue TLS labs  Outpatient will take Neulasta (after finishing chemo, while at rehab!) and Rituximab  CBC 2x/week   Place precautions if ANC <500   Transfuse for Hb <7.0 & plt <10k or active bleeding <20k    Hypothyroidism  Last TSH 10/2024 - 4.7   Continue levothyroxine  Presence of cardiac pacemaker  Known history  Outpatient follow-up cardiology  Thrombocytopenia (HCC)  Platelets ranging between 38-43, I/s/o HCL relapse  Holding DVT Ppx for now   Rest of care per hematology/oncology team  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 Mildly prominent reticular interstitial markings. Correlate for interstitial edema/CHF.   BNP of 201  Pt with hx of HFpEF - last TTE in 2020 with EF of 50%   repeat echo 11/21/24 with EF of 60%, normal systolic function, with severe annular calcification of MV, mild MR, moderate MV stenosis, moderate TV regurgitation with mildly elevated  right ventricular systolic pressure  Pt s/p 10 mg IV lasix x1 on 11/20  S/p 20 mg IV lasix x1 on 11/24 w resolution of hypoxia    Plan:  Continue salt and 2L fluid restrictions to diet  AM amb pulse ox prior to d/c if hypoxia recurs      Acute on chronic diastolic congestive heart failure (HCC)  Wt Readings from Last 3 Encounters:   11/21/24 45.4 kg (100 lb)   11/07/24 47.2 kg (104 lb)   11/06/24 46.3 kg (102 lb)     TTE 2020 ef 50 % with diastolic dysfunction  repeat echo 11/21/24 with EF of 60%, normal systolic function, with severe annular calcification of MV, mild MR, moderate MV stenosis, moderate TV regurgitation with mildly elevated right ventricular systolic pressure    Left Ventricle: Left ventricular cavity size is normal. Wall thickness is moderately increased. The left ventricular ejection fraction is 60%. Systolic function is normal. Wall motion is normal.  •  Left Atrium: The atrium is moderately dilated.  •  Right Atrium: The atrium is moderately dilated.  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 There is moderate paravalvular regurgitation. The gradient recorded across the prosthetic aortic valve is within the expected range. The aortic valve mean gradient is 10 mmHg. The aortic valve area is 1.92 cm2.  •  Mitral Valve: There is moderate diffuse thickening of the anterior leaflet and posterior leaflet. There is moderate calcification with moderate chordal involvement. There is severe annular calcification. There is mild regurgitation. There is moderate stenosis. The mitral valve mean gradient is 10mmHg.  •  Tricuspid Valve: There is moderate regurgitation. The right ventricular systolic pressure is mildly elevated. The estimated right ventricular systolic pressure is 45.00 mmHg.    CXR with mildly prominent reticular interstitial marking, suspicious interstitial edema/CHF  BNP mildly elevated 201  S/p IV 10 mg Lasix 11/20 - unable to track I&Os due to incontinence  and refusal of abraham  Previously unable to diurese due to patient refusal  Daily weights  I/O  Salt restriction 2g, fluid restriction 2L  See plan for acute hypoxic resp failure  Constipation  Continue bowel regimen     VTE Pharmacologic Prophylaxis:   Moderate Risk (Score 3-4) - Pharmacological DVT Prophylaxis Contraindicated. Sequential Compression Devices Ordered.    Mobility:   Basic Mobility Inpatient Raw Score: 18  JH-HLM Goal: 6: Walk 10 steps or more  JH-HLM Achieved: 6: Walk 10 steps or more  JH-HLM Goal achieved. Continue to encourage appropriate mobility.    Patient Centered Rounds: I performed bedside rounds with nursing staff today.   Discussions with Specialists or Other Care Team Provider: heme onc, CM re:Neulasta planning    Education and Discussions with Family / Patient: Updated  (son) at bedside.    Current Length of Stay: 7 day(s)  Current Patient Status: Inpatient   Certification Statement: The patient will continue to require additional inpatient hospital stay due to ongoing chemotherapy thru 11/24 with extra day after to monitor blood counts  Discharge Plan: Anticipate discharge tomorrow to home vs rehab pending PTOT    Code Status: Level 1 - Full Code    Subjective   Patient has no complaints. She denies fevers/chills, chest pain, shortness of breath, heart palpitations, nausea, vomiting, abdominal pain, weakness, or numbness. Diuresing today for hypoxia +/- home O2 eval for d/c tomorrow 11/25 if no complications from chemo      Objective :  Temp:  [97.3 °F (36.3 °C)-97.7 °F (36.5 °C)] 97.6 °F (36.4 °C)  HR:  [64-87] 87  BP: (102-125)/(57-70) 105/66  Resp:  [16-17] 16  SpO2:  [90 %-97 %] 91 %  O2 Device: None (Room air)  Nasal Cannula O2 Flow Rate (L/min):  [2 L/min] 2 L/min    Body mass index is 16.64 kg/m².     Input and Output Summary (last 24 hours):     Intake/Output Summary (Last 24 hours) at 11/25/2024 1709  Last data filed at 11/25/2024 1300  Gross per 24 hour   Intake  1099.9 ml   Output 450 ml   Net 649.9 ml       Physical Exam  Vitals reviewed.   Constitutional:       General: She is not in acute distress.     Comments: Appears thin, frail.   HENT:      Head: Normocephalic and atraumatic.      Mouth/Throat:      Mouth: Mucous membranes are moist.      Pharynx: Oropharynx is clear.   Eyes:      General: No scleral icterus.     Extraocular Movements: Extraocular movements intact.   Cardiovascular:      Rate and Rhythm: Normal rate and regular rhythm.      Heart sounds: Murmur (2nd IC RUSB) heard.      No friction rub. No gallop.   Pulmonary:      Effort: Pulmonary effort is normal. No respiratory distress.      Breath sounds: No stridor. No wheezing or rales.   Abdominal:      General: There is no distension.      Palpations: There is no mass.      Tenderness: There is no abdominal tenderness. There is no guarding.   Musculoskeletal:         General: Normal range of motion.   Skin:     General: Skin is warm and dry.      Findings: No rash.   Neurological:      Mental Status: She is alert.      Sensory: No sensory deficit.   Psychiatric:         Mood and Affect: Mood normal.         Behavior: Behavior normal.         Thought Content: Thought content normal.         Lines/Drains:  Lines/Drains/Airways       Active Status       Name Placement date Placement time Site Days    PICC Line 11/18/24 Right Brachial 11/18/24  1343  Brachial  7                    Central Line:  Goal for removal: N/A - Chronic PICC               Lab Results: I have reviewed the following results:   Results from last 7 days   Lab Units 11/25/24  0449 11/24/24  0549 11/23/24  0623   WBC Thousand/uL 1.44*   < > 3.49*   HEMOGLOBIN g/dL 9.0*   < > 9.0*   HEMATOCRIT % 27.3*   < > 27.7*   PLATELETS Thousands/uL 45*   < > 46*   BANDS PCT %  --   --  6   LYMPHO PCT %  --   --  2*   MONO PCT %  --   --  5   EOS PCT %  --   --  0    < > = values in this interval not displayed.     Results from last 7 days   Lab Units  11/25/24  0449 11/24/24  0549 11/23/24  0623   SODIUM mmol/L 138   < > 138   POTASSIUM mmol/L 4.3   < > 4.3   CHLORIDE mmol/L 102   < > 103   CO2 mmol/L 32   < > 32   BUN mg/dL 31*   < > 29*   CREATININE mg/dL 0.72   < > 0.64   ANION GAP mmol/L 4   < > 3*   CALCIUM mg/dL 8.7   < > 8.5   ALBUMIN g/dL  --   --  3.2*   TOTAL BILIRUBIN mg/dL  --   --  0.58   ALK PHOS U/L  --   --  46   ALT U/L  --   --  20   AST U/L  --   --  22   GLUCOSE RANDOM mg/dL 135   < > 109    < > = values in this interval not displayed.                       Recent Cultures (last 7 days):         Imaging Results Review: I reviewed radiology reports from this admission including: chest xray.  Other Study Results Review: EKG was reviewed.     Last 24 Hours Medication List:     Current Facility-Administered Medications:   •  acetaminophen (TYLENOL) tablet 650 mg, Q6H PRN  •  acyclovir (ZOVIRAX) capsule 400 mg, BID  •  allopurinol (ZYLOPRIM) tablet 300 mg, Daily  •  alteplase (CATHFLO) injection 2 mg, Q1MIN PRN  •  alteplase (CATHFLO) injection 2 mg, Q1MIN PRN  •  alteplase (CATHFLO) injection 2 mg, Q1MIN PRN  •  aspirin chewable tablet 81 mg, Daily  •  bimatoprost (LUMIGAN) 0.03 % ophthalmic drops 1 drop, HS  •  bisacodyl (DULCOLAX) EC tablet 10 mg, Daily PRN  •  cladribine (LEUSTATIN) 4.7 mg in sodium chloride 0.9 % 500 mL infusion, Once, Last Rate: 4.7 mg (11/24/24 2229)  •  docusate sodium (COLACE) capsule 100 mg, BID  •  famotidine (PEPCID) tablet 20 mg, Daily  •  levothyroxine tablet 88 mcg, Early Morning  •  melatonin tablet 9 mg, HS  •  ondansetron (ZOFRAN) injection 4 mg, Q6H PRN  •  polyethylene glycol (MIRALAX) packet 17 g, Daily  •  predniSONE tablet 10 mg, Daily  •  sodium chloride 0.9 % infusion, Once PRN  •  sodium chloride 0.9 % infusion, Once PRN  •  sodium chloride 0.9 % infusion, Once PRN  •  sodium chloride 0.9 % infusion, Once PRN  •  sulfamethoxazole-trimethoprim (BACTRIM DS) 800-160 mg per tablet 1 tablet, Once per day on  Monday Wednesday Friday    Administrative Statements   Today, Patient Was Seen By: Eva James  I have spent a total time of 35 minutes in caring for this patient on the day of the visit/encounter including Diagnostic results, Impressions, Documenting in the medical record, Reviewing / ordering tests, medicine, procedures  , Obtaining or reviewing history  , and Communicating with other healthcare professionals .    **Please Note: This note may have been constructed using a voice recognition system.**

## 2024-11-25 NOTE — ASSESSMENT & PLAN NOTE
Presented as direct admit for chemotherapy d/t recurrent HCL with initial treatment 40 + years ago   BRAF V600E (+)  Oncology following  CBC shows anemia (Hgb stable during admission, between 8.4-8.9) and thrombocytopenia (platelets between 38-43), consistent with HCL relapse   Ongoing chemo infusions w heme onc inpatient, on 2-CdA(cladrabine).     Plan:  Continue infusions through 11/25 (was delayed due to drip holds)  Discharge likely on 11/26-27 when confirmed counts stable on post-chemo CBC  Continue TLS labs  Outpatient will take Neulasta (after finishing chemo, while at rehab!) and Rituximab  CBC 2x/week   Place precautions if ANC <500   Transfuse for Hb <7.0 & plt <10k or active bleeding <20k

## 2024-11-25 NOTE — TELEPHONE ENCOUNTER
Onc. Care Coordinator from [facility] called down. Pt. Med is being switched for tomorrow to [facility]. Ok'd with Pharmacy. Dr. Huynh will put new orders in for tomorrow's AM appointment.

## 2024-11-25 NOTE — PROGRESS NOTES
Oncology Finance Advocacy Intake and Intervention    Oncology Finance Counselor/Advocate placed call to patient. This writer informed patient that this writer is here to assist patient with billing questions, financial assistance, payment/payment plans, quotes, copayment assistance, insurance optimization, and insurance navigation.      This writer conducted a thorough benefit review of copayment, deductible, and out of pocket cost. This information is documented below and has been reviewed with patient.     Insurance Optimization (Limited Benefit Vs Self-Pay): N/A  Patient Assistance Status: N/A     Interventions:   Chart was reviewed for financial need. PT's self-pay balance is below threshold, medications could be eligible for HW tricia, if need be (CLL), and insurance coverage is active.   Will continue to monitor for ongoing need.    Information above was review thoroughly with patient and patient was advise of possible assistance programs/interventions. If any question arise patient can contact this writer at below information. This information was given to patient at time of contact.      Gayla Dee MPH  Phone: [phone]  Email: [email or web address]

## 2024-11-25 NOTE — CASE MANAGEMENT
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) 2024     Chemotherapy induced neutropenia (HCC) 2024     Bone marrow disease 10/06/2024     Chronic fatigue 2021     Presence of cardiac pacemaker 2021     Ambulatory dysfunction 2021     Tachy-nataliia syndrome (HCC) 2021     Polypharmacy 2021     Fall 2020     Macrocytosis 2020     Abnormal EKG 2020     Aortic insufficiency 2020     Lightheadedness 2020     Nonrheumatic mitral valve regurgitation 2020     Difficulty reading due to visual problem 2020     Chronic right-sided low back pain without sciatica 2019     Peripheral vertigo 2019     Left bundle branch block (LBBB) 2018     Left ventricular hypertrophy 2018     S/P TAVR (transcatheter aortic valve replacement) 2018     Insomnia 2016     Urinary incontinence      Osteoporosis      Osteoarthritis      Hairy cell leukemia, in relapse (HCC)      Hypothyroidism      Benign essential hypertension      Essential tremor      History of severe aortic stenosis 2016       LOS (days): 7  Geometric Mean LOS (GMLOS) (days): 6  Days to GMLOS:-1.2     OBJECTIVE:  Risk of Unplanned Readmission Score: 14.46         Current admission status: Inpatient   Preferred Pharmacy:   [id number] - [place], [place] - [address]  [facility]  [place] [address]  Phone: [phone] Fax: [phone]    [facility] - [place] - [address] [place] [address]  [address] [place] [address]  [place] [address]  Phone: [phone] Fax: [phone]    Primary Care Provider: JESSICA Pal    Primary Insurance: MEDICARE  Secondary Insurance: AARP    DISCHARGE DETAILS:    Discharge planning discussed with:: Pt, pt's son Larry and Luis Alberto  Freedom of Choice: Yes  Comments - Freedom of Choice: [facility] for STR  CM contacted family/caregiver?: Yes  Were Treatment Team discharge recommendations reviewed with patient/caregiver?: Yes  Did patient/caregiver verbalize understanding of patient care needs?: N/A- going to facility  Were patient/caregiver advised of the risks associated with not following Treatment Team discharge recommendations?: Yes    Contacts  Patient Contacts: Pt's sons Larry & Luis Alberto at bedside  Relationship to Patient:: Family  Contact Method: In Person  Reason/Outcome: Discharge Planning, Emergency Contact, Referral    Requested Home Health Care         Is the patient interested in HHC at discharge?: No    DME Referral Provided  Referral made for DME?: No    Other Referral/Resources/Interventions Provided:  Interventions: Short Term Rehab  Referral Comments: Pt to go to [facility] for STR         Treatment Team Recommendation: Short Term Rehab  Discharge Destination Plan:: Short Term Rehab  Transport at Discharge : Wheelchair van                             IMM Given (Date):: 11/25/24  IMM Given to:: Family (Pt's son Larry)                            IMM reviewed with  Pt's son Larry ,  Pt's son Larry  agrees with discharge determination.

## 2024-11-26 ENCOUNTER — HOSPITAL ENCOUNTER (OUTPATIENT)
Dept: INFUSION CENTER | Facility: HOSPITAL | Age: 89
Discharge: HOME | End: 2024-11-26
Attending: INTERNAL MEDICINE
Payer: MEDICARE

## 2024-11-26 VITALS
HEART RATE: 71 BPM | SYSTOLIC BLOOD PRESSURE: 106 MMHG | DIASTOLIC BLOOD PRESSURE: 62 MMHG | OXYGEN SATURATION: 91 % | RESPIRATION RATE: 15 BRPM | WEIGHT: 100 LBS | TEMPERATURE: 97.5 F | BODY MASS INDEX: 16.66 KG/M2 | HEIGHT: 65 IN

## 2024-11-26 VITALS — TEMPERATURE: 97.6 F

## 2024-11-26 DIAGNOSIS — T45.1X5A CHEMOTHERAPY INDUCED NEUTROPENIA (HCC): Primary | ICD-10-CM

## 2024-11-26 DIAGNOSIS — D61.89 ANEMIA DUE TO OTHER BONE MARROW FAILURE (HCC): ICD-10-CM

## 2024-11-26 DIAGNOSIS — D70.1 CHEMOTHERAPY INDUCED NEUTROPENIA (HCC): Primary | ICD-10-CM

## 2024-11-26 DIAGNOSIS — C91.42 HAIRY CELL LEUKEMIA, IN RELAPSE (HCC): ICD-10-CM

## 2024-11-26 DIAGNOSIS — Z91.89 AT HIGH RISK OF TUMOR LYSIS SYNDROME: ICD-10-CM

## 2024-11-26 DIAGNOSIS — D69.6 THROMBOCYTOPENIA (HCC): ICD-10-CM

## 2024-11-26 LAB
ANION GAP SERPL CALCULATED.3IONS-SCNC: 4 MMOL/L (ref 4–13)
BUN SERPL-MCNC: 33 MG/DL (ref 5–25)
CALCIUM SERPL-MCNC: 8.2 MG/DL (ref 8.4–10.2)
CHLORIDE SERPL-SCNC: 103 MMOL/L (ref 96–108)
CO2 SERPL-SCNC: 30 MMOL/L (ref 21–32)
CREAT SERPL-MCNC: 0.64 MG/DL (ref 0.6–1.3)
ERYTHROCYTE [DISTWIDTH] IN BLOOD BY AUTOMATED COUNT: 21.9 % (ref 11.6–15.1)
GFR SERPL CREATININE-BSD FRML MDRD: 75 ML/MIN/1.73SQ M
GLUCOSE SERPL-MCNC: 72 MG/DL (ref 65–140)
HCT VFR BLD AUTO: 26.8 % (ref 34.8–46.1)
HGB BLD-MCNC: 8.5 G/DL (ref 11.5–15.4)
MCH RBC QN AUTO: 41.3 PG (ref 26.8–34.3)
MCHC RBC AUTO-ENTMCNC: 31.7 G/DL (ref 31.4–37.4)
MCV RBC AUTO: 130 FL (ref 82–98)
NRBC BLD AUTO-RTO: 142 /100 WBCS
PLATELET # BLD AUTO: 41 THOUSANDS/UL (ref 149–390)
PMV BLD AUTO: 12.3 FL (ref 8.9–12.7)
POTASSIUM SERPL-SCNC: 4.1 MMOL/L (ref 3.5–5.3)
RBC # BLD AUTO: 2.06 MILLION/UL (ref 3.81–5.12)
SODIUM SERPL-SCNC: 137 MMOL/L (ref 135–147)
T4 FREE SERPL-MCNC: 1.12 NG/DL (ref 0.61–1.12)
TSH SERPL DL<=0.05 MIU/L-ACNC: 4.63 UIU/ML (ref 0.45–4.5)
WBC # BLD AUTO: 1.42 THOUSAND/UL (ref 4.31–10.16)

## 2024-11-26 PROCEDURE — 84439 ASSAY OF FREE THYROXINE: CPT

## 2024-11-26 PROCEDURE — 96372 THER/PROPH/DIAG INJ SC/IM: CPT

## 2024-11-26 PROCEDURE — 84443 ASSAY THYROID STIM HORMONE: CPT

## 2024-11-26 PROCEDURE — 80048 BASIC METABOLIC PNL TOTAL CA: CPT

## 2024-11-26 PROCEDURE — 88184 FLOWCYTOMETRY/ TC 1 MARKER: CPT | Performed by: STUDENT IN AN ORGANIZED HEALTH CARE EDUCATION/TRAINING PROGRAM

## 2024-11-26 PROCEDURE — 99239 HOSP IP/OBS DSCHRG MGMT >30: CPT | Performed by: STUDENT IN AN ORGANIZED HEALTH CARE EDUCATION/TRAINING PROGRAM

## 2024-11-26 PROCEDURE — 85027 COMPLETE CBC AUTOMATED: CPT

## 2024-11-26 PROCEDURE — 85007 BL SMEAR W/DIFF WBC COUNT: CPT

## 2024-11-26 PROCEDURE — 99233 SBSQ HOSP IP/OBS HIGH 50: CPT | Performed by: INTERNAL MEDICINE

## 2024-11-26 PROCEDURE — 88185 FLOWCYTOMETRY/TC ADD-ON: CPT | Performed by: STUDENT IN AN ORGANIZED HEALTH CARE EDUCATION/TRAINING PROGRAM

## 2024-11-26 RX ORDER — PREDNISONE 5 MG/1
5 TABLET ORAL DAILY
Status: DISCONTINUED | OUTPATIENT
Start: 2024-11-27 | End: 2024-11-26 | Stop reason: HOSPADM

## 2024-11-26 RX ORDER — LEVOFLOXACIN 25 MG/ML
250 SOLUTION ORAL DAILY
Start: 2024-11-27 | End: 2024-12-27

## 2024-11-26 RX ORDER — DOCUSATE SODIUM 100 MG/1
100 CAPSULE, LIQUID FILLED ORAL 2 TIMES DAILY
Start: 2024-11-26

## 2024-11-26 RX ORDER — ACYCLOVIR 200 MG/1
400 CAPSULE ORAL 2 TIMES DAILY
Start: 2024-11-26 | End: 2024-12-26

## 2024-11-26 RX ORDER — ALLOPURINOL 100 MG/1
100 TABLET ORAL DAILY
Start: 2024-11-27

## 2024-11-26 RX ORDER — SULFAMETHOXAZOLE AND TRIMETHOPRIM 800; 160 MG/1; MG/1
1 TABLET ORAL 3 TIMES WEEKLY
Start: 2024-11-27 | End: 2024-12-27

## 2024-11-26 RX ORDER — PREDNISONE 5 MG/1
5 TABLET ORAL DAILY
Start: 2024-11-27

## 2024-11-26 RX ORDER — ALLOPURINOL 100 MG/1
100 TABLET ORAL DAILY
Status: DISCONTINUED | OUTPATIENT
Start: 2024-11-27 | End: 2024-11-26 | Stop reason: HOSPADM

## 2024-11-26 RX ORDER — LEVOFLOXACIN 25 MG/ML
250 SOLUTION ORAL DAILY
Status: DISCONTINUED | OUTPATIENT
Start: 2024-11-26 | End: 2024-11-26 | Stop reason: HOSPADM

## 2024-11-26 RX ADMIN — LEVOTHYROXINE SODIUM 88 MCG: 88 TABLET ORAL at 05:46

## 2024-11-26 RX ADMIN — DOCUSATE SODIUM 100 MG: 100 CAPSULE, LIQUID FILLED ORAL at 08:28

## 2024-11-26 RX ADMIN — ALLOPURINOL 300 MG: 300 TABLET ORAL at 08:28

## 2024-11-26 RX ADMIN — PEGFILGRASTIM 6 MG: KIT SUBCUTANEOUS at 15:54

## 2024-11-26 RX ADMIN — LEVOFLOXACIN 250 MG: 25 SOLUTION ORAL at 10:35

## 2024-11-26 RX ADMIN — ASPIRIN 81 MG CHEWABLE TABLET 81 MG: 81 TABLET CHEWABLE at 08:28

## 2024-11-26 RX ADMIN — FAMOTIDINE 20 MG: 20 TABLET, FILM COATED ORAL at 08:28

## 2024-11-26 RX ADMIN — ACYCLOVIR 400 MG: 200 CAPSULE ORAL at 08:28

## 2024-11-26 RX ADMIN — POLYETHYLENE GLYCOL 3350 17 G: 17 POWDER, FOR SOLUTION ORAL at 08:28

## 2024-11-26 RX ADMIN — PREDNISONE 10 MG: 10 TABLET ORAL at 08:28

## 2024-11-26 NOTE — DISCHARGE SUMMARY
Discharge Summary - Hospitalist   Name: Helen Gaona 89 y.o. female I MRN: [id number]  Unit/Bed#: [facility] 914-01 I Date of Admission: 11/18/2024   Date of Service: 11/26/2024 I Hospital Day: 8     Assessment & Plan  Hairy cell leukemia, in relapse (HCC)  Presented as direct admit for chemotherapy d/t recurrent HCL with initial treatment 40 + years ago   BRAF V600E (+)  Oncology following  CBC shows anemia (Hgb stable during admission, between 8.4-8.9) and thrombocytopenia (platelets between 38-43), consistent with HCL relapse   Status post 7 days of Cladrabine (completed on 11/20/2024) with plan for outpatient rituxan    Plan:  Outpatient follow-up with oncology for Rituxan  Outpatient Neulasta  Monitor blood counts outpatient    New medications at discharge:  Acyclovir 400 mg twice daily  Bactrim 3 times weekly  Levofloxacin 2050 mg daily  Allopurinol 100 mg daily  Prednisone 5 mg daily  Hypothyroidism  Last TSH 10/2024 - 4.7   Continue levothyroxine  Follow-up TSH  Presence of cardiac pacemaker  Known history  Outpatient follow-up cardiology  Thrombocytopenia (HCC)  Platelets stable at 41  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)  Likely related to malignancy  Hgb stable between 8.3-8.9  Aspirin discontinued by hematology oncology  Acute on chronic diastolic congestive heart failure (HCC)  Wt Readings from Last 3 Encounters:   11/21/24 45.4 kg (100 lb)   11/07/24 47.2 kg (104 lb)   11/06/24 46.3 kg (102 lb)     TTE 2020 ef 50 % with diastolic dysfunction  repeat echo 11/21/24 with EF of 60%, normal systolic function, with severe annular calcification of MV, mild MR, moderate MV stenosis, moderate TV regurgitation with mildly elevated right ventricular systolic pressure    Left Ventricle: Left ventricular cavity size is normal. Wall thickness is moderately increased. The left ventricular ejection fraction is 60%. Systolic function is normal. Wall motion is normal.    Left Atrium: The atrium is moderately  dilated.    Right Atrium: The atrium is moderately dilated.    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 There is moderate paravalvular regurgitation. The gradient recorded across the prosthetic aortic valve is within the expected range. The aortic valve mean gradient is 10 mmHg. The aortic valve area is 1.92 cm2.    Mitral Valve: There is moderate diffuse thickening of the anterior leaflet and posterior leaflet. There is moderate calcification with moderate chordal involvement. There is severe annular calcification. There is mild regurgitation. There is moderate stenosis. The mitral valve mean gradient is 10mmHg.    Tricuspid Valve: There is moderate regurgitation. The right ventricular systolic pressure is mildly elevated. The estimated right ventricular systolic pressure is 45.00 mmHg.  CXR with mildly prominent reticular interstitial marking, suspicious interstitial edema/CHF  BNP mildly elevated 201  Improved with IV diuretics    Plan:  Salt restriction 2g, fluid restriction 2L  See plan for acute hypoxic resp failure  No need for further diuretics at discharge  Constipation  Continue bowel regimen      Medical Problems       Resolved Problems  Date Reviewed: 11/22/2024          Resolved    Acute hypoxic respiratory failure (HCC) 11/25/2024     Resolved by  Eva James        Discharging Physician / Practitioner: Hanna Heredia DO  PCP: JESSICA Pal  Admission Date:   Admission Orders (From admission, onward)       Ordered        11/18/24 0941  INPATIENT ADMISSION  Once                          Discharge Date: 11/26/24    Consultations During Hospital Stay:  Hematology oncology    Procedures Performed:   PICC line    Significant Findings / Test Results:   Echo complete w/ contrast if indicated  Result Date: 11/21/2024  Narrative:   Left Ventricle: Left ventricular cavity size is normal. Wall thickness is moderately increased. The left ventricular ejection  fraction is 60%. Systolic function is normal. Wall motion is normal.   Left Atrium: The atrium is moderately dilated.   Right Atrium: The atrium is moderately dilated.   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 There is moderate paravalvular regurgitation. The gradient recorded across the prosthetic aortic valve is within the expected range. The aortic valve mean gradient is 10 mmHg. The aortic valve area is 1.92 cm2.   Mitral Valve: There is moderate diffuse thickening of the anterior leaflet and posterior leaflet. There is moderate calcification with moderate chordal involvement. There is severe annular calcification. There is mild regurgitation. There is moderate stenosis. The mitral valve mean gradient is 10mmHg.   Tricuspid Valve: There is moderate regurgitation. The right ventricular systolic pressure is mildly elevated. The estimated right ventricular systolic pressure is 45.00 mmHg.     XR chest portable  Result Date: 11/19/2024  Narrative: XR CHEST PORTABLE INDICATION: hypoxia. COMPARISON: February 26, 2021 FINDINGS: Right-sided PICC line, tip to the level of the cavoatrial junction. Left-sided pacer, leads intact and stable in position. Prior aortic valve repair noted. Mild prominent bronchovascular reticular markings in the mid and lower lung zones, suggesting interstitial edema. No pneumothorax or pleural effusion. Normal cardiomediastinal silhouette. Bones are unremarkable for age. Normal upper abdomen.     Incidental Findings:   See above     Test Results Pending at Discharge (will require follow up):   None      Outpatient Tests Requested:  CBC twice weekly    Complications:  none     Reason for Admission: Direct for chemotherapy    Hospital Course:   Helen Gaona is a 89 y.o. female patient who originally presented to the hospital on 11/18/2024 due to direct admission for chemotherapy for relapse of hairy cell leukemia.  Patient completed 7 days of Cladrabine  "inpatient on 11/24/2024.  Patient was also noted to be hypoxic and treated with IV diuretics for heart failure exacerbation.  Patient will need close follow-up with outpatient oncology for Rituxan and Neulasta and lab monitoring with CBC twice weekly.  Patient was started on prophylaxis antibiotics/antivirals given neutropenia.  Aspirin discontinued.  Medically stable for discharge to rehab.    Please see above list of diagnoses and related plan for additional information.     Condition at Discharge: stable    Discharge Day Visit / Exam:   Subjective: Patient assessed at bedside.  No acute complaints.  Vitals: Blood Pressure: 106/62 (11/26/24 0816)  Pulse: 71 (11/26/24 0816)  Temperature: 97.5 °F (36.4 °C) (11/26/24 0816)  Temp Source: Oral (11/24/24 2148)  Respirations: 15 (11/26/24 0816)  Height: 5' 5\" (165.1 cm) (11/21/24 0850)  Weight - Scale: 45.4 kg (100 lb) (11/21/24 0850)  SpO2: 91 % (11/26/24 0816)  Physical Exam  Vitals reviewed.   Constitutional:       General: She is not in acute distress.     Appearance: Normal appearance. She is not ill-appearing.   HENT:      Head: Normocephalic and atraumatic.   Cardiovascular:      Rate and Rhythm: Normal rate and regular rhythm.      Heart sounds: Normal heart sounds.   Pulmonary:      Effort: Pulmonary effort is normal. No respiratory distress.   Abdominal:      General: Bowel sounds are normal.      Tenderness: There is no abdominal tenderness.   Musculoskeletal:      Right lower leg: No edema.      Left lower leg: No edema.   Neurological:      Mental Status: She is alert. Mental status is at baseline.          Discussion with Family: Updated  (son) at bedside.    Discharge instructions/Information to patient and family:   See after visit summary for information provided to patient and family.      Provisions for Follow-Up Care:  See after visit summary for information related to follow-up care and any pertinent home health orders.      Mobility at " time of Discharge:   Basic Mobility Inpatient Raw Score: 18  JH-HLM Goal: 6: Walk 10 steps or more  JH-HLM Achieved: 6: Walk 10 steps or more  HLM Goal achieved. Continue to encourage appropriate mobility.     Disposition:   Other Skilled Nursing Facility at [place]    Planned Readmission: none    Discharge Medications:  See after visit summary for reconciled discharge medications provided to patient and/or family.      Administrative Statements   Discharge Statement:  I have spent a total time of 35 minutes in caring for this patient on the day of the visit/encounter. >30 minutes of time was spent on: Impressions, Counseling / Coordination of care, Documenting in the medical record, Reviewing / ordering tests, medicine, procedures  , and Communicating with other healthcare professionals .    **Please Note: This note may have been constructed using a voice recognition system**

## 2024-11-26 NOTE — ASSESSMENT & PLAN NOTE
Wt Readings from Last 3 Encounters:   11/21/24 45.4 kg (100 lb)   11/07/24 47.2 kg (104 lb)   11/06/24 46.3 kg (102 lb)     TTE 2020 ef 50 % with diastolic dysfunction  repeat echo 11/21/24 with EF of 60%, normal systolic function, with severe annular calcification of MV, mild MR, moderate MV stenosis, moderate TV regurgitation with mildly elevated right ventricular systolic pressure    Left Ventricle: Left ventricular cavity size is normal. Wall thickness is moderately increased. The left ventricular ejection fraction is 60%. Systolic function is normal. Wall motion is normal.    Left Atrium: The atrium is moderately dilated.    Right Atrium: The atrium is moderately dilated.    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 There is moderate paravalvular regurgitation. The gradient recorded across the prosthetic aortic valve is within the expected range. The aortic valve mean gradient is 10 mmHg. The aortic valve area is 1.92 cm2.    Mitral Valve: There is moderate diffuse thickening of the anterior leaflet and posterior leaflet. There is moderate calcification with moderate chordal involvement. There is severe annular calcification. There is mild regurgitation. There is moderate stenosis. The mitral valve mean gradient is 10mmHg.    Tricuspid Valve: There is moderate regurgitation. The right ventricular systolic pressure is mildly elevated. The estimated right ventricular systolic pressure is 45.00 mmHg.  CXR with mildly prominent reticular interstitial marking, suspicious interstitial edema/CHF  BNP mildly elevated 201  Improved with IV diuretics    Plan:  Salt restriction 2g, fluid restriction 2L  See plan for acute hypoxic resp failure  No need for further diuretics at discharge

## 2024-11-26 NOTE — PROGRESS NOTES
"Oncology Progress Note  Helen Gaona 89 y.o. female MRN: [id number]  [facility] Encounter: [id number]      Oncology History   Hairy cell leukemia, in relapse (HCC)   4/12/2016 Initial Diagnosis    Leukemia, 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HCC)     11/18/2024 -  Chemotherapy    alteplase (CATHFLO), 2 mg, Intracatheter, Every 1 Minute as needed, 1 of 1 cycle  pegfilgrastim (NEULASTA ONPRO), 6 mg, Subcutaneous, Once, 1 of 1 cycle  cladribine (LEUSTATIN) infusion, 0.1 mg/kg = 4.7 mg, Intravenous, Once, 1 of 1 cycle  Administration: 4.7 mg (11/18/2024), 4.7 mg (11/19/2024), 4.7 mg (11/20/2024), 4.7 mg (11/21/2024), 4.7 mg (11/22/2024), 4.7 mg (11/23/2024), 4.7 mg (11/24/2024)         Assessment and Plan :  HCL  Recurrent HCL with initial treatment 40+ years ago  BRAF V600E mutated  Will require Cladrabine x 7 days followed by outpatient Rituxan  Completed chemotherapy on 11/24/24       Plan:  Patient hematologically stable for discharge at this time  Cladrabine x 7 days - treatment complete on 11/24/24  Rituxan as OP  Labs stable at this time, WBC remains low at 1.44 will require Neulasta O/P to stimulate bone marrow  Monitor for symptoms of febrile neutropenia, prophylactic medications detailed below     Medication changes per below:  Start Levofloxacin 250mg qd  Continue Bactrim 800-160mg qd  Continue Acyclovir 400 mg BID  Decrease to Allopurinol 100 mg qd  Decrease to Prednisone 5 mg qd        Subjective:    Patient is seen and evaluated at bedside with attending physician. She is accompanied by her son at time of exam. She remains hemodynamically stable at this time despite pancytopenia. She has no complaints at this time and denies fever, chills, fatigue, nausea, vomiting, and diarrhea.    Objective:      /62   Pulse 71   Temp 97.5 °F (36.4 °C)   Resp 15   Ht 5' 5\" (1.651 m)   Wt 45.4 kg (100 lb)   SpO2 91%   BMI 16.64 kg/m²   General Appearance:    Alert, oriented        Eyes:    PERRL   Ears:    Normal " external ear canals, both ears   Nose:   Nares normal, septum midline   Throat:   Mucosa moist. Pharynx without injection.    Neck:   Supple       Lungs:     Left lower lobe crackles appreciated on exam   Chest Wall:    No tenderness or deformity    Heart:    Regular rate and rhythm       Abdomen:     Soft, non-tender, bowel sounds +, no organomegaly           Extremities:   Extremities no cyanosis or edema       Skin:   no rash or icterus.    Lymph nodes:   Cervical, supraclavicular, and axillary nodes normal   Neurologic:   CNII-XII intact, normal strength, sensation and reflexes     throughout        Recent Results (from the past 48 hours)   Basic metabolic panel    Collection Time: 11/25/24  4:49 AM   Result Value Ref Range    Sodium 138 135 - 147 mmol/L    Potassium 4.3 3.5 - 5.3 mmol/L    Chloride 102 96 - 108 mmol/L    CO2 32 21 - 32 mmol/L    ANION GAP 4 4 - 13 mmol/L    BUN 31 (H) 5 - 25 mg/dL    Creatinine 0.72 0.60 - 1.30 mg/dL    Glucose 135 65 - 140 mg/dL    Calcium 8.7 8.4 - 10.2 mg/dL    eGFR 70 ml/min/1.73sq m   CBC and differential    Collection Time: 11/25/24  4:49 AM   Result Value Ref Range    WBC 1.44 (L) 4.31 - 10.16 Thousand/uL    RBC 2.17 (L) 3.81 - 5.12 Million/uL    Hemoglobin 9.0 (L) 11.5 - 15.4 g/dL    Hematocrit 27.3 (L) 34.8 - 46.1 %     (H) 82 - 98 fL    MCH 41.5 (H) 26.8 - 34.3 pg    MCHC 33.0 31.4 - 37.4 g/dL    RDW 21.6 (H) 11.6 - 15.1 %    MPV 11.8 8.9 - 12.7 fL    Platelets 45 (L) 149 - 390 Thousands/uL    nRBC 222 /100 WBCs   Basic metabolic panel    Collection Time: 11/26/24  5:51 AM   Result Value Ref Range    Sodium 137 135 - 147 mmol/L    Potassium 4.1 3.5 - 5.3 mmol/L    Chloride 103 96 - 108 mmol/L    CO2 30 21 - 32 mmol/L    ANION GAP 4 4 - 13 mmol/L    BUN 33 (H) 5 - 25 mg/dL    Creatinine 0.64 0.60 - 1.30 mg/dL    Glucose 72 65 - 140 mg/dL    Calcium 8.2 (L) 8.4 - 10.2 mg/dL    eGFR 75 ml/min/1.73sq m   CBC and differential    Collection Time: 11/26/24  5:51 AM    Result Value Ref Range    WBC 1.42 (L) 4.31 - 10.16 Thousand/uL    RBC 2.06 (L) 3.81 - 5.12 Million/uL    Hemoglobin 8.5 (L) 11.5 - 15.4 g/dL    Hematocrit 26.8 (L) 34.8 - 46.1 %     (H) 82 - 98 fL    MCH 41.3 (H) 26.8 - 34.3 pg    MCHC 31.7 31.4 - 37.4 g/dL    RDW 21.9 (H) 11.6 - 15.1 %    MPV 12.3 8.9 - 12.7 fL    Platelets 41 (L) 149 - 390 Thousands/uL    nRBC 142 /100 WBCs         XR chest portable  Result Date: 11/19/2024  Narrative: XR CHEST PORTABLE INDICATION: hypoxia. COMPARISON: February 26, 2021 FINDINGS: Right-sided PICC line, tip to the level of the cavoatrial junction. Left-sided pacer, leads intact and stable in position. Prior aortic valve repair noted. Mild prominent bronchovascular reticular markings in the mid and lower lung zones, suggesting interstitial edema. No pneumothorax or pleural effusion. Normal cardiomediastinal silhouette. Bones are unremarkable for age. Normal upper abdomen.     Impression: Right-sided PICC line with tip at the caval atrial junction. Mildly prominent reticular interstitial markings. Correlate for interstitial edema/CHF. 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 The procedure was performed in the prone position. 1% lidocaine was used for local anesthetic and moderate sedation was administered. The right posterior inferior iliac spine was localized by fluoroscopy and the low back was prepped and draped in sterile fashion. Using fluoroscopic guidance, an 11g bone marrow needle was advanced through the cortex of the iliac spine into the marrow. Aspiration was performed and submitted to pathology for slide preparation. The needle was  slightly withdrawn and redirected to obtain a core biopsy using the Trek system. The core was submitted to pathology. The core sample appeared to be small, so a second core sample was taken after positioning confirmed under fluoroscopic guidance. The puncture site was cleansed and a dressing was applied. Fluoroscopy time: 1.4 MINUTES Images: 2 Sedation (min) : 25 MINUTES     Impression: Impression: Fluoroscopy image guided bone marrow aspiration and core biopsy Signed, performed, and dictated by Lenora Nelson PA-C under supervision of Dr. Umer Wren Workstation performed: [id number]         I spent 30 minutes in chart review, face to face counseling, coordination of care and documentation.     Jose Luis Trujillo, MS3

## 2024-11-26 NOTE — ASSESSMENT & PLAN NOTE
Presented as direct admit for chemotherapy d/t recurrent HCL with initial treatment 40 + years ago   BRAF V600E (+)  Oncology following  CBC shows anemia (Hgb stable during admission, between 8.4-8.9) and thrombocytopenia (platelets between 38-43), consistent with HCL relapse   Status post 7 days of Cladrabine (completed on 11/20/2024) with plan for outpatient rituxan    Plan:  Outpatient follow-up with oncology for Rituxan  Outpatient Neulasta  Monitor blood counts outpatient    New medications at discharge:  Acyclovir 400 mg twice daily  Bactrim 3 times weekly  Levofloxacin 2050 mg daily  Allopurinol 100 mg daily  Prednisone 5 mg daily

## 2024-11-26 NOTE — PROGRESS NOTES
Helen Gaona  tolerated treatment well with no complications. Neulasta OnPro attached to R upper arm. Patient and sons made aware of device removal time.    Helen Gaona is aware she does not have any future appointments with the infusion center.     AVS printed and given to Helen Gaona:  Yes

## 2024-11-26 NOTE — PLAN OF CARE
Problem: PAIN - ADULT  Goal: Verbalizes/displays adequate comfort level or baseline comfort level  Description: Interventions:  - Encourage patient to monitor pain and request assistance  - Assess pain using appropriate pain scale  - Administer analgesics based on type and severity of pain and evaluate response  - Implement non-pharmacological measures as appropriate and evaluate response  - Consider cultural and social influences on pain and pain management  - Notify physician/advanced practitioner if interventions unsuccessful or patient reports new pain  Outcome: Progressing     Problem: INFECTION - ADULT  Goal: Absence or prevention of progression during hospitalization  Description: INTERVENTIONS:  - Assess and monitor for signs and symptoms of infection  - Monitor lab/diagnostic results  - Monitor all insertion sites, i.e. indwelling lines, tubes, and drains  - Monitor endotracheal if appropriate and nasal secretions for changes in amount and color  - [place] appropriate cooling/warming therapies per order  - Administer medications as ordered  - Instruct and encourage patient and family to use good hand hygiene technique  - Identify and instruct in appropriate isolation precautions for identified infection/condition  Outcome: Progressing     Problem: SAFETY ADULT  Goal: Patient will remain free of falls  Description: INTERVENTIONS:  - Educate patient/family on patient safety including physical limitations  - Instruct patient to call for assistance with activity   - Consult OT/PT to assist with strengthening/mobility   - Keep Call bell within reach  - Keep bed low and locked with side rails adjusted as appropriate  - Keep care items and personal belongings within reach  - Initiate and maintain comfort rounds  - Make Fall Risk Sign visible to staff  - Offer Toileting every 2 Hours, in advance of need  - Initiate/Maintain alarm  - Obtain necessary fall risk management equipment:   - Apply yellow socks and  bracelet for high fall risk patients  - Consider moving patient to room near nurses station  Outcome: Progressing     Problem: DISCHARGE PLANNING  Goal: Discharge to home or other facility with appropriate resources  Description: INTERVENTIONS:  - Identify barriers to discharge w/patient and caregiver  - Arrange for needed discharge resources and transportation as appropriate  - Identify discharge learning needs (meds, wound care, etc.)  - Arrange for interpretive services to assist at discharge as needed  - Refer to Case Management Department for coordinating discharge planning if the patient needs post-hospital services based on physician/advanced practitioner order or complex needs related to functional status, cognitive ability, or social support system  Outcome: Progressing     Problem: Knowledge Deficit  Goal: Patient/family/caregiver demonstrates understanding of disease process, treatment plan, medications, and discharge instructions  Description: Complete learning assessment and assess knowledge base.  Interventions:  - Provide teaching at level of understanding  - Provide teaching via preferred learning methods  Outcome: Progressing     Problem: Prexisting or High Potential for Compromised Skin Integrity  Goal: Skin integrity is maintained or improved  Description: INTERVENTIONS:  - Identify patients at risk for skin breakdown  - Assess and monitor skin integrity  - Assess and monitor nutrition and hydration status  - Monitor labs   - Assess for incontinence   - Turn and reposition patient  - Assist with mobility/ambulation  - Relieve pressure over bony prominences  - Avoid friction and shearing  - Provide appropriate hygiene as needed including keeping skin clean and dry  - Evaluate need for skin moisturizer/barrier cream  - Collaborate with interdisciplinary team   - Patient/family teaching  - Consider wound care consult   Outcome: Progressing     Problem: Nutrition/Hydration-ADULT  Goal:  Nutrient/Hydration intake appropriate for improving, restoring or maintaining nutritional needs  Description: Monitor and assess patient's nutrition/hydration status for malnutrition. Collaborate with interdisciplinary team and initiate plan and interventions as ordered.  Monitor patient's weight and dietary intake as ordered or per policy. Utilize nutrition screening tool and intervene as necessary. Determine patient's food preferences and provide high-protein, high-caloric foods as appropriate.     INTERVENTIONS:  - Monitor oral intake, urinary output, labs, and treatment plans  - Assess nutrition and hydration status and recommend course of action  - Evaluate amount of meals eaten  - Assist patient with eating if necessary   - Allow adequate time for meals  - Recommend/ encourage appropriate diets, oral nutritional supplements, and vitamin/mineral supplements  - Order, calculate, and assess calorie counts as needed  - Recommend, monitor, and adjust tube feedings and TPN/PPN based on assessed needs  - Assess need for intravenous fluids  - Provide specific nutrition/hydration education as appropriate  - Include patient/family/caregiver in decisions related to nutrition  Outcome: Progressing     Problem: HEMATOLOGIC - ADULT  Goal: Maintains hematologic stability  Description: INTERVENTIONS  - Assess for signs and symptoms of bleeding or hemorrhage  - Monitor labs  - Administer supportive blood products/factors as ordered and appropriate  Outcome: Progressing

## 2024-11-26 NOTE — RESTORATIVE TECHNICIAN NOTE
Restorative Technician Note      Patient Name: Helen Gaona     Restorative Tech Visit Date: 11/26/24  Note Type: Mobility  Patient Position Upon Consult: Supine  Activity Performed: Ambulated  Assistive Device: Roller walker  Patient Position at End of Consult: Supine; All needs within reach  Nurse Communication: Nurse aware of consult, application of brace    Delio Smiley, Restorative Technician

## 2024-11-26 NOTE — ASSESSMENT & PLAN NOTE
Likely related to malignancy  Hgb stable between 8.3-8.9  Aspirin discontinued by hematology oncology

## 2024-11-27 ENCOUNTER — TELEPHONE (OUTPATIENT)
Age: 89
End: 2024-11-27

## 2024-11-27 NOTE — TELEPHONE ENCOUNTER
Call received from patients son amanda. Questioning when Neulasta onpro can be taken off of patients arm. Informed son that from the time it was applied to patients arm, which was 1554 yesterday, it takes in total 27 hours + 45 minutes for medication to inject and after that it can be taken off. Educated him to look for solid green light on the device which indicates that the medication is done injecting and the device can be removed. Advised him to check device around 8 pm and it should be done by then. Verbalized understanding.

## 2024-11-29 LAB — SCAN RESULT: NORMAL

## 2024-12-02 ENCOUNTER — TELEPHONE (OUTPATIENT)
Age: 89
End: 2024-12-02

## 2024-12-02 ENCOUNTER — TELEPHONE (OUTPATIENT)
Dept: INFUSION CENTER | Facility: HOSPITAL | Age: 89
End: 2024-12-02

## 2024-12-02 DIAGNOSIS — C91.42 HAIRY CELL LEUKEMIA, IN RELAPSE (HCC): Primary | ICD-10-CM

## 2024-12-02 DIAGNOSIS — D61.89 ANEMIA DUE TO OTHER BONE MARROW FAILURE (HCC): ICD-10-CM

## 2024-12-02 PROCEDURE — 85060 BLOOD SMEAR INTERPRETATION: CPT | Performed by: STUDENT IN AN ORGANIZED HEALTH CARE EDUCATION/TRAINING PROGRAM

## 2024-12-02 RX ORDER — SODIUM CHLORIDE 9 MG/ML
20 INJECTION, SOLUTION INTRAVENOUS ONCE
Status: CANCELLED | OUTPATIENT
Start: 2024-12-03

## 2024-12-02 NOTE — TELEPHONE ENCOUNTER
Patient's son called inquiring if patient should go to ER or if she is ok to wait until platelet transfusion tomorrow. I inquired how patient was feeling, he denies patient having increased SOB, chest pain or active bleeding. I confirmed with him patient is ok to wait until tomorrow for platelet transfusion, but if [facility] notices that patient has increased or new symptoms, to send to ER for evaluation. He verbalized understanding and has no additional questions or concerns at this moment.

## 2024-12-02 NOTE — TELEPHONE ENCOUNTER
Spoke with HOA Carreon from [facility] who wanted to discuss patient's CBC results from today in comparison to CBC on 11/29. Hgb 9.5 to 8.6, plt 31 to 17. She confirmed she will [facility]. Confirmed with HOA Kelley that per Dr. Hunyh, patient does not need blood transfusion but will require platelet transfusion. [facility] requesting T&S prior to platelet transfusion. Velma and [place] nursing director made aware and they are reaching out to patient's son Larry to make sure he is fit to drive pt.     Called and spoke with Clemente. His brother Mack will pickup patient today to bring her to [facility] by 1pm for T&S and transfusion. HOA Kelley made aware.     Called and left  for Velma at [facility] x2 to make her aware as well. Provided hopeline callback number.

## 2024-12-02 NOTE — TELEPHONE ENCOUNTER
Please advise patient that the infusion center would not be able to do transfusion today  However, I was able to schedule patient for a blood transfusion tomorrow at 730 am, [facility]  She will need to have labs drawn today, including CBC, and blood bank tube hold, ABO/RH.  It would be easier if patient was able to be taken to the lab for this that way we get results quicker

## 2024-12-02 NOTE — TELEPHONE ENCOUNTER
Attempted to reach nica Juarez and Luis Alberto regarding Helen's urgent platelet appt scheduled today (same day) for 1pm. Patient still has not arrived. No answer on either contact. Also attempted patient's home number with no luck.

## 2024-12-02 NOTE — TELEPHONE ENCOUNTER
Received a phone call from patient's son, Larry.  Larry stated that patient received Cladribine inpatient 11/18-11/26.  Patient was transferred to [facility] for rehab.  Larry stated that patient has been feeling fatigued and weak.  Patient is to have labs drawn today at facility.  Larry inquiring if patient can have blood transfusion today.  Informed Larry that provider will review lab results when received and assess the need for transfusion.  Larry verbalized understanding and stated that if facility has not drawn labs, he will take patient to lab.

## 2024-12-02 NOTE — TELEPHONE ENCOUNTER
Patient's son called in to let us know he could not transport her today to appointment. Rescheduled for tomorrow morning at 9am.

## 2024-12-03 ENCOUNTER — HOSPITAL ENCOUNTER (OUTPATIENT)
Dept: INFUSION CENTER | Facility: HOSPITAL | Age: 89
Discharge: HOME | End: 2024-12-03
Payer: MEDICARE

## 2024-12-03 VITALS
DIASTOLIC BLOOD PRESSURE: 56 MMHG | RESPIRATION RATE: 16 BRPM | TEMPERATURE: 98.1 F | OXYGEN SATURATION: 94 % | HEART RATE: 69 BPM | SYSTOLIC BLOOD PRESSURE: 90 MMHG

## 2024-12-03 DIAGNOSIS — D61.89 ANEMIA DUE TO OTHER BONE MARROW FAILURE (HCC): Primary | ICD-10-CM

## 2024-12-03 DIAGNOSIS — C91.42 HAIRY CELL LEUKEMIA, IN RELAPSE (HCC): ICD-10-CM

## 2024-12-03 LAB
BURR CELLS BLD QL SMEAR: PRESENT
ERYTHROCYTE [DISTWIDTH] IN BLOOD BY AUTOMATED COUNT: 21.9 % (ref 11.6–15.1)
HCT VFR BLD AUTO: 26.8 % (ref 34.8–46.1)
HGB BLD-MCNC: 8.5 G/DL (ref 11.5–15.4)
HYPERCHROMIA BLD QL SMEAR: PRESENT
LYMPHOCYTES # BLD AUTO: 0.53 THOUSAND/UL (ref 0.6–4.47)
LYMPHOCYTES # BLD AUTO: 26 %
MACROCYTES BLD QL AUTO: PRESENT
MCH RBC QN AUTO: 41.3 PG (ref 26.8–34.3)
MCHC RBC AUTO-ENTMCNC: 31.7 G/DL (ref 31.4–37.4)
MCV RBC AUTO: 130 FL (ref 82–98)
MISCELLANEOUS LAB TEST RESULT: NORMAL
MONOCYTES # BLD AUTO: 0.01 THOUSAND/UL (ref 0–1.22)
MONOCYTES NFR BLD AUTO: 1 % (ref 4–12)
MYELOCYTES # BLD MANUAL: 0.03 THOUSAND/UL (ref 0–0.1)
MYELOCYTES NFR BLD MANUAL: 2 % (ref 0–1)
NEUTS SEG # BLD: 0.85 THOUSAND/UL (ref 1.81–6.82)
NEUTS SEG NFR BLD AUTO: 60 %
NRBC BLD AUTO-RTO: 142 /100 WBCS
NRBC BLD AUTO-RTO: 192 /100 WBC (ref 0–2)
PLATELET # BLD AUTO: 41 THOUSANDS/UL (ref 149–390)
PLATELET BLD QL SMEAR: ABNORMAL
PMV BLD AUTO: 12.3 FL (ref 8.9–12.7)
POIKILOCYTOSIS BLD QL SMEAR: PRESENT
RBC # BLD AUTO: 2.06 MILLION/UL (ref 3.81–5.12)
SCAN RESULT: NORMAL
TARGETS BLD QL SMEAR: PRESENT
TOTAL CELLS COUNTED SPEC: 100
VARIANT LYMPHS # BLD AUTO: 11 % (ref 0–0)
WBC # BLD AUTO: 1.42 THOUSAND/UL (ref 4.31–10.16)
WBC NRBC COR # BLD: 1.42 THOUSAND/UL (ref 4.31–10.16)

## 2024-12-03 PROCEDURE — P9037 PLATE PHERES LEUKOREDU IRRAD: HCPCS

## 2024-12-03 PROCEDURE — 36430 TRANSFUSION BLD/BLD COMPNT: CPT

## 2024-12-03 RX ORDER — SODIUM CHLORIDE 9 MG/ML
20 INJECTION, SOLUTION INTRAVENOUS ONCE
Status: CANCELLED | OUTPATIENT
Start: 2024-12-11

## 2024-12-03 RX ORDER — SODIUM CHLORIDE 9 MG/ML
20 INJECTION, SOLUTION INTRAVENOUS ONCE
Status: COMPLETED | OUTPATIENT
Start: 2024-12-03 | End: 2024-12-03

## 2024-12-03 RX ADMIN — SODIUM CHLORIDE 20 ML/HR: 0.9 INJECTION, SOLUTION INTRAVENOUS at 09:30

## 2024-12-03 NOTE — PROGRESS NOTES
Helen Gaona  tolerated treatment well with no complications.      Helen Gaona is aware that she does not have any future appointments at this time.     AVS printed and given to Helen Gaona:  Yes

## 2024-12-03 NOTE — PROGRESS NOTES
Patient here for platelets transfusion.Vitals stable. Labs reviewed from 12/2, Ptl 17. Offers no complains. Call bell within reach.

## 2024-12-04 LAB
ABO GROUP BLD BPU: NORMAL
BPU ID: NORMAL
UNIT DISPENSE STATUS: NORMAL
UNIT PRODUCT CODE: NORMAL
UNIT PRODUCT VOLUME: 247 ML
UNIT RH: NORMAL

## 2024-12-06 ENCOUNTER — TELEPHONE (OUTPATIENT)
Age: 89
End: 2024-12-06

## 2024-12-06 NOTE — TELEPHONE ENCOUNTER
Janette message sent in response  Dr. Huynh reviewed labs, no transfusions needed at this time  Continue to montjonna

## 2024-12-06 NOTE — TELEPHONE ENCOUNTER
Pt son Larry had a few questions and concerns. Larry would like to discuss pt's lab orders. [facility] normally gets pt's lab completed there and he would just like to make sure those were all completed. He would like to discuss pt's progress with her health based on these labs. Also last night he found out that [facility] has explained that they will discharge the pt from their facility for medicare reasons on next Monday. He is concerned because he doesn't think pt is well enough at this time to be on independent living and if possible could Dr. Huynh assist in any way. Possibly a letter to [facility] stating that pt does need to stay. Whatever Dr. Huynh could do to assist in this matter Larry would appreciate it. Larry would like a call back at [phone]. Thank you.

## 2024-12-07 ENCOUNTER — TELEPHONE (OUTPATIENT)
Dept: HEMATOLOGY ONCOLOGY | Facility: CLINIC | Age: 89
End: 2024-12-07

## 2024-12-07 NOTE — TELEPHONE ENCOUNTER
A phone call came to the office on 12/6/2024 from patient's son Larry to get an update on his mother's condition.  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  Hairy cell leukemia itself causes pancytopenia.  2-CdA causes prolonged marrow suppression and expected low blood counts making patient susceptible to infection/sepsis, bleeding and tiredness.  Patient received Neulasta and she is on antibacterial and antiviral medications to prevent from infections.  Platelet counts have dropped and she required platelet transfusions this week.  It is too early to predict how long marrow suppression and low blood counts persist and risks of infection and bleeding persist, could be anywhere between 3 to 4 weeks more.  Patient gets blood counts checked twice a week and transfusions as needed.  If she developed fever and temperature 100.4 and higher or shaking chills or other signs of infection and other medical serious problems she would be hospitalized for intravenous antibiotics and what ever else would be needed.  I explained this to Larry.

## 2024-12-09 ENCOUNTER — APPOINTMENT (OUTPATIENT)
Dept: LAB | Facility: CLINIC | Age: 89
End: 2024-12-09
Payer: MEDICARE

## 2024-12-09 ENCOUNTER — OFFICE VISIT (OUTPATIENT)
Dept: HEMATOLOGY ONCOLOGY | Facility: CLINIC | Age: 89
End: 2024-12-09
Payer: MEDICARE

## 2024-12-09 VITALS
BODY MASS INDEX: 17.16 KG/M2 | RESPIRATION RATE: 16 BRPM | OXYGEN SATURATION: 98 % | WEIGHT: 103 LBS | TEMPERATURE: 97.7 F | DIASTOLIC BLOOD PRESSURE: 62 MMHG | HEART RATE: 110 BPM | HEIGHT: 65 IN | SYSTOLIC BLOOD PRESSURE: 106 MMHG

## 2024-12-09 DIAGNOSIS — C91.42 HAIRY CELL LEUKEMIA, IN RELAPSE (HCC): Primary | ICD-10-CM

## 2024-12-09 DIAGNOSIS — D69.6 THROMBOCYTOPENIA (HCC): ICD-10-CM

## 2024-12-09 DIAGNOSIS — D61.89 ANEMIA DUE TO OTHER BONE MARROW FAILURE (HCC): ICD-10-CM

## 2024-12-09 DIAGNOSIS — C91.42 HAIRY CELL LEUKEMIA, IN RELAPSE (HCC): ICD-10-CM

## 2024-12-09 LAB
ABO GROUP BLD: NORMAL
ANISOCYTOSIS BLD QL SMEAR: PRESENT
BASOPHILS # BLD MANUAL: 0 THOUSAND/UL (ref 0–0.1)
BASOPHILS NFR MAR MANUAL: 0 % (ref 0–1)
EOSINOPHIL # BLD MANUAL: 0 THOUSAND/UL (ref 0–0.4)
EOSINOPHIL NFR BLD MANUAL: 0 % (ref 0–6)
ERYTHROCYTE [DISTWIDTH] IN BLOOD BY AUTOMATED COUNT: 23.9 % (ref 11.6–15.1)
HCT VFR BLD AUTO: 28.5 % (ref 34.8–46.1)
HGB BLD-MCNC: 9.2 G/DL (ref 11.5–15.4)
LYMPHOCYTES # BLD AUTO: 0.24 THOUSAND/UL (ref 0.6–4.47)
LYMPHOCYTES # BLD AUTO: 4 % (ref 14–44)
MACROCYTES BLD QL AUTO: PRESENT
MCH RBC QN AUTO: 44.7 PG (ref 26.8–34.3)
MCHC RBC AUTO-ENTMCNC: 32.3 G/DL (ref 31.4–37.4)
MCV RBC AUTO: 138 FL (ref 82–98)
METAMYELOCYTE ABSOLUTE CT: 0.06 THOUSAND/UL (ref 0–0.1)
METAMYELOCYTES NFR BLD MANUAL: 1 % (ref 0–1)
MONOCYTES # BLD AUTO: 0.12 THOUSAND/UL (ref 0–1.22)
MONOCYTES NFR BLD: 2 % (ref 4–12)
MYELOCYTE ABSOLUTE CT: 0.06 THOUSAND/UL (ref 0–0.1)
MYELOCYTES NFR BLD MANUAL: 1 % (ref 0–1)
NEUTROPHILS # BLD MANUAL: 5.59 THOUSAND/UL (ref 1.85–7.62)
NEUTS BAND NFR BLD MANUAL: 10 % (ref 0–8)
NEUTS SEG NFR BLD AUTO: 82 % (ref 43–75)
NRBC BLD AUTO-RTO: 3 /100 WBC (ref 0–2)
PLATELET # BLD AUTO: 22 THOUSANDS/UL (ref 149–390)
PLATELET BLD QL SMEAR: ABNORMAL
PMV BLD AUTO: 13.1 FL (ref 8.9–12.7)
RBC # BLD AUTO: 2.06 MILLION/UL (ref 3.81–5.12)
RBC MORPH BLD: PRESENT
RH BLD: POSITIVE
TOXIC GRANULES BLD QL SMEAR: PRESENT
WBC # BLD AUTO: 6.08 THOUSAND/UL (ref 4.31–10.16)
WBC TOXIC VACUOLES BLD QL SMEAR: PRESENT

## 2024-12-09 PROCEDURE — 86901 BLOOD TYPING SEROLOGIC RH(D): CPT

## 2024-12-09 PROCEDURE — 99214 OFFICE O/P EST MOD 30 MIN: CPT | Performed by: INTERNAL MEDICINE

## 2024-12-09 PROCEDURE — 85027 COMPLETE CBC AUTOMATED: CPT

## 2024-12-09 PROCEDURE — 86900 BLOOD TYPING SEROLOGIC ABO: CPT

## 2024-12-09 PROCEDURE — 85007 BL SMEAR W/DIFF WBC COUNT: CPT

## 2024-12-09 PROCEDURE — 36415 COLL VENOUS BLD VENIPUNCTURE: CPT

## 2024-12-09 PROCEDURE — G2211 COMPLEX E/M VISIT ADD ON: HCPCS | Performed by: INTERNAL MEDICINE

## 2024-12-10 ENCOUNTER — TELEPHONE (OUTPATIENT)
Age: 89
End: 2024-12-10

## 2024-12-10 RX ORDER — SODIUM CHLORIDE 9 MG/ML
20 INJECTION, SOLUTION INTRAVENOUS ONCE
OUTPATIENT
Start: 2024-12-10

## 2024-12-10 NOTE — TELEPHONE ENCOUNTER
Velma from [facility] called to report that pts plt that resulted today is critical at 18. Velma is faxing labs to office now via right fax. Allie CAMARA made aware. Pt has standing orders for plt transfusion if less than 20. Can you please schedule pt asap? Please call son Larry to set up a date and time for pt to get transported to infusion dept by son because it is short notice for [facility] to transport pt. However, Velma would like a call with the details.    Would pt need another type and screen?

## 2024-12-11 ENCOUNTER — HOSPITAL ENCOUNTER (OUTPATIENT)
Dept: INFUSION CENTER | Facility: CLINIC | Age: 89
Discharge: HOME | End: 2024-12-11
Payer: MEDICARE

## 2024-12-11 VITALS
DIASTOLIC BLOOD PRESSURE: 66 MMHG | HEART RATE: 60 BPM | SYSTOLIC BLOOD PRESSURE: 97 MMHG | OXYGEN SATURATION: 96 % | TEMPERATURE: 98.6 F | RESPIRATION RATE: 18 BRPM

## 2024-12-11 DIAGNOSIS — D61.89 ANEMIA DUE TO OTHER BONE MARROW FAILURE (HCC): Primary | ICD-10-CM

## 2024-12-11 DIAGNOSIS — C91.42 HAIRY CELL LEUKEMIA, IN RELAPSE (HCC): ICD-10-CM

## 2024-12-11 PROCEDURE — P9037 PLATE PHERES LEUKOREDU IRRAD: HCPCS

## 2024-12-11 PROCEDURE — 36430 TRANSFUSION BLD/BLD COMPNT: CPT

## 2024-12-11 NOTE — ASSESSMENT & PLAN NOTE
Status post 7 days of infusion 2-CdA from 11/18/2024 - 11/26/2024.  Patient received Neulasta.  Counts are being monitored twice a week and transfusions as needed.   Orders:  •  CBC and differential; Standing

## 2024-12-11 NOTE — PROGRESS NOTES
Pt arrives to infusion center for 1 unit platelets. Offers no complaints. Labs from 12/09, PLT 22- OK to tx. PIV accessed without issue. 24g was placed in pt- talked with blood bank- they recommend 22g or larger for transfusion but 24g is acceptable in pediatric pt. Due to age and vein size on pt, we are transfusing with the 24g that is in place. Spoke with ALKA, she is agreeable to this plan. Pt sitting comfortably in chair, wheels locked, call bell within reach.

## 2024-12-11 NOTE — PROGRESS NOTES
Pt tolerated tx without issue. PIV removed. Pt will follow-up with ordering provider. AVS declined.

## 2024-12-11 NOTE — PROGRESS NOTES
Name: Helen Gaona      : 1928      MRN: [id number]  Encounter Provider: Jan Huynh MD  Encounter Date: 2024   Encounter department: [facility] [place]  :  Assessment & Plan  Hairy cell leukemia, in relapse (HCC)  Status post 7 days of infusion 2-CdA from 2024 - 2024.  Patient received Neulasta.  Counts are being monitored twice a week and transfusions as needed.   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  Precautions for febrile neutropenia.  To prevent falls and trauma.  Nutritional supplements.  Goal is prevention of febrile neutropenia, bleeding and to monitor her counts for transfusions.  Patient needs assistance in her care.  All discussed in detail.  Questions answered.  Patient will continue to follow-up with primary physician and other consultants.  Provided counseling and support.  I used a dictation device to dictate this note and there could be mistakes in my note and for that patient's family may contact my office.    History of Present Illness   Chief Complaint   Patient presents with   • Follow-up   Helen Gaona is a 89 y.o. female.  Patient is here with her 2 sons Luis Alberto and Ricci.  Son Larry was on the speaker phone.  In  she had splenectomy and looks like interferon for hairy cell leukemia that has relapsed now and she has received 1 cycle of 2-CdA and Neulasta as above.  She feels weak and tired.  She feels better in the morning.  She is eating  "less.  She has been advised protein nutritional supplements.  She has a walker.  She has some swelling of the ankles.  Few small bruises lower legs.  Occasionally she gets confused.  She is somewhat hard of hearing.  In addition to above she has history of hysterectomy, pacemaker, osteoporosis, cataract surgeries, hypothyroidism, IBS and insomnia.    Pertinent Medical History   Patient is here with her 2 sons Luis Alberto and Ricci.  Son Larry was on the speaker phone.  In 1985 she had splenectomy and looks like interferon for hairy cell leukemia that has relapsed now and she has received 1 cycle of 2-CdA and Neulasta as above.  She feels weak and tired.  She feels better in the morning.  She is eating less.  She has been advised protein nutritional supplements.  She has a walker.  She has some swelling of the ankles.  Few small bruises lower legs.  Occasionally she gets confused.  She is somewhat hard of hearing.  No fevers and chills.  No active bleeding.  In addition to above she has history of hysterectomy, pacemaker, osteoporosis, cataract surgeries, hypothyroidism, IBS and insomnia.      Review of Systems  Reviewed 12 systems.  Symptoms are as in HPI and medical history.  No other symptoms from other systems.      Objective   /62 (BP Location: Right arm, Patient Position: Sitting, Cuff Size: Adult)   Pulse (!) 110   Temp 97.7 °F (36.5 °C) (Temporal)   Resp 16   Ht 5' 5\" (1.651 m)   Wt 46.7 kg (103 lb)   SpO2 98%   BMI 17.14 kg/m²   Performance status 3  Physical Exam  Constitutional:       Appearance: Normal appearance.   HENT:      Head: Normocephalic and atraumatic.      Mouth/Throat:      Comments: No oral thrush.  Eyes:      General: No scleral icterus.  Cardiovascular:      Rate and Rhythm: Normal rate and regular rhythm.      Heart sounds: Murmur heard.   Pulmonary:      Effort: Pulmonary effort is normal. No respiratory distress.      Breath sounds: Normal breath sounds. No rhonchi or rales. "   Abdominal:      General: Abdomen is flat. There is no distension.      Palpations: Abdomen is soft. There is no mass.      Tenderness: There is no abdominal tenderness.   Musculoskeletal:         General: Normal range of motion.      Cervical back: Normal range of motion.      Right lower leg: No edema.      Left lower leg: No edema.      Comments: 1+ edema of the ankles.  No calf tenderness.  No palpable lymph node in the axillary areas.  No clubbing.   Lymphadenopathy:      Cervical: No cervical adenopathy.   Skin:     Findings: Bruising (Few very small bruises lower legs.) present. No rash.   Neurological:      General: No focal deficit present.      Mental Status: She is alert.      Motor: Weakness present.      Gait: Gait abnormal (She has a walker.).      Comments: Oriented to place and person.   Psychiatric:         Behavior: Behavior normal.         Labs: I have reviewed the following labs:  Results for orders placed or performed during the hospital encounter of 12/03/24   Prepare Leukoreduced Platelet Pheresis (1 pheresis product = 6-8 pooled units): 1 Product, Irradiated, Leukoreduced   Result Value Ref Range    Unit Product Code [id number]     Unit Number [id number]     Unit ABO O     Unit RH POS     Unit Dispense Status Presumed Trans     Unit Product Volume 247 mL               Component  Ref Range & Units (hover) 12/9/24  3:02 PM 11/26/24  5:51 AM 11/25/24  4:49 AM 11/24/24  5:49 AM 11/23/24  6:23 AM 11/22/24  6:16 AM 11/21/24  4:52 AM   WBC 6.08 1.42 Low  1.44 Low  1.89 Low  3.49 Low  4.30 Low  5.51   RBC 2.06 Low  2.06 Low  2.17 Low  2.09 Low  2.24 Low  2.11 Low  2.16 Low    Hemoglobin 9.2 Low  8.5 Low  9.0 Low  8.5 Low  9.0 Low  8.5 Low  8.9 Low    Hematocrit 28.5 Low  26.8 Low  27.3 Low  26.2 Low  27.7 Low  26.5 Low  28.1 Low     High  130 High  126 High  125 High  124 High  126 High  130 High    MCH 44.7 High  41.3 High  41.5 High  40.7 High  40.2 High  40.3 High  41.2 High    MCHC  32.3 31.7 33.0 32.4 32.5 32.1 31.7   RDW 23.9 High  21.9 High  21.6 High  21.6 High  21.4 High  21.2 High  21.2 High    MPV 13.1 High  12.3 11.8 12.5 12.0 12.8 High  11.8   Platelets 22 Low  41 Low  CM 45 Low  CM 45 Low  CM 46 Low  43 Low  43 Low  CM   Comment: REV 11-20-24   nRBC  142 R 222 R 208 R   46 R           Manual Differential(PHLEBS Do Not Order)  Status: Final result      Contains abnormal data Manual Differential(PHLEBS Do Not Order)  Order: [id number] - Reflex for Order [id number]   Status: Final result       Next appt: Today at 11:00 AM in Infusion Therapy (AN INF CHAIR 11)       Dx: Hairy cell leukemia, in relapse (HCC)    Test Result Released: Yes (not seen)    0 Result Notes            Component  Ref Range & Units (hover) 12/9/24  3:02 PM 11/26/24  5:51 AM 11/23/24  6:23 AM 11/20/24  6:01 AM 11/19/24  5:49 AM 11/15/24  3:14 PM 11/6/24  7:37 AM   Segmented % 82 High  60 R 59 48 45 41 Low  32 Low    Bands % 10 High   6 5 4  7   Lymphocytes % 4 Low  26 R 2 Low  17 28 27 33   Monocytes % 2 Low  1 Low  5 22 High  2 Low  29 High  8   Eosinophils % 0  0 0 0 0 1   Basophils % 0  1 0 0 0 0   Metamyelocytes % 1   2 High  2 High   4 High    Myelocytes % 1 2 High   2 High  4 High   3 High    Absolute Neutrophils 5.59 0.85 Low  R 2.27 3.10 2.17 2.09 3.70   Absolute Lymphocytes 0.24 Low  0.53 Low  1.01 1.23 1.90 1.53 4.27   Absolute Monocytes 0.12 0.01 0.17 1.29 High  0.09 1.48 High  0.76   Absolute Eosinophils 0.00  0.00 0.00 0.00 0.00 0.09   Absolute Basophils 0.00  0.03 0.00 0.00 0.00 0.00   Absolute Metamyelocytes 0.06   0.12 High  0.09  0.38 High    Absolute Myelocytes 0.06 0.03  0.12 High  0.18 High   0.28 High    Total Counted  100        nRBC 3 High  192 High   48 High  33 High  53 High  47 High    Toxic Granulation Present         Toxic Vacuolization Present         RBC Morphology Present  Present Present  Present Present   Platelet Estimate Decreased Abnormal  Decreased Abnormal  Decreased Abnormal   Decreased Abnormal  Decreased Abnormal  Decreased Abnormal  Decreased Abnormal    Anisocytosis Present  Present Present Present Present Present   Macrocytes Present Present Present Present  Present              Specimen Collected: 12/09/24  3:02 PM Last Resulted: 12/09/24  9:27 PM

## 2024-12-11 NOTE — ASSESSMENT & PLAN NOTE
Leukoreduced and irradiated platelet transfusions as needed for 20,000 or lower      Blood work twice a week and she has standing orders for blood work

## 2024-12-12 LAB
ABO GROUP BLD BPU: NORMAL
BPU ID: NORMAL
UNIT DISPENSE STATUS: NORMAL
UNIT PRODUCT CODE: NORMAL
UNIT PRODUCT VOLUME: 300 ML
UNIT RH: NORMAL

## 2024-12-18 ENCOUNTER — NURSE TRIAGE (OUTPATIENT)
Age: 89
End: 2024-12-18

## 2024-12-18 ENCOUNTER — DOCUMENTATION (OUTPATIENT)
Dept: HEMATOLOGY ONCOLOGY | Facility: CLINIC | Age: 89
End: 2024-12-18

## 2024-12-18 ENCOUNTER — TELEPHONE (OUTPATIENT)
Age: 89
End: 2024-12-18

## 2024-12-18 NOTE — PROGRESS NOTES
I was looking at patient's blood test results and I noticed TSH is high and T4 is low.  She is on Synthroid 25 mcg daily.  I called patient's sons Larry and Luis Alberto and left message for both of them in the cell phones to get in touch with patient's primary physician and doctor can look at the results in the epic and increase the dose of thyroid medication.  I left name of my physician's assistant Dawood and our office phone number , a  because I am going to be away for 2 weeks.

## 2024-12-18 NOTE — TELEPHONE ENCOUNTER
Still awaiting picture to be sent through [employer]  Dr. Huynh is out of office until January 3rd but Dawood RIBEIRO is in office at this time  However, most recent platelet count is 41,000 from 12/16

## 2024-12-18 NOTE — TELEPHONE ENCOUNTER
"DESCRIPTION (describe complaint in short phrase) b/l legs are swelling with bruised areas    SEVERITY (mild, moderate, severe) moderate    ONSET (of THIS SPECIFIC complaint) for a couple of weeks    CAUSE (what does the patient think is     causing this) unknown    MEDICATIONS (any changes in meds   or doses?, Take any meds for relief?) no    OTHER SYMPTOMS (yes or no- if no,   just write that. If yes, write the symptoms c/o) no       Answer Assessment - Initial Assessment Questions  1. ONSET: \"When did the swelling start?\" (e.g., minutes, hours, days)      Over a week ago  2. LOCATION: \"What part of the leg is swollen?\"  \"Are both legs swollen or just one leg?\"      B/l legs  3. SEVERITY: \"How bad is the swelling?\" (e.g., localized; mild, moderate, severe)      Swelling is much worse. (Lower leg, may be up to her knees)n  4. REDNESS: \"Does the swelling look red or infected?\"      no  5. PAIN: \"Is the swelling painful to touch?\" If Yes, ask: \"How painful is it?\"   (Scale 1-10; mild, moderate or severe)      Complaining about her legs  6. FEVER: \"Do you have a fever?\" If Yes, ask: \"What is it, how was it measured, and when did it start?\"       no  7. CAUSE: \"What do you think is causing the leg swelling?\"      unknown  8. MEDICAL HISTORY: \"Do you have a history of blood clots (e.g., DVT), cancer, heart failure, kidney disease, or liver failure?\"      no  9. RECURRENT SYMPTOM: \"Have you had leg swelling before?\" If Yes, ask: \"When was the last time?\" \"What happened that time?\"      Has had swelling in the past.   10. OTHER SYMPTOMS: \"Do you have any other symptoms?\" (e.g., chest pain, difficulty breathing)        Also having bruising area.b/l legs.  (Quarter size)    Protocols used: Leg Swelling and Edema-Adult-[place]    "

## 2024-12-18 NOTE — TELEPHONE ENCOUNTER
Relayed message to patient's son. States he's currently trying to get the pictures sent over but was having trouble with getting them to appear clear. He will work on this and send over tonight.

## 2024-12-18 NOTE — TELEPHONE ENCOUNTER
Send me pic when it comes in   I do not see this result of 22,000? Last I see is 12/16 and was 41,000

## 2024-12-22 NOTE — PROGRESS NOTES
Cardiology Follow Up    [address]  4/7/1928  [id number]  [address] [facility]  [address] [place] [address] [address]    1  Aortic stenosis, severe     2  S/P TAVR (transcatheter aortic valve replacement)     3  Left ventricular hypertrophy     4  Left bundle branch block (LBBB)         Discussion/Summary:  Ms Geneva Stevens is a pleasant 59-year-old female who presents to the office today for a routine follow-up  Since her last visit she has been feeling well  She continues to exercise and is asymptomatic  Her blood pressure is well-controlled on no medication  Her most recent echocardiogram reveals her ejection fraction had declined to 50%  She has moderate perivalvular regurgitation around her bioprosthetic aortic valve  Her mitral regurgitation has improved from moderate to severe to mild-to-moderate  She was noted to have mild mitral stenosis  We did discuss the above-noted findings  Given her advanced age she would not want to pursue any further testing or interventions should anything continued to worsen  No testing is advised  I will see her back in the office in six months or sooner if deemed necessary  Interval History:  Ms Geneva Stevens is a pleasant 59-year-old female who presents to the office today for routine followup  Since her last visit she has been feeling well  She continues swimming a few times a week  With those activities she denies any chest pain or shortness of breath  She denies any signs or symptoms of congestive heart failure including increasing lower extremity edema, paroxysmal nocturnal dyspnea, or orthopnea  She denies weight gain or increasing abdominal girth    She denies symptoms of claudication  She has had issues with vertigo  She underwent vestibular therapy and now does exercises at home  She is also undergoing balance therapy      She smokes about five cigarettes per week          Problem List     Aortic stenosis, severe    Urinary incontinence    Osteoporosis    Osteoarthritis    Leukemia, 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HCC)    Overview Signed 4/12/2016 12:57 PM by Biagio Severin, PA-C     s/p splenectomy         Hypothyroidism    Hyperlipidemia    HTN (hypertension)    Essential tremor    CAD (coronary artery disease)    Insomnia    S/P TAVR (transcatheter aortic valve replacement)        Past Medical History:   Diagnosis Date    Aortic stenosis     severe    CAD (coronary artery disease)     Essential tremor     HTN (hypertension)     HTN (hypertension)     Hyperlipidemia     Hypothyroidism     Leukemia, 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HCC)     s/p splenectomy    Osteoarthritis     Osteoporosis     Urinary incontinence      Social History     Socioeconomic History    Marital status:       Spouse name: Not on file    Number of children: Not on file    Years of education: Not on file    Highest education level: Not on file   Occupational History    Not on file   Social Needs    Financial resource strain: Not on file    Food insecurity:     Worry: Not on file     Inability: Not on file    Transportation needs:     Medical: Not on file     Non-medical: Not on file   Tobacco Use    Smoking status: Current Some Day Smoker     Types: Cigarettes    Smokeless tobacco: Former User   Substance and Sexual Activity    Alcohol use: Yes     Comment: SOCIAL    Drug use: No    Sexual activity: Not on file   Lifestyle    Physical activity:     Days per week: Not on file     Minutes per session: Not on file    Stress: Not on file   Relationships    Social connections:     Talks on phone: Not on file     Gets together: Not on file     Attends Sabianism service: Not on file     Active member of club or organization: Not on file     Attends meetings of clubs or organizations: Not on file     Relationship status: Not on file    Intimate partner violence:     Fear of current or ex partner: Not on file Emotionally abused: Not on file     Physically abused: Not on file     Forced sexual activity: Not on file   Other Topics Concern    Not on file   Social History Narrative    Not on file      Family History   Problem Relation Age of Onset    Other Mother         malignant neoplasm of uterus    Coronary artery disease Father     Heart failure Brother      Past Surgical History:   Procedure Laterality Date    COLONOSCOPY      HYSTERECTOMY      DC REPLACE AORTIC VALVE OPENFEMORAL ARTERY APPROACH N/A 4/12/2016    Procedure: REPLACEMENT AORTIC VALVE TRANSCATHETER (TAVR) TRANSFEMORAL  26mm Lin 3 valve;  Surgeon: Cele Ambrocio DO;  Location: [facility];  Service: Cardiac Surgery    SPLENECTOMY      for hx hairy cell leukemia       Current Outpatient Medications:     acetaminophen (TYLENOL) 500 mg tablet, Take 1,000 mg by mouth every 6 (six) hours as needed for mild pain   , Disp: , Rfl:     amoxicillin (AMOXIL) 500 mg capsule, TAKE 4 CAPSULES 1 HOUR PRIOR TO PROCEDURE , Disp: 4 capsule, Rfl: 0    APPLE CIDER VINEGAR PO, Take by mouth daily, Disp: , Rfl:     Glucosamine-Chondroitin (GLUCOSAMINE CHONDR COMPLEX PO), Take by mouth daily, Disp: , Rfl:     levothyroxine 75 mcg tablet, TAKE 1 TABLET DAILY IN THE MORNING ON EMPTY STOMACH, Disp: 30 tablet, Rfl: 3    Loperamide HCl (IMODIUM PO), Take 2 mg by mouth as needed  , Disp: , Rfl:     MELATONIN PO, Take 5 mg by mouth daily at bedtime  , Disp: , Rfl:     Multiple Vitamin (MULTIVITAMIN) tablet, Take 1 tablet by mouth daily  , Disp: , Rfl:     Polyethyl Glycol-Propyl Glycol (SYSTANE OP), Apply 1 drop to eye 2 (two) times a day   EACH EYE TWICE DAILY , Disp: , Rfl:   No Known Allergies    Labs:     Chemistry        Component Value Date/Time     06/02/2015 1237    K 4 4 04/19/2017 1216    K 4 3 06/02/2015 1237     04/19/2017 1216     06/02/2015 1237    CO2 29 04/19/2017 1216    CO2 26 04/12/2016 1502    BUN 17 04/19/2017 1216    BUN 20 06/02/2015 1237    CREATININE 0 70 04/19/2017 1216    CREATININE 0 68 06/02/2015 1237        Component Value Date/Time    CALCIUM 9 7 04/19/2017 1216    CALCIUM 9 0 06/02/2015 1237    ALKPHOS 65 06/02/2015 1237    AST 22 06/02/2015 1237    ALT 22 06/02/2015 1237    BILITOT 0 81 06/02/2015 1237            Lab Results   Component Value Date    CHOL 202 11/25/2014    CHOL 192 03/13/2014     Lab Results   Component Value Date    HDL 84 11/25/2014    HDL 69 03/13/2014     Lab Results   Component Value Date    LDLCALC 102 (H) 11/25/2014    LDLCALC 111 (H) 03/13/2014     Lab Results   Component Value Date    TRIG 80 11/25/2014    TRIG 62 03/13/2014     No results found for: CHOLHDL    Imaging: No results found  Review of Systems   Constitution: Positive for malaise/fatigue  Cardiovascular: Negative for chest pain, claudication, cyanosis, dyspnea on exertion, irregular heartbeat, leg swelling, near-syncope, orthopnea, palpitations, paroxysmal nocturnal dyspnea and syncope  There were no vitals filed for this visit  There were no vitals filed for this visit  There is no height or weight on file to calculate BMI      Physical Exam:   General appearance:  Appears stated age, alert, well appearing and in no distress  HEENT:  PERRLA, EOMI, no scleral icterus, no conjunctival pallor  NECK:  Supple, No elevated JVP, no thyromegaly, no carotid bruits  HEART:  Regular rate and rhythm, normal S1/S2, soft early peaking systolic ejection murmur noted at the right upper sternal border with a early diastolic murmur  LUNGS:  Clear to auscultation bilaterally  ABDOMEN:  Soft, non-tender, positive bowel sounds, no rebound or guarding, no organomegaly   EXTREMITIES:  No edema  VASCULAR:  Normal pedal pulses   SKIN: No lesions or rashes on exposed skin  NEURO:  CN II-XII intact, no focal deficits Dakota Garcia (Resident)

## 2024-12-26 ENCOUNTER — TELEPHONE (OUTPATIENT)
Age: 89
End: 2024-12-26

## 2024-12-26 NOTE — TELEPHONE ENCOUNTER
Larry called back . Information José Luis CAMARA provided was given to Larry. Larry has no further questions. Larry will confirm with [facility] if they will be getting pts labs completed today and will notify us via [employer]. If not, Larry would like to know if Francisco LOPEZ can use labs that were completed on 12/18, scanned in media.    MD please see notes from nephrology, yellow folder

## 2024-12-27 ENCOUNTER — HOSPITAL ENCOUNTER (OUTPATIENT)
Dept: NON INVASIVE DIAGNOSTICS | Facility: HOSPITAL | Age: 89
Discharge: HOME | End: 2024-12-27
Payer: MEDICARE

## 2024-12-27 ENCOUNTER — OFFICE VISIT (OUTPATIENT)
Dept: HEMATOLOGY ONCOLOGY | Facility: CLINIC | Age: 89
End: 2024-12-27
Payer: MEDICARE

## 2024-12-27 ENCOUNTER — TELEPHONE (OUTPATIENT)
Dept: HEMATOLOGY ONCOLOGY | Facility: CLINIC | Age: 89
End: 2024-12-27

## 2024-12-27 ENCOUNTER — TELEPHONE (OUTPATIENT)
Age: 89
End: 2024-12-27

## 2024-12-27 VITALS
BODY MASS INDEX: 17.66 KG/M2 | SYSTOLIC BLOOD PRESSURE: 102 MMHG | HEIGHT: 65 IN | OXYGEN SATURATION: 96 % | RESPIRATION RATE: 17 BRPM | WEIGHT: 106 LBS | DIASTOLIC BLOOD PRESSURE: 60 MMHG | HEART RATE: 78 BPM | TEMPERATURE: 98.4 F

## 2024-12-27 DIAGNOSIS — E88.09 HYPOALBUMINEMIA: ICD-10-CM

## 2024-12-27 DIAGNOSIS — D69.6 THROMBOCYTOPENIA (HCC): ICD-10-CM

## 2024-12-27 DIAGNOSIS — C91.42 HAIRY CELL LEUKEMIA, IN RELAPSE (HCC): Primary | ICD-10-CM

## 2024-12-27 DIAGNOSIS — R60.0 BILATERAL LOWER EXTREMITY EDEMA: ICD-10-CM

## 2024-12-27 DIAGNOSIS — R60.0 BILATERAL EDEMA OF LOWER EXTREMITY: Primary | ICD-10-CM

## 2024-12-27 PROCEDURE — 93970 EXTREMITY STUDY: CPT | Performed by: STUDENT IN AN ORGANIZED HEALTH CARE EDUCATION/TRAINING PROGRAM

## 2024-12-27 PROCEDURE — 99214 OFFICE O/P EST MOD 30 MIN: CPT

## 2024-12-27 PROCEDURE — 93970 EXTREMITY STUDY: CPT

## 2024-12-27 RX ORDER — POTASSIUM CHLORIDE 750 MG/1
10 CAPSULE, EXTENDED RELEASE ORAL ONCE
Qty: 1 CAPSULE | Refills: 0 | Status: SHIPPED | OUTPATIENT
Start: 2024-12-27 | End: 2024-12-27

## 2024-12-27 RX ORDER — FUROSEMIDE 20 MG/1
10 TABLET ORAL ONCE
Qty: 1 TABLET | Refills: 0 | Status: SHIPPED | OUTPATIENT
Start: 2024-12-27 | End: 2024-12-27

## 2024-12-27 NOTE — PROGRESS NOTES
Name: Helen Gaona      : 1928      MRN: [id number]  Encounter Provider: JESSICA Guajardo  Encounter Date: 2024   Encounter department: [facility]  :  Assessment & Plan  Bilateral lower extremity edema    Orders:  •   VAS VENOUS DUPLEX - LOWER LIMB BILATERAL; Future    Hairy cell leukemia, in relapse (HCC)    Orders:  •  Ambulatory Referral to Nutrition Services for Oncology; Future    Hypoalbuminemia    Orders:  •  Ambulatory Referral to Nutrition Services for Oncology; Future    Thrombocytopenia (HCC)      89-year-old female with hairy cell leukemia that has relapsed, she is status post 7 days of infusion 2-CdA from 2024 - 2024. Patient received Neulasta.       We reviewed her bruising likely secondary to thrombocytopenia, compared to the pictures sent by patient's son, bruising looks to be the same and has not worsened.     In regards to patient's +3 edema bilateral lower extremity, likely secondary to hypoalbuminemia.  Recommend patient increase protein in her diet.  Will refer her to nutrition services for oncology for recommendations.  In addition, we will obtain venous duplex stat to rule out blood clot.  Recommend she ambulate as well as ambulate her legs when seated.    We will continue with Dr. Huynh's plan:  CBCD/CMP twice a week ( and ).    Transfuse if hemoglobin is less than or equal to 7.5 g/dL (leukoreduced and irradiated)  Transfuse if platelets are less than or equal to 20,000 (leukoreduced and irradiated)   patient is neutropenic, family was previously informed of febrile neutropenia and precautions.  Continue Bactrim 3 times a week and levofloxacin 250 mg daily for prophylaxis    We will see patient back in the office in 2 weeks for continued monitoring and evaluation.  Patient's sons are agreeable with the plan above.  They are aware to contact us for any additional questions/concerns or worsening  symptoms.    History of Present Illness {?Quick Links Encounters * My Last Note * Last Note in Specialty * Snapshot * Since Last Visit * History :02372}  Chief Complaint   Patient presents with   • Follow-up   Helen Gaona is a 89 y.o. female with hairy cell leukemia that is relapsed.  She is status post 7 days of infusion  2-CdA from 11/18/2024 - 11/26/2024. Patient received Neulasta.  She presents for follow-up today.  She is here with her 2 sons Luis Alberto and Ricci.  Her son Larry is on speaker phone during office visit.    Patient's son voiced concern regarding her bruising as well as bilateral lower extremity edema.  Upon assessment, patient's bruising has not worsened compared to the pictures he had sent (see images below).  On assessment, bruising is only noted bilateral lower extremities.  No bruising noted bilateral arms, abdomen, back.  Patient's bilateral ankles noted to be edematous, +3 pitting edema.    Patient is currently on Bactrim 3 times a week and levofloxacin 250 mg daily for prophylaxis.  She is also on transfusion support.  She has required platelet transfusion twice, 12/3/2024 and 12/11/2024.    Patient denies any fevers, increased fatigue, drenching night sweats, or decreased appetite.  She reports she is eating 3 meals a day.  She was also drinking boost.  Patient send Larry is questioning whether patient to start Magic cup, which was recommended while she was inpatient.    Labs:  12/26/2024: WBC 1.4, ANC 0.9, Hgb 8.6, , Platelets 32,000    12/23/2024: WBC 1.4, ANC 1.0, Hgb 8.0, , Platelets 32,000        Pertinent Medical History   89-year-old female with hairy cell leukemia that is relapsed. She is status post 7 days of infusion 2-CdA from 11/18/2024 - 11/26/2024. Patient received Neulasta.  Patient tolerated treatment fairly well.  At this time, her counts are being monitored twice a week has transfusion support for her anemia and thrombocytopenia.  Patient has not  "required platelet transfusion twice, 12/3/2024 and 12/11/2024.      Oncology History   Oncology History   Hairy cell leukemia, in relapse (HCC)   4/12/2016 Initial Diagnosis    Leukemia, 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HCC)     11/18/2024 -  Chemotherapy    alteplase (CATHFLO), 2 mg, Intracatheter, Every 1 Minute as needed, 1 of 1 cycle  pegfilgrastim (NEULASTA ONPRO), 6 mg, Subcutaneous, Once, 1 of 1 cycle  cladribine (LEUSTATIN) infusion, 0.1 mg/kg = 4.7 mg, Intravenous, Once, 1 of 1 cycle  Administration: 4.7 mg (11/18/2024), 4.7 mg (11/19/2024), 4.7 mg (11/20/2024), 4.7 mg (11/21/2024), 4.7 mg (11/22/2024), 4.7 mg (11/23/2024), 4.7 mg (11/24/2024)        Review of Systems   Constitutional:  Negative for activity change, appetite change, diaphoresis, fatigue and fever.   HENT:  Negative for nosebleeds.    Respiratory:  Negative for shortness of breath.    Cardiovascular:  Positive for leg swelling. Negative for chest pain and palpitations.   Gastrointestinal:  Negative for blood in stool.   Genitourinary:  Negative for hematuria.   Neurological: Negative.    Hematological:  Bruises/bleeds easily.           Objective {?Quick Links Trend Vitals * Enter New Vitals * Results Review * Timeline (Adult) * Labs * Imaging * Cardiology * Procedures * Lung Cancer Screening * Surgical eConsent :81950}  /60 (BP Location: Left arm, Patient Position: Sitting, Cuff Size: Adult)   Pulse 78   Temp 98.4 °F (36.9 °C) (Temporal)   Resp 17   Ht 5' 5\" (1.651 m)   Wt 48.1 kg (106 lb)   SpO2 96%   BMI 17.64 kg/m²     ECOG   3  Physical Exam  Constitutional:       Appearance: Normal appearance.   HENT:      Head: Normocephalic and atraumatic.   Cardiovascular:      Heart sounds: Murmur heard.   Pulmonary:      Breath sounds: Normal breath sounds.   Musculoskeletal:      Cervical back: Normal range of motion.      Right lower leg: Edema present.      Left lower leg: Edema present.      Comments: +3 edema bilateral lower extremities   Skin:     " General: Skin is warm and dry.      Coloration: Skin is pale.      Findings: Bruising present.      Comments: Bruising bilateral lower extremity   Neurological:      Mental Status: She is alert and oriented to person, place, and time.   Psychiatric:         Mood and Affect: Mood normal.         Behavior: Behavior normal.         Thought Content: Thought content normal.         Judgment: Judgment normal.        Media Information: Left Lower Extremity      Document Information    Clinical Image - Mobile Device      12/27/2024 10:40 AM   Attached To:   Office Visit on 12/27/24 with JESSICA Guajardo   Source Information    JESSICA Guajardo  [facility] [place]   Document History         Media Information Left Lower extremity      Document Information    Clinical Image - Mobile Device      12/27/2024 10:40 AM   Attached To:   Office Visit on 12/27/24 with JESSICA Guajardo   Source Information    JESSIAC Guajardo  [facility] [place]   Document History         Media Information: Right lower extremity      Document Information    Clinical Image - Mobile Device      12/27/2024 10:40 AM   Attached To:   Office Visit on 12/27/24 with JESSICA Guajardo   Source Information    JESSICA Guajardo  [facility] [place]   Document History        Labs: I have reviewed the following labs:  Lab Results   Component Value Date/Time    WBC 6.08 12/09/2024 03:02 PM    RBC 2.06 (L) 12/09/2024 03:02 PM    Hemoglobin 9.2 (L) 12/09/2024 03:02 PM    Hematocrit 28.5 (L) 12/09/2024 03:02 PM     (H) 12/09/2024 03:02 PM    MCH 44.7 (H) 12/09/2024 03:02 PM    RDW 23.9 (H) 12/09/2024 03:02 PM    Platelets 22 (L) 12/09/2024 03:02 PM    Lymphocytes % 4 (L) 12/09/2024 03:02 PM    Monocytes % 2 (L) 12/09/2024 03:02 PM    Eosinophils % 0 12/09/2024 03:02 PM    Basophils % 0 12/09/2024 03:02 PM    Absolute Neutrophils 0.85 (L) 11/26/2024 05:51 AM     Lab Results   Component Value Date/Time    Potassium 4.1 11/26/2024  05:51 AM    Chloride 103 11/26/2024 05:51 AM    CO2 30 11/26/2024 05:51 AM    BUN 33 (H) 11/26/2024 05:51 AM    Creatinine 0.64 11/26/2024 05:51 AM    Calcium 8.2 (L) 11/26/2024 05:51 AM    Corrected Calcium 9.1 11/23/2024 06:23 AM    AST 22 11/23/2024 06:23 AM    ALT 20 11/23/2024 06:23 AM    Alkaline Phosphatase 46 11/23/2024 06:23 AM    Total Protein 5.7 (L) 11/23/2024 06:23 AM    Albumin 3.2 (L) 11/23/2024 06:23 AM    Total Bilirubin 0.58 11/23/2024 06:23 AM    eGFR 75 11/26/2024 05:51 AM     I spent 30 minutes in chart review, counseling, coordination of care, and documentation.    This note has been generated by voice recognition software system.  Therefore, there may be spelling, grammar, and or syntax errors. Please contact if questions arise.

## 2024-12-27 NOTE — TELEPHONE ENCOUNTER
Abeba Cervantes from [facility], stated that the orders for the medications from Sandra Singh were not received, she requested they be faxed back over to [phone]. Sent via right fax/epic

## 2024-12-27 NOTE — TELEPHONE ENCOUNTER
Spoke with patient's son Larry, informed him that the Venous duplex was negative for a blood clot however,the technician did message me stating there was evidence of fluid throughout her calves and knees.  Discussed giving her a one time low dose of lasix 10 mg with potassium chloride 10 meq.  Larry is agreeable and will let us know if he sees an improvement.  He will follow up on Monday with the office.

## 2024-12-30 ENCOUNTER — TELEPHONE (OUTPATIENT)
Dept: NUTRITION | Facility: HOSPITAL | Age: 89
End: 2024-12-30

## 2024-12-30 NOTE — TELEPHONE ENCOUNTER
"Received notification by  JESSICA Guajardo  on 12/27 that pt has triggered for oncology nutrition care (reason for referral:  \"Patient wondering about magic cup, which was recommended inpatient.\"  ).    Contacted Helen today to establish care.  No answer.  Left voice message with the reason for today's call and this RD’s contact information asking that Helen call back as able/desired.    "

## 2024-12-31 NOTE — TELEPHONE ENCOUNTER
Second attempt made today to reach Helen to establish care and discuss her nutrition.  No answer.  Left a voice message requesting a call back at Helen's convenience.

## 2025-01-10 ENCOUNTER — TELEPHONE (OUTPATIENT)
Dept: HEMATOLOGY ONCOLOGY | Facility: CLINIC | Age: OVER 89
End: 2025-01-10

## 2025-01-10 NOTE — TELEPHONE ENCOUNTER
Attempted to contact patient due to missed visit. Left a voicemail with number for the [facility] to schedule.

## 2025-01-16 ENCOUNTER — OFFICE VISIT (OUTPATIENT)
Dept: HEMATOLOGY ONCOLOGY | Facility: CLINIC | Age: OVER 89
End: 2025-01-16
Payer: MEDICARE

## 2025-01-16 VITALS
DIASTOLIC BLOOD PRESSURE: 60 MMHG | WEIGHT: 104 LBS | HEART RATE: 54 BPM | BODY MASS INDEX: 17.33 KG/M2 | RESPIRATION RATE: 18 BRPM | OXYGEN SATURATION: 98 % | SYSTOLIC BLOOD PRESSURE: 110 MMHG | TEMPERATURE: 97.3 F | HEIGHT: 65 IN

## 2025-01-16 DIAGNOSIS — D61.89 ANEMIA DUE TO OTHER BONE MARROW FAILURE (HCC): ICD-10-CM

## 2025-01-16 DIAGNOSIS — I50.33 ACUTE ON CHRONIC DIASTOLIC (CONGESTIVE) HEART FAILURE (HCC): ICD-10-CM

## 2025-01-16 DIAGNOSIS — D61.818 PANCYTOPENIA (HCC): ICD-10-CM

## 2025-01-16 DIAGNOSIS — C91.42 HAIRY CELL LEUKEMIA, IN RELAPSE (HCC): Primary | ICD-10-CM

## 2025-01-16 DIAGNOSIS — E03.9 ACQUIRED HYPOTHYROIDISM: ICD-10-CM

## 2025-01-16 PROCEDURE — 99214 OFFICE O/P EST MOD 30 MIN: CPT | Performed by: INTERNAL MEDICINE

## 2025-01-16 PROCEDURE — G2211 COMPLEX E/M VISIT ADD ON: HCPCS | Performed by: INTERNAL MEDICINE

## 2025-01-16 NOTE — PATIENT INSTRUCTIONS
Blood count every week.  Blood chemistry every 4 weeks.  Visit in 4 weeks.  Discussed febrile neutropenia precautions and bleeding risk and patient to be brought to the emergency room with febrile neutropenia, bleeding and other medical emergencies.

## 2025-01-17 ENCOUNTER — TELEPHONE (OUTPATIENT)
Age: OVER 89
End: 2025-01-17

## 2025-01-17 DIAGNOSIS — D61.818 PANCYTOPENIA (HCC): ICD-10-CM

## 2025-01-17 DIAGNOSIS — D61.89 ANEMIA DUE TO OTHER BONE MARROW FAILURE (HCC): ICD-10-CM

## 2025-01-17 DIAGNOSIS — C91.42 HAIRY CELL LEUKEMIA, IN RELAPSE (HCC): Primary | ICD-10-CM

## 2025-01-17 NOTE — TELEPHONE ENCOUNTER
"Patient's son Clemente calling in as a follow-up to appointment with Dr. Huynh yesterday. He states he did not want to discuss this in front of his mother but wanted Dr. Huynh's input.    He states that Helen is showing signs of dementia, and he is looking for guidance for next steps. She has been unsure who she is talking to over the phone, sounding disoriented and repeatedly asking \"who is this?\" She also seems to have difficulty familiarizing her with her surroundings in her facility. She asked to go for a walk the other day in 20 degree weather and was very upset when she was told no. He states that the past few days have been a bit better but he is not sure if it is because she has been settling in to her new environment. He states some days are better than others. He does feel that her level of rest significantly affects her cognition, noting that if she has not napped or slept well she is significantly worse.    Clemente would like Dr. Huynh's input to help him understand if the chemotherapy could have caused an acceleration or instigation of dementia symptoms, or if perhaps the hairy cell leukemia itself and the blood counts associated with that could have caused these symptoms?    He notes that his brother Jose Guadalupe is upset with the situation and adamantly feels she should never have gone through treatments, and that he feels the treatments caused all of this. Clemente would just like to know if there is anything that can be done to help her deal with her symptoms best, and what resources are available to make her life a little bit easier. He does not want to jump to conclusions and would rather have Dr. Huynh's educated input on the matter.    I confirmed with Clemente that the patient has not yet been seen by palliative care, and informed him that they may be of help in this situation. I advised him that I would review with Dr. Huynh to see if he felt a palliative care consult may be appropriate in this " instance, after reviewing with Clemente from an oncology standpoint. Clemente was agreeable and appreciative.    Clemente is requesting a call back to review his concerns at [phone].

## 2025-01-17 NOTE — TELEPHONE ENCOUNTER
"Called and spoke with Helen's Son \"Larry\" and reviewed 's comment that he felt that Helen's dementia was not caused by the chemotherapy and may have exacerbated it.    Also discussed with Larry that a Palliative Care Consult has been ordered.    Larry was appreciative for the follow up phone call.   "

## 2025-01-17 NOTE — TELEPHONE ENCOUNTER
Discussed with Dr. Huynh and he advised that the dementia is not caused by the cancer  It could be exacerbated by the chemotherapy, but not definitive  I did place a referral for palliative care but no further recommendations on Dr. Huynh's end as far as any treatment for dementia

## 2025-01-19 NOTE — ASSESSMENT & PLAN NOTE
Status post 2-CdA and Neulasta.  Patient has pancytopenia.  Counts are being monitored.  No febrile neutropenia or bleeding at present.  Orders:  •  CBC and differential; Standing  •  Comprehensive metabolic panel; Standing

## 2025-01-19 NOTE — ASSESSMENT & PLAN NOTE
Patient is on Synthroid      Counts are being monitored.  They have instructions about febrile neutropenia and bleeding. Blood count every week.  Blood chemistry every 4 weeks.  Visit in 4 weeks.  Discussed febrile neutropenia precautions and bleeding risk and patient to be brought to the emergency room with febrile neutropenia, bleeding and other medical emergencies. There is slight improvement in hemoglobin and platelet count.  The ANC is still low of 600 and if it dropped any lower she will receive Granix or Neulasta.  If febrile neutropenia or bleeding she will be hospitalized.  She will have counts checked again on 1/20/2025 and once a week after that.   Patient has a walker.  They have instructions to avoid falls and trauma and she should not be walking alone even with walker.  Protein supplements.  Patient needs assistance in her care.  Goal is to prevent febrile neutropenia and bleeding.  All discussed with patient and her family members in detail.  Questions answered.  He will continue to follow-up with her primary physician and other consultants.  Provided counseling and support to the family.  I used a dictation device to dictate this note and there could be mistakes in my note and for that patient may contact my office.

## 2025-01-19 NOTE — PROGRESS NOTES
Name: Helen Gaona      : 1928      MRN: [id number]  Encounter Provider: Jan Huynh MD  Encounter Date: 2025   Encounter department: [facility] [place]  :  Assessment & Plan  Hairy cell leukemia, in relapse (HCC)  Status post 2-CdA and Neulasta.  Patient has pancytopenia.  Counts are being monitored.  No febrile neutropenia or bleeding at present.  Orders:  •  CBC and differential; Standing  •  Comprehensive metabolic panel; Standing    Pancytopenia (HCC)  Secondary to hairy cell leukemia and 2-CdA.  Patient had Neulasta.Counts are being monitored.  No febrile neutropenia or bleeding at present.  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       Acute on chronic diastolic (congestive) heart failure (HCC)  Wt Readings from Last 3 Encounters:   25 47.2 kg (104 lb)   24 48.1 kg (106 lb)   24 46.7 kg (103 lb)   Improvement in edema with increased protein intake and Lasix                 Acquired hypothyroidism  Patient is on Synthroid      Counts are being monitored.  They have instructions about febrile neutropenia and bleeding. Blood count every week.  Blood chemistry every 4 weeks.  Visit in 4 weeks.  Discussed febrile neutropenia precautions and bleeding risk and patient to be brought to the emergency room with febrile neutropenia, bleeding and other medical emergencies. There is slight improvement in hemoglobin and platelet count.  The ANC is still low of 600 and if it dropped any lower she will receive Granix or Neulasta.  If febrile neutropenia or bleeding she will be hospitalized.  She will have counts checked again on 2025 and once a week after that.   Patient has a walker.  They have instructions to avoid falls and trauma and she should not be walking alone even with  walker.  Protein supplements.  Patient needs assistance in her care.  Goal is to prevent febrile neutropenia and bleeding.  All discussed with patient and her family members in detail.  Questions answered.  He will continue to follow-up with her primary physician and other consultants.  Provided counseling and support to the family.  I used a dictation device to dictate this note and there could be mistakes in my note and for that patient may contact my office.      History of Present Illness   Chief Complaint   Patient presents with   • Follow-up   Patient is here with 2 sons.  Third son Clemente was on the phone.  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  Recently presented with anemia and thrombocytopenia and workup showed relapsed hairy cell leukemia and patient had 2-CdA and Neulasta.  Counts are being monitored.  They have instructions about febrile neutropenia and bleeding.  There is slight improvement in hemoglobin and platelet count.  The ANC is still low of 600 and if it dropped any lower she will receive Granix or Neulasta.  If febrile neutropenia or bleeding she will be hospitalized.  She will have counts checked again on 1/20/2025 and once a week after that.  Patient has generalized weakness and tiredness.  History of dementia.  Improved swelling of the ankles with increasing protein intake and low-dose Lasix.  Patient has a walker.  They have instructions to avoid falls and trauma and she should not be walking alone even with walker.  Patient is hard of hearing.  Oncology History   Cancer Staging   No matching staging information was found for the patient.  Oncology History   Hairy cell leukemia, in relapse (HCC)   4/12/2016 Initial Diagnosis    Leukemia, 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HCC)     11/18/2024 -  Chemotherapy    alteplase (CATHFLO), 2 mg, Intracatheter, Every 1 Minute as needed, 1 of 1 cycle  pegfilgrastim (NEULASTA ONPRO), 6  "mg, Subcutaneous, Once, 1 of 1 cycle  cladribine (LEUSTATIN) infusion, 0.1 mg/kg = 4.7 mg, Intravenous, Once, 1 of 1 cycle  Administration: 4.7 mg (11/18/2024), 4.7 mg (11/19/2024), 4.7 mg (11/20/2024), 4.7 mg (11/21/2024), 4.7 mg (11/22/2024), 4.7 mg (11/23/2024), 4.7 mg (11/24/2024)        Pertinent Medical History   1/16/2025:   See details in HPI      Review of Systems  Reviewed 12 systems.  Symptoms are as in HPI and medical history.  No other symptoms like fever, chills, bleeding, bone pains, skin rash, night sweats and swollen glands.  Presently no other neurological, cardiac, pulmonary, GI and  symptoms other than listed in HPI.               Objective   /60 (BP Location: Right arm, Patient Position: Sitting, Cuff Size: Adult)   Pulse (!) 54   Temp (!) 97.3 °F (36.3 °C) (Temporal)   Resp 18   Ht 5' 5\" (1.651 m)   Wt 47.2 kg (104 lb)   SpO2 98%   BMI 17.31 kg/m²     Pain Screening:  Pain Score: 0-No pain  ECOG     Physical Exam  Constitutional:       Appearance: Normal appearance.      Comments: Confused at times.  Hard of hearing.   HENT:      Head: Normocephalic and atraumatic.      Mouth/Throat:      Comments: No oral thrush.  Eyes:      General: No scleral icterus.  Cardiovascular:      Rate and Rhythm: Normal rate and regular rhythm.      Heart sounds: Murmur (Systolic murmur.) heard.   Pulmonary:      Effort: Pulmonary effort is normal. No respiratory distress.      Breath sounds: Normal breath sounds. No rhonchi or rales.   Abdominal:      General: Abdomen is flat. There is no distension.      Palpations: Abdomen is soft. There is no mass.      Tenderness: There is no abdominal tenderness.      Comments: No hepatomegaly.  No ascites.   Musculoskeletal:      Cervical back: Normal range of motion.      Right lower leg: No edema.      Left lower leg: No edema.      Comments: Generalized weakness.  He can move all 4 extremities.   Lymphadenopathy:      Cervical: No cervical adenopathy. "   Skin:     Coloration: Skin is not jaundiced.      Findings: No bruising or rash.   Neurological:      General: No focal deficit present.      Motor: Weakness (Generalized weakness) present.      Gait: Gait abnormal (Has a walker).      Comments: Confused off and on.   Psychiatric:      Comments: Anxious         Labs: I have reviewed the following labs:  Lab Results   Component Value Date/Time    WBC 6.08 12/09/2024 03:02 PM    RBC 2.06 (L) 12/09/2024 03:02 PM    Hemoglobin 9.2 (L) 12/09/2024 03:02 PM    Hematocrit 28.5 (L) 12/09/2024 03:02 PM     (H) 12/09/2024 03:02 PM    MCH 44.7 (H) 12/09/2024 03:02 PM    RDW 23.9 (H) 12/09/2024 03:02 PM    Platelets 22 (L) 12/09/2024 03:02 PM    Lymphocytes % 4 (L) 12/09/2024 03:02 PM    Monocytes % 2 (L) 12/09/2024 03:02 PM    Eosinophils % 0 12/09/2024 03:02 PM    Basophils % 0 12/09/2024 03:02 PM    Absolute Neutrophils 0.85 (L) 11/26/2024 05:51 AM     Lab Results   Component Value Date/Time    Potassium 4.1 11/26/2024 05:51 AM    Chloride 103 11/26/2024 05:51 AM    CO2 30 11/26/2024 05:51 AM    BUN 33 (H) 11/26/2024 05:51 AM    Creatinine 0.64 11/26/2024 05:51 AM    Calcium 8.2 (L) 11/26/2024 05:51 AM    Corrected Calcium 9.1 11/23/2024 06:23 AM    AST 22 11/23/2024 06:23 AM    ALT 20 11/23/2024 06:23 AM    Alkaline Phosphatase 46 11/23/2024 06:23 AM    Total Protein 5.7 (L) 11/23/2024 06:23 AM    Albumin 3.2 (L) 11/23/2024 06:23 AM    Total Bilirubin 0.58 11/23/2024 06:23 AM    eGFR 75 11/26/2024 05:51 AM      VAS VENOUS DUPLEX - LOWER LIMB BILATERAL  Status: Final result     PACS Images     Show images for VAS VENOUS DUPLEX - LOWER LIMB BILATERAL  Study Result    Result Text      [facility] REPORT  CLINICAL:  Indications: Patient presents with bilateral lower extremity and foot edema x  one month.  Physician wants to rule out deep vein thrombosis. She denies any  pain in her legs.  Operative History:  2016-06-12 TAVR  Pacemaker  Risk Factors  The patient  has history of HTN, CAD, CHF, cancer and previous smoking (quit >10  yrs ago).     FINDINGS:     Right       Impression        PostTibial  Not Visualized             CONCLUSION:     Impression:  RIGHT LOWER LIMB:  No evidence of acute or chronic deep vein thrombosis.  No evidence of superficial thrombophlebitis noted.  Doppler evaluation shows a normal response to augmentation maneuvers.  Popliteal, posterior tibial, and anterior tibial arterial Doppler waveforms are  triphasic.  Tech note:  Edema identified throughout the upper and lower leg.     LEFT LOWER LIMB:  No evidence of acute or chronic deep vein thrombosis.  No evidence of superficial thrombophlebitis noted.  Doppler evaluation shows a normal response to augmentation maneuvers.  Popliteal, posterior tibial, and anterior tibial arterial Doppler waveforms are  triphasic.  Tech note:  Edema identified throughout the upper and lower leg.     Technical findings were given to JESSICA Singh via [place] at time of exam on  12/27/24 and posted on [facility].     SIGNATURE:  Electronically Signed by: ELIZABETH FERRARI MD on 2024-12-27 03:04:41 PM

## 2025-01-22 RX ORDER — ASPIRIN 81 MG/1
1 TABLET ORAL DAILY
COMMUNITY
Start: 2024-10-28

## 2025-01-27 ENCOUNTER — CONSULT (OUTPATIENT)
Dept: PALLIATIVE MEDICINE | Facility: CLINIC | Age: OVER 89
End: 2025-01-27
Payer: MEDICARE

## 2025-01-27 ENCOUNTER — TELEPHONE (OUTPATIENT)
Age: OVER 89
End: 2025-01-27

## 2025-01-27 VITALS
RESPIRATION RATE: 20 BRPM | TEMPERATURE: 97.7 F | SYSTOLIC BLOOD PRESSURE: 112 MMHG | BODY MASS INDEX: 17.21 KG/M2 | WEIGHT: 103.4 LBS | HEART RATE: 61 BPM | DIASTOLIC BLOOD PRESSURE: 60 MMHG | OXYGEN SATURATION: 94 %

## 2025-01-27 DIAGNOSIS — Z95.2 S/P TAVR (TRANSCATHETER AORTIC VALVE REPLACEMENT): ICD-10-CM

## 2025-01-27 DIAGNOSIS — D61.89 ANEMIA DUE TO OTHER BONE MARROW FAILURE (HCC): ICD-10-CM

## 2025-01-27 DIAGNOSIS — C91.42 HAIRY CELL LEUKEMIA, IN RELAPSE (HCC): Primary | ICD-10-CM

## 2025-01-27 DIAGNOSIS — D75.9 BONE MARROW DISEASE: ICD-10-CM

## 2025-01-27 DIAGNOSIS — D61.818 PANCYTOPENIA (HCC): ICD-10-CM

## 2025-01-27 PROBLEM — I35.1 AORTIC INSUFFICIENCY: Status: RESOLVED | Noted: 2020-12-04 | Resolved: 2025-01-27
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
## 2025-01-27 PROCEDURE — 99205 OFFICE O/P NEW HI 60 MIN: CPT | Performed by: FAMILY MEDICINE

## 2025-01-27 NOTE — TELEPHONE ENCOUNTER
Pt son Larry would like someone to come out to help pt from the car into the office for her 2 pm appt. She has a walker but if someone could come down with a wheelchair it would help out tons. His other brothers will call in to inform team that they are outside. Larry can be reached at [phone]. Thank you.

## 2025-01-27 NOTE — PROGRESS NOTES
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  - Sons clearly decline:   = invasive / aggressive testing (aside from blood labs)   = new specialists (aside from Dr. Huynh or [facility] provider   = ANY new meds   = hospice : they wish to continue treatments with Dr. Huynh.  - Return to clinic: PRN     PDMP Review: I have reviewed the patient's controlled substance dispensing history in the Prescription Drug Monitoring Program in compliance with the [facility] regulations before prescribing any controlled substances.    History of Present Illness   Helen Gaona is a 89 y.o. female who presents on referral from Dr. Huynh for leukemia in relapse.  Pt has notably low trilineage counts.  She has taken therapy in 2024 in hospital, and there is not a plan to offer from treatment at this time.      Today in office, pt presents with her three sons (one son on phone).  She denies pain or any other symptom.  They do not know why they have been referred to our office.  She has just moved into [place] in the [place] / Personal Care unit, where she has been found to have some memory challenges and difficulty accommodating to new environs.      Her counts are all stable or improving; her treatment in hospital does seem to have been asso with improved hgb, and a slight  improvement in WBCs.  She has not had Granx.  Her plts do continue to deteriorate.      Family today note a new pattern of inappropriate behaviors, including multiple repeated phone calls over the course of the day; and an inappropriate desire to leave [place] and [place]go [place]'.  She is hard of hearing, and also uses inappropriate words, such as 'pass the drum roll' at the dinner table.      She has lived in [facility] for 20+ years, but has -- within the past 3 mos -- moved from independent living ==> hospital ==> rehab ==> [place].      We reviewed that her current status may straddle the line between delirium and dementia, and while the usual approach might be to consider Neuro / GeriMed referrals, metabolic w/up, and neuroimaging.  Family defer referrals at this time, and do not want to subject her to invasive or burdensome testing beyond monitoring her blood counts.    We reviewed bheavioral intervneitons for her delirium, which the sons are already implementing    We considered several meds that may improve behaviors or protect memory, and son flatly declined each of these.      Family offered f/up with us, but declined at this time.    Past medical, surgical, social, and family histories are reviewed and pertinent updates and considerations are included in the above narrative.       Objective   There were no vitals taken for this visit.    Physical Exam  Constitutional:       General: She is not in acute distress.     Appearance: She is ill-appearing. She is not toxic-appearing or diaphoretic.      Comments: frail   HENT:      Head: Normocephalic and atraumatic.      Right Ear: External ear normal.      Left Ear: External ear normal.   Eyes:      General:         Right eye: No discharge.         Left eye: No discharge.      Conjunctiva/sclera: Conjunctivae normal.      Pupils: Pupils are equal, round, and reactive to light.   Neck:      Trachea: No tracheal deviation.   Cardiovascular:      Rate and Rhythm: Regular rhythm.  Tachycardia present.   Pulmonary:      Effort: Pulmonary effort is normal. No respiratory distress.      Breath sounds: No stridor.   Abdominal:      General: There is no distension.      Palpations: Abdomen is soft.   Skin:     General: Skin is warm and dry.      Coloration: Skin is pale.      Findings: No erythema or rash.   Neurological:      General: No focal deficit present.      Mental Status: She is disoriented.      Cranial Nerves: Cranial nerve deficit (presbycusis) present.   Psychiatric:      Comments: Poor insight and judgment         Recent labs: none new; reviewed historical as noted in HPI    Administrative Statements   I have spent a total time of 60+ minutes in caring for this patient on the day of the visit/encounter including: chart review; symptom pursuit; supportive listening; anticipatory guidance.  review and assessment of decisional apparatus and capacity, applicable legal codes and extant documents; consideration of treatment vs hospice in setting of progressive and dangerous leukemia

## 2025-01-29 NOTE — ASSESSMENT & PLAN NOTE
Found in 80's and now on 2 liters stable  Cxr  with pulm edema-   Suspect in light of acte on chronic diastolic heart failure as based on 2020 TTE ef was 50 %  Will give lasix 10 mg iv x1 and see response if still with hypoxia and lungs clear would need cta pe    67M w/ hx of metastatic testicular cancer (s/p L orchiectomy, on etoposide/cisplatin chemo, last dose 6/2024), epilepsy (grand-mal seizures), schizophrenia, developmental delay, HTN, HLD, VAZQUEZ, stage III CKD, obesity, secondary hyperparathyroidism, anemia, thrombocytopenia, ? spinal surgery who was admitted on 1/16/25 for fall and syncope. On 1/17 patient had multiple RRTs called for grand mal seizure activity and  patient had x2 more episode and was ultimately given ativan 2mg, Vimpat load 200mg, and intubated for airway protection with MICU transfer. Extubated 1/19 - to AVAPS, post extubation with increased secretion, now better; downgraded 1/21; While on medical floors; patient developed respiratory distress; re-intubated for suspected aspiration pna vs flash pulmonary edema with new fever and leukocytosis. Re-extubated 1/28.      CHANGES/UPDATES 1/28/25  - Extubated  - OG tube changed to NG tube   - IVF DS5 40cc/hr      ===NEURO===  #Retubated 1/28/25    - Pain: PRN fentanyl 25mcg q4h, PRN Lidocaine patch 4%  - SATs as able      #Status Epilepticus  #Grand Mal Seizures  #?MRI with mets vs abnl lesion    - AEDs: Keppra 1.5g BID, Lamotrigine 100mg BID, Gabapentin 300mg BID, Vimpat 100mg BID  - S/p Ativan for seizures  - s/p vEEG negative for seizures  - s/p CTH negative for acute findings  - Neuro signed off 1/27/25  - Onc as below  - Once stabilized, need w/u for cerebellar lesion w/u (concerning for metastatic focus but unclear source)      #Schizophrenia  #Depression    - Escitalopram 15mg daily  - Lurasidone 40mg daily      ===CVS===  #HTN #HLD  #?Flash Pulmonary Edema  #Normal EF (62%)    - GOAL: Net negative, Normotension       #Hypotension    - s/p Norepi prn    ===PULM===  #Respiratory Failure s/t ?Edema vs PNA  #VAZQUEZ on CPAP at home-->?AVAPS  #Suspected aspiration PNA    - Duonebs 3mL q4h  - S/p 3% saline Nebs 4mL BID (D/C’ed 1/27/25)  - Antibiotics as below  - S/p Intubation 1/17 for status epilepticus -> S/p Extubation 1/19 to AVAPS -> re-intubated for suspected aspiration PNA 1/23 -> Extubation 1/28     ===RENAL===  #Metabolic Acidosis  #SHAGUFTA on CKD Stage III  #Electrolytes    - Monitor BP and urine output, if concerning may need fluids  - s/p bumex 4mg gtt with good effect  - sevelamer and rasburicase for phos/uric acid  - Nephro Dr. Dao Morris following.    ===GI===  #Diet #NG Tube  #Aspiration risk  #Bowel Regimen    - Tube feeds  - OG tube changed to NG tube 1/28/25  - IVF DS5 40cc/hr to be D/C’ed if NG tube is tolerated  - Pantoprazole 40mg daily  - Senna, Miralax      ===ID===  #?Aspiration Pneumonia  #Pneumonia RLL, CAP, treated  #FluA+, treated    - Zosyn 3.375g BID (1/23- planned 1/29) empirically, if rash start cefepime  - BCx: 1/23(NGTD), 1/19 (NGTD 4d)  - Sputum Cx: 1/25 pseudomonas, sensitive to Zosyn. 1/23 (No sufficient sputum), 1/18 (neg)  - Labs: MRSA (1/23 neg), Legionella (1/24 neg), Strep Ag (1/24 neg)  - S/p Ceftriaxone 1g daily (1/17-1/21) for CAP  - S/p Tamiflu 30mg BID (1/18-1/21) for FluA         ===ENDO===  #At risk of hypoglycemia  #Hx of secondary hyperparathyroidism  #Thyroid nodules    - FS q6h while NPO  - If more stable, consider thyroid nodule biopsy inpatient vs outpatient  - Endo signed off 1/27/25         ===HEME/ONC===  #Metastatic testicular cancer  #Last chemo 06/2024 (etoposide/cisplatin)  #S/p L. Orchiectomy  #New Neck Nodule: US/MRI with concern for thyroid cancer TIRADS 5  #New Lung Nodule: 4cm on MRI and additional nodule on CT 4mm  #New Brain Enhancing Lesion: 5.4mm lesion in white matter near ventricles    - Dr. Franki Ovalles ([facility]) aware (contacted for possible MRI showing mets to brain, lung, thyroid), will have colleague round  - Awaiting recommendations       #DVT ppx  - Heparin SQ [id number]       ===ETHICS===  #Code Status: FULL CODE.    - Palliative consulted, following 67M w/ hx of metastatic testicular cancer (s/p L orchiectomy, on etoposide/cisplatin chemo, last dose 6/2024), epilepsy (grand-mal seizures), schizophrenia, developmental delay, HTN, HLD, VAZQUEZ, stage III CKD, obesity, secondary hyperparathyroidism, anemia, thrombocytopenia, ? spinal surgery who was admitted on 1/16/25 for fall and syncope. On 1/17 patient had multiple RRTs called for grand mal seizure activity and  patient had x2 more episode and was ultimately given ativan 2mg, Vimpat load 200mg, and intubated for airway protection with MICU transfer. Extubated 1/19 - to AVAPS, post extubation with increased secretion, now better; downgraded 1/21; While on medical floors; patient developed respiratory distress; re-intubated for suspected aspiration pna vs flash pulmonary edema with new fever and leukocytosis. Re-extubated 1/28.    Overnight of 1/28-29: AVAPS overnight. Cr 5.08 -> now free water 200mL Q8H.     CHANGES/UPDATES 1/28/25  - Extubated  - OG tube changed to NG tube   - IVF DS5 40cc/hr      ===NEURO===  #Retubated 1/28/25    - Pain: PRN fentanyl 25mcg q4h, PRN Lidocaine patch 4%  - SATs as able      #Status Epilepticus  #Grand Mal Seizures  #?MRI with mets vs abnl lesion    - AEDs: Keppra 1.5g BID, Lamotrigine 100mg BID, Gabapentin 300mg BID, Vimpat 100mg BID  - S/p Ativan for seizures  - s/p vEEG negative for seizures  - s/p CTH negative for acute findings  - Neuro signed off 1/27/25  - Onc as below  - Once stabilized, need w/u for cerebellar lesion w/u (concerning for metastatic focus but unclear source)      #Schizophrenia  #Depression    - Escitalopram 15mg daily  - Lurasidone 40mg daily      ===CVS===  #HTN #HLD  #?Flash Pulmonary Edema  #Normal EF (62%)    - GOAL: Net negative, Normotension       #Hypotension    - s/p Norepi prn    ===PULM===  #Respiratory Failure s/t ?Edema vs PNA  #VAZQUEZ on CPAP at home-->?AVAPS  #Suspected aspiration PNA    - Duonebs 3mL q4h  - S/p 3% saline Nebs 4mL BID (D/C’ed 1/27/25)  - Antibiotics as below  - S/p Intubation 1/17 for status epilepticus -> S/p Extubation 1/19 to AVAPS -> re-intubated for suspected aspiration PNA 1/23 -> Extubation 1/28     ===RENAL===  #Metabolic Acidosis  #SHAGUFTA on CKD Stage III  #Electrolytes    - Monitor BP and urine output, if concerning may need fluids  - s/p bumex 4mg gtt with good effect  - sevelamer and rasburicase for phos/uric acid  - Nephro Dr. Dao Morris following.    ===GI===  #Diet #NG Tube  #Aspiration risk  #Bowel Regimen    - Tube feeds  - OG tube changed to NG tube 1/28/25  - IVF DS5 40cc/hr to be D/C’ed if NG tube is tolerated  - Pantoprazole 40mg daily  - Senna, Miralax      ===ID===  #?Aspiration Pneumonia  #Pneumonia RLL, CAP, treated  #FluA+, treated    - Zosyn 3.375g BID (1/23- planned 1/29) empirically, if rash start cefepime  - BCx: 1/23(NGTD), 1/19 (NGTD 4d)  - Sputum Cx: 1/25 pseudomonas, sensitive to Zosyn. 1/23 (No sufficient sputum), 1/18 (neg)  - Labs: MRSA (1/23 neg), Legionella (1/24 neg), Strep Ag (1/24 neg)  - S/p Ceftriaxone 1g daily (1/17-1/21) for CAP  - S/p Tamiflu 30mg BID (1/18-1/21) for FluA         ===ENDO===  #At risk of hypoglycemia  #Hx of secondary hyperparathyroidism  #Thyroid nodules    - FS q6h while NPO  - If more stable, consider thyroid nodule biopsy inpatient vs outpatient  - Endo signed off 1/27/25         ===HEME/ONC===  #Metastatic testicular cancer  #Last chemo 06/2024 (etoposide/cisplatin)  #S/p L. Orchiectomy  #New Neck Nodule: US/MRI with concern for thyroid cancer TIRADS 5  #New Lung Nodule: 4cm on MRI and additional nodule on CT 4mm  #New Brain Enhancing Lesion: 5.4mm lesion in white matter near ventricles    - Dr. Franki Ovalles ([facility]) aware (contacted for possible MRI showing mets to brain, lung, thyroid), will have colleague round  - Awaiting recommendations       #DVT ppx  - Heparin SQ 7500U       ===ETHICS===  #Code Status: FULL CODE.    - Palliative consulted, following 67M w/ hx of metastatic testicular cancer (s/p L orchiectomy, on etoposide/cisplatin chemo, last dose 6/2024), epilepsy (grand-mal seizures), schizophrenia, developmental delay, HTN, HLD, VAZQUEZ, stage III CKD, obesity, secondary hyperparathyroidism, anemia, thrombocytopenia, ? spinal surgery who was admitted on 1/16/25 for fall and syncope. On 1/17 patient had multiple RRTs called for grand mal seizure activity and  patient had x2 more episode and was ultimately given ativan 2mg, Vimpat load 200mg, and intubated for airway protection with MICU transfer. Extubated 1/19 - to AVAPS, post extubation with increased secretion, now better; downgraded 1/21; While on medical floors; patient developed respiratory distress; re-intubated for suspected aspiration pna vs flash pulmonary edema with new fever and leukocytosis. Re-extubated 1/28.    Overnight of 1/28-29: AVAPS overnight. Cr 5.08 -> now free water 200mL Q8H.     CHANGES/UPDATES 1/28/25  - Extubated  - OG tube changed to NG tube   - IVF DS5 40cc/hr      ===NEURO===  #Re-extubated 1/28/25    - Pain: PRN fentanyl 25mcg q4h, PRN Lidocaine patch 4%  - SATs as able  - SLP swallow test.    #Status Epilepticus  #Grand Mal Seizures  #?MRI with mets vs abnl lesion    - AEDs: Keppra 1.5g BID, Lamotrigine 100mg BID, Gabapentin 300mg BID, Vimpat 100mg BID  - S/p Ativan for seizures  - s/p vEEG negative for seizures  - s/p CTH negative for acute findings  - Neuro signed off 1/27/25  - Onc as below  - Once stabilized, need w/u for cerebellar lesion w/u (concerning for metastatic focus but unclear source)      #Schizophrenia  #Depression    - Escitalopram 15mg daily  - Lurasidone 40mg daily      ===CVS===  #HTN #HLD  #?Flash Pulmonary Edema  #Normal EF (62%)    - GOAL: Net negative, Normotension       #Hypotension    - s/p Norepi prn    ===PULM===  #Respiratory Failure s/t ?Edema vs PNA  #VAZQUEZ on CPAP at home-->?AVAPS  #Suspected aspiration PNA    - Duonebs 3mL q4h  - S/p 3% saline Nebs 4mL BID (D/C’ed 1/27/25)  - Antibiotics as below  - S/p Intubation 1/17 for status epilepticus -> S/p Extubation 1/19 to AVAPS -> re-intubated for suspected aspiration PNA 1/23 -> Extubation 1/28     ===RENAL===  #Metabolic Acidosis  #SHAGUFTA on CKD Stage III  #Electrolytes    - Monitor BP and urine output, if concerning may need fluids  - s/p bumex 4mg gtt with good effect  - sevelamer and rasburicase for phos/uric acid  - Nephro Dr. Dao Morris following.  - half normal saline 75cc/hr for 12 hours.     ===GI===  #Diet #NG Tube  #Aspiration risk  #Bowel Regimen    - Tube feeds  - OG tube changed to NG tube 1/28/25  - IVF DS5 40cc/hr to be D/C’ed if NG tube is tolerated  - Pantoprazole 40mg daily  - Senna, Miralax      ===ID===  #?Aspiration Pneumonia  #Pneumonia RLL, CAP, treated  #FluA+, treated    - Zosyn 3.375g BID (1/23- planned 1/29) empirically, if rash start cefepime  - BCx: 1/23(NGTD), 1/19 (NGTD 4d)  - Sputum Cx: 1/25 pseudomonas, sensitive to Zosyn. 1/23 (No sufficient sputum), 1/18 (neg)  - Labs: MRSA (1/23 neg), Legionella (1/24 neg), Strep Ag (1/24 neg)  - S/p Ceftriaxone 1g daily (1/17-1/21) for CAP  - S/p Tamiflu 30mg BID (1/18-1/21) for FluA         ===ENDO===  #At risk of hypoglycemia  #Hx of secondary hyperparathyroidism  #Thyroid nodules    - FS q6h while NPO  - If more stable, consider thyroid nodule biopsy inpatient vs outpatient  - Endo signed off 1/27/25         ===HEME/ONC===  #Metastatic testicular cancer  #Last chemo 06/2024 (etoposide/cisplatin)  #S/p L. Orchiectomy  #New Neck Nodule: US/MRI with concern for thyroid cancer TIRADS 5  #New Lung Nodule: 4cm on MRI and additional nodule on CT 4mm  #New Brain Enhancing Lesion: 5.4mm lesion in white matter near ventricles    - Dr. Franki Ovalles ([facility]) aware (contacted for possible MRI showing mets to brain, lung, thyroid), will have colleague round  - Awaiting recommendations       #DVT ppx  - Heparin SQ 7500U       ===ETHICS===  #Code Status: FULL CODE.    - Palliative consulted, following

## 2025-02-17 ENCOUNTER — REMOTE DEVICE CLINIC VISIT (OUTPATIENT)
Dept: CARDIOLOGY CLINIC | Facility: CLINIC | Age: OVER 89
End: 2025-02-17
Payer: MEDICARE

## 2025-02-17 ENCOUNTER — RESULTS FOLLOW-UP (OUTPATIENT)
Dept: NON INVASIVE DIAGNOSTICS | Facility: HOSPITAL | Age: OVER 89
End: 2025-02-17

## 2025-02-17 DIAGNOSIS — I44.30 AVB (ATRIOVENTRICULAR BLOCK): Primary | ICD-10-CM

## 2025-02-17 PROCEDURE — 93294 REM INTERROG EVL PM/LDLS PM: CPT | Performed by: STUDENT IN AN ORGANIZED HEALTH CARE EDUCATION/TRAINING PROGRAM

## 2025-02-17 PROCEDURE — 93296 REM INTERROG EVL PM/IDS: CPT | Performed by: STUDENT IN AN ORGANIZED HEALTH CARE EDUCATION/TRAINING PROGRAM

## 2025-02-17 NOTE — PROGRESS NOTES
Results for orders placed or performed in visit on 02/17/25   Cardiac EP device report    Narrative    MDT DUAL PM/ ACTIVE SYSTEM IS MRI CONDITIONAL  CARELINK TRANSMISSION: BATTERY VOLTAGE ADEQUATE (7.5 YRS). AP: 22.6%.  95.7% (MVP-OFF). ALL AVAILABLE LEAD PARAMETERS WITHIN NORMAL LIMITS. 1 VT EPISODE W/ EGM SHOWING NSVT 6 BEATS @ 197 BPM. 15 AT/AF EPISODES W/ AVAIL EGMS SHOWING PAT, MAX DURATION 6 MINS 11 SECS. AF BURDEN: <0.1%. PT TAKES ASA 81MG, NOT ON AC, PT IS 89 YO (HIGH RISK). EF: 60% (ECHO 11/21/24). NORMAL DEVICE FUNCTION. CH

## 2025-02-21 ENCOUNTER — TELEPHONE (OUTPATIENT)
Dept: HEMATOLOGY ONCOLOGY | Facility: CLINIC | Age: OVER 89
End: 2025-02-21

## 2025-02-21 NOTE — TELEPHONE ENCOUNTER
Spoke with Clemente and advised his mother had missed appt today with Dr. Huynh.  We will reschedule on Monday once dr huynh looks at his schedule.

## 2025-03-03 ENCOUNTER — OFFICE VISIT (OUTPATIENT)
Dept: HEMATOLOGY ONCOLOGY | Facility: CLINIC | Age: OVER 89
End: 2025-03-03
Payer: MEDICARE

## 2025-03-03 VITALS
SYSTOLIC BLOOD PRESSURE: 122 MMHG | TEMPERATURE: 97.8 F | WEIGHT: 103 LBS | RESPIRATION RATE: 18 BRPM | HEART RATE: 75 BPM | BODY MASS INDEX: 17.16 KG/M2 | DIASTOLIC BLOOD PRESSURE: 62 MMHG | HEIGHT: 65 IN | OXYGEN SATURATION: 99 %

## 2025-03-03 DIAGNOSIS — D61.818 PANCYTOPENIA (HCC): ICD-10-CM

## 2025-03-03 DIAGNOSIS — C91.42 HAIRY CELL LEUKEMIA, IN RELAPSE (HCC): Primary | ICD-10-CM

## 2025-03-03 DIAGNOSIS — E03.9 ACQUIRED HYPOTHYROIDISM: ICD-10-CM

## 2025-03-03 PROCEDURE — 99214 OFFICE O/P EST MOD 30 MIN: CPT | Performed by: INTERNAL MEDICINE

## 2025-03-03 PROCEDURE — G2211 COMPLEX E/M VISIT ADD ON: HCPCS | Performed by: INTERNAL MEDICINE

## 2025-03-03 NOTE — ASSESSMENT & PLAN NOTE
Disease relapsed after so many years and was recently treated with 2-CdA and she has pancytopenia.  Counts are slowly recovering.  She and her sons have instructions about febrile neutropenia and bleeding.  Patient's son Larry is with the patient today.  Orders:    CBC and differential; Standing    Comprehensive metabolic panel; Standing

## 2025-03-03 NOTE — ASSESSMENT & PLAN NOTE
Secondary to hairy cell leukemia and 2-CdA  Orders:    CBC and differential; Standing    Comprehensive metabolic panel; Standing

## 2025-03-03 NOTE — PATIENT INSTRUCTIONS
Blood work every 4 weeks.  Please fax results to our office at [phone].  For temperature 100.4 or shaking chills or bleeding and for other medical emergencies please bring her to the emergency room.  Please discontinue allopurinol.  Please discontinue Bactrim if she is still on it.  Change prednisone to 5 mg every other day.  Follow-up in 6 weeks.

## 2025-03-03 NOTE — PROGRESS NOTES
Name: Helen Gaona      : 1928      MRN: [id number]  Encounter Provider: Jan Huynh MD  Encounter Date: 3/3/2025   Encounter department: [facility] [place]  :  Assessment & Plan  Hairy cell leukemia, in relapse (HCC)  Disease relapsed after so many years and was recently treated with 2-CdA and she has pancytopenia.  Counts are slowly recovering.  She and her sons have instructions about febrile neutropenia and bleeding.  Patient's son Larry is with the patient today.  Orders:    CBC and differential; Standing    Comprehensive metabolic panel; Standing    Pancytopenia (HCC)  Secondary to hairy cell leukemia and 2-CdA  Orders:    CBC and differential; Standing    Comprehensive metabolic panel; Standing    Acquired hypothyroidism  Being managed by PCP         Blood work every 4 weeks.  Please fax results to our office at [phone].  For temperature 100.4 or shaking chills or bleeding and for other medical emergencies please bring her to the emergency room.  Please discontinue allopurinol.  Please discontinue Bactrim if she is still on it.  Change prednisone to 5 mg every other day.  Follow-up in 6 weeks.  Discussed with patient and her son.  Goal is improvement in blood counts.  Patient needs assistance in her care.  All discussed in detail.  Questions answered.I suggested self breast examination, eating healthy foods, staying active but to avoid falls and trauma.  Patient to continue to follow-up with primary physician and other consultants.  Provided counseling and support.  I used a dictation device to dictate this note and there could be mistakes in my note and for that patient's family may contact my office.        History of Present Illness   Chief Complaint   Patient presents with    Follow-up   Patient is here with her son Larry.  In  patient was diagnosed to have hairy cell leukemia and she had splenectomy and interferon.  Now she has relapsed hairy  "cell leukemia and she received 1 cycle of 2-CdA and Neulasta in November 2024.  As expected she had profound pancytopenia and counts are slowly recovering.  She has generalized weakness and tiredness.  No fevers, chills or bleeding.  She appears to be less confused today and is answering questions appropriately.  Son mentioned that generally she is less confused in the morning but more in the evening.  Patient ambulates with a walker.  She is at a long-term care facility.  Pertinent Medical History     03/03/25:    See details in HPI and assessment.  Review of Systems  Reviewed 12 systems.  Symptoms are as in HPI.  No fevers, chills, bleeding, bone pains, skin rash, weight loss, night sweats and no swelling of the ankles and no swollen glands.  No other neurological, cardiac, pulmonary, GI and  symptoms other than mentioned in HPI and assessment.      Objective   /62 (BP Location: Right arm, Patient Position: Sitting, Cuff Size: Adult)   Pulse 75   Temp 97.8 °F (36.6 °C) (Temporal)   Resp 18   Ht 5' 5\" (1.651 m)   Wt 46.7 kg (103 lb)   SpO2 99%   BMI 17.14 kg/m²     Physical Exam  Vitals reviewed.   Constitutional:       General: She is not in acute distress.     Appearance: Normal appearance. She is not ill-appearing.   HENT:      Head: Normocephalic and atraumatic.      Mouth/Throat:      Comments: No thrush.  Eyes:      General: No scleral icterus.     Conjunctiva/sclera: Conjunctivae normal.   Cardiovascular:      Rate and Rhythm: Normal rate and regular rhythm.      Heart sounds: Murmur (Systolic murmur) heard.   Pulmonary:      Effort: Pulmonary effort is normal. No respiratory distress.      Breath sounds: Normal breath sounds. No rhonchi or rales.   Abdominal:      General: Abdomen is flat. There is no distension.      Palpations: Abdomen is soft. There is no mass.      Tenderness: There is no abdominal tenderness.      Comments: No ascites.    Musculoskeletal:         General: No swelling or " tenderness. Normal range of motion.      Cervical back: Normal range of motion.      Right lower leg: No edema.      Left lower leg: No edema.      Comments: No calf tenderness.   Lymphadenopathy:      Cervical: No cervical adenopathy.      Upper Body:      Right upper body: No supraclavicular or axillary adenopathy.      Left upper body: No supraclavicular or axillary adenopathy.   Skin:     Coloration: Skin is not jaundiced or pale.      Findings: No bruising or rash.      Nails: There is no clubbing.   Neurological:      General: No focal deficit present.      Motor: Weakness present.      Gait: Gait abnormal.      Comments: Oriented to place and person.  Answered simple questions appropriately today.  She ambulates with a walker   Psychiatric:         Behavior: Behavior normal.      Comments: Anxious         Labs: I have reviewed the following labs:  Results for orders placed or performed during the hospital encounter of 12/11/24   Prepare Leukoreduced Platelet Pheresis (1 pheresis product = 6-8 pooled units): 1 Product, Irradiated, Leukoreduced   Result Value Ref Range    Unit Product Code [id number]     Unit Number [id number]     Unit ABO B     Unit RH POS     Unit Dispense Status Presumed Trans     Unit Product Volume 300 mL

## 2025-03-25 PROBLEM — I50.32 CHRONIC HEART FAILURE WITH PRESERVED EJECTION FRACTION (HFPEF) (HCC): Status: ACTIVE | Noted: 2024-11-20

## 2025-04-07 ENCOUNTER — OFFICE VISIT (OUTPATIENT)
Dept: HEMATOLOGY ONCOLOGY | Facility: CLINIC | Age: OVER 89
End: 2025-04-07
Payer: MEDICARE

## 2025-04-07 VITALS
DIASTOLIC BLOOD PRESSURE: 80 MMHG | BODY MASS INDEX: 17.25 KG/M2 | HEART RATE: 67 BPM | TEMPERATURE: 97.5 F | OXYGEN SATURATION: 94 % | WEIGHT: 103.5 LBS | RESPIRATION RATE: 18 BRPM | HEIGHT: 65 IN | SYSTOLIC BLOOD PRESSURE: 142 MMHG

## 2025-04-07 DIAGNOSIS — C91.42 HAIRY CELL LEUKEMIA, IN RELAPSE (HCC): Primary | ICD-10-CM

## 2025-04-07 DIAGNOSIS — D53.9 MACROCYTIC ANEMIA: ICD-10-CM

## 2025-04-07 DIAGNOSIS — E03.9 ACQUIRED HYPOTHYROIDISM: ICD-10-CM

## 2025-04-07 DIAGNOSIS — D61.818 PANCYTOPENIA (HCC): ICD-10-CM

## 2025-04-07 DIAGNOSIS — D69.6 THROMBOCYTOPENIA (HCC): ICD-10-CM

## 2025-04-07 PROCEDURE — 99214 OFFICE O/P EST MOD 30 MIN: CPT | Performed by: INTERNAL MEDICINE

## 2025-04-07 NOTE — PROGRESS NOTES
Name: Helen Gaona      : 1928      MRN: [id number]  Encounter Provider: aJn Huynh MD  Encounter Date: 2025   Encounter department: [facility] [place]  :  Assessment & Plan  Hairy cell leukemia, in relapse (HCC)  With pancytopenia and patient received 1 cycle of 2-CdA.  Blood counts are recovering.  She is feeling better.  Orders:    CBC and differential; Standing    Pancytopenia (HCC)  Secondary to hairy cell leukemia and 2-CdA.  Counts are recovering.  Orders:    CBC and differential; Standing    Acquired hypothyroidism  Patient is on Synthroid.       Thrombocytopenia (HCC)  Counts are recovering.       Macrocytic anemia  Anemia is improving.       Blood counts every 4 weeks. Visit in 3 months.  I wished her happy birthday today.  Patient seems to be improving physically and has much less confusion.  Expected blood counts to improve more.  Goal is improvement in her condition and blood counts.  Patient needs assistance in her care.  All discussed in detail with her and her family members.  Questions answered.  I suggested  eating healthy foods, staying active as tolerated and with assistance but to avoid falls and trauma.  Patient to continue to follow-up with primary physician and other consultants.  Provided counseling and support.  I used a dictation device to dictate this note and there could be mistakes in my note and for that patient may contact my office.      Return in about 3 months (around 2025) for Follow up Lino.    History of Present Illness   Chief Complaint   Patient presents with    Follow-up   Patient is here with her 3 sons and daughter-in-law and fourth son Clemente for is on the speaker phone.  Follow-up visit for relapsed hairy cell leukemia after so many years and patient received 2-CdA chemotherapy in 2024.  She received Neulasta.  Initially pancytopenia worsened but now counts are improving.  She is feeling better both  "physically and mentally.  Today she is not confused and she is answering questions appropriately.  Has generalized weakness and tiredness.  She ambulates with a walker.  She is at a long-term facility.  She was first diagnosed to have a hairy cell leukemia in 1985 and she had splenectomy and interferon.  Disease recurred in 2024  Pertinent Medical History   See details in HPI  04/07/25:      Review of Systems  Reviewed 12 systems.  Symptoms are in HPI.  No other neurological, cardiac, pulmonary, GI and  symptoms other than mentioned in HPI.  No fevers, chills, bleeding, bone pains, skin rash, weight loss, night sweats and no swelling of the ankles and no swollen glands.      Objective   /80 (BP Location: Right arm, Patient Position: Sitting, Cuff Size: Adult)   Pulse 67   Temp 97.5 °F (36.4 °C) (Temporal)   Resp 18   Ht 5' 5\" (1.651 m)   Wt 46.9 kg (103 lb 8 oz)   SpO2 94%   BMI 17.22 kg/m²     Physical Exam  Vitals reviewed.   Constitutional:       General: She is not in acute distress.     Appearance: Normal appearance. She is not ill-appearing.   HENT:      Head: Normocephalic and atraumatic.      Mouth/Throat:      Comments: No thrush.  Eyes:      General: No scleral icterus.     Conjunctiva/sclera: Conjunctivae normal.   Cardiovascular:      Rate and Rhythm: Normal rate and regular rhythm.      Heart sounds: Murmur (Systolic murmur) heard.   Pulmonary:      Effort: Pulmonary effort is normal. No respiratory distress.      Breath sounds: Normal breath sounds. No rhonchi or rales.   Abdominal:      General: Abdomen is flat. There is no distension.      Palpations: Abdomen is soft. There is no mass.      Tenderness: There is no abdominal tenderness.      Comments: No ascites.    Musculoskeletal:         General: No swelling or tenderness. Normal range of motion.      Cervical back: Normal range of motion. No rigidity or tenderness.      Right lower leg: No edema.      Left lower leg: No edema.      " Comments: No calf tenderness.   Lymphadenopathy:      Cervical: No cervical adenopathy.      Upper Body:      Right upper body: No supraclavicular or axillary adenopathy.      Left upper body: No supraclavicular or axillary adenopathy.   Skin:     Coloration: Skin is not jaundiced or pale.      Findings: No bruising or rash.      Nails: There is no clubbing.   Neurological:      General: No focal deficit present.      Mental Status: She is alert.      Motor: Weakness (Generalized weakness) present.      Gait: Gait abnormal (Ambulates with a walker).      Comments: Intermittently confused per family members.  Not confused at present in the office   Psychiatric:         Mood and Affect: Mood normal.         Behavior: Behavior normal.         Thought Content: Thought content normal.         Labs: I have reviewed the following labs:  Results for orders placed or performed during the hospital encounter of 12/11/24   Prepare Leukoreduced Platelet Pheresis (1 pheresis product = 6-8 pooled units): 1 Product, Irradiated, Leukoreduced   Result Value Ref Range    Unit Product Code O1033Y05     Unit Number [id number]     Unit ABO B     Unit RH POS     Unit Dispense Status Presumed Trans     Unit Product Volume 300 mL     HNL CMP, HNL CBC DIFF,    HNL CMP, HNL CBC DIFF,  HNL CBC DIFF,      Result Text      [facility] REPORT  CLINICAL:  Indications: Patient presents with bilateral lower extremity and foot edema x  one month.  Physician wants to rule out deep vein thrombosis. She denies any  pain in her legs.  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     FINDINGS:     Right       Impression        PostTibial  Not Visualized             CONCLUSION:     Impression:  RIGHT LOWER LIMB:  No evidence of acute or chronic deep vein thrombosis.  No evidence of superficial thrombophlebitis noted.  Doppler evaluation  shows a normal response to augmentation maneuvers.  Popliteal, posterior tibial, and anterior tibial arterial Doppler waveforms are  triphasic.  Tech note:  Edema identified throughout the upper and lower leg.     LEFT LOWER LIMB:  No evidence of acute or chronic deep vein thrombosis.  No evidence of superficial thrombophlebitis noted.  Doppler evaluation shows a normal response to augmentation maneuvers.  Popliteal, posterior tibial, and anterior tibial arterial Doppler waveforms are  triphasic.  Tech note:  Edema identified throughout the upper and lower leg.     Technical findings were given to JESSICA Singh via [place] at time of exam on  12/27/24 and posted on [employer].     SIGNATURE:  Electronically Signed by: ELIZABETH FERRARI MD on 2024-12-27 03:04:41 PM     Linked Documents    View Image   View Image     Imaging     VAS VENOUS DUPLEX - LOWER LIMB BILATERAL (Order: [id number]) - 12/27/2024

## 2025-04-07 NOTE — ASSESSMENT & PLAN NOTE
Secondary to hairy cell leukemia and 2-CdA.  Counts are recovering.  Orders:    CBC and differential; Standing

## 2025-04-07 NOTE — ASSESSMENT & PLAN NOTE
With pancytopenia and patient received 1 cycle of 2-CdA.  Blood counts are recovering.  She is feeling better.  Orders:    CBC and differential; Standing

## 2025-05-08 NOTE — TELEPHONE ENCOUNTER
Dr Ellison,     Patient is requesting cardiac clearance for endoscopy in June.    Please provide the name of the specialist on referral.     Pended referral please review diagnosis and sign off if you agree.    Thank you.  Katherine Manzo  [facility]     She should stop aspirin 3 days prior but ensure her dentist gave her instructions

## 2025-05-16 ENCOUNTER — APPOINTMENT (EMERGENCY)
Dept: RADIOLOGY | Facility: HOSPITAL | Age: OVER 89
End: 2025-05-16
Payer: MEDICARE

## 2025-05-16 ENCOUNTER — HOSPITAL ENCOUNTER (OUTPATIENT)
Facility: HOSPITAL | Age: OVER 89
Setting detail: OBSERVATION
Discharge: NURSING, REHAB OR LONG-TERM CARE | End: 2025-05-17
Attending: EMERGENCY MEDICINE | Admitting: STUDENT IN AN ORGANIZED HEALTH CARE EDUCATION/TRAINING PROGRAM
Payer: MEDICARE

## 2025-05-16 DIAGNOSIS — R07.9 CHEST PAIN: ICD-10-CM
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
## 2025-05-16 DIAGNOSIS — R93.89 ABNORMAL CT SCAN: ICD-10-CM

## 2025-05-16 DIAGNOSIS — I26.99 PULMONARY EMBOLISM (HCC): ICD-10-CM

## 2025-05-16 LAB
2HR DELTA HS TROPONIN: 2 NG/L
ALBUMIN SERPL BCG-MCNC: 4.1 G/DL (ref 3.5–5)
ALP SERPL-CCNC: 62 U/L (ref 34–104)
ALT SERPL W P-5'-P-CCNC: 11 U/L (ref 7–52)
ANION GAP SERPL CALCULATED.3IONS-SCNC: 8 MMOL/L (ref 4–13)
ANISOCYTOSIS BLD QL SMEAR: PRESENT
AST SERPL W P-5'-P-CCNC: 21 U/L (ref 13–39)
ATRIAL RATE: 64 BPM
BASOPHILS # BLD MANUAL: 0.04 THOUSAND/UL (ref 0–0.1)
BASOPHILS NFR MAR MANUAL: 1 % (ref 0–1)
BILIRUB SERPL-MCNC: 0.69 MG/DL (ref 0.2–1)
BILIRUB UR QL STRIP: NEGATIVE
BUN SERPL-MCNC: 22 MG/DL (ref 5–25)
CALCIUM SERPL-MCNC: 9.5 MG/DL (ref 8.4–10.2)
CARDIAC TROPONIN I PNL SERPL HS: 10 NG/L (ref ?–50)
CARDIAC TROPONIN I PNL SERPL HS: 8 NG/L (ref ?–50)
CHLORIDE SERPL-SCNC: 106 MMOL/L (ref 96–108)
CK SERPL-CCNC: 41 U/L (ref 26–192)
CLARITY UR: CLEAR
CO2 SERPL-SCNC: 27 MMOL/L (ref 21–32)
COLOR UR: ABNORMAL
CREAT SERPL-MCNC: 0.73 MG/DL (ref 0.6–1.3)
D DIMER PPP FEU-MCNC: 1.5 UG/ML FEU
EOSINOPHIL # BLD MANUAL: 0 THOUSAND/UL (ref 0–0.4)
EOSINOPHIL NFR BLD MANUAL: 0 % (ref 0–6)
ERYTHROCYTE [DISTWIDTH] IN BLOOD BY AUTOMATED COUNT: 18.6 % (ref 11.6–15.1)
GFR SERPL CREATININE-BSD FRML MDRD: 69 ML/MIN/1.73SQ M
GLUCOSE SERPL-MCNC: 95 MG/DL (ref 65–140)
GLUCOSE UR STRIP-MCNC: NEGATIVE MG/DL
HCT VFR BLD AUTO: 39.2 % (ref 34.8–46.1)
HGB BLD-MCNC: 12.2 G/DL (ref 11.5–15.4)
HGB UR QL STRIP.AUTO: NEGATIVE
KETONES UR STRIP-MCNC: NEGATIVE MG/DL
LEUKOCYTE ESTERASE UR QL STRIP: NEGATIVE
LIPASE SERPL-CCNC: 53 U/L (ref 11–82)
LYMPHOCYTES # BLD AUTO: 0.69 THOUSAND/UL (ref 0.6–4.47)
LYMPHOCYTES # BLD AUTO: 14 % (ref 14–44)
MACROCYTES BLD QL AUTO: PRESENT
MCH RBC QN AUTO: 33 PG (ref 26.8–34.3)
MCHC RBC AUTO-ENTMCNC: 31.1 G/DL (ref 31.4–37.4)
MCV RBC AUTO: 106 FL (ref 82–98)
MONOCYTES # BLD AUTO: 0.61 THOUSAND/UL (ref 0–1.22)
MONOCYTES NFR BLD: 15 % (ref 4–12)
NEUTROPHILS # BLD MANUAL: 2.73 THOUSAND/UL (ref 1.85–7.62)
NEUTS BAND NFR BLD MANUAL: 2 % (ref 0–8)
NEUTS SEG NFR BLD AUTO: 65 % (ref 43–75)
NITRITE UR QL STRIP: NEGATIVE
P AXIS: 82 DEGREES
PH UR STRIP.AUTO: 6.5 [PH]
PLATELET # BLD AUTO: 84 THOUSANDS/UL (ref 149–390)
PLATELET BLD QL SMEAR: ABNORMAL
PMV BLD AUTO: 11.8 FL (ref 8.9–12.7)
POIKILOCYTOSIS BLD QL SMEAR: PRESENT
POLYCHROMASIA BLD QL SMEAR: PRESENT
POTASSIUM SERPL-SCNC: 3.9 MMOL/L (ref 3.5–5.3)
PR INTERVAL: 200 MS
PROT SERPL-MCNC: 6.6 G/DL (ref 6.4–8.4)
PROT UR STRIP-MCNC: NEGATIVE MG/DL
QRS AXIS: 139 DEGREES
QRSD INTERVAL: 136 MS
QT INTERVAL: 446 MS
QTC INTERVAL: 460 MS
RBC # BLD AUTO: 3.7 MILLION/UL (ref 3.81–5.12)
RBC MORPH BLD: PRESENT
SODIUM SERPL-SCNC: 141 MMOL/L (ref 135–147)
SP GR UR STRIP.AUTO: 1.03 (ref 1–1.03)
T WAVE AXIS: 72 DEGREES
UROBILINOGEN UR STRIP-ACNC: <2 MG/DL
VARIANT LYMPHS # BLD AUTO: 3 %
VENTRICULAR RATE: 64 BPM
WBC # BLD AUTO: 4.08 THOUSAND/UL (ref 4.31–10.16)

## 2025-05-16 PROCEDURE — 82550 ASSAY OF CK (CPK): CPT

## 2025-05-16 PROCEDURE — 70450 CT HEAD/BRAIN W/O DYE: CPT

## 2025-05-16 PROCEDURE — 80053 COMPREHEN METABOLIC PANEL: CPT

## 2025-05-16 PROCEDURE — 81003 URINALYSIS AUTO W/O SCOPE: CPT

## 2025-05-16 PROCEDURE — 72125 CT NECK SPINE W/O DYE: CPT

## 2025-05-16 PROCEDURE — 99223 1ST HOSP IP/OBS HIGH 75: CPT | Performed by: STUDENT IN AN ORGANIZED HEALTH CARE EDUCATION/TRAINING PROGRAM

## 2025-05-16 PROCEDURE — 93005 ELECTROCARDIOGRAM TRACING: CPT

## 2025-05-16 PROCEDURE — 36415 COLL VENOUS BLD VENIPUNCTURE: CPT

## 2025-05-16 PROCEDURE — 84484 ASSAY OF TROPONIN QUANT: CPT

## 2025-05-16 PROCEDURE — 71046 X-RAY EXAM CHEST 2 VIEWS: CPT

## 2025-05-16 PROCEDURE — 74177 CT ABD & PELVIS W/CONTRAST: CPT

## 2025-05-16 PROCEDURE — 85007 BL SMEAR W/DIFF WBC COUNT: CPT

## 2025-05-16 PROCEDURE — 93010 ELECTROCARDIOGRAM REPORT: CPT | Performed by: INTERNAL MEDICINE

## 2025-05-16 PROCEDURE — 71275 CT ANGIOGRAPHY CHEST: CPT

## 2025-05-16 PROCEDURE — 83690 ASSAY OF LIPASE: CPT

## 2025-05-16 PROCEDURE — 85027 COMPLETE CBC AUTOMATED: CPT

## 2025-05-16 PROCEDURE — 99285 EMERGENCY DEPT VISIT HI MDM: CPT

## 2025-05-16 PROCEDURE — 85379 FIBRIN DEGRADATION QUANT: CPT

## 2025-05-16 RX ORDER — LOPERAMIDE HYDROCHLORIDE 2 MG/1
2 CAPSULE ORAL 4 TIMES DAILY PRN
Status: DISCONTINUED | OUTPATIENT
Start: 2025-05-16 | End: 2025-05-17 | Stop reason: HOSPADM

## 2025-05-16 RX ORDER — PREDNISONE 5 MG/1
5 TABLET ORAL DAILY
Status: DISCONTINUED | OUTPATIENT
Start: 2025-05-17 | End: 2025-05-17 | Stop reason: HOSPADM

## 2025-05-16 RX ORDER — ACYCLOVIR 200 MG/1
400 CAPSULE ORAL 2 TIMES DAILY
Status: DISCONTINUED | OUTPATIENT
Start: 2025-05-16 | End: 2025-05-17 | Stop reason: HOSPADM

## 2025-05-16 RX ORDER — HEPARIN SODIUM 5000 [USP'U]/ML
5000 INJECTION, SOLUTION INTRAVENOUS; SUBCUTANEOUS EVERY 8 HOURS SCHEDULED
Status: DISCONTINUED | OUTPATIENT
Start: 2025-05-16 | End: 2025-05-16

## 2025-05-16 RX ORDER — DOCUSATE SODIUM 100 MG/1
100 CAPSULE, LIQUID FILLED ORAL 2 TIMES DAILY
Status: DISCONTINUED | OUTPATIENT
Start: 2025-05-16 | End: 2025-05-17 | Stop reason: HOSPADM

## 2025-05-16 RX ORDER — LEVOTHYROXINE SODIUM 75 UG/1
75 TABLET ORAL DAILY
Status: DISCONTINUED | OUTPATIENT
Start: 2025-05-17 | End: 2025-05-17 | Stop reason: HOSPADM

## 2025-05-16 RX ORDER — LORAZEPAM 0.5 MG/1
0.25 TABLET ORAL EVERY 8 HOURS PRN
Status: DISCONTINUED | OUTPATIENT
Start: 2025-05-16 | End: 2025-05-17 | Stop reason: HOSPADM

## 2025-05-16 RX ORDER — POLYETHYLENE GLYCOL 3350 17 G/17G
17 POWDER, FOR SOLUTION ORAL DAILY PRN
Status: DISCONTINUED | OUTPATIENT
Start: 2025-05-16 | End: 2025-05-17 | Stop reason: HOSPADM

## 2025-05-16 RX ORDER — ACETAMINOPHEN 325 MG/1
975 TABLET ORAL EVERY 8 HOURS PRN
Status: DISCONTINUED | OUTPATIENT
Start: 2025-05-16 | End: 2025-05-17 | Stop reason: HOSPADM

## 2025-05-16 RX ORDER — FAMOTIDINE 20 MG/1
20 TABLET, FILM COATED ORAL DAILY
Status: DISCONTINUED | OUTPATIENT
Start: 2025-05-16 | End: 2025-05-17 | Stop reason: HOSPADM

## 2025-05-16 RX ORDER — ASPIRIN 325 MG
325 TABLET ORAL DAILY
Status: DISCONTINUED | OUTPATIENT
Start: 2025-05-17 | End: 2025-05-17 | Stop reason: HOSPADM

## 2025-05-16 RX ORDER — ASPIRIN 81 MG/1
4 TABLET, CHEWABLE ORAL ONCE
Status: COMPLETED | OUTPATIENT
Start: 2025-05-16 | End: 2025-05-16

## 2025-05-16 RX ORDER — MECLIZINE HCL 12.5 MG 12.5 MG/1
12.5 TABLET ORAL EVERY 8 HOURS PRN
Status: DISCONTINUED | OUTPATIENT
Start: 2025-05-16 | End: 2025-05-17 | Stop reason: HOSPADM

## 2025-05-16 RX ADMIN — ACYCLOVIR 400 MG: 200 CAPSULE ORAL at 21:38

## 2025-05-16 RX ADMIN — Medication 9 MG: at 21:38

## 2025-05-16 RX ADMIN — IOHEXOL 70 ML: 350 INJECTION, SOLUTION INTRAVENOUS at 14:12

## 2025-05-16 NOTE — ASSESSMENT & PLAN NOTE
"Suspected pulmonary embolism seen on CT for chest pain  \"Suspected nonocclusive subsegmental right lower lobe pulmonary embolism\"  Patient with history of hairy cell leukemia and chronic pancytopenia, also recent fall yesterday in which she rolled out of bed  Discussed anticoagulation with family at bedside. Could potentially defer anticoagulation for this nonocclusive, subsegmental PE, however patient's history of malignancy will likely put her at an increased risk of thromboembolic disease. Given patient's advanced age, pancytopenia, and recent fall, family would prefer to hold off of anticoagulation at this time. Can continue with high dose aspirin although I did explain to family that this is in no way an adequate anticoagulant.   Low threshold to reach out to pulmonology/hematology if patient's chest pain returns, or has signs/symptoms of worsening PE  "

## 2025-05-16 NOTE — ED PROVIDER NOTES
Time reflects when diagnosis was documented in both MDM as applicable and the Disposition within this note       Time User Action Codes Description Comment    5/16/2025  4:18 PM Ruth Noguera Add [W19.XXXA] Fall, initial encounter     5/16/2025  4:18 PM Char Noguerau T Add [R07.9] Chest pain     5/16/2025  4:18 PM Char Noguerau T Add [R93.89] Abnormal CT scan     5/17/2025 11:50 AM Fawn Hernandez Add [I26.99] Pulmonary embolism (HCC)           ED Disposition       ED Disposition   Admit    Condition   Stable    Date/Time   Fri May 16, 2025  4:26 PM    Comment   --             Assessment & Plan       Medical Decision Making  Amount and/or Complexity of Data Reviewed  Labs: ordered. Decision-making details documented in ED Course.  Radiology: ordered.    Risk  OTC drugs.  Prescription drug management.  Decision regarding hospitalization.      Patient is a 89 y.o. female with PMH of hairy cell leukemia, hypothyroidism on Synthroid, PPM, s/p TAVR who presents to the ED with chest pain.    Vital signs stable, afebrile. On exam no acute distress. No increased WOB. Lungs CTA. Abdomen soft and mildly tender diffusely without guarding or rebound.     Differential diagnosis included but not limited to ACS, pneumothorax, pneumonia, rib fractures, musculoskeletal chest pain, PE, myocarditis, pericarditis, intra-abdominal pathology.    Plan: CBC, CMP, troponin, D-dimer, lipase, CK, EKG, CT head without contrast, CT C-spine without contrast, CT PE study with abdomen pelvis.     View ED course for further discussion on patient workup.     All labs reviewed and utilized in the medical decision making process  All radiology studies independently viewed by me and interpreted by the radiologist.  I reviewed all testing with the patient.     Upon re-evaluation patient and symptomatic and hemodynamically stable.    Disposition: admitted to medicine.     Portions of the record may have been created with voice recognition software. Occasional  "wrong word or \"sound a like\" substitutions may have occurred due to the inherent limitations of voice recognition software. Read the chart carefully and recognize, using context, where substitutions have occurred.    ED Course as of 05/22/25 0124   Fri May 16, 2025   1323 D-Dimer, Quant(!): 1.50  CT PE ordered.    1323 hs TnI 0hr: 8  Delta due at 1442    1324 Comprehensive metabolic panel  No electrolyte abnormality, ELENITA, transaminitis   1524 Delta 2hr hsTnI: 2   1615 Had long discussion with patient and family regarding results, including questionable PE.  Discussed risks and benefits of potentially starting anticoagulation/dc vs dc wo AC/close follow up with PCP vs admit for close monitoring/possible repeat imaging/eval to r/o PE and determine need for AC. Family and patient agreed upon hospital admission    1627 Admitted to [facility]   aspirin chewable tablet 324 mg (0 mg Does not apply Given to EMS 5/16/25 1151)   iohexol (OMNIPAQUE) 350 MG/ML injection (MULTI-DOSE) 70 mL (70 mL Intravenous Given 5/16/25 1412)       ED Risk Strat Scores                    No data recorded    PERC Rule for PE      Flowsheet Row Most Recent Value   PERC Rule for PE    Age >=50 1 Filed at: 05/16/2025 1211   HR >=100 0 Filed at: 05/16/2025 1211   O2 Sat on room air < 95% 0 Filed at: 05/16/2025 1211   History of PE or DVT 0 Filed at: 05/16/2025 1211   Recent trauma or surgery 0 Filed at: 05/16/2025 1211   Hemoptysis 0 Filed at: 05/16/2025 1211   Exogenous estrogen 0 Filed at: 05/16/2025 1211   Unilateral leg swelling 0 Filed at: 05/16/2025 1211   PERC Rule for PE Results 1 Filed at: 05/16/2025 1211                Wells' Criteria for PE      Flowsheet Row Most Recent Value   Wells' Criteria for PE    Clinical signs and symptoms of DVT 0 Filed at: 05/16/2025 1211   PE is primary diagnosis or equally likely 0 Filed at: 05/16/2025 1211   HR >100 0 Filed at: 05/16/2025 1211   Immobilization at least 3 days or Surgery in the " previous 4 weeks 0 Filed at: 05/16/2025 1211   Previous, objectively diagnosed PE or DVT 0 Filed at: 05/16/2025 1211   Hemoptysis 0 Filed at: 05/16/2025 1211   Malignancy with treatment within 6 months or palliative 1 Filed at: 05/16/2025 1211   Wells' Criteria Total 1 Filed at: 05/16/2025 1211                        History of Present Illness       Chief Complaint   Patient presents with    Chest Pain     Coming from [place]. Began this morning. Pain in the center of the chest. Denies sob. Hx dementia. Received 324 of ASA from EMS.       Past Medical History:   Diagnosis Date    Aortic insufficiency 12/04/2020    Aortic stenosis     severe    CAD (coronary artery disease)     Essential tremor     Fall 12/11/2020    HTN (hypertension)     HTN (hypertension)     Hyperlipidemia     Hypothyroidism     Leukemia, 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HCC)     s/p splenectomy    Osteoarthritis     Osteoporosis     Urinary incontinence       Past Surgical History:   Procedure Laterality Date    COLONOSCOPY      HYSTERECTOMY      IR BIOPSY BONE MARROW  11/6/2024    OK REPLACE AORTIC VALVE OPENFEMORAL ARTERY APPROACH N/A 4/12/2016    Procedure: REPLACEMENT AORTIC VALVE TRANSCATHETER (TAVR) TRANSFEMORAL  26mm Lin 3 valve;  Surgeon: Que Thurston DO;  Location: [facility];  Service: Cardiac Surgery    SPLENECTOMY      for hx hairy cell leukemia      Family History   Problem Relation Name Age of Onset    Other Mother          malignant neoplasm of uterus    Coronary artery disease Father      Heart failure Brother        Social History[1]   E-Cigarette/Vaping    E-Cigarette Use Never User       E-Cigarette/Vaping Substances      I have reviewed and agree with the history as documented.     HPI  Patient is a 89 y.o. female with PMHx hairy cell leukemia, hypothyroidism on Synthroid, PPM, s/p TAVR who presents to the ED for evaluation of chest pain.     Several hours of CP, woke up with it, constant, center of chest, 5/10 pain   No  f/c/cough/congestion/SOB/abdominal pain/n/v/d/dizziness  No sick contacts, no recent travel   Fell from seated position in bed on floor this AM, unclear HS or LOC   Decreased urination x 1 week, no dysuria/hematuria/frequency/urgency     Review of Systems   Cardiovascular:  Positive for chest pain.   Genitourinary:  Positive for decreased urine volume.           Objective       ED Triage Vitals   Temperature Pulse Blood Pressure Respirations SpO2 Patient Position - Orthostatic VS   05/16/25 1133 05/16/25 1133 05/16/25 1133 05/16/25 1133 05/16/25 1133 05/16/25 1300   97.7 °F (36.5 °C) 73 157/72 18 95 % Lying      Temp Source Heart Rate Source BP Location FiO2 (%) Pain Score    05/16/25 2226 05/16/25 1133 05/16/25 1300 -- 05/16/25 1832    Oral Monitor Right arm  No Pain      Vitals      Date and Time Temp Pulse SpO2 Resp BP Pain Score FACES Pain Rating User   05/17/25 0829 -- -- -- -- -- No Pain -- JS   05/17/25 0828 -- -- -- -- -- No Pain -- NV   05/17/25 0800 -- -- -- -- -- No Pain -- LAK   05/16/25 2226 97.9 °F (36.6 °C) 65 92 % 16 153/67 -- -- DII   05/16/25 2000 -- -- -- -- -- No Pain -- AD   05/16/25 1832 -- -- -- 20 -- No Pain -- BS   05/16/25 1832 -- 80 92 % -- 120/66 -- -- DII   05/16/25 1700 -- 70 98 % 22 182/84 -- -- EL   05/16/25 1500 -- 60 96 % 24 150/99 -- -- EL   05/16/25 1300 -- 60 95 % 18 126/68 -- -- ABDELRAHMAN   05/16/25 1200 -- 60 95 % 20 138/65 -- -- KM   05/16/25 1133 97.7 °F (36.5 °C) 73 95 % 18 157/72 -- -- KM            Physical Exam  Vitals and nursing note reviewed.   Constitutional:       General: She is not in acute distress.     Appearance: Normal appearance. She is well-developed. She is not ill-appearing, toxic-appearing or diaphoretic.   HENT:      Head: Normocephalic and atraumatic.      Mouth/Throat:      Mouth: Mucous membranes are moist.      Pharynx: Oropharynx is clear.     Eyes:      General:         Right eye: No discharge.         Left eye: No discharge.      Conjunctiva/sclera:  Conjunctivae normal.       Cardiovascular:      Rate and Rhythm: Normal rate and regular rhythm.      Heart sounds: No murmur heard.  Pulmonary:      Effort: Pulmonary effort is normal. No respiratory distress.      Breath sounds: Normal breath sounds. No stridor. No wheezing, rhonchi or rales.   Abdominal:      General: There is no distension.      Palpations: Abdomen is soft.      Tenderness: There is abdominal tenderness. There is no right CVA tenderness, left CVA tenderness, guarding or rebound.     Musculoskeletal:         General: No swelling or deformity. Normal range of motion.      Cervical back: Normal range of motion and neck supple. No rigidity.      Right lower leg: No edema.      Left lower leg: No edema.     Skin:     General: Skin is warm and dry.      Capillary Refill: Capillary refill takes less than 2 seconds.     Neurological:      General: No focal deficit present.      Mental Status: She is alert and oriented to person, place, and time.     Psychiatric:         Mood and Affect: Mood normal.         Results Reviewed       Procedure Component Value Units Date/Time    UA w Reflex to Microscopic w Reflex to Culture [[id number]]  (Abnormal) Collected: 05/16/25 1545    Lab Status: Final result Specimen: Urine, Clean Catch Updated: 05/16/25 1641     Color, UA Light Yellow     Clarity, UA Clear     Specific Gravity, UA 1.032     pH, UA 6.5     Leukocytes, UA Negative     Nitrite, UA Negative     Protein, UA Negative mg/dl      Glucose, UA Negative mg/dl      Ketones, UA Negative mg/dl      Urobilinogen, UA <2.0 mg/dl      Bilirubin, UA Negative     Occult Blood, UA Negative    HS Troponin I 2hr [[id number]]  (Normal) Collected: 05/16/25 1438    Lab Status: Final result Specimen: Blood from Arm, Left Updated: 05/16/25 1510     hs TnI 2hr 10 ng/L      Delta 2hr hsTnI 2 ng/L     RBC Morphology Reflex Test [[id number]] Collected: 05/16/25 1242    Lab Status: Final result Specimen: Blood from Arm, Left  Updated: 05/16/25 1501    CBC and differential [[id number]]  (Abnormal) Collected: 05/16/25 1242    Lab Status: Final result Specimen: Blood from Arm, Left Updated: 05/16/25 1412     WBC 4.08 Thousand/uL      RBC 3.70 Million/uL      Hemoglobin 12.2 g/dL      Hematocrit 39.2 %       fL      MCH 33.0 pg      MCHC 31.1 g/dL      RDW 18.6 %      MPV 11.8 fL      Platelets 84 Thousands/uL     Narrative:      This is an appended report.  These results have been appended to a previously verified report.    Manual Differential(PHLEBS Do Not Order) [[id number]]  (Abnormal) Collected: 05/16/25 1242    Lab Status: Final result Specimen: Blood from Arm, Left Updated: 05/16/25 1412     Segmented % 65 %      Bands % 2 %      Lymphocytes % 14 %      Monocytes % 15 %      Eosinophils % 0 %      Basophils % 1 %      Atypical Lymphocytes % 3 %      Absolute Neutrophils 2.73 Thousand/uL      Absolute Lymphocytes 0.69 Thousand/uL      Absolute Monocytes 0.61 Thousand/uL      Absolute Eosinophils 0.00 Thousand/uL      Absolute Basophils 0.04 Thousand/uL      Total Counted --     RBC Morphology Present     Platelet Estimate Decreased     Anisocytosis Present     Macrocytes Present     Poikilocytes Present     Polychromasia Present    Comprehensive metabolic panel [[id number]] Collected: 05/16/25 1242    Lab Status: Final result Specimen: Blood from Arm, Left Updated: 05/16/25 1322     Sodium 141 mmol/L      Potassium 3.9 mmol/L      Chloride 106 mmol/L      CO2 27 mmol/L      ANION GAP 8 mmol/L      BUN 22 mg/dL      Creatinine 0.73 mg/dL      Glucose 95 mg/dL      Calcium 9.5 mg/dL      AST 21 U/L      ALT 11 U/L      Alkaline Phosphatase 62 U/L      Total Protein 6.6 g/dL      Albumin 4.1 g/dL      Total Bilirubin 0.69 mg/dL      eGFR 69 ml/min/1.73sq m     Narrative:      [employer] guidelines for Chronic Kidney Disease (CKD):     Stage 1 with normal or high GFR (GFR > 90 mL/min/1.73 square meters)     Stage 2 Mild CKD (GFR = 60-89 mL/min/1.73 square meters)    Stage 3A Moderate CKD (GFR = 45-59 mL/min/1.73 square meters)    Stage 3B Moderate CKD (GFR = 30-44 mL/min/1.73 square meters)    Stage 4 Severe CKD (GFR = 15-29 mL/min/1.73 square meters)    Stage 5 End Stage CKD (GFR <15 mL/min/1.73 square meters)  Note: GFR calculation is accurate only with a steady state creatinine    Lipase [[id number]]  (Normal) Collected: 05/16/25 1242    Lab Status: Final result Specimen: Blood from Arm, Left Updated: 05/16/25 1322     Lipase 53 u/L     CK [[id number]]  (Normal) Collected: 05/16/25 1242    Lab Status: Final result Specimen: Blood from Arm, Left Updated: 05/16/25 1322     Total CK 41 U/L     HS Troponin 0hr (reflex protocol) [[id number]]  (Normal) Collected: 05/16/25 1242    Lab Status: Final result Specimen: Blood from Arm, Left Updated: 05/16/25 1318     hs TnI 0hr 8 ng/L     D-dimer, quantitative [[id number]]  (Abnormal) Collected: 05/16/25 1242    Lab Status: Final result Specimen: Blood from Arm, Left Updated: 05/16/25 1312     D-Dimer, Quant 1.50 ug/ml FEU     Narrative:      In the evaluation for possible pulmonary embolism, in the appropriate (Well's Score of 4 or less) patient, the age adjusted d-dimer cutoff for this patient can be calculated as:    Age x 0.01 (in ug/mL) for Age-adjusted D-dimer exclusion threshold for a patient over 50 years.            XR chest 2 views   Final Interpretation by Celena Ingram MD (05/16 5928)      Mild dependent atelectasis in the lower lobes. See subsequent chest CT               Workstation performed: [id number]         CT head without contrast   Final Interpretation by Sae Golden DO (05/16 5804)      No acute intracranial abnormality.  Chronic microangiopathic changes.                  Workstation performed: [id number]         CT spine cervical without contrast   Final Interpretation by Sae Golden DO (05/16 3941)      No cervical spine  "fracture or traumatic malalignment. Diffuse osteopenia and multilevel mild cervical degenerative change.                  Workstation performed: [id number]         CT pe study w abdomen pelvis w contrast   Final Interpretation by Luis Alberto Edward MD (05/16 5402)      Suspected nonocclusive subsegmental right lower lobe pulmonary embolism (14:92).   Note that evaluation for PE in the right lower lobe is limited secondary to respiratory motion artifact.   No CT evidence right heart strain.      No acute abdominopelvic abnormality   No fracture.      Findings were discussed with Dr. Fisher from the emergency department at 3:30 p.m. on 5/16/2025.               Workstation performed: [id number]             Procedures    ED Medication and Procedure Management   Prior to Admission Medications   Prescriptions Last Dose Informant Patient Reported? Taking?   APPLE CIDER VINEGAR PO  Self Yes No   Sig: Take by mouth daily   [place] Aspirin EC Low Dose 81 MG EC tablet  Self Yes No   Sig: Take 1 tablet by mouth in the morning   Calcium Carbonate-Vitamin D (CALCIUM PLUS VITAMIN D PO)  Self Yes No   Sig: Take by mouth   Loperamide HCl (IMODIUM PO)  Self Yes No   Sig: Take 2 mg by mouth as needed.     Lumigan 0.01 % ophthalmic drops  Self Yes No   Sig: Administer 1 drop to the right eye daily at bedtime   MELATONIN PO  Self Yes No   Sig: Take 10 mg by mouth daily at bedtime   Melatonin 10 MG SUBL  Self Yes No   Sig: Take 1 tablet by mouth daily at bedtime   Multiple Vitamin (MULTIVITAMIN) tablet  Self Yes No   Sig: Take 1 tablet by mouth daily.   Polyethyl Glycol-Propyl Glycol (SYSTANE OP)  Self Yes No   Sig: Apply 1 drop to eye 2 (two) times a day EACH EYE TWICE DAILY   acetaminophen (TYLENOL) 500 mg tablet  Self Yes No   Sig: Take 1,000 mg by mouth every 8 (eight) hours No more than 3g a day \"As needed\"   acyclovir (ZOVIRAX) 200 mg capsule  Self No No   Sig: Take 2 capsules (400 mg total) by mouth 2 (two) times a day   allopurinol " "(ZYLOPRIM) 100 mg tablet  Self No No   Sig: Take 1 tablet (100 mg total) by mouth daily Do not start before November 27, 2024.   docusate sodium (COLACE) 100 mg capsule  Self No No   Sig: Take 1 capsule (100 mg total) by mouth 2 (two) times a day   famotidine (PEPCID) 20 mg tablet  Self No No   Sig: Take 1 tablet (20 mg total) by mouth in the morning   furosemide (LASIX) 20 mg tablet   No No   Sig: Take 0.5 tablets (10 mg total) by mouth 1 (one) time for 1 dose   levothyroxine (Euthyrox) 88 mcg tablet  Self No No   Sig: Take 1 tablet (88 mcg total) by mouth daily   potassium chloride (MICRO-K) 10 MEQ CR capsule   No No   Sig: Take 1 capsule (10 mEq total) by mouth 1 (one) time for 1 dose   predniSONE 5 mg tablet  Self No No   Sig: Take 1 tablet (5 mg total) by mouth daily Do not start before November 27, 2024.      Facility-Administered Medications: None     Discharge Medication List as of 5/17/2025 11:52 AM        START taking these medications    Details   aspirin 325 mg tablet Take 1 tablet (325 mg total) by mouth daily, Starting Sun 5/18/2025, No Print           CONTINUE these medications which have NOT CHANGED    Details   acetaminophen (TYLENOL) 500 mg tablet Take 1,000 mg by mouth every 8 (eight) hours No more than 3g a day \"As needed\", Historical Med      acyclovir (ZOVIRAX) 200 mg capsule Take 2 capsules (400 mg total) by mouth 2 (two) times a day, Starting Tue 11/26/2024, Until Fri 12/27/2024, No Print      allopurinol (ZYLOPRIM) 100 mg tablet Take 1 tablet (100 mg total) by mouth daily Do not start before November 27, 2024., Starting Wed 11/27/2024, No Print      APPLE CIDER VINEGAR PO Take by mouth daily, Historical Med      Calcium Carbonate-Vitamin D (CALCIUM PLUS VITAMIN D PO) Take by mouth, Historical Med      docusate sodium (COLACE) 100 mg capsule Take 1 capsule (100 mg total) by mouth 2 (two) times a day, Starting Tue 11/26/2024, No Print      famotidine (PEPCID) 20 mg tablet Take 1 tablet (20 mg " total) by mouth in the morning, Starting Tue 10/22/2024, Normal      levothyroxine (Euthyrox) 88 mcg tablet Take 1 tablet (88 mcg total) by mouth daily, Starting Wed 12/1/2021, Normal      Loperamide HCl (IMODIUM PO) Take 2 mg by mouth as needed.  , Historical Med      Lumigan 0.01 % ophthalmic drops Administer 1 drop to the right eye daily at bedtime, Starting Wed 10/30/2024, Historical Med      Melatonin 10 MG SUBL Take 1 tablet by mouth daily at bedtime, Starting Mon 10/28/2024, Historical Med      MELATONIN PO Take 10 mg by mouth daily at bedtime, Historical Med      Multiple Vitamin (MULTIVITAMIN) tablet Take 1 tablet by mouth daily., Historical Med      Polyethyl Glycol-Propyl Glycol (SYSTANE OP) Apply 1 drop to eye 2 (two) times a day EACH EYE TWICE DAILY, Historical Med      potassium chloride (MICRO-K) 10 MEQ CR capsule Take 1 capsule (10 mEq total) by mouth 1 (one) time for 1 dose, Starting Fri 12/27/2024, Print      predniSONE 5 mg tablet Take 1 tablet (5 mg total) by mouth daily Do not start before November 27, 2024., Starting Wed 11/27/2024, No Print           STOP taking these medications       [place] Aspirin EC Low Dose 81 MG EC tablet Comments:   Reason for Stopping:         furosemide (LASIX) 20 mg tablet Comments:   Reason for Stopping:             Outpatient Discharge Orders   Discharge Diet     Discharge Condition:  Improving     Patient Aware of Diagnosis: Yes     ED SEPSIS DOCUMENTATION   Time reflects when diagnosis was documented in both MDM as applicable and the Disposition within this note       Time User Action Codes Description Comment    5/16/2025  4:18 PM Ruth Noguera Add [W19.XXXA] Fall, initial encounter     5/16/2025  4:18 PM Ruth oNguera Add [R07.9] Chest pain     5/16/2025  4:18 PM Ruth Noguera Add [R93.89] Abnormal CT scan     5/17/2025 11:50 AM Fawn Hernandez Add [I26.99] Pulmonary embolism (HCC)                      [1]   Social History  Tobacco Use    Smoking status: Former      Types: Cigarettes    Smokeless tobacco: Former   Vaping Use    Vaping status: Never Used   Substance Use Topics    Alcohol use: Yes     Comment: SOCIAL    Drug use: No        Ruth Noguera MD  05/22/25 0124

## 2025-05-16 NOTE — H&P
"H&P - Hospitalist   Name: Helen Gaona 89 y.o. female I MRN: [id number]  Unit/Bed#: QCF I Date of Admission: 5/16/2025   Date of Service: 5/16/2025 I Hospital Day: 0     Assessment & Plan  Pulmonary embolism (HCC)  Suspected pulmonary embolism seen on CT for chest pain  \"Suspected nonocclusive subsegmental right lower lobe pulmonary embolism\"  Patient with history of hairy cell leukemia and chronic pancytopenia, also recent fall yesterday in which she rolled out of bed  Discussed anticoagulation with family at bedside. Could potentially defer anticoagulation for this nonocclusive, subsegmental PE, however patient's history of malignancy will likely put her at an increased risk of thromboembolic disease. Given patient's advanced age, pancytopenia, and recent fall, family would prefer to hold off of anticoagulation at this time. Can continue with high dose aspirin although I did explain to family that this is in no way an adequate anticoagulant.   Low threshold to reach out to pulmonology/hematology if patient's chest pain returns, or has signs/symptoms of worsening PE  Hairy cell leukemia, in relapse (HCC)  Patient follows with outpatient oncology Dr. Huynh. S/p 2-CdA   Has chronic pancytopenia, although platelet count and hemoglobin seem to be highest in recent history for this patient.  Patient's platelet count remains 84 from a baseline that appears to be around 20s to 40s.  In discussion of anticoagulation with family, ultimately will hold off of anticoagulation for now, consider reaching out to patient's outpatient hematology/oncology service versus inpatient curbside regarding risk of anticoagulation with patient's history of pancytopenia.  Per shared decision making with patient and family, will hold anticoagulation for now  Resume prednisone 5mg po qd  S/P TAVR (transcatheter aortic valve replacement)  Follows with outpatient cardiology  Previously on Lasix, but not presently on diuretic  Continue to " monitor  Fall  Patient endorses fall out of bed yesterday, she believes that she simply rolled out of bed and tells me she does not fall that often.  Son at bedside states that the falls are generally rare for this patient, however they still wish to hold off of anticoagulation in the event that patient does have future falls    VTE Pharmacologic Prophylaxis:   Moderate Risk (Score 3-4) - Pharmacological DVT Prophylaxis Ordered: heparin.  Code Status: Level 1 - Full Code   Discussion with family: Updated  (son) at bedside.    Anticipated Length of Stay: Patient will be admitted on an observation basis with an anticipated length of stay of less than 2 midnights secondary to PE.    History of Present Illness   Chief Complaint: Fall out of bed and chest pain    Helen Gaona is a 89 y.o. female with a PMH of mild dementia, hairy cell leukemia, pancytopenia, aortic stenosis, hypertension off of antihypertensives, among others who presents with chest pain and recent fall.  Patient has some mild confusion at baseline so some history provided by her son Jose Guadalupe at bedside.  I also discussed case with son Mack over the phone.  Patient has been in her normal state of health however had a fall out of bed yesterday.  Helen cannot recount any specific cause for the fall, she simply states that she must have been sleeping and then rolled out of bed.  She denies significant trauma or injury from this fall.  She was complaining of chest pain today and was brought in by [facility].  Workup included elevated D-dimer and so patient was taken to CT where she was found to have possible left-sided subsegmental PE.  ED and myself discussed anticoagulation with patient and from an overall risk/benefit perspective, shared decision was made to hold off of anticoagulant at this time.  Presently patient is sitting in chair, her only complaint is that she is hungry.    Review of Systems   Constitutional:  Negative for  chills and fever.   HENT:  Negative for sore throat.    Respiratory:  Negative for cough and shortness of breath.    Cardiovascular:  Negative for chest pain and palpitations.   Gastrointestinal:  Negative for abdominal pain and vomiting.   Genitourinary:  Negative for hematuria.   Musculoskeletal:  Negative for arthralgias and back pain.   Neurological:  Negative for syncope.   All other systems reviewed and are negative.    Historical Information   Past Medical History:   Diagnosis Date    Aortic insufficiency 12/04/2020    Aortic stenosis     severe    CAD (coronary artery disease)     Essential tremor     Fall 12/11/2020    HTN (hypertension)     HTN (hypertension)     Hyperlipidemia     Hypothyroidism     Leukemia, 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HCC)     s/p splenectomy    Osteoarthritis     Osteoporosis     Urinary incontinence      Past Surgical History:   Procedure Laterality Date    COLONOSCOPY      HYSTERECTOMY      IR BIOPSY BONE MARROW  11/6/2024    [place] REPLACE AORTIC VALVE OPENFEMORAL ARTERY APPROACH N/A 4/12/2016    Procedure: REPLACEMENT AORTIC VALVE TRANSCATHETER (TAVR) TRANSFEMORAL  26mm Lin 3 valve;  Surgeon: Que Thurston DO;  Location: [facility];  Service: Cardiac Surgery    SPLENECTOMY      for hx hairy cell leukemia     Social History     Tobacco Use    Smoking status: Former     Types: Cigarettes    Smokeless tobacco: Former   Vaping Use    Vaping status: Never Used   Substance and Sexual Activity    Alcohol use: Yes     Comment: SOCIAL    Drug use: No    Sexual activity: Not on file     E-Cigarette/Vaping    E-Cigarette Use Never User      E-Cigarette/Vaping Substances     Family History   Problem Relation Age of Onset    Other Mother         malignant neoplasm of uterus    Coronary artery disease Father     Heart failure Brother      Social History:  Marital Status:    Occupation: Retired  Patient Pre-hospital Living Situation: Home  Patient Pre-hospital Level of Mobility: walks  Patient  "Pre-hospital Diet Restrictions: N/A    Meds/Allergies   I have reveiwed home medications using records provided by [facility].  Prior to Admission medications    Medication Sig Start Date End Date Taking? Authorizing Provider   acetaminophen (TYLENOL) 500 mg tablet Take 1,000 mg by mouth every 8 (eight) hours No more than 3g a day \"As needed\"    Historical Provider, MD   acyclovir (ZOVIRAX) 200 mg capsule Take 2 capsules (400 mg total) by mouth 2 (two) times a day 11/26/24 12/27/24  Hanna Heredia DO   allopurinol (ZYLOPRIM) 100 mg tablet Take 1 tablet (100 mg total) by mouth daily Do not start before November 27, 2024. 11/27/24   Hanna Heredia DO   APPLE CIDER VINEGAR PO Take by mouth daily    Historical ProviderMD [place] Aspirin EC Low Dose 81 MG EC tablet Take 1 tablet by mouth in the morning 10/28/24   Kaylye Fay MD   Calcium Carbonate-Vitamin D (CALCIUM PLUS VITAMIN D PO) Take by mouth    Historical Provider, MD   docusate sodium (COLACE) 100 mg capsule Take 1 capsule (100 mg total) by mouth 2 (two) times a day 11/26/24   Hanna Heredia DO   famotidine (PEPCID) 20 mg tablet Take 1 tablet (20 mg total) by mouth in the morning 10/22/24   Jan Huynh MD   furosemide (LASIX) 20 mg tablet Take 0.5 tablets (10 mg total) by mouth 1 (one) time for 1 dose 12/27/24 12/27/24  JESSICA Guajardo   levothyroxine (Euthyrox) 88 mcg tablet Take 1 tablet (88 mcg total) by mouth daily 12/1/21   Donna Veloz MD   Loperamide HCl (IMODIUM PO) Take 2 mg by mouth as needed.      Historical Provider, MD   Lumigan 0.01 % ophthalmic drops Administer 1 drop to the right eye daily at bedtime 10/30/24   Historical Provider, MD   Melatonin 10 MG SUBL Take 1 tablet by mouth daily at bedtime 10/28/24   Historical Provider, MD   MELATONIN PO Take 10 mg by mouth daily at bedtime    Historical Provider, MD   Multiple Vitamin (MULTIVITAMIN) tablet Take 1 tablet by mouth daily.    Historical Provider, MD   Polyethyl " Glycol-Propyl Glycol (SYSTANE OP) Apply 1 drop to eye 2 (two) times a day EACH EYE TWICE DAILY    Historical Provider, MD   potassium chloride (MICRO-K) 10 MEQ CR capsule Take 1 capsule (10 mEq total) by mouth 1 (one) time for 1 dose 12/27/24 12/27/24  Sandra SinghJESSICA   predniSONE 5 mg tablet Take 1 tablet (5 mg total) by mouth daily Do not start before November 27, 2024. 11/27/24   Olindasejal Heredia, DO     No Known Allergies    Objective :  Temp:  [97.7 °F (36.5 °C)] 97.7 °F (36.5 °C)  HR:  [60-73] 60  BP: (126-157)/(65-99) 150/99  Resp:  [18-24] 24  SpO2:  [95 %-96 %] 96 %  O2 Device: None (Room air)    Physical Exam  Vitals and nursing note reviewed.   Constitutional:       General: She is not in acute distress.     Appearance: She is well-developed. She is not diaphoretic.   HENT:      Head: Normocephalic and atraumatic.      Mouth/Throat:      Mouth: Mucous membranes are moist.     Eyes:      General: No scleral icterus.     Conjunctiva/sclera: Conjunctivae normal.       Cardiovascular:      Rate and Rhythm: Normal rate and regular rhythm.      Pulses: Normal pulses.      Heart sounds: No murmur heard.  Pulmonary:      Effort: Pulmonary effort is normal. No respiratory distress.      Breath sounds: Normal breath sounds.   Abdominal:      General: There is no distension.      Palpations: Abdomen is soft.      Tenderness: There is no abdominal tenderness. There is no guarding or rebound.     Musculoskeletal:         General: No swelling.      Cervical back: Neck supple.     Skin:     General: Skin is warm and dry.      Capillary Refill: Capillary refill takes less than 2 seconds.     Neurological:      General: No focal deficit present.      Mental Status: She is alert and oriented to person, place, and time.      Comments: Pleasantly confused   Psychiatric:         Mood and Affect: Mood normal.          Lab Results: I have reviewed the following results:  Results from last 7 days   Lab Units 05/16/25  6073    WBC Thousand/uL 4.08*   HEMOGLOBIN g/dL 12.2   HEMATOCRIT % 39.2   PLATELETS Thousands/uL 84*   BANDS PCT % 2   LYMPHO PCT % 14   MONO PCT % 15*   EOS PCT % 0     Results from last 7 days   Lab Units 05/16/25  1242   SODIUM mmol/L 141   POTASSIUM mmol/L 3.9   CHLORIDE mmol/L 106   CO2 mmol/L 27   BUN mg/dL 22   CREATININE mg/dL 0.73   ANION GAP mmol/L 8   CALCIUM mg/dL 9.5   ALBUMIN g/dL 4.1   TOTAL BILIRUBIN mg/dL 0.69   ALK PHOS U/L 62   ALT U/L 11   AST U/L 21   GLUCOSE RANDOM mg/dL 95             Lab Results   Component Value Date    HGBA1C 5.5 11/30/2021    HGBA1C 5.5 11/30/2021    HGBA1C 5.5 04/01/2016           Imaging Results Review: I personally reviewed the following image studies in PACS and associated radiology reports: CT chest. My interpretation of the radiology images/reports is: Suspected PE difficult to appreciate.  Other Study Results Review: EKG was personally reviewed and my interpretation is: Paced rhythm. HR 64..    Administrative Statements       ** Please Note: This note has been constructed using a voice recognition system. **

## 2025-05-16 NOTE — ASSESSMENT & PLAN NOTE
Patient follows with outpatient oncology Dr. Huynh. S/p 2-CdA   Has chronic pancytopenia, although platelet count and hemoglobin seem to be highest in recent history for this patient.  Patient's platelet count remains 84 from a baseline that appears to be around 20s to 40s.  In discussion of anticoagulation with family, ultimately will hold off of anticoagulation for now, consider reaching out to patient's outpatient hematology/oncology service versus inpatient curbside regarding risk of anticoagulation with patient's history of pancytopenia.  Per shared decision making with patient and family, will hold anticoagulation for now  Resume prednisone 5mg po qd

## 2025-05-16 NOTE — ED ATTENDING ATTESTATION
5/16/2025  I, Yoan Fisher MD, saw and evaluated the patient. I have discussed the patient with the resident/non-physician practitioner and agree with the resident's/non-physician practitioner's findings, Plan of Care, and MDM as documented in the resident's/non-physician practitioner's note, except where noted. All available labs and Radiology studies were reviewed.  I was present for key portions of any procedure(s) performed by the resident/non-physician practitioner and I was immediately available to provide assistance.       At this point I agree with the current assessment done in the Emergency Department.  I have conducted an independent evaluation of this patient a history and physical is as follows:    ED Course     Emergency Department Note- Helen Gaona 89 y.o. female MRN: [id number]    Unit/Bed#: QCF Encounter: [id number]    Helen Gaona is a 89 y.o. female who presents with   Chief Complaint   Patient presents with    Chest Pain     Coming from [place]. Began this morning. Pain in the center of the chest. Denies sob. Hx dementia. Received 324 of ASA from EMS.         History of Present Illness   HPI:  Helen Gaona is a 89 y.o. female who presents for evaluation of:  Fall with an episode of central chest pain earlier today. Patient's son notes that the patient also fell out of be yesterday; unwitnessed. Patient denies CP, HA, NV currently. Movement does not provoke her symptoms and rest does not palliate her symptoms.     Review of Systems   Constitutional:  Negative for fatigue and fever.   HENT:  Negative for congestion and sore throat.    Respiratory:  Negative for cough and shortness of breath.    Cardiovascular:  Positive for chest pain. Negative for palpitations.   Gastrointestinal:  Negative for abdominal pain and nausea.   Genitourinary:  Negative for flank pain and frequency.   Neurological:  Negative for light-headedness and headaches.   Psychiatric/Behavioral:  Negative  for dysphoric mood and hallucinations.    All other systems reviewed and are negative.      Historical Information   Past Medical History:   Diagnosis Date    Aortic insufficiency 12/04/2020    Aortic stenosis     severe    CAD (coronary artery disease)     Essential tremor     Fall 12/11/2020    HTN (hypertension)     HTN (hypertension)     Hyperlipidemia     Hypothyroidism     Leukemia, 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HCC)     s/p splenectomy    Osteoarthritis     Osteoporosis     Urinary incontinence      Past Surgical History:   Procedure Laterality Date    COLONOSCOPY      HYSTERECTOMY      IR BIOPSY BONE MARROW  11/6/2024    IN REPLACE AORTIC VALVE OPENFEMORAL ARTERY APPROACH N/A 4/12/2016    Procedure: REPLACEMENT AORTIC VALVE TRANSCATHETER (TAVR) TRANSFEMORAL  26mm Lin 3 valve;  Surgeon: Que Thurston DO;  Location: [facility];  Service: Cardiac Surgery    SPLENECTOMY      for hx hairy cell leukemia     Social History   Social History     Substance and Sexual Activity   Alcohol Use Yes    Comment: SOCIAL     Social History     Substance and Sexual Activity   Drug Use No     Tobacco Use History[1]  Family History:   Family History   Problem Relation Age of Onset    Other Mother         malignant neoplasm of uterus    Coronary artery disease Father     Heart failure Brother        Meds/Allergies   PTA meds:   Prior to Admission Medications   Prescriptions Last Dose Informant Patient Reported? Taking?   APPLE CIDER VINEGAR PO  Self Yes No   Sig: Take by mouth daily   [place] Aspirin EC Low Dose 81 MG EC tablet  Self Yes No   Sig: Take 1 tablet by mouth in the morning   Calcium Carbonate-Vitamin D (CALCIUM PLUS VITAMIN D PO)  Self Yes No   Sig: Take by mouth   Loperamide HCl (IMODIUM PO)  Self Yes No   Sig: Take 2 mg by mouth as needed.     Lumigan 0.01 % ophthalmic drops  Self Yes No   Sig: Administer 1 drop to the right eye daily at bedtime   MELATONIN PO  Self Yes No   Sig: Take 10 mg by mouth daily at bedtime   Melatonin  "10 MG SUBL  Self Yes No   Sig: Take 1 tablet by mouth daily at bedtime   Multiple Vitamin (MULTIVITAMIN) tablet  Self Yes No   Sig: Take 1 tablet by mouth daily.   Polyethyl Glycol-Propyl Glycol (SYSTANE OP)  Self Yes No   Sig: Apply 1 drop to eye 2 (two) times a day EACH EYE TWICE DAILY   acetaminophen (TYLENOL) 500 mg tablet  Self Yes No   Sig: Take 1,000 mg by mouth every 8 (eight) hours No more than 3g a day \"As needed\"   acyclovir (ZOVIRAX) 200 mg capsule  Self No No   Sig: Take 2 capsules (400 mg total) by mouth 2 (two) times a day   allopurinol (ZYLOPRIM) 100 mg tablet  Self No No   Sig: Take 1 tablet (100 mg total) by mouth daily Do not start before November 27, 2024.   docusate sodium (COLACE) 100 mg capsule  Self No No   Sig: Take 1 capsule (100 mg total) by mouth 2 (two) times a day   famotidine (PEPCID) 20 mg tablet  Self No No   Sig: Take 1 tablet (20 mg total) by mouth in the morning   furosemide (LASIX) 20 mg tablet   No No   Sig: Take 0.5 tablets (10 mg total) by mouth 1 (one) time for 1 dose   levothyroxine (Euthyrox) 88 mcg tablet  Self No No   Sig: Take 1 tablet (88 mcg total) by mouth daily   potassium chloride (MICRO-K) 10 MEQ CR capsule   No No   Sig: Take 1 capsule (10 mEq total) by mouth 1 (one) time for 1 dose   predniSONE 5 mg tablet  Self No No   Sig: Take 1 tablet (5 mg total) by mouth daily Do not start before November 27, 2024.      Facility-Administered Medications: None     Allergies[2]    Objective   First Vitals:   Blood Pressure: 157/72 (05/16/25 1133)  Pulse: 73 (05/16/25 1133)  Temperature: 97.7 °F (36.5 °C) (05/16/25 1133)  Respirations: 18 (05/16/25 1133)  SpO2: 95 % (05/16/25 1133)    Current Vitals:   Blood Pressure: 138/65 (05/16/25 1200)  Pulse: 60 (05/16/25 1200)  Temperature: 97.7 °F (36.5 °C) (05/16/25 1133)  Respirations: 20 (05/16/25 1200)  SpO2: 95 % (05/16/25 1200)    No intake or output data in the 24 hours ending 05/16/25 1259    Invasive Devices       Peripheral " Intravenous Line  Duration             Peripheral IV 25 Left;Ventral (anterior) Forearm <1 day              Airway  Duration             Non-Surgical Airway Oral pharyngeal airway 1539 days                    Physical Exam  Vitals and nursing note reviewed.   Constitutional:       General: She is not in acute distress.     Appearance: Normal appearance. She is well-developed.   HENT:      Head: Normocephalic and atraumatic.      Right Ear: External ear normal.      Left Ear: External ear normal.      Nose: Nose normal.      Mouth/Throat:      Pharynx: No oropharyngeal exudate.     Eyes:      Conjunctiva/sclera: Conjunctivae normal.      Pupils: Pupils are equal, round, and reactive to light.       Cardiovascular:      Rate and Rhythm: Normal rate and regular rhythm.   Pulmonary:      Effort: Pulmonary effort is normal. No respiratory distress.   Abdominal:      General: Abdomen is flat. There is no distension.      Palpations: Abdomen is soft.     Musculoskeletal:         General: No deformity. Normal range of motion.      Cervical back: Normal range of motion and neck supple.     Skin:     General: Skin is warm and dry.      Capillary Refill: Capillary refill takes less than 2 seconds.     Neurological:      General: No focal deficit present.      Mental Status: She is alert and oriented to person, place, and time. Mental status is at baseline.      Coordination: Coordination normal.     Psychiatric:         Mood and Affect: Mood normal.         Behavior: Behavior normal.         Thought Content: Thought content normal.         Judgment: Judgment normal.           Medical Decision Makin. Chest pain: Complete blood count obtained to assess for leukocytosis and anemia; Basic Metabolic profile obtained to assess for electrolyte disturbance, uremia, and disorders of glucose metabolism; electrocardiogram obtained to assess for arrhythmia; Troponin obtained to assess for myocardial infarction and  "myocarditis.  2. Hairy Cell leukemia currently being evaluated for hospice.         Recent Results (from the past 36 hours)   ECG 12 lead    Collection Time: 05/16/25 11:36 AM   Result Value Ref Range    Ventricular Rate 64 BPM    Atrial Rate 64 BPM    UT Interval 200 ms    QRSD Interval 136 ms    QT Interval 446 ms    QTC Interval 460 ms    P Axis 82 degrees    QRS Axis 139 degrees    T Wave Axis 72 degrees     XR chest 2 views    (Results Pending)   CT abdomen pelvis with contrast    (Results Pending)   CT head without contrast    (Results Pending)   CT spine cervical without contrast    (Results Pending)         Portions of the record may have been created with voice recognition software. Occasional wrong word or \"sound a like\" substitutions may have occurred due to the inherent limitations of voice recognition software.  Read the chart carefully and recognize, using context, where substitutions have occurred.        Critical Care Time  ECG 12 Lead Documentation Only    Date/Time: 5/16/2025 12:56 PM    Performed by: Yoan Fisher MD  Authorized by: Yoan Fisher MD    Indications / Diagnosis:  Chest pain  ECG reviewed by me, the ED Provider: yes    Patient location:  ED  Previous ECG:     Previous ECG:  Compared to current    Comparison ECG info:  2/16/28    Similarity:  No change    Comparison to cardiac monitor: Yes    Interpretation:     Interpretation: normal    Rate:     ECG rate:  64    ECG rate assessment: normal    Rhythm:     Rhythm: sinus rhythm and paced    Pacing:     Capture:  Complete  Ectopy:     Ectopy: none    QRS:     QRS axis:  Normal             [1]   Social History  Tobacco Use   Smoking Status Former    Types: Cigarettes   Smokeless Tobacco Former   [2] No Known Allergies    "

## 2025-05-16 NOTE — ASSESSMENT & PLAN NOTE
Patient endorses fall out of bed yesterday, she believes that she simply rolled out of bed and tells me she does not fall that often.  Son at bedside states that the falls are generally rare for this patient, however they still wish to hold off of anticoagulation in the event that patient does have future falls

## 2025-05-16 NOTE — ASSESSMENT & PLAN NOTE
Follows with outpatient cardiology  Previously on Lasix, but not presently on diuretic  Continue to monitor

## 2025-05-17 VITALS
BODY MASS INDEX: 17.16 KG/M2 | HEIGHT: 65 IN | OXYGEN SATURATION: 92 % | WEIGHT: 103 LBS | DIASTOLIC BLOOD PRESSURE: 67 MMHG | TEMPERATURE: 97.9 F | RESPIRATION RATE: 16 BRPM | HEART RATE: 65 BPM | SYSTOLIC BLOOD PRESSURE: 153 MMHG

## 2025-05-17 LAB
ANION GAP SERPL CALCULATED.3IONS-SCNC: 6 MMOL/L (ref 4–13)
BUN SERPL-MCNC: 29 MG/DL (ref 5–25)
CALCIUM SERPL-MCNC: 9.3 MG/DL (ref 8.4–10.2)
CHLORIDE SERPL-SCNC: 109 MMOL/L (ref 96–108)
CO2 SERPL-SCNC: 28 MMOL/L (ref 21–32)
CREAT SERPL-MCNC: 0.76 MG/DL (ref 0.6–1.3)
ERYTHROCYTE [DISTWIDTH] IN BLOOD BY AUTOMATED COUNT: 18.7 % (ref 11.6–15.1)
GFR SERPL CREATININE-BSD FRML MDRD: 65 ML/MIN/1.73SQ M
GLUCOSE SERPL-MCNC: 99 MG/DL (ref 65–140)
HCT VFR BLD AUTO: 37.5 % (ref 34.8–46.1)
HGB BLD-MCNC: 12 G/DL (ref 11.5–15.4)
MAGNESIUM SERPL-MCNC: 2 MG/DL (ref 1.9–2.7)
MCH RBC QN AUTO: 33.3 PG (ref 26.8–34.3)
MCHC RBC AUTO-ENTMCNC: 32 G/DL (ref 31.4–37.4)
MCV RBC AUTO: 104 FL (ref 82–98)
PLATELET # BLD AUTO: 90 THOUSANDS/UL (ref 149–390)
PMV BLD AUTO: 11.5 FL (ref 8.9–12.7)
POTASSIUM SERPL-SCNC: 3.9 MMOL/L (ref 3.5–5.3)
RBC # BLD AUTO: 3.6 MILLION/UL (ref 3.81–5.12)
SODIUM SERPL-SCNC: 143 MMOL/L (ref 135–147)
WBC # BLD AUTO: 10.94 THOUSAND/UL (ref 4.31–10.16)

## 2025-05-17 PROCEDURE — 99239 HOSP IP/OBS DSCHRG MGMT >30: CPT | Performed by: PHYSICIAN ASSISTANT

## 2025-05-17 PROCEDURE — 80048 BASIC METABOLIC PNL TOTAL CA: CPT | Performed by: FAMILY MEDICINE

## 2025-05-17 PROCEDURE — 83735 ASSAY OF MAGNESIUM: CPT | Performed by: FAMILY MEDICINE

## 2025-05-17 PROCEDURE — 85027 COMPLETE CBC AUTOMATED: CPT | Performed by: FAMILY MEDICINE

## 2025-05-17 PROCEDURE — 97167 OT EVAL HIGH COMPLEX 60 MIN: CPT

## 2025-05-17 PROCEDURE — 97163 PT EVAL HIGH COMPLEX 45 MIN: CPT

## 2025-05-17 RX ORDER — ASPIRIN 325 MG
325 TABLET ORAL DAILY
Start: 2025-05-18

## 2025-05-17 RX ADMIN — PREDNISONE 5 MG: 5 TABLET ORAL at 09:19

## 2025-05-17 RX ADMIN — FAMOTIDINE 20 MG: 20 TABLET, FILM COATED ORAL at 09:19

## 2025-05-17 RX ADMIN — LEVOTHYROXINE SODIUM 75 MCG: 75 TABLET ORAL at 05:21

## 2025-05-17 RX ADMIN — ACYCLOVIR 400 MG: 200 CAPSULE ORAL at 09:18

## 2025-05-17 RX ADMIN — B-COMPLEX W/ C & FOLIC ACID TAB 1 TABLET: TAB at 09:19

## 2025-05-17 RX ADMIN — DOCUSATE SODIUM 100 MG: 100 CAPSULE, LIQUID FILLED ORAL at 09:19

## 2025-05-17 RX ADMIN — ASPIRIN 325 MG ORAL TABLET 325 MG: 325 PILL ORAL at 09:19

## 2025-05-17 NOTE — PLAN OF CARE
Problem: Prexisting or High Potential for Compromised Skin Integrity  Goal: Skin integrity is maintained or improved  Description: INTERVENTIONS:  - Identify patients at risk for skin breakdown  - Assess and monitor skin integrity including under and around medical devices   - Assess and monitor nutrition and hydration status  - Monitor labs  - Assess for incontinence   - Turn and reposition patient  - Assist with mobility/ambulation  - Relieve pressure over brett prominences   - Avoid friction and shearing  - Provide appropriate hygiene as needed including keeping skin clean and dry  - Evaluate need for skin moisturizer/barrier cream  - Collaborate with interdisciplinary team  - Patient/family teaching  - Consider wound care consult    Assess:  - Review Regan scale daily  - Clean and moisturize skin every 2  - Inspect skin when repositioning, toileting, and assisting with ADLS  - Assess under medical devices such as  every   - Assess extremities for adequate circulation and sensation     Bed Management:  - Have minimal linens on bed & keep smooth, unwrinkled  - Change linens as needed when moist or perspiring  - Avoid sitting or lying in one position for more than 2 hours while in bed?Keep HOB at 30 degrees   - Toileting:  - Offer bedside commode  - Assess for incontinence every 2  - Use incontinent care products after each incontinent episode such as     Activity:  - Mobilize patient  times a day  - Encourage activity and walks on unit  - Encourage or provide ROM exercises   - Turn and reposition patient every  Hours  - Use appropriate equipment to lift or move patient in bed  - Instruct/ Assist with weight shifting every  when out of bed in chair  - Consider limitation of chair time  hour intervals    Skin Care:  - Avoid use of baby powder, tape, friction and shearing, hot water or constrictive clothing  - Relieve pressure over bony prominences using   - Do not massage red bony areas    Next Steps:  - Teach  patient strategies to minimize risks such as   - Consider consults to  interdisciplinary teams such as   Outcome: Progressing

## 2025-05-17 NOTE — PHYSICAL THERAPY NOTE
Physical Therapy Evaluation     Patient's Name: Helen Gaona    Admitting Diagnosis  Chest pain [R07.9]  Abnormal CT scan [R93.89]  Fall, initial encounter [W19.XXXA]    Problem List  Problem List[1]    Past Medical History  Past Medical History:   Diagnosis Date    Aortic insufficiency 12/04/2020    Aortic stenosis     severe    CAD (coronary artery disease)     Essential tremor     Fall 12/11/2020    HTN (hypertension)     HTN (hypertension)     Hyperlipidemia     Hypothyroidism     Leukemia, 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HCC)     s/p splenectomy    Osteoarthritis     Osteoporosis     Urinary incontinence        Past Surgical History  Past Surgical History:   Procedure Laterality Date    COLONOSCOPY      HYSTERECTOMY      IR BIOPSY BONE MARROW  11/6/2024    MA REPLACE AORTIC VALVE OPENFEMORAL ARTERY APPROACH N/A 4/12/2016    Procedure: REPLACEMENT AORTIC VALVE TRANSCATHETER (TAVR) TRANSFEMORAL  26mm Lin 3 valve;  Surgeon: Que Thurston DO;  [facility];  Service: Cardiac Surgery    SPLENECTOMY      for hx hairy cell leukemia          05/17/25 0828   PT Last Visit   PT Visit Date 05/17/25   Note Type   Note type Evaluation   Pain Assessment   Pain Assessment Tool 0-10   Pain Score No Pain   Restrictions/Precautions   Weight Bearing Precautions Per Order No   Other Precautions Cognitive;Chair Alarm;Bed Alarm;Multiple lines;Fall Risk;Visual impairment  (R eye blindness)   Home Living   Type of Home Other (Comment)  ([facility])   Home Layout One level;Access   Bathroom Shower/Tub Tub/shower unit   Bathroom Toilet Standard   Bathroom Equipment Grab bars in shower;Tub transfer bench   Home Equipment Walker   Additional Comments Above information obtained from prior notes.   Prior Function   Level of [place] Independent with functional mobility;Needs assistance with IADLS;Independent with ADLs   Lives With Alone;Facility staff   Receives Help From   (Staff)   IADLs Family/Friend/Other provides  transportation;Family/Friend/Other provides meals;Family/Friend/Other provides medication management   Falls in the last 6 months 1 to 4   Vocational Retired   Comments Pt states was Ind for mobility. pt is ? historian with h/o dementia   General   Family/Caregiver Present No   Cognition   Overall Cognitive Status (S)  Impaired   Arousal/Participation Responsive   Orientation Level Oriented to person;Disoriented to place;Disoriented to time;Disoriented to situation  (Pt able to tell First and middle name with increased recall time for last name)   Following Commands Follows one step commands with increased time or repetition   Comments Pt pleasantly confused.   Subjective   Subjective Agreeable to mobilize   RLE Assessment   RLE Assessment WFL   LLE Assessment   LLE Assessment WFL   Bed Mobility   Supine to Sit Unable to assess   Sit to Supine Unable to assess   Additional Comments Pt OOB in chair upon arrival.   Transfers   Sit to Stand 5  Supervision   Additional items Increased time required;Armrests;Verbal cues   Stand to Sit 5  Supervision   Additional items Armrests;Increased time required;Verbal cues   Additional Comments w/RW- VCs for hand placement   Ambulation/Elevation   Gait pattern Forward Flexion;Narrow REED;Short stride   Gait Assistance 4  Minimal assist  (CG A)   Additional items Assist x 1;Tactile cues;Verbal cues   Assistive Device Rolling walker   Distance 70 FT   Ambulation/Elevation Additional Comments Pt ambulates in hallway with CGA/Min assist. Pt does require assist manuevering RW at times, with constant VC + TC for safety, positioning with RW, posture correction. Tends to push walker away during ambulation.   Balance   Static Sitting Fair +   Dynamic Sitting Fair   Static Standing Fair -   Dynamic Standing Poor +   Ambulatory Poor +   Endurance Deficit   Endurance Deficit Yes   Activity Tolerance   Activity Tolerance Patient limited by fatigue   Medical Staff Made Aware Co-eval with OT 2*  medical complexity and multiple co morbidities   Nurse Made Aware RN cleared pt for therapy   Assessment   Prognosis Fair   Problem List Decreased endurance;Decreased mobility;Impaired judgement;Decreased safety awareness;Decreased cognition   Assessment Pt is a 89 y.o. female seen for PT evaluation s/p admit to [facility] on 5/16/2025. Pt was admitted with a primary dx of: Pulmonary embolism, Fall.Pt has a past medical history of Aortic insufficiency (12/04/2020), Aortic stenosis, CAD (coronary artery disease), Essential tremor, Fall (12/11/2020), HTN (hypertension), HTN (hypertension), Hyperlipidemia, Hypothyroidism, Leukemia, 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HCC), Osteoarthritis, Osteoporosis, and Urinary incontinence.   PT now consulted for assessment of mobility and d/c needs. Pt with Activity as tolerated orders. Pts current clinical presentation is Unstable/ Unpredictable (high complexity) due to Ongoing medical management for primary dx, Decreased activity tolerance compared to baseline, Increased assistance needed from caregiver at current time, Cog status, Trending lab values. [place]. Information obtained from prior notes, pt Ind for mobility and some assist with ADLs and assist for IADLs.Will confirm with CM regarding PLOF.  Upon evaluation, pt currently is requiring supervision for transfers, then ambulating with CG/Min assist with max VC and TC as detailed in flowsheet. Pt presents at PT eval functioning below baseline and currently w/ overall mobility deficits 2* to: impaired balance, decreased endurance, gait deviations, decreased safety awareness, impaired judgement, fall risk. Pt currently at a fall risk 2* to impairments listed above.  Pt will continue to benefit from skilled acute PT interventions to address stated impairments; to maximize functional mobility; for ongoing pt/ family training; and DME needs. At conclusion of PT session chair alarm engaged, all needs in reach, RN notified  of session findings/recommendations, and pt returned back in recliner chair with phone and call bell within reach. Pt denies any further questions at this time. The patient's AM-PAC Basic Mobility Inpatient Short Form Raw Score is 16. A Raw score of less than or equal to 16 suggests the patient may benefit from discharge to post-acute rehabilitation services. Please also refer to the recommendation of the Physical Therapist for safe discharge planning. Recommend Level II upon hospital D/C pending further progress with mobility+ level of assist provided at [facility].   Barriers to Discharge Decreased caregiver support  (? level of assist provided at facility)   Goals   Patient Goals none stated   STG Expiration Date 05/31/25   Short Term Goal #1 STG 1. Pt will be able to perform bed mobility tasks with Sup in order to improve overall functional mobility and assist in safe d/c. STG 2. Pt with sit EOB for at least 25 minutes at Sup level in order to strengthen abdominal musculature and assist in future transfers/ ambulation. STG 3.Pt will be able to ambulate at least 200 feet with least restrictive device with Sup A in order to improve overall functional mobility and assist in safe d/c. STG 4. Pt will improve sitting/standing static/dynamic balance 1/2 grade in order to improve functional mobility and assist in safe d/c.   PT Treatment Day 0   Plan   Treatment/Interventions Functional transfer training;Therapeutic exercise;Gait training;Bed mobility;Spoke to nursing;Spoke to case management;OT   PT Frequency 3-5x/wk   Discharge Recommendation   Rehab Resource Intensity Level, PT II (Moderate Resource Intensity)  (pending further progress with mobility + level of assist provided at [facility])   Equipment Recommended Walker   AM-PAC Basic Mobility Inpatient   Turning in Flat Bed Without Bedrails 3   Lying on Back to Sitting on Edge of Flat Bed Without Bedrails 3   Moving Bed to Chair 3   Standing Up From Chair Using Arms 3   Walk  in Room 3   Climb 3-5 Stairs With Railing 1   Basic Mobility Inpatient Raw Score 16   Basic Mobility Standardized Score 38.32   [facility] Highest Level Of Mobility   -HL Goal 5: Stand one or more mins   -HL Achieved 7: Walk 25 feet or more   Modified Mi Scale   Modified Schaller Scale 4   End of Consult   Patient Position at End of Consult All needs within reach;Bed/Chair alarm activated;Bedside chair         Mattie Mcfarland PT DPT       [1]   Patient Active Problem List  Diagnosis    History of severe aortic stenosis    Urinary incontinence    Osteoporosis    Osteoarthritis    Hairy cell leukemia, in relapse (HCC)    Benign essential hypertension    Essential tremor    Insomnia    S/P TAVR (transcatheter aortic valve replacement)    Left ventricular hypertrophy    Left bundle branch block (LBBB)    Peripheral vertigo    Chronic right-sided low back pain without sciatica    Difficulty reading due to visual problem    Nonrheumatic mitral valve regurgitation    Lightheadedness    Macrocytosis    Abnormal EKG    Fall    Polypharmacy    Tachy-nataliia syndrome (HCC)    Ambulatory dysfunction    Presence of cardiac pacemaker    Chronic fatigue    Bone marrow disease    At high risk of tumor lysis syndrome    Thrombocytopenia (HCC)    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

## 2025-05-17 NOTE — OCCUPATIONAL THERAPY NOTE
Occupational Therapy Evaluation     Patient Name: Helen Gaona  Today's Date: 5/17/2025  Problem List  Principal Problem:    Pulmonary embolism (HCC)  Active Problems:    Hairy cell leukemia, in relapse (HCC)    S/P TAVR (transcatheter aortic valve replacement)    Fall    Past Medical History  Past Medical History:   Diagnosis Date    Aortic insufficiency 12/04/2020    Aortic stenosis     severe    CAD (coronary artery disease)     Essential tremor     Fall 12/11/2020    HTN (hypertension)     HTN (hypertension)     Hyperlipidemia     Hypothyroidism     Leukemia, 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HCC)     s/p splenectomy    Osteoarthritis     Osteoporosis     Urinary incontinence      Past Surgical History  Past Surgical History:   Procedure Laterality Date    COLONOSCOPY      HYSTERECTOMY      IR BIOPSY BONE MARROW  11/6/2024    AK REPLACE AORTIC VALVE OPENFEMORAL ARTERY APPROACH N/A 4/12/2016    Procedure: REPLACEMENT AORTIC VALVE TRANSCATHETER (TAVR) TRANSFEMORAL  26mm Lin 3 valve;  Surgeon: Que Thurston DO;  [facility];  Service: Cardiac Surgery    SPLENECTOMY      for hx hairy cell leukemia             05/17/25 0829   OT Last Visit   OT Visit Date 05/17/25   Note Type   Note type Evaluation   Pain Assessment   Pain Assessment Tool 0-10   Pain Score No Pain   Restrictions/Precautions   Weight Bearing Precautions Per Order No   Other Precautions Cognitive;Chair Alarm;Bed Alarm;Multiple lines;Fall Risk;Visual impairment  (R eye blindness)   Home Living   Type of Home Other (Comment)  ([facility]   Home Layout One level;Access   Bathroom Shower/Tub Tub/shower unit   Bathroom Toilet Standard   Bathroom Equipment Grab bars in shower;Tub transfer bench   Home Equipment Walker   Additional Comments Pt with h/o cognitive deficits, unable to provide hx or PLOF. Information obtained from chart.   Prior Function   Level of [place] Independent with functional mobility;Independent with ADLs;Needs  "assistance with IADLS   Lives With Alone;Facility staff   Receives Help From Other (Comment)  (facility staff)   IADLs Family/Friend/Other provides transportation;Family/Friend/Other provides meals;Family/Friend/Other provides medication management   Falls in the last 6 months 1 to 4  (at least 1 fall prior to admission)   Vocational Retired   Comments When speaking with pt, pt was IND with ADLs PTA, and IND with functional mobility. Pt is a ?historian, will confirm baseline LOF   Lifestyle   Autonomy ?historian, per chart IND with ADLs PTA. Assist IADLs. RW used. (-).   Reciprocal Relationships Lives at [facility].   Service to Others Retired   Intrinsic Gratification Enjoys watching TV   General   Additional Pertinent History ?historian. Prior notes state pt lives at [facility]. Per EMR, pt has baseline mild dementia. Will confirm with CM as pt appears significantly confused.   Family/Caregiver Present No   Subjective   Subjective \"I don't know what I was saying\"   ADL   Where Assessed Chair   Eating Assistance 5  Supervision/Setup   Grooming Assistance 5  Supervision/Setup   UB Bathing Assistance 5  Supervision/Setup   LB Bathing Assistance 5  Supervision/Setup   UB Dressing Assistance 5  Supervision/Setup   LB Dressing Assistance 5  Supervision/Setup   Toileting Assistance  5  Supervision/Setup   Bed Mobility   Supine to Sit Unable to assess   Sit to Supine Unable to assess   Additional Comments Pt OOB in recliner pre/post session, all needs met, call bell within reach. +chair alarm on   Transfers   Sit to Stand 5  Supervision   Additional items Increased time required;Verbal cues   Stand to Sit 5  Supervision   Additional items Increased time required;Verbal cues   Additional Comments RW in stance, VCs for hand placement and directions   Functional Mobility   Functional Mobility   (CGA)   Additional Comments household distances, RW VCs for safety when walking   Additional items Rolling walker " "  Balance   Static Sitting Fair +   Dynamic Sitting Fair   Static Standing Fair -   Dynamic Standing Poor +   Ambulatory Poor +   Activity Tolerance   Activity Tolerance Patient limited by fatigue;Other (Comment)  (cognition)   Medical Staff Made Aware Co-eval w PT 2/2 increased medical complexity and comorbidities.   Nurse Made Aware RN cleared for therapy   RUE Assessment   RUE Assessment WFL   LUE Assessment   LUE Assessment WFL   Hand Function   Gross Motor Coordination Functional   Fine Motor Coordination Functional   Cognition   Overall Cognitive Status (S)  Impaired   Arousal/Participation Alert;Responsive   Attention Difficulty attending to directions   Orientation Level Oriented to person;Disoriented to place;Disoriented to time;Disoriented to situation  (Pt able to state first and middle name, but requires significant time and VCs to recall last name. Only oriented to first name)   Memory Decreased recall of precautions;Decreased recall of recent events;Decreased short term memory;Decreased recall of biographical information;Decreased long term memory   Following Commands Follows one step commands with increased time or repetition   Comments Pt overall pleasant. ?baseline level of cognitive deficits. Pt appears significantly confused on this date, often trails off mid sentance. Requires VCs and some tactile cues for motor planning. Per EMR, pt has h/o \"mild dementia\". Will need to confirm baseline level as pt is in [facility] and is IND with ADLs and functional mobility.   Assessment   Limitation Decreased ADL status;Decreased Safe judgement during ADL;Decreased cognition;Decreased endurance;Decreased self-care trans;Decreased high-level ADLs   Prognosis Fair   Assessment 89 year old pt seen today for an OT evaluation following admission to [facility] 2/2 chest pain, recent fall OOB when sleeping, PE - non-occlusive with present symptoms significant for pain, fatigue, weakness, decreased ADL " status, decreased functional mobility. Pt  has a past medical history of Aortic insufficiency, Aortic stenosis, CAD (coronary artery disease), Essential tremor, Fall, HTN (hypertension), HTN (hypertension), Hyperlipidemia, Hypothyroidism, Leukemia, 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HCC), Osteoarthritis, Osteoporosis, and Urinary incontinence. Pt is a ?historian, information obtained from chart. Pt reports being IND with ADLs at baseline, assist PRN for IADLs. RW used. (-). Pt very pleasant and cooperative t/o session. Pt completed functional STS txfs and functional mobility with SUP/CGA, RW in stance. Pt was SUP for UB ADLs and SUP for LB ADLs. The patient's raw score on the -PAC Daily Activity Inpatient Short Form is 18. A raw score of less than 19 suggests the patient may benefit from discharge to post-acute rehabilitation services. Please refer to the recommendation of the Occupational Therapist for safe discharge planning. Pt is functioning below baseline level of function and will continue to benefit from skilled acute OT to promote increased independence and return to PLOF. At this time, current OT recommendation is Level 2 resources - pending level of assist at baseline. Will continue to follow and assess.   Goals   Patient Goals to eat breakfast   LT Time Frame 10-14   Long Term Goal #1 See below for goals   Plan   Treatment Interventions ADL retraining;Functional transfer training;UE strengthening/ROM;Endurance training;Patient/family training;Cognitive reorientation;Equipment evaluation/education;Fine motor coordination activities;Compensatory technique education;Continued evaluation;Energy conservation;Activityengagement   Goal Expiration Date 05/31/25   OT Frequency 1-2x/wk   Discharge Recommendation   Rehab Resource Intensity Level, OT II (Moderate Resource Intensity)  (pending baseline level of function and level of assist upon d/c.)   [facility] Daily Activity Inpatient   Lower Body Dressing 3   Bathing 3   Toileting  3   Upper Body Dressing 3   Grooming 3   Eating 3   Daily Activity Raw Score 18   Daily Activity Standardized Score (Calc for Raw Score >=11) 38.66   AM-PAC Applied Cognition Inpatient   Following a Speech/Presentation 1   Understanding Ordinary Conversation 2   Taking Medications 1   Remembering Where Things Are Placed or Put Away 1   Remembering List of 4-5 Errands 1   Taking Care of Complicated Tasks 1   Applied Cognition Raw Score 7   Applied Cognition Standardized Score 15.17   End of Consult   Education Provided Yes   Patient Position at End of Consult Bedside chair;Bed/Chair alarm activated;All needs within reach   Nurse Communication Nurse aware of consult         Occupational Therapy Goals    Pt will be SUP with bed mobility with good sitting balance/tolerance to engage in self care tasks within this plan of care.      Pt will be Mod I for UB dressing and bathing with use of assistive devices PRN within this plan of care.     Pt will be Mod I for LB dressing and bathing with use of assistive devices PRN within this plan of care.     Pt will demonstrate standing tolerance of 2-3 minutes increase independence for ADLs/tasks with use of assistive devices PRN within this plan of care.     Pt will complete functional transfers with mod I, with use of appropriate assistive devices within this plan of care.     Pt will demonstrate good carryover of safety awareness with use of appropriate assistive devices during functional tasks within this plan of care.     Pt will demonstrate increased activity tolerance to 30 mins for participation in ADLs and functional tasks within this plan of care.     Pt will complete further functional cognitive assessment with good attention/participation to assist with safe d/c planning recommendations.        Hector Mccullough, MS, OTR/L     MOCA CERTIFIED RATER ID [id number]

## 2025-05-17 NOTE — DISCHARGE SUMMARY
"Discharge Summary - Hospitalist   Name: Helen Gaona 89 y.o. female I MRN: [id number]  Unit/Bed#: [facility] 402-01 I Date of Admission: 5/16/2025   Date of Service: 5/17/2025 I Hospital Day: 0     Assessment & Plan  Pulmonary embolism (HCC)  Suspected pulmonary embolism seen on CT for chest pain  \"Suspected nonocclusive subsegmental right lower lobe pulmonary embolism\"  Patient with history of hairy cell leukemia and chronic pancytopenia, also recent fall yesterday in which she rolled out of bed  Discussed anticoagulation with family at bedside. Could potentially defer anticoagulation for this nonocclusive, subsegmental PE, however patient's history of malignancy will likely put her at an increased risk of thromboembolic disease. Given patient's advanced age, pancytopenia, and recent fall, family would prefer to hold off of anticoagulation at this time. Can continue with high dose aspirin although I did explain to family that this is in no way an adequate anticoagulant.   Low threshold to reach out to pulmonology/hematology if patient's chest pain returns, or has signs/symptoms of worsening PE  Denies cp, sob, CARNES, LH/dizziness.  Ambulating halls without difficulty.  Asx.  D/w family, do not feel repeating CTA would  at this time as pt is asymptomatic and they wish to defer AC regardless.  Return precautions discussed.  Outpt /fu   Hairy cell leukemia, in relapse (HCC)  Patient follows with outpatient oncology Dr. Huynh. S/p 2-CdA   Has chronic pancytopenia, although platelet count and hemoglobin seem to be highest in recent history for this patient.  Patient's platelet count remains 84 from a baseline that appears to be around 20s to 40s.  In discussion of anticoagulation with family, ultimately will hold off of anticoagulation for now, consider reaching out to patient's outpatient hematology/oncology service versus inpatient curbside regarding risk of anticoagulation with patient's history of " pancytopenia.  Per shared decision making with patient and family, will hold anticoagulation for now  Resume prednisone 5mg po qd  S/P TAVR (transcatheter aortic valve replacement)  Follows with outpatient cardiology  Previously on Lasix, but not presently on diuretic  Continue to monitor  Fall  Patient endorses fall out of bed yesterday, she believes that she simply rolled out of bed and tells me she does not fall that often.  Son at bedside states that the falls are generally rare for this patient, however they still wish to hold off of anticoagulation in the event that patient does have future falls     Medical Problems       Resolved Problems  Date Reviewed: 1/27/2025   None       Discharging Physician / Practitioner: Fawn Hernandez PA-C  PCP: JESSICA Pal  Admission Date:   Admission Orders (From admission, onward)       Ordered        05/16/25 1627  Place in Observation  Once                          Discharge Date: 05/17/25    Consultations During Hospital Stay:  None    Procedures Performed:   None    Significant Findings / Test Results:   As above     Incidental Findings:   None     Test Results Pending at Discharge (will require follow up):   None     Outpatient Tests Requested:  None    Complications:  None    Reason for Admission: possible PE    Hospital Course:   Helen Gaona is a 89 y.o. female patient who originally presented to the hospital on 5/16/2025 due to fall OOB.  Evaluated by trauma, no traumatic injuries identified, however CT imaging did note possible PE so she was admitted to medicine service for observation.  It was discussed with family given age/falls, pancytopenia decision was made to observe off AC.  At this time she is asymptomatic, she denies cp, sob, LH/dizziness, she ambulated the halls without difficulty and is on room air.  She is stable for d/c with outpt f/u.  Return precautions discussed for any worsening respiratory sx.     Please see above list of diagnoses and  "related plan for additional information.     Condition at Discharge: good    Discharge Day Visit / Exam:   Subjective:  No complaints, denies sob, cp, LH/dizziness.  Feels well.    Vitals: Blood Pressure: 153/67 (05/16/25 2226)  Pulse: 65 (05/16/25 2226)  Temperature: 97.9 °F (36.6 °C) (05/16/25 2226)  Temp Source: Oral (05/16/25 2226)  Respirations: 16 (05/16/25 2226)  Height: 5' 5\" (165.1 cm) (05/16/25 2226)  Weight - Scale: 46.7 kg (103 lb) (05/16/25 2226)  SpO2: 92 % (05/16/25 2226)  Physical Exam  Vitals reviewed.   Constitutional:       General: She is not in acute distress.     Appearance: She is not toxic-appearing.      Comments: Elderly, frail   HENT:      Head: Normocephalic and atraumatic.     Eyes:      Extraocular Movements: Extraocular movements intact.       Cardiovascular:      Rate and Rhythm: Normal rate and regular rhythm.   Pulmonary:      Effort: Pulmonary effort is normal. No respiratory distress.      Breath sounds: No wheezing or rales.     Musculoskeletal:         General: Normal range of motion.     Neurological:      General: No focal deficit present.      Mental Status: She is alert and oriented to person, place, and time.     Psychiatric:         Mood and Affect: Mood normal.         Behavior: Behavior normal.          Discussion with Family: Updated  (son) at bedside.    Discharge instructions/Information to patient and family:   See after visit summary for information provided to patient and family.      Provisions for Follow-Up Care:  See after visit summary for information related to follow-up care and any pertinent home health orders.      Mobility at time of Discharge:   Basic Mobility Inpatient Raw Score: 18  JH-HLM Goal: 6: Walk 10 steps or more  JH-HLM Achieved: 8: Walk 250 feet ot more  HLM Goal achieved. Continue to encourage appropriate mobility.     Disposition:   Back to      Planned Readmission: no    Discharge Medications:  See after visit summary for " reconciled discharge medications provided to patient and/or family.      Administrative Statements   Discharge Statement:  I have spent a total time of 35 minutes in caring for this patient on the day of the visit/encounter. >30 minutes of time was spent on: Diagnostic results, Risks and benefits of tx options, Instructions for management, Patient and family education, Counseling / Coordination of care, Documenting in the medical record, Reviewing / ordering tests, medicine, procedures  , and Communicating with other healthcare professionals .    **Please Note: This note may have been constructed using a voice recognition system**

## 2025-05-17 NOTE — ASSESSMENT & PLAN NOTE
"Suspected pulmonary embolism seen on CT for chest pain  \"Suspected nonocclusive subsegmental right lower lobe pulmonary embolism\"  Patient with history of hairy cell leukemia and chronic pancytopenia, also recent fall yesterday in which she rolled out of bed  Discussed anticoagulation with family at bedside. Could potentially defer anticoagulation for this nonocclusive, subsegmental PE, however patient's history of malignancy will likely put her at an increased risk of thromboembolic disease. Given patient's advanced age, pancytopenia, and recent fall, family would prefer to hold off of anticoagulation at this time. Can continue with high dose aspirin although I did explain to family that this is in no way an adequate anticoagulant.   Low threshold to reach out to pulmonology/hematology if patient's chest pain returns, or has signs/symptoms of worsening PE  Denies cp, sob, CARNES, LH/dizziness.  Ambulating halls without difficulty.  Asx.  D/w family, do not feel repeating CTA would  at this time as pt is asymptomatic and they wish to defer AC regardless.  Return precautions discussed.  Outpt /fu   "

## 2025-05-17 NOTE — DISCHARGE INSTR - AVS FIRST PAGE
You were diagnosed with a possible blood clot in your lungs.  Given your underlying conditions and that you are feeling well without symptoms, a mutual decision was made to not treat with blood thinner medications.  If you experience worsening trouble breathing, chest pain, lightheadedness, dizziness, please return for evaluation

## 2025-05-17 NOTE — MALNUTRITION/BMI
This medical record reflects one or more clinical indicators suggestive of malnutrition.    Malnutrition Findings:   Adult Malnutrition type: Chronic illness  Adult Degree of Malnutrition: Malnutrition of moderate degree  Malnutrition Characteristics: Fat loss, Muscle loss                360 Statement: Moderate protein calorie malnutrition related to chronic illness as evidenced by muscle wasting to clavicle and temple regions and fat loss to orbital and buccal regions. Treating with regular diet.    BMI Findings:  Adult BMI Classifications: Underweight < 18.5        Body mass index is 17.14 kg/m².     See Nutrition note dated 5/17/2025 for additional details.  Completed nutrition assessment is viewable in the nutrition documentation.

## 2025-05-19 ENCOUNTER — TELEPHONE (OUTPATIENT)
Age: OVER 89
End: 2025-05-19

## 2025-05-19 NOTE — TELEPHONE ENCOUNTER
We can schedule CTA chest only at any time for f/u however if she has SOB, pain with breathing, tachycardia ---- then needs to go back to ED

## 2025-05-19 NOTE — TELEPHONE ENCOUNTER
"PROVIDER:Dr Huynh    Pt son calling with pt update.  Pt was sent to the ER on 5/16 for s/p fall from bed at [place] and chest pain.Pt was d/c back to [place] on 5/17.   Per son They did a CTA and \"inconclusive as per pt son\"  (SEE ER note)Dx: PE Family refusing AC at this time    Son also wanted to know pts plt ct for the NP at [facility] [place].Information was provided to him.  All his questions were answered to his satisfaction today and appreciative of the help.    He would like to know who will be the MD ordereding the f/u CT scan that was recommenced at the hospital?    Pls advise      "

## 2025-05-19 NOTE — TELEPHONE ENCOUNTER
Spoke with patient's son. He verbalized an understanding and would like the follow up CTA to be ordered by our office. He is aware she should go to ER if those symptoms occur but she has been feeling okay since.

## 2025-05-20 ENCOUNTER — TELEPHONE (OUTPATIENT)
Dept: HEMATOLOGY ONCOLOGY | Facility: CLINIC | Age: OVER 89
End: 2025-05-20

## 2025-05-20 ENCOUNTER — RESULTS FOLLOW-UP (OUTPATIENT)
Dept: NON INVASIVE DIAGNOSTICS | Facility: HOSPITAL | Age: OVER 89
End: 2025-05-20

## 2025-05-20 ENCOUNTER — HOSPITAL ENCOUNTER (OUTPATIENT)
Dept: RADIOLOGY | Facility: HOSPITAL | Age: OVER 89
Discharge: HOME | End: 2025-05-20
Attending: PHYSICIAN ASSISTANT
Payer: MEDICARE

## 2025-05-20 ENCOUNTER — REMOTE DEVICE CLINIC VISIT (OUTPATIENT)
Dept: CARDIOLOGY CLINIC | Facility: CLINIC | Age: OVER 89
End: 2025-05-20
Payer: MEDICARE

## 2025-05-20 DIAGNOSIS — Z95.0 CARDIAC PACEMAKER IN SITU: Primary | ICD-10-CM

## 2025-05-20 DIAGNOSIS — R07.9 CHEST PAIN, UNSPECIFIED TYPE: ICD-10-CM

## 2025-05-20 DIAGNOSIS — R07.9 CHEST PAIN, UNSPECIFIED TYPE: Primary | ICD-10-CM

## 2025-05-20 PROCEDURE — 93294 REM INTERROG EVL PM/LDLS PM: CPT | Performed by: STUDENT IN AN ORGANIZED HEALTH CARE EDUCATION/TRAINING PROGRAM

## 2025-05-20 PROCEDURE — 93296 REM INTERROG EVL PM/IDS: CPT | Performed by: STUDENT IN AN ORGANIZED HEALTH CARE EDUCATION/TRAINING PROGRAM

## 2025-05-20 PROCEDURE — 71275 CT ANGIOGRAPHY CHEST: CPT

## 2025-05-20 RX ADMIN — IOHEXOL 75 ML: 350 INJECTION, SOLUTION INTRAVENOUS at 18:07

## 2025-05-20 NOTE — PROGRESS NOTES
"Results for orders placed or performed in visit on 05/20/25   Cardiac EP device report    Narrative    MDT DUAL PM/ ACTIVE SYSTEM IS MRI CONDITIONAL  CARELINK TRANSMISSION: PRESENTING EGRAM AS @ 64 BPM. BATTERY STATUS \"7 YRS.\" AP 35%  98%. ALL AVAILABLE LEAD PARAMETERS WITHIN NORMAL LIMITS. 2 AT/AF NOTED; 0% BURDEN; PT ON ASA. NO AC; EGRAMS PRESENT AS PAT AND FF OVSENSING. NORMAL DEVICE FUNCTION. NC         "

## 2025-05-20 NOTE — TELEPHONE ENCOUNTER
Scheduled STAT CT scan for 3:30 at [facility]. Called and spoke with Clemente. He stated he will call his brother and the facility to arrange transportation. He will call back to reschedule if time does not work.

## 2025-05-21 ENCOUNTER — TELEPHONE (OUTPATIENT)
Dept: HEMATOLOGY ONCOLOGY | Facility: CLINIC | Age: OVER 89
End: 2025-05-21

## 2025-05-21 NOTE — TELEPHONE ENCOUNTER
Spoke with patient's son Clemente who is patient's medical power of .  Reviewed CTA imaging of the chest.  This is still showing a likely subsegmental right lower lobe PE.  She did not start anticoagulation in the hospital.  I reviewed with him it was per the family that they did not want this started.  He stated this may be secondary to brother Jose Guadalupe.  Patient's son and he is in favor of her going on anticoagulation.  Explained that the PE could enlarge over time and cause more issues.    Due to her age, thrombocytopenia (though mild), recent cancer history would recommend a lower dose of anticoagulation as she is a fall risk.  Will proceed with Eliquis 2.5 mg twice daily.  Clemente  is agreeable.  I spoke with staff at her facility.  A written order form was faxed to them.    DC aspirin since she will be starting Eliquis.

## 2025-07-07 ENCOUNTER — TELEPHONE (OUTPATIENT)
Dept: HEMATOLOGY ONCOLOGY | Facility: CLINIC | Age: OVER 89
End: 2025-07-07

## 2025-07-07 PROBLEM — D53.9 MACROCYTIC ANEMIA: Status: ACTIVE | Noted: 2025-07-07

## 2025-07-07 NOTE — TELEPHONE ENCOUNTER
Attempted to contact patient in regards to missed visit with Dr Huynh. Left a detailed voicemail advising the patient to call the [facility] to reschedule.

## 2025-08-22 ENCOUNTER — REMOTE DEVICE CLINIC VISIT (OUTPATIENT)
Dept: CARDIOLOGY CLINIC | Facility: CLINIC | Age: OVER 89
End: 2025-08-22
Payer: MEDICARE

## 2025-08-22 DIAGNOSIS — Z95.0 PRESENCE OF PERMANENT CARDIAC PACEMAKER: Primary | ICD-10-CM

## 2025-08-22 PROCEDURE — 93296 REM INTERROG EVL PM/IDS: CPT | Performed by: STUDENT IN AN ORGANIZED HEALTH CARE EDUCATION/TRAINING PROGRAM

## 2025-08-22 PROCEDURE — 93294 REM INTERROG EVL PM/LDLS PM: CPT | Performed by: STUDENT IN AN ORGANIZED HEALTH CARE EDUCATION/TRAINING PROGRAM

## (undated) RX ORDER — BIMATOPROST 0.1 MG/ML: 1 SOLUTION/ DROPS OPHTHALMIC EVERY EVENING